# Patient Record
Sex: MALE | Race: WHITE | HISPANIC OR LATINO | Employment: FULL TIME | URBAN - METROPOLITAN AREA
[De-identification: names, ages, dates, MRNs, and addresses within clinical notes are randomized per-mention and may not be internally consistent; named-entity substitution may affect disease eponyms.]

---

## 2017-11-06 ENCOUNTER — HOSPITAL ENCOUNTER (EMERGENCY)
Facility: HOSPITAL | Age: 52
Discharge: HOME | End: 2017-11-06
Attending: EMERGENCY MEDICINE

## 2017-11-06 ENCOUNTER — APPOINTMENT (EMERGENCY)
Dept: RADIOLOGY | Facility: HOSPITAL | Age: 52
End: 2017-11-06

## 2017-11-06 VITALS
DIASTOLIC BLOOD PRESSURE: 82 MMHG | SYSTOLIC BLOOD PRESSURE: 141 MMHG | WEIGHT: 228 LBS | HEIGHT: 71 IN | RESPIRATION RATE: 19 BRPM | TEMPERATURE: 97.9 F | BODY MASS INDEX: 31.92 KG/M2 | OXYGEN SATURATION: 98 % | HEART RATE: 66 BPM
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
## 2017-11-06 DIAGNOSIS — S39.012A ACUTE MYOFASCIAL STRAIN OF LUMBAR REGION, INITIAL ENCOUNTER: ICD-10-CM

## 2017-11-06 PROCEDURE — 99284 EMERGENCY DEPT VISIT MOD MDM: CPT

## 2017-11-06 PROCEDURE — 72100 X-RAY EXAM L-S SPINE 2/3 VWS: CPT

## 2017-11-06 RX ORDER — METHOCARBAMOL 750 MG/1
750 TABLET, FILM COATED ORAL EVERY 6 HOURS PRN
Qty: 12 TABLET | Refills: 0 | Status: SHIPPED | OUTPATIENT
Start: 2017-11-06 | End: 2018-11-19 | Stop reason: HOSPADM

## 2017-11-06 RX ORDER — METHOCARBAMOL 500 MG/1
1000 TABLET, FILM COATED ORAL ONCE
Status: COMPLETED | OUTPATIENT
Start: 2017-11-06 | End: 2017-11-06

## 2017-11-06 RX ORDER — NAPROXEN 500 MG/1
500 TABLET ORAL 2 TIMES DAILY PRN
Qty: 14 TABLET | Refills: 0 | Status: SHIPPED | OUTPATIENT
Start: 2017-11-06 | End: 2018-11-19 | Stop reason: HOSPADM

## 2017-11-06 RX ORDER — IBUPROFEN 600 MG/1
600 TABLET ORAL ONCE
Status: COMPLETED | OUTPATIENT
Start: 2017-11-06 | End: 2017-11-06

## 2017-11-06 RX ADMIN — IBUPROFEN 600 MG: 600 TABLET, FILM COATED ORAL at 06:38

## 2017-11-06 RX ADMIN — METHOCARBAMOL 1000 MG: 500 TABLET ORAL at 06:38
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
## 2018-11-19 ENCOUNTER — APPOINTMENT (EMERGENCY)
Dept: RADIOLOGY | Facility: HOSPITAL | Age: 53
End: 2018-11-19

## 2018-11-19 ENCOUNTER — HOSPITAL ENCOUNTER (EMERGENCY)
Facility: HOSPITAL | Age: 53
Discharge: HOME | End: 2018-11-19
Attending: EMERGENCY MEDICINE | Admitting: EMERGENCY MEDICINE

## 2018-11-19 VITALS
SYSTOLIC BLOOD PRESSURE: 148 MMHG | DIASTOLIC BLOOD PRESSURE: 93 MMHG | WEIGHT: 225 LBS | TEMPERATURE: 98.8 F | OXYGEN SATURATION: 99 % | HEART RATE: 87 BPM | RESPIRATION RATE: 16 BRPM | BODY MASS INDEX: 31.38 KG/M2

## 2018-11-19 DIAGNOSIS — S83.90XA KNEE SPRAIN: Primary | ICD-10-CM

## 2018-11-19 PROCEDURE — 73562 X-RAY EXAM OF KNEE 3: CPT

## 2018-11-19 PROCEDURE — 99283 EMERGENCY DEPT VISIT LOW MDM: CPT

## 2018-11-19 RX ORDER — IBUPROFEN 600 MG/1
600 TABLET ORAL EVERY 6 HOURS PRN
Qty: 30 TABLET | Refills: 0 | Status: SHIPPED | OUTPATIENT
Start: 2018-11-19 | End: 2019-02-04

## 2018-11-19 NOTE — DISCHARGE INSTRUCTIONS
Knee Sprain   WHAT YOU NEED TO KNOW:   A knee sprain occurs when one or more ligaments in your knee are suddenly stretched or torn  Ligaments are tissues that hold bones together  Ligaments support the knee and keep the joint and bones in the correct position  DISCHARGE INSTRUCTIONS:   Seek care immediately if:   · Any part of your leg feels cold, numb, or looks pale     Contact your healthcare provider if:   · You have new or increased swelling, bruising, or pain in your knee  · Your symptoms do not improve within 6 weeks, even with treatment  · You have questions or concerns about your condition or care  Medicines:   · NSAIDs , such as ibuprofen, help decrease swelling, pain, and fever  This medicine is available with or without a doctor's order  NSAIDs can cause stomach bleeding or kidney problems in certain people  If you take blood thinner medicine, always ask your healthcare provider if NSAIDs are safe for you  Always read the medicine label and follow directions  · Acetaminophen  decreases pain and fever  It is available without a doctor's order  Ask how much to take and how often to take it  Follow directions  Read the labels of all other medicines you are using to see if they also contain acetaminophen, or ask your doctor or pharmacist  Acetaminophen can cause liver damage if not taken correctly  Do not use more than 4 grams (4,000 milligrams) total of acetaminophen in one day  · Prescription pain medicine  may be given  Ask how to take this medicine safely  · Take your medicine as directed  Contact your healthcare provider if you think your medicine is not helping or if you have side effects  Tell him or her if you are allergic to any medicine  Keep a list of the medicines, vitamins, and herbs you take  Include the amounts, and when and why you take them  Bring the list or the pill bottles to follow-up visits  Carry your medicine list with you in case of an emergency    Self-care: · Rest  your knee and do not exercise  You may be told to keep weight off your knee  This means that you should not walk on your injured leg  Rest helps decrease swelling and allows the injury to heal  You can do gentle range of motion (ROM) exercises as directed  This will prevent stiffness  · Apply ice  on your knee for 15 to 20 minutes every hour or as directed  Use an ice pack, or put crushed ice in a plastic bag  Cover it with a towel  Ice helps prevent tissue damage and decreases swelling and pain  · Apply compression to your knee as directed  You may need to wear an elastic bandage  This helps keep your injured knee from moving too much while it heals  You can loosen or tighten the elastic bandage to make it comfortable  It should be tight enough for you to feel support  It should not be so tight that it causes your toes to feel numb or tingly  If you are wearing an elastic bandage, take it off and rewrap it once a day  · Elevate your knee  above the level of your heart as often as you can  This will help decrease swelling and pain  Prop your leg on pillows or blankets to keep it elevated comfortably  Do not put pillows directly behind your knee  · Use support devices as directed:  Support devices such as a splint or brace may be needed  These devices limit movement and protect your joint while it heals  You may be given crutches to use until you can stand on your injured leg without pain  Use devices as directed  Physical therapy:  A physical therapist teaches you exercises to help improve movement and strength, and to decrease pain  Prevent another knee sprain:  Exercise your legs to keep your muscles strong  Strong leg muscles help protect your knee and prevent strain  The following may also prevent a knee sprain:  · Slowly start your exercise or training program   Slowly increase the time, distance, and intensity of your exercise   Sudden increases in training may cause you to injure your knee again  · Wear protective braces and equipment as directed  Braces may prevent your knee from moving the wrong way and causing another sprain  Protective equipment may support your bones and ligaments to prevent injury  · Warm up and stretch before exercise  Warm up by walking or using an exercise bike before starting your regular exercise  Do gentle stretches after warming up  This helps to loosen your muscles and decrease stress on your knee  Cool down and stretch after you exercise  · Wear shoes that fit correctly and support your feet  Replace your running or exercise shoes before the padding or shock absorption is worn out  Ask your healthcare provider which exercise shoes are best for you  Ask if you should wear special shoe inserts  Shoe inserts can help support your heels and arches or keep your foot lined up correctly in your shoes  Exercise on flat surfaces  Follow up with your healthcare provider as directed:  Write down your questions so you remember to ask them during your visits  © 2017 [address] [employer] Information is for End User's use only and may not be sold, redistributed or otherwise used for commercial purposes  All illustrations and images included in CareNotes® are the copyrighted property of [employer]  or Khalif Alvarado  The above information is an  only  It is not intended as medical advice for individual conditions or treatments  Talk to your doctor, nurse or pharmacist before following any medical regimen to see if it is safe and effective for you

## 2018-11-19 NOTE — ED PROVIDER NOTES
History  Chief Complaint   Patient presents with    Knee Pain     left knee pain for about a week  no specific injury       History provided by:  Patient   used: No    Medical Problem   Location:  Left knee pain for 2 weeks  Severity:  Mild  Onset quality:  Gradual  Duration:  2 weeks  Progression:  Unchanged  Chronicity:  New  Associated symptoms: no abdominal pain, no chest pain, no congestion, no cough, no diarrhea, no ear pain, no fatigue, no fever, no headaches, no loss of consciousness, no myalgias, no nausea, no rash, no rhinorrhea, no shortness of breath, no sore throat, no vomiting and no wheezing        Prior to Admission Medications   Prescriptions Last Dose Informant Patient Reported? Taking?   insulin aspart (NovoLOG) 100 units/mL injection Not Taking at Unknown time  Yes No   Sig: Inject under the skin 3 (three) times a day before meals      Facility-Administered Medications: None       Past Medical History:   Diagnosis Date    Carpal tunnel syndrome     Diabetes mellitus ([facility] [address] )     Umbilical hernia        Past Surgical History:   Procedure Laterality Date    HAND SURGERY      UMBILICAL HERNIA REPAIR         History reviewed  No pertinent family history  I have reviewed and agree with the history as documented  Social History   Substance Use Topics    Smoking status: Current Every Day Smoker     Packs/day: 0 25    Smokeless tobacco: Never Used    Alcohol use Yes      Comment: rarely        Review of Systems   Constitutional: Negative  Negative for fatigue and fever  HENT: Negative  Negative for congestion, ear pain, rhinorrhea and sore throat  Eyes: Negative  Respiratory: Negative  Negative for cough, shortness of breath and wheezing  Cardiovascular: Negative for chest pain  Gastrointestinal: Negative  Negative for abdominal pain, diarrhea, nausea and vomiting  Endocrine: Negative  Genitourinary: Negative  Musculoskeletal: Negative    Negative for myalgias  Skin: Negative  Negative for rash  Allergic/Immunologic: Negative  Neurological: Negative  Negative for loss of consciousness and headaches  Hematological: Negative  Psychiatric/Behavioral: Negative  All other systems reviewed and are negative  Physical Exam  Physical Exam   Constitutional: He appears well-developed and well-nourished  HENT:   Head: Normocephalic  Right Ear: External ear normal    Left Ear: External ear normal    Nose: Nose normal    Mouth/Throat: Oropharynx is clear and moist    Eyes: Pupils are equal, round, and reactive to light  Conjunctivae and EOM are normal    Neck: Normal range of motion  Neck supple  Cardiovascular: Normal rate, regular rhythm and normal heart sounds  Pulmonary/Chest: Effort normal and breath sounds normal    Abdominal: Soft  Bowel sounds are normal    Musculoskeletal: Normal range of motion  Left knee  From +crepitace no ballotment  Minor anterior meniscal tenderness   No ant/post drawer no valgus no varus    Neurological: He is alert  Skin: Skin is warm  Psychiatric: He has a normal mood and affect  His behavior is normal    Nursing note and vitals reviewed  Vital Signs  ED Triage Vitals [11/19/18 1313]   Temperature Pulse Respirations Blood Pressure SpO2   98 8 °F (37 1 °C) 87 16 148/93 99 %      Temp Source Heart Rate Source Patient Position - Orthostatic VS BP Location FiO2 (%)   Tympanic -- Sitting Left arm --      Pain Score       --           Vitals:    11/19/18 1313   BP: 148/93   Pulse: 87   Patient Position - Orthostatic VS: Sitting       Visual Acuity      ED Medications  Medications - No data to display    Diagnostic Studies  Results Reviewed     None                 XR knee 3 vw left non injury   Final Result by Yasmany Cullen MD (11/19 9002)         1  No acute osseous abnormality  2   Findings suggestive of old medial collateral ligament injury (Akin-Stieda)  3   Small joint effusion  Workstation performed: [id number]                    Procedures  Procedures       Phone Contacts  ED Phone Contact    ED Course                               MDM  CritCare Time    Disposition  Final diagnoses:   Knee sprain     Time reflects when diagnosis was documented in both MDM as applicable and the Disposition within this note     Time User Action Codes Description Comment    11/19/2018  2:27 PM Hanane Enriquez, [address] Knee sprain       ED Disposition     ED Disposition Condition Comment    Discharge  Marlon Crimes discharge to home/self care  Condition at discharge: Good        Follow-up Information     Follow up With Specialties Details Why Contact Info Additional [phone] [facility] Schedule an appointment as soon as possible for a visit  [address] [facility] [place], [place], [address]          Discharge Medication List as of 11/19/2018  2:29 PM      START taking these medications    Details   ibuprofen (MOTRIN) 600 mg tablet Take 1 tablet (600 mg total) by mouth every 6 (six) hours as needed (pain), Starting Mon 11/19/2018, Print         CONTINUE these medications which have NOT CHANGED    Details   insulin aspart (NovoLOG) 100 units/mL injection Inject under the skin 3 (three) times a day before meals, Historical Med      methocarbamol (ROBAXIN) 750 mg tablet Take 1 tablet by mouth every 6 (six) hours as needed for muscle spasms, Starting Mon 11/6/2017, Print      naproxen (NAPROSYN) 500 mg tablet Take 1 tablet by mouth 2 (two) times a day as needed for moderate pain, Starting Mon 11/6/2017, Print           No discharge procedures on file      ED Provider  Electronically Signed by           Imani Gregg PA-C  11/19/18 3731

## 2018-12-03 ENCOUNTER — APPOINTMENT (EMERGENCY)
Dept: RADIOLOGY | Facility: HOSPITAL | Age: 53
End: 2018-12-03

## 2018-12-03 ENCOUNTER — HOSPITAL ENCOUNTER (EMERGENCY)
Facility: HOSPITAL | Age: 53
Discharge: HOME | End: 2018-12-03
Attending: EMERGENCY MEDICINE

## 2018-12-03 ENCOUNTER — APPOINTMENT (EMERGENCY)
Dept: CT IMAGING | Facility: HOSPITAL | Age: 53
End: 2018-12-03

## 2018-12-03 VITALS
HEART RATE: 77 BPM | TEMPERATURE: 97 F | SYSTOLIC BLOOD PRESSURE: 158 MMHG | OXYGEN SATURATION: 98 % | RESPIRATION RATE: 18 BRPM | DIASTOLIC BLOOD PRESSURE: 97 MMHG | WEIGHT: 227.29 LBS | BODY MASS INDEX: 31.7 KG/M2

## 2018-12-03 DIAGNOSIS — S32.2XXA CLOSED FRACTURE OF COCCYX, INITIAL ENCOUNTER (HCC): Primary | ICD-10-CM
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
## 2018-12-03 DIAGNOSIS — S09.90XA INJURY OF HEAD, INITIAL ENCOUNTER: ICD-10-CM

## 2018-12-03 LAB — GLUCOSE SERPL-MCNC: 95 MG/DL (ref 70–99)

## 2018-12-03 PROCEDURE — 72220 X-RAY EXAM SACRUM TAILBONE: CPT

## 2018-12-03 PROCEDURE — 70450 CT HEAD/BRAIN W/O DYE: CPT

## 2018-12-03 PROCEDURE — 99284 EMERGENCY DEPT VISIT MOD MDM: CPT

## 2018-12-03 PROCEDURE — 96372 THER/PROPH/DIAG INJ SC/IM: CPT

## 2018-12-03 PROCEDURE — 82948 REAGENT STRIP/BLOOD GLUCOSE: CPT

## 2018-12-03 RX ORDER — DOCUSATE SODIUM 100 MG/1
100 CAPSULE, LIQUID FILLED ORAL EVERY 12 HOURS
Qty: 60 CAPSULE | Refills: 0 | Status: SHIPPED | OUTPATIENT
Start: 2018-12-03 | End: 2019-02-04

## 2018-12-03 RX ORDER — ACETAMINOPHEN AND CODEINE PHOSPHATE 300; 30 MG/1; MG/1
1-2 TABLET ORAL EVERY 6 HOURS PRN
Qty: 15 TABLET | Refills: 0 | Status: SHIPPED | OUTPATIENT
Start: 2018-12-03 | End: 2018-12-13

## 2018-12-03 RX ORDER — KETOROLAC TROMETHAMINE 30 MG/ML
15 INJECTION, SOLUTION INTRAMUSCULAR; INTRAVENOUS ONCE
Status: COMPLETED | OUTPATIENT
Start: 2018-12-03 | End: 2018-12-03

## 2018-12-03 RX ORDER — NAPROXEN 500 MG/1
500 TABLET ORAL 2 TIMES DAILY WITH MEALS
Qty: 30 TABLET | Refills: 0 | Status: SHIPPED | OUTPATIENT
Start: 2018-12-03 | End: 2019-02-04

## 2018-12-03 RX ADMIN — KETOROLAC TROMETHAMINE 15 MG: 30 INJECTION, SOLUTION INTRAMUSCULAR; INTRAVENOUS at 13:00

## 2018-12-03 NOTE — DISCHARGE INSTRUCTIONS
Coccyx Injury   WHAT YOU NEED TO KNOW:   A coccyx (tailbone) injury is when your coccyx breaks, dislocates, or is not stable  The coccyx is a small bone shaped like a triangle that forms the bottom of your spine  DISCHARGE INSTRUCTIONS:   Medicines: You may need any of the following:  · NSAIDs  decrease swelling and pain or fever  This medicine can be bought with or without a doctor's order  This medicine can cause stomach bleeding or kidney problems in certain people  If you take blood thinner medicine, always ask your healthcare provider if NSAIDs are safe for you  Always read the medicine label and follow the directions on it before using this medicine  · Prescription pain medicine  may be given to decrease pain  Do not wait until the pain is severe before you take this medicine  · A bowel movement softener  makes it easier and less painful for you to have a bowel movement  · Take your medicine as directed  Contact your healthcare provider if you think your medicine is not helping or if you have side effects  Tell him if you are allergic to any medicine  Keep a list of the medicines, vitamins, and herbs you take  Include the amounts, and when and why you take them  Bring the list or the pill bottles to follow-up visits  Carry your medicine list with you in case of an emergency  Self-care:   · Use a donut-shaped cushion  to decrease pain and support your coccyx when you sit  · Ice  helps decrease swelling and pain  Ice may also help prevent tissue damage  Use an ice pack, or put crushed ice in a plastic bag  Cover it with a towel and place it on your coccyx for 15 to 20 minutes every hour or as directed  · Sleep  on a firm mattress  Place a pillow under your knees if you sleep on your back  Or, sleep on your side with a pillow between your knees  This will decrease pain and tension in your coccyx and back    Follow up with your healthcare provider as directed:  Write down your questions so you remember to ask them during your visits  Contact your healthcare provider if:   · You have trouble urinating or having a bowel movement  · Your pain or swelling get worse or do not go away with treatment  · You have a fever  · You have questions or concerns about your condition or care  Return to the emergency department if:   · You have trouble breathing  · You cannot move your legs  · Your legs suddenly go numb  · You have severe pain  © 2017 [address] [facility] Information is for End User's use only and may not be sold, redistributed or otherwise used for commercial purposes  All illustrations and images included in CareNotes® are the copyrighted property of A D A M [employer] Inc  or [employer]var[employer]  The above information is an  only  It is not intended as medical advice for individual conditions or treatments  Talk to your doctor, nurse or pharmacist before following any medical regimen to see if it is safe and effective for you  Head Injury   WHAT YOU NEED TO KNOW:   A head injury is most often caused by a blow to the head  This may occur from a fall, bicycle injury, sports injury, being struck in the head, or a motor vehicle accident  DISCHARGE INSTRUCTIONS:   Call 911 or have someone else call for any of the following:   · You cannot be woken  · You have a seizure  · You stop responding to others or you faint  · You have blurry or double vision  · Your speech becomes slurred or confused  · You have arm or leg weakness, loss of feeling, or new problems with coordination  · Your pupils are larger than usual or one pupil is a different size than the other  · You have blood or clear fluid coming out of your ears or nose  Return to the emergency department if:   · You have repeated or forceful vomiting  · You feel confused  · Your headache gets worse or becomes severe      · You or someone caring for you notices that you are harder to wake than usual   Contact your healthcare provider if:   · Your symptoms last longer than 6 weeks after the injury  · You have questions or concerns about your condition or care  Medicines:   · Acetaminophen  decreases pain  Acetaminophen is available without a doctor's order  Ask how much to take and how often to take it  Follow directions  Acetaminophen can cause liver damage if not taken correctly  · Take your medicine as directed  Contact your healthcare provider if you think your medicine is not helping or if you have side effects  Tell him or her if you are allergic to any medicine  Keep a list of the medicines, vitamins, and herbs you take  Include the amounts, and when and why you take them  Bring the list or the pill bottles to follow-up visits  Carry your medicine list with you in case of an emergency  Self-care:   · Rest  or do quiet activities for 24 to 48 hours  Limit your time watching TV, using the computer, or doing tasks that require a lot of thinking  Slowly return to your normal activities as directed  Do not play sports or do activities that may cause you to get hit in the head  Ask your healthcare provider when you can return to sports  · Apply ice  on your head for 15 to 20 minutes every hour or as directed  Use an ice pack, or put crushed ice in a plastic bag  Cover it with a towel before you apply it to your skin  Ice helps prevent tissue damage and decreases swelling and pain  · Have someone stay with you for 24 hours  or as directed  This person can monitor you for complications and call [phone]  When you are awake the person should ask you a few questions to see if you are thinking clearly  An example would be to ask your name or your address  Prevent another head injury:   · Wear a helmet that fits properly  Do this when you play sports, or ride a bike, scooter, or skateboard  Helmets help decrease your risk of a serious head injury   Talk to your healthcare provider about other ways you can protect yourself if you play sports  · Wear your seat belt every time you are in a car  This helps to decrease your risk for a head injury if you are in a car accident  Follow up with your healthcare provider as directed:  Write down your questions so you remember to ask them during your visits  © 2017 [address] [employer] Information is for End User's use only and may not be sold, redistributed or otherwise used for commercial purposes  All illustrations and images included in CareNotes® are the copyrighted property of [employer]  or Khalif Alvarado  The above information is an  only  It is not intended as medical advice for individual conditions or treatments  Talk to your doctor, nurse or pharmacist before following any medical regimen to see if it is safe and effective for you

## 2018-12-03 NOTE — ED PROVIDER NOTES
History  Chief Complaint   Patient presents with    Fall     Patient was walking into house and slipped falling onto his tailbone  Fall happened on Friday night  No LOC  Having pain more on left side of tailbone and pain going down right leg with movement  41-year-old male presenting after fall 3 days ago  Patient reports he was drinking alcohol 3 days ago when he started feeling dizzy and he when outside  All he was coming back into the house patient states he tripped and fell backward  He does admit to hitting his head but no LOC  He reports taking tylenol at home with only minimal relief  He denies any blurry vision, dizziness, loss of vision, diplopia, numnbess, tingling or weakness currently  He denies any neck or back pain  He denies any dysuria, hematuria, bowel or bladder dysfunction, saddle anesthesia after the fall  Prior to Admission Medications   Prescriptions Last Dose Informant Patient Reported? Taking?   ibuprofen (MOTRIN) 600 mg tablet   No No   Sig: Take 1 tablet (600 mg total) by mouth every 6 (six) hours as needed (pain)   insulin aspart (NovoLOG) 100 units/mL injection   Yes No   Sig: Inject under the skin 3 (three) times a day before meals      Facility-Administered Medications: None       Past Medical History:   Diagnosis Date    Carpal tunnel syndrome     Chronic pain     left arm    Diabetes mellitus ([facility] [address] )     Hypertension     Umbilical hernia        Past Surgical History:   Procedure Laterality Date    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    HAND SURGERY      UMBILICAL HERNIA REPAIR         History reviewed  No pertinent family history  I have reviewed and agree with the history as documented  Social History   Substance Use Topics    Smoking status: Current Every Day Smoker     Packs/day: 0 25    Smokeless tobacco: Never Used    Alcohol use Yes      Comment: rarely        Review of Systems   All other systems reviewed and are negative        Physical Exam  Physical Exam   Constitutional: He is oriented to person, place, and time  He appears well-developed and well-nourished  No distress  HENT:   Head: Normocephalic and atraumatic  Eyes: Conjunctivae are normal    EOM grossly intact   Neck: Normal range of motion  Neck supple  No JVD present  Cardiovascular: Normal rate  Pulmonary/Chest: Effort normal    Abdominal: Soft  Musculoskeletal:        Arms:       Legs:  FROM, steady gait, cap refill brisk, strength and sensation grossly intact throughout   Neurological: He is alert and oriented to person, place, and time  Skin: Skin is warm and dry  Capillary refill takes less than 2 seconds  Psychiatric: He has a normal mood and affect  His behavior is normal    Nursing note and vitals reviewed  Vital Signs  ED Triage Vitals [12/03/18 1112]   Temperature Pulse Respirations Blood Pressure SpO2   (!) 97 °F (36 1 °C) 77 18 158/97 98 %      Temp Source Heart Rate Source Patient Position - Orthostatic VS BP Location FiO2 (%)   Tympanic Monitor Sitting Left arm --      Pain Score       6           Vitals:    12/03/18 1112   BP: 158/97   Pulse: 77   Patient Position - Orthostatic VS: Sitting       Visual Acuity      ED Medications  Medications   ketorolac (TORADOL) injection 15 mg (not administered)       Diagnostic Studies  Results Reviewed     Procedure Component Value Units Date/Time    Fingerstick Glucose (POCT) [[id number]]  (Normal) Collected:  12/03/18 1109    Lab Status:  Final result Updated:  12/03/18 1120     POC Glucose 95 mg/dl                  CT head without contrast   Final Result by Dominic Bermudez MD (12/03 1249)      No acute intracranial abnormality  Workstation performed: [id number]         XR sacrum and coccyx   Final Result by Dominic Bermudez MD (12/03 1224)      Mid coccygeal fracture           Workstation performed: [id number]                    Procedures  Procedures       Phone Contacts  ED Phone Contact    ED Course  ED Course as of Dec 03 1255   Mon Dec 03, 2018   1229 Spoke with pt about results of xray, will await CT head                                MDM  Number of Diagnoses or Management Options  Diagnosis management comments: 71-year-old male presenting 3 days status post fall, patient did have a coccygeal fracture on x-ray, CT head was negative, will send home with rx for analgesia, advised to buy a donut to sit on at home for comfort, will provide information to f/u with pcp and ortho outpatient    All imaging discussed with patient, strict return to ED precautions discussed  Pt verbalizes understanding and agrees with plan  Pt is stable for discharge    Portions of the record may have been created with voice recognition software  Occasional wrong word or "sound a like" substitutions may have occurred due to the inherent limitations of voice recognition software  Read the chart carefully and recognize, using context, where substitutions have occurred  CritCare Time    Disposition  Final diagnoses:   Closed fracture of coccyx, initial encounter [facility])   Fall, initial encounter   Injury of head, initial encounter     Time reflects when diagnosis was documented in both MDM as applicable and the Disposition within this note     Time User Action Codes Description Comment    12/3/2018 12:53 PM Ivonne Meckel Add [S32  2XXA] Closed fracture of coccyx, initial encounter ([facility] [address] [facility]     12/3/2018 12:54 PM Ivonne Meckel Add [A69  PJPI] Fall, initial encounter     12/3/2018 12:54 PM Ebbie Common C Add [S09 90XA] Injury of head, initial encounter       ED Disposition     ED Disposition Condition Comment    Discharge  Lina Fought discharge to home/self care      Condition at discharge: Good        Follow-up Information     Follow up With Specialties Details Why Contact Info Additional Information    Ti Morrissey MD Family Medicine Schedule an appointment as soon as possible for a visit As needed 21 [phone] [address] [facility] [id number] [address] [facility] Orthopedic Surgery Schedule an appointment as soon as possible for a visit As needed [facility] [phone] [address], [address]          Patient's Medications   Discharge Prescriptions    ACETAMINOPHEN-CODEINE (TYLENOL #3) 300-30 MG PER TABLET    Take 1-2 tablets by mouth every 6 (six) hours as needed for moderate pain for up to 10 days       Start Date: 12/3/2018 End Date: 12/13/2018       Order Dose: 1-2 tablets       Quantity: 15 tablet    Refills: 0    DOCUSATE SODIUM (COLACE) 100 MG CAPSULE    Take 1 capsule (100 mg total) by mouth every 12 (twelve) hours       Start Date: 12/3/2018 End Date: --       Order Dose: 100 mg       Quantity: 60 capsule    Refills: 0    NAPROXEN (NAPROSYN) 500 MG TABLET    Take 1 tablet (500 mg total) by mouth 2 (two) times a day with meals       Start Date: 12/3/2018 End Date: --       Order Dose: 500 mg       Quantity: 30 tablet    Refills: 0     No discharge procedures on file      ED Provider  Electronically Signed by           Danny Sadler PA-C  12/03/18 7044

## 2018-12-06 ENCOUNTER — APPOINTMENT (EMERGENCY)
Dept: RADIOLOGY | Facility: HOSPITAL | Age: 53
End: 2018-12-06

## 2018-12-06 ENCOUNTER — HOSPITAL ENCOUNTER (EMERGENCY)
Facility: HOSPITAL | Age: 53
Discharge: HOME | End: 2018-12-06
Attending: EMERGENCY MEDICINE | Admitting: EMERGENCY MEDICINE

## 2018-12-06 VITALS
BODY MASS INDEX: 31.85 KG/M2 | DIASTOLIC BLOOD PRESSURE: 99 MMHG | OXYGEN SATURATION: 99 % | RESPIRATION RATE: 18 BRPM | SYSTOLIC BLOOD PRESSURE: 147 MMHG | HEART RATE: 74 BPM | TEMPERATURE: 97.8 F | WEIGHT: 228.4 LBS

## 2018-12-06 DIAGNOSIS — J40 BRONCHITIS: Primary | ICD-10-CM

## 2018-12-06 DIAGNOSIS — J45.909 REACTIVE AIRWAY DISEASE: ICD-10-CM

## 2018-12-06 LAB
ANION GAP SERPL CALCULATED.3IONS-SCNC: 7 MMOL/L (ref 5–14)
APTT PPP: 28 SECONDS (ref 23–34)
BASOPHILS # BLD AUTO: 0 THOUSANDS/ΜL (ref 0–0.1)
BASOPHILS NFR BLD AUTO: 0 % (ref 0–1)
BUN SERPL-MCNC: 16 MG/DL (ref 5–25)
CALCIUM SERPL-MCNC: 9.4 MG/DL (ref 8.4–10.2)
CHLORIDE SERPL-SCNC: 102 MMOL/L (ref 97–108)
CO2 SERPL-SCNC: 28 MMOL/L (ref 22–30)
CREAT SERPL-MCNC: 0.81 MG/DL (ref 0.7–1.5)
EOSINOPHIL # BLD AUTO: 0.1 THOUSAND/ΜL (ref 0–0.4)
EOSINOPHIL NFR BLD AUTO: 2 % (ref 0–6)
ERYTHROCYTE [DISTWIDTH] IN BLOOD BY AUTOMATED COUNT: 13.9 %
GFR SERPL CREATININE-BSD FRML MDRD: 103 ML/MIN/1.73SQ M
GLUCOSE SERPL-MCNC: 129 MG/DL (ref 70–99)
HCT VFR BLD AUTO: 47.5 % (ref 41–53)
HGB BLD-MCNC: 15.2 G/DL (ref 13.5–17.5)
INR PPP: 0.99 (ref 0.89–1.1)
LIPASE SERPL-CCNC: 95 U/L (ref 23–300)
LYMPHOCYTES # BLD AUTO: 1.3 THOUSANDS/ΜL (ref 0.5–4)
LYMPHOCYTES NFR BLD AUTO: 24 % (ref 25–45)
MCH RBC QN AUTO: 28 PG (ref 26–34)
MCHC RBC AUTO-ENTMCNC: 32.1 G/DL (ref 31–36)
MCV RBC AUTO: 87 FL (ref 80–100)
MONOCYTES # BLD AUTO: 0.5 THOUSAND/ΜL (ref 0.2–0.9)
MONOCYTES NFR BLD AUTO: 9 % (ref 1–10)
NEUTROPHILS # BLD AUTO: 3.6 THOUSANDS/ΜL (ref 1.8–7.8)
NEUTS SEG NFR BLD AUTO: 65 % (ref 45–65)
NT-PROBNP SERPL-MCNC: 109 PG/ML (ref 0–299)
PLATELET # BLD AUTO: 218 THOUSANDS/UL (ref 150–450)
PMV BLD AUTO: 8.2 FL (ref 8.9–12.7)
POTASSIUM SERPL-SCNC: 4.3 MMOL/L (ref 3.6–5)
PROTHROMBIN TIME: 10.5 SECONDS (ref 9.5–11.6)
RBC # BLD AUTO: 5.44 MILLION/UL (ref 4.5–5.9)
SODIUM SERPL-SCNC: 137 MMOL/L (ref 137–147)
TROPONIN I SERPL-MCNC: <0.01 NG/ML (ref 0–0.03)
WBC # BLD AUTO: 5.6 THOUSAND/UL (ref 4.5–11)

## 2018-12-06 PROCEDURE — 83690 ASSAY OF LIPASE: CPT | Performed by: EMERGENCY MEDICINE

## 2018-12-06 PROCEDURE — 83880 ASSAY OF NATRIURETIC PEPTIDE: CPT | Performed by: EMERGENCY MEDICINE

## 2018-12-06 PROCEDURE — 93005 ELECTROCARDIOGRAM TRACING: CPT

## 2018-12-06 PROCEDURE — 85610 PROTHROMBIN TIME: CPT | Performed by: EMERGENCY MEDICINE

## 2018-12-06 PROCEDURE — 84484 ASSAY OF TROPONIN QUANT: CPT | Performed by: EMERGENCY MEDICINE

## 2018-12-06 PROCEDURE — 85025 COMPLETE CBC W/AUTO DIFF WBC: CPT | Performed by: EMERGENCY MEDICINE

## 2018-12-06 PROCEDURE — 85730 THROMBOPLASTIN TIME PARTIAL: CPT | Performed by: EMERGENCY MEDICINE

## 2018-12-06 PROCEDURE — 80048 BASIC METABOLIC PNL TOTAL CA: CPT | Performed by: EMERGENCY MEDICINE

## 2018-12-06 PROCEDURE — 99285 EMERGENCY DEPT VISIT HI MDM: CPT

## 2018-12-06 PROCEDURE — 71045 X-RAY EXAM CHEST 1 VIEW: CPT

## 2018-12-06 PROCEDURE — 36415 COLL VENOUS BLD VENIPUNCTURE: CPT | Performed by: EMERGENCY MEDICINE

## 2018-12-06 RX ORDER — DEXTROMETHORPHAN HYDROBROMIDE AND PROMETHAZINE HYDROCHLORIDE 15; 6.25 MG/5ML; MG/5ML
5 SYRUP ORAL 4 TIMES DAILY PRN
Qty: 118 ML | Refills: 0 | Status: SHIPPED | OUTPATIENT
Start: 2018-12-06 | End: 2019-02-04

## 2018-12-06 RX ORDER — ALBUTEROL SULFATE 90 UG/1
1-2 AEROSOL, METERED RESPIRATORY (INHALATION) EVERY 6 HOURS PRN
Qty: 1 INHALER | Refills: 0 | Status: SHIPPED | OUTPATIENT
Start: 2018-12-06 | End: 2019-02-04

## 2018-12-06 NOTE — DISCHARGE INSTRUCTIONS
Asthma, Ambulatory Care   GENERAL INFORMATION:   Asthma  is a lung disease that makes breathing difficult  Chronic inflammation and reactions to triggers narrow the airways in your lungs  Asthma can become life-threatening if it is not managed  Common symptoms include the following:   · Coughing     · Wheezing     · Shortness of breath     · Chest tightness  Seek immediate care for the following symptoms:   · Severe shortness of breath    · Blue or gray lips or nails    · Skin around your neck and ribs pulls in with each breath    · Shortness of breath, even after you take your short-term medicine as directed     · Peak flow numbers in the red zone of your asthma action plan  Treatment for asthma  will depend on how severe it is  Medicine may decrease inflammation, open airways, and make it easier to breathe  Medicines may be inhaled, taken as a pill, or injected  Short-term medicines relieve your symptoms quickly  Long-term medicines are used to prevent future attacks  You may also need medicine to help control your allergies  Manage and prevent future asthma attacks:   · Follow your asthma action pan  This is a written plan that you and your healthcare provider create  It explains which medicine you need and when to change doses if necessary  It also explains how you can monitor symptoms and use a peak flow meter  The meter measures how well your lungs are working  · Manage other health conditions , such as allergies, acid reflux, and sleep apnea  · Identify and avoid triggers  These may include pets, dust mites, mold, and cockroaches  · Do not smoke and avoid others who smoke  If you smoke, it is never too late to quit  Ask your healthcare provider if you need help quitting  · Ask about a flu vaccine  The flu can make your asthma worse  You may need a yearly flu shot  Follow up with your healthcare provider as directed:   You will need to return to make sure your medicine is working and your symptoms are controlled  You may be referred to an asthma or allergy specialist  Mehnaz Brown may be asked to keep a record of your peak flow values and bring it with you to your appointments  Write down your questions so you remember to ask them during your visits  CARE AGREEMENT:   You have the right to help plan your care  Learn about your health condition and how it may be treated  Discuss treatment options with your caregivers to decide what care you want to receive  You always have the right to refuse treatment  The above information is an  only  It is not intended as medical advice for individual conditions or treatments  Talk to your doctor, nurse or pharmacist before following any medical regimen to see if it is safe and effective for you  © 2014 [address] is for End User's use only and may not be sold, redistributed or otherwise used for commercial purposes  All illustrations and images included in CareNotes® are the copyrighted property of A D A M , Inc  or [employer]  Acute Bronchitis   WHAT YOU NEED TO KNOW:   Acute bronchitis is swelling and irritation in the air passages of your lungs  This irritation may cause you to cough or have other breathing problems  Acute bronchitis often starts because of another illness, such as a cold or the flu  The illness spreads from your nose and throat to your windpipe and airways  Bronchitis is often called a chest cold  Acute bronchitis lasts about 3 to 6 weeks and is usually not a serious illness  Your cough can last for several weeks  DISCHARGE INSTRUCTIONS:   Return to the emergency department if:   · You cough up blood  · Your lips or fingernails turn blue  · You feel like you are not getting enough air when you breathe  Contact your healthcare provider if:   · You have a fever  · Your breathing problems do not go away or get worse  · Your cough does not get better within 4 weeks      · You have questions or concerns about your condition or care  Self-care:   · Get more rest   Rest helps your body to heal  Slowly start to do more each day  Rest when you feel it is needed  · Avoid irritants in the air  Avoid chemicals, fumes, and dust  Wear a face mask if you must work around dust or fumes  Stay inside on days when air pollution levels are high  If you have allergies, stay inside when pollen counts are high  Do not use aerosol products, such as spray-on deodorant, bug spray, and hair spray  · Do not smoke or be around others who smoke  Nicotine and other chemicals in cigarettes and cigars damages the cilia that move mucus out of your lungs  Ask your healthcare provider for information if you currently smoke and need help to quit  E-cigarettes or smokeless tobacco still contain nicotine  Talk to your healthcare provider before you use these products  · Drink liquids as directed  Liquids help keep your air passages moist and help you cough up mucus  You may need to drink more liquids when you have acute bronchitis  Ask how much liquid to drink each day and which liquids are best for you  · Use a humidifier or vaporizer  Use a cool mist humidifier or a vaporizer to increase air moisture in your home  This may make it easier for you to breathe and help decrease your cough  Decrease risk for acute bronchitis:   · Get the vaccinations you need  Ask your healthcare provider if you should get vaccinated against the flu or pneumonia  · Prevent the spread of germs  You can decrease your risk of acute bronchitis and other illnesses by doing the following:     [facility] your hands often with soap and water  Carry germ-killing hand lotion or gel with you  You can use the lotion or gel to clean your hands when soap and water are not available      ¨ Do not touch your eyes, nose, or mouth unless you have washed your hands first     ¨ Always cover your mouth when you cough to prevent the spread of germs  It is best to cough into a tissue or your shirt sleeve instead of into your hand  Ask those around you cover their mouths when they cough  ¨ Try to avoid people who have a cold or the flu  If you are sick, stay away from others as much as possible  Medicines: Your healthcare provider may  give you any of the following:  · Ibuprofen or acetaminophen  are medicines that help lower your fever  They are available without a doctor's order  Ask your healthcare provider which medicine is right for you  Ask how much to take and how often to take it  Follow directions  These medicines can cause stomach bleeding if not taken correctly  Ibuprofen can cause kidney damage  Do not take ibuprofen if you have kidney disease, an ulcer, or allergies to aspirin  Acetaminophen can cause liver damage  Do not take more than 4,000 milligrams in 24 hours  · Decongestants  help loosen mucus in your lungs and make it easier to cough up  This can help you breathe easier  · Cough suppressants  decrease your urge to cough  If your cough produces mucus, do not take a cough suppressant unless your healthcare provider tells you to  Your healthcare provider may suggest that you take a cough suppressant at night so you can rest     · Inhalers  may be given  Your healthcare provider may give you one or more inhalers to help you breathe easier and cough less  An inhaler gives your medicine to open your airways  Ask your healthcare provider to show you how to use your inhaler correctly  · Take your medicine as directed  Contact your healthcare provider if you think your medicine is not helping or if you have side effects  Tell him of her if you are allergic to any medicine  Keep a list of the medicines, vitamins, and herbs you take  Include the amounts, and when and why you take them  Bring the list or the pill bottles to follow-up visits  Carry your medicine list with you in case of an emergency    Follow up with your healthcare provider as directed:  Write down questions you have so you will remember to ask them during your follow-up visits  © 2017 [address] [employer] Information is for End User's use only and may not be sold, redistributed or otherwise used for commercial purposes  All illustrations and images included in CareNotes® are the copyrighted property of A D A M [employer]  or [employer]if [employer]varado  The above information is an  only  It is not intended as medical advice for individual conditions or treatments  Talk to your doctor, nurse or pharmacist before following any medical regimen to see if it is safe and effective for you

## 2018-12-06 NOTE — ED PROVIDER NOTES
History  Chief Complaint   Patient presents with    Shortness of Breath     SOB, cough for 2-3 weeks  denies any pain to chest or upper back       History provided by:  Patient  Cough   Cough characteristics:  Non-productive  Sputum characteristics:  Nondescript  Severity:  Moderate  Onset quality:  Gradual  Timing:  Constant  Progression:  Worsening  Chronicity:  New  Relieved by:  Nothing  Worsened by: Activity, deep breathing, environmental changes and exposure to cold air  Ineffective treatments:  None tried  Associated symptoms: wheezing    Associated symptoms: no chest pain, no chills, no fever, no headaches, no myalgias, no rash, no rhinorrhea, no shortness of breath and no sore throat        Prior to Admission Medications   Prescriptions Last Dose Informant Patient Reported? Taking? METFORMIN HCL PO   Yes No   Sig: Take by mouth   acetaminophen-codeine (TYLENOL #3) 300-30 mg per tablet   No No   Sig: Take 1-2 tablets by mouth every 6 (six) hours as needed for moderate pain for up to 10 days   docusate sodium (COLACE) 100 mg capsule   No No   Sig: Take 1 capsule (100 mg total) by mouth every 12 (twelve) hours   ibuprofen (MOTRIN) 600 mg tablet   No No   Sig: Take 1 tablet (600 mg total) by mouth every 6 (six) hours as needed (pain)   insulin aspart (NovoLOG) 100 units/mL injection   Yes No   Sig: Inject under the skin 3 (three) times a day before meals   naproxen (NAPROSYN) 500 mg tablet   No No   Sig: Take 1 tablet (500 mg total) by mouth 2 (two) times a day with meals      Facility-Administered Medications: None       Past Medical History:   Diagnosis Date    Carpal tunnel syndrome     Chronic pain     left arm    Diabetes mellitus ([address] )     Hypertension     Umbilical hernia        Past Surgical History:   Procedure Laterality Date    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    HAND SURGERY      UMBILICAL HERNIA REPAIR         History reviewed  No pertinent family history    I have reviewed and agree with the history as documented  Social History   Substance Use Topics    Smoking status: Current Every Day Smoker     Packs/day: 0 25    Smokeless tobacco: Never Used    Alcohol use Yes      Comment: rarely        Review of Systems   Constitutional: Negative for chills and fever  HENT: Negative for rhinorrhea, sore throat and trouble swallowing  Eyes: Negative for pain  Respiratory: Positive for wheezing  Negative for cough, shortness of breath and stridor  Cardiovascular: Negative for chest pain and leg swelling  Gastrointestinal: Negative for abdominal pain, diarrhea and nausea  Endocrine: Negative for polyuria  Genitourinary: Negative for dysuria, flank pain and urgency  Musculoskeletal: Negative for joint swelling, myalgias and neck stiffness  Skin: Negative for rash  Allergic/Immunologic: Negative for immunocompromised state  Neurological: Negative for dizziness, syncope, weakness, numbness and headaches  Psychiatric/Behavioral: Negative for confusion and suicidal ideas  All other systems reviewed and are negative  Physical Exam  Physical Exam   Constitutional: He is oriented to person, place, and time  He appears well-developed and well-nourished  HENT:   Head: Normocephalic and atraumatic  Eyes: Pupils are equal, round, and reactive to light  EOM are normal    Neck: Normal range of motion  Neck supple  Cardiovascular: Normal rate and regular rhythm  Exam reveals no friction rub  No murmur heard  Pulmonary/Chest: Breath sounds normal  No respiratory distress  He has no wheezes  He has no rales  Abdominal: Soft  Bowel sounds are normal  He exhibits no distension  There is no tenderness  Musculoskeletal: Normal range of motion  He exhibits no edema or tenderness  Neurological: He is alert and oriented to person, place, and time  Skin: Skin is warm  No rash noted  Psychiatric: He has a normal mood and affect     Nursing note and vitals reviewed        Vital Signs  ED Triage Vitals   Temperature Pulse Respirations Blood Pressure SpO2   12/06/18 1143 12/06/18 1143 12/06/18 1143 12/06/18 1143 12/06/18 1143   97 8 °F (36 6 °C) 79 18 131/99 98 %      Temp Source Heart Rate Source Patient Position - Orthostatic VS BP Location FiO2 (%)   12/06/18 1143 12/06/18 1143 12/06/18 1143 12/06/18 1143 --   Tympanic Monitor Sitting Left arm       Pain Score       12/06/18 1250       4           Vitals:    12/06/18 1143 12/06/18 1250   BP: 131/99 147/99   Pulse: 79 74   Patient Position - Orthostatic VS: Sitting Sitting       Visual Acuity      ED Medications  Medications - No data to display    Diagnostic Studies  Results Reviewed     Procedure Component Value Units Date/Time    Troponin I [[id number]]  (Normal) Collected:  12/06/18 1158    Lab Status:  Final result Specimen:  Blood from Arm, Right Updated:  12/06/18 1233     Troponin I <0 01 ng/mL     Protime-INR [[id number]]  (Normal) Collected:  12/06/18 1158    Lab Status:  Final result Specimen:  Blood from Arm, Right Updated:  12/06/18 1231     Protime 10 5 seconds      INR 0 99    Narrative:       INR:  ,PROTIME:      APTT [[id number]]  (Normal) Collected:  12/06/18 1158    Lab Status:  Final result Specimen:  Blood from Arm, Right Updated:  12/06/18 1231     PTT 28 seconds     Narrative:       PTT:      NT-BNP PRO [[id number]]  (Normal) Collected:  12/06/18 1158    Lab Status:  Final result Specimen:  Blood from Arm, Right Updated:  12/06/18 1231     NT-proBNP 109 pg/mL     Lipase [[id number]]  (Normal) Collected:  12/06/18 1158    Lab Status:  Final result Specimen:  Blood from Arm, Right Updated:  12/06/18 1223     Lipase 95 u/L     Basic metabolic panel [[id number]]  (Abnormal) Collected:  12/06/18 1158    Lab Status:  Final result Specimen:  Blood from Arm, Right Updated:  12/06/18 1223     Sodium 137 mmol/L      Potassium 4 3 mmol/L      Chloride 102 mmol/L      CO2 28 mmol/L      ANION GAP 7 mmol/L      BUN 16 mg/dL      Creatinine 0 81 mg/dL      Glucose 129 (H) mg/dL      Calcium 9 4 mg/dL      eGFR 103 ml/min/1 73sq m     Narrative:         National Kidney Disease Education Program recommendations are as follows:  GFR calculation is accurate only with a steady state creatinine  Chronic Kidney disease less than 60 ml/min/1 73 sq  meters  Kidney failure less than 15 ml/min/1 73 sq  meters  CBC and differential [[id number]]  (Abnormal) Collected:  12/06/18 1158    Lab Status:  Final result Specimen:  Blood from Arm, Right Updated:  12/06/18 1215     WBC 5 60 Thousand/uL      RBC 5 44 Million/uL      Hemoglobin 15 2 g/dL      Hematocrit 47 5 %      MCV 87 fL      MCH 28 0 pg      MCHC 32 1 g/dL      RDW 13 9 %      MPV 8 2 (L) fL      Platelets 485 Thousands/uL      Neutrophils Relative 65 %      Lymphocytes Relative 24 (L) %      Monocytes Relative 9 %      Eosinophils Relative 2 %      Basophils Relative 0 %      Neutrophils Absolute 3 60 Thousands/µL      Lymphocytes Absolute 1 30 Thousands/µL      Monocytes Absolute 0 50 Thousand/µL      Eosinophils Absolute 0 10 Thousand/µL      Basophils Absolute 0 00 Thousands/µL                  XR chest portable   Final Result by Shavon Carvajal DO (12/06 1240)   No acute cardiopulmonary disease              Workstation performed: [id number]                    Procedures  ECG 12 Lead Documentation  Date/Time: 12/6/2018 12:44 PM  Performed by: Addison Murry by: Aaron Tom     ECG reviewed by me, the ED Provider: yes    Patient location:  ED  Previous ECG:     Previous ECG:  Compared to current    Similarity:  No change  Interpretation:     Interpretation: normal    Rate:     ECG rate assessment: normal    Rhythm:     Rhythm: sinus rhythm    Ectopy:     Ectopy: none    QRS:     QRS axis:  Normal    QRS intervals:  Normal  Conduction:     Conduction: normal    ST segments:     ST segments:  Normal  T waves:     T waves: normal             Phone Contacts  ED Phone Contact    ED Course                               MDM  Number of Diagnoses or Management Options  Bronchitis: new and requires workup  Reactive airway disease: new and requires workup     Amount and/or Complexity of Data Reviewed  Clinical lab tests: reviewed and ordered  Tests in the radiology section of CPT®: ordered and reviewed  Review and summarize past medical records: yes  Independent visualization of images, tracings, or specimens: yes (All labs reviewed and utilized in the medical decision making process    All radiology studies independently viewed by me and interpreted by the radiologist   )      CritCare Time    Disposition  Final diagnoses:   Bronchitis   Reactive airway disease     Time reflects when diagnosis was documented in both MDM as applicable and the Disposition within this note     Time User Action Codes Description Comment    12/6/2018 12:42 PM Aysha Numbers Add [J40] Bronchitis     12/6/2018 12:44 PM Aysha Numbers Add [J45 909] Reactive airway disease       ED Disposition     ED Disposition Condition Comment    Discharge  Cydney Naif discharge to home/self care      Condition at discharge: Stable        Follow-up Information     Follow up With Specialties Details Why Contact Info    Gene Kitchen MD Family Medicine Call in 2 days If symptoms worsen [id number] [address] [phone]            Discharge Medication List as of 12/6/2018 12:46 PM      START taking these medications    Details   albuterol (PROVENTIL HFA,VENTOLIN HFA) 90 mcg/act inhaler Inhale 1-2 puffs every 6 (six) hours as needed for wheezing, Starting Thu 12/6/2018, Print      promethazine-dextromethorphan (PHENERGAN-DM) 6 25-15 mg/5 mL oral syrup Take 5 mL by mouth 4 (four) times a day as needed for cough, Starting u 12/6/2018, Print         CONTINUE these medications which have NOT CHANGED    Details   METFORMIN HCL PO Take by mouth, Historical Med      acetaminophen-codeine (TYLENOL #3) 300-30 mg per tablet Take 1-2 tablets by mouth every 6 (six) hours as needed for moderate pain for up to 10 days, Starting Mon 12/3/2018, Until u 12/13/2018, Print      docusate sodium (COLACE) 100 mg capsule Take 1 capsule (100 mg total) by mouth every 12 (twelve) hours, Starting Mon 12/3/2018, Print      ibuprofen (MOTRIN) 600 mg tablet Take 1 tablet (600 mg total) by mouth every 6 (six) hours as needed (pain), Starting Mon 11/19/2018, Print      insulin aspart (NovoLOG) 100 units/mL injection Inject under the skin 3 (three) times a day before meals, Historical Med      naproxen (NAPROSYN) 500 mg tablet Take 1 tablet (500 mg total) by mouth 2 (two) times a day with meals, Starting Mon 12/3/2018, Print           No discharge procedures on file      ED Provider  Electronically Signed by           Jose Jacobo DO  12/06/18 1547

## 2018-12-09 LAB
ATRIAL RATE: 63 BPM
P AXIS: 69 DEGREES
PR INTERVAL: 148 MS
QRS AXIS: 27 DEGREES
QRSD INTERVAL: 84 MS
QT INTERVAL: 422 MS
QTC INTERVAL: 431 MS
T WAVE AXIS: 31 DEGREES
VENTRICULAR RATE: 63 BPM

## 2018-12-09 PROCEDURE — 93010 ELECTROCARDIOGRAM REPORT: CPT | Performed by: INTERNAL MEDICINE

## 2018-12-17 ENCOUNTER — APPOINTMENT (EMERGENCY)
Dept: CT IMAGING | Facility: HOSPITAL | Age: 53
End: 2018-12-17

## 2018-12-17 ENCOUNTER — HOSPITAL ENCOUNTER (EMERGENCY)
Facility: HOSPITAL | Age: 53
Discharge: HOME | End: 2018-12-17
Attending: EMERGENCY MEDICINE

## 2018-12-17 VITALS
SYSTOLIC BLOOD PRESSURE: 148 MMHG | HEIGHT: 72 IN | RESPIRATION RATE: 22 BRPM | DIASTOLIC BLOOD PRESSURE: 95 MMHG | BODY MASS INDEX: 31.46 KG/M2 | HEART RATE: 92 BPM | TEMPERATURE: 98.8 F | WEIGHT: 232.3 LBS | OXYGEN SATURATION: 98 %

## 2018-12-17 DIAGNOSIS — H53.8 BLURRY VISION, RIGHT EYE: ICD-10-CM

## 2018-12-17 DIAGNOSIS — E11.319 DIABETIC RETINOPATHY (HCC): Primary | ICD-10-CM

## 2018-12-17 DIAGNOSIS — H43.391 FLOATERS IN VISUAL FIELD, RIGHT: ICD-10-CM

## 2018-12-17 LAB
ANION GAP SERPL CALCULATED.3IONS-SCNC: 4 MMOL/L (ref 5–14)
APTT PPP: 27 SECONDS (ref 23–34)
BASOPHILS # BLD AUTO: 0 THOUSANDS/ΜL (ref 0–0.1)
BASOPHILS NFR BLD AUTO: 1 % (ref 0–1)
BUN SERPL-MCNC: 17 MG/DL (ref 5–25)
CALCIUM SERPL-MCNC: 9.4 MG/DL (ref 8.4–10.2)
CHLORIDE SERPL-SCNC: 104 MMOL/L (ref 97–108)
CO2 SERPL-SCNC: 29 MMOL/L (ref 22–30)
CREAT SERPL-MCNC: 0.88 MG/DL (ref 0.7–1.5)
EOSINOPHIL # BLD AUTO: 0.2 THOUSAND/ΜL (ref 0–0.4)
EOSINOPHIL NFR BLD AUTO: 3 % (ref 0–6)
ERYTHROCYTE [DISTWIDTH] IN BLOOD BY AUTOMATED COUNT: 14.4 %
ERYTHROCYTE [SEDIMENTATION RATE] IN BLOOD: 18 MM/HOUR (ref 1–20)
GFR SERPL CREATININE-BSD FRML MDRD: 99 ML/MIN/1.73SQ M
GLUCOSE SERPL-MCNC: 110 MG/DL (ref 70–99)
GLUCOSE SERPL-MCNC: 125 MG/DL (ref 70–99)
HCT VFR BLD AUTO: 46.1 % (ref 41–53)
HGB BLD-MCNC: 14.7 G/DL (ref 13.5–17.5)
INR PPP: 0.97 (ref 0.89–1.1)
LYMPHOCYTES # BLD AUTO: 1.5 THOUSANDS/ΜL (ref 0.5–4)
LYMPHOCYTES NFR BLD AUTO: 24 % (ref 25–45)
MCH RBC QN AUTO: 27.7 PG (ref 26–34)
MCHC RBC AUTO-ENTMCNC: 31.9 G/DL (ref 31–36)
MCV RBC AUTO: 87 FL (ref 80–100)
MONOCYTES # BLD AUTO: 0.7 THOUSAND/ΜL (ref 0.2–0.9)
MONOCYTES NFR BLD AUTO: 11 % (ref 1–10)
NEUTROPHILS # BLD AUTO: 3.9 THOUSANDS/ΜL (ref 1.8–7.8)
NEUTS SEG NFR BLD AUTO: 62 % (ref 45–65)
PLATELET # BLD AUTO: 219 THOUSANDS/UL (ref 150–450)
PMV BLD AUTO: 7.8 FL (ref 8.9–12.7)
POTASSIUM SERPL-SCNC: 4.2 MMOL/L (ref 3.6–5)
PROTHROMBIN TIME: 10.3 SECONDS (ref 9.5–11.6)
RBC # BLD AUTO: 5.32 MILLION/UL (ref 4.5–5.9)
SODIUM SERPL-SCNC: 137 MMOL/L (ref 137–147)
WBC # BLD AUTO: 6.2 THOUSAND/UL (ref 4.5–11)

## 2018-12-17 PROCEDURE — 70450 CT HEAD/BRAIN W/O DYE: CPT

## 2018-12-17 PROCEDURE — 85610 PROTHROMBIN TIME: CPT | Performed by: EMERGENCY MEDICINE

## 2018-12-17 PROCEDURE — 85652 RBC SED RATE AUTOMATED: CPT | Performed by: EMERGENCY MEDICINE

## 2018-12-17 PROCEDURE — 80048 BASIC METABOLIC PNL TOTAL CA: CPT | Performed by: EMERGENCY MEDICINE

## 2018-12-17 PROCEDURE — 36415 COLL VENOUS BLD VENIPUNCTURE: CPT | Performed by: EMERGENCY MEDICINE

## 2018-12-17 PROCEDURE — 86140 C-REACTIVE PROTEIN: CPT | Performed by: EMERGENCY MEDICINE

## 2018-12-17 PROCEDURE — 85025 COMPLETE CBC W/AUTO DIFF WBC: CPT | Performed by: EMERGENCY MEDICINE

## 2018-12-17 PROCEDURE — 99284 EMERGENCY DEPT VISIT MOD MDM: CPT

## 2018-12-17 PROCEDURE — 82948 REAGENT STRIP/BLOOD GLUCOSE: CPT

## 2018-12-17 PROCEDURE — 85730 THROMBOPLASTIN TIME PARTIAL: CPT | Performed by: EMERGENCY MEDICINE

## 2018-12-18 LAB — CRP SERPL QL: <3 MG/L

## 2018-12-18 NOTE — ED PROVIDER NOTES
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  48 yo male with poorly controlled diabetes (checks it every few days and typically in 200's - takes metformin 500mg once daily instead of prescribed BID) who presents with 4 days of blurry vision with floaters in R eye  Pt denies trauma, no discharge  Visual acuity performed and 20/200 in R eye  Most of history taking pt keeps R eye closed to focus on me  Has had intermittent mild global headache  History provided by:  Patient   used: No    Eye Problem   Location:  Right eye  Quality:  Dull  Severity:  Mild  Onset quality:  Gradual  Duration:  4 days  Timing:  Constant  Progression:  Unchanged  Chronicity:  New  Relieved by:  Nothing  Worsened by:  Nothing  Ineffective treatments:  None tried  Associated symptoms: blurred vision, decreased vision (floaters) and headaches (intermittent generalized)    Associated symptoms: no nausea, no photophobia, no redness and no vomiting        Prior to Admission Medications   Prescriptions Last Dose Informant Patient Reported? Taking?    METFORMIN HCL PO   Yes No   Sig: Take by mouth   albuterol (PROVENTIL HFA,VENTOLIN HFA) 90 mcg/act inhaler   No No   Sig: Inhale 1-2 puffs every 6 (six) hours as needed for wheezing   docusate sodium (COLACE) 100 mg capsule   No No   Sig: Take 1 capsule (100 mg total) by mouth every 12 (twelve) hours   ibuprofen (MOTRIN) 600 mg tablet   No No   Sig: Take 1 tablet (600 mg total) by mouth every 6 (six) hours as needed (pain)   insulin aspart (NovoLOG) 100 units/mL injection   Yes No   Sig: Inject under the skin 3 (three) times a day before meals   naproxen (NAPROSYN) 500 mg tablet   No No   Sig: Take 1 tablet (500 mg total) by mouth 2 (two) times a day with meals   promethazine-dextromethorphan (PHENERGAN-DM) 6 25-15 mg/5 mL oral syrup   No No   Sig: Take 5 mL by mouth 4 (four) times a day as needed for cough      Facility-Administered Medications: None       Past Medical History:   Diagnosis Date    Carpal tunnel syndrome     Chronic pain     left arm    Diabetes mellitus ([address] )     Hypertension     Umbilical hernia        Past Surgical History:   Procedure Laterality Date    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    HAND SURGERY      UMBILICAL HERNIA REPAIR         History reviewed  No pertinent family history  I have reviewed and agree with the history as documented  Social History   Substance Use Topics    Smoking status: Current Every Day Smoker     Packs/day: 0 25    Smokeless tobacco: Never Used    Alcohol use Yes      Comment: rarely        Review of Systems   Constitutional: Negative for chills and fever  HENT: Negative for congestion and sore throat  Eyes: Positive for blurred vision and visual disturbance (R eye blurry vision with floaters)  Negative for photophobia, pain and redness  Respiratory: Negative for shortness of breath and wheezing  Cardiovascular: Negative for chest pain and palpitations  Gastrointestinal: Negative for abdominal pain, diarrhea, nausea and vomiting  Genitourinary: Negative for dysuria  Musculoskeletal: Negative for neck pain and neck stiffness  Skin: Negative for pallor and rash  Neurological: Positive for headaches (intermittent generalized)  Negative for dizziness, seizures and speech difficulty  Psychiatric/Behavioral: Negative for confusion  All other systems reviewed and are negative  Physical Exam  Physical Exam   Constitutional: He is oriented to person, place, and time  He appears well-developed and well-nourished  No distress  HENT:   Head: Normocephalic and atraumatic  Right Ear: External ear normal    Left Ear: External ear normal    Mouth/Throat: Oropharynx is clear and moist    Eyes: Pupils are equal, round, and reactive to light  Conjunctivae and EOM are normal  Right eye exhibits no discharge   Left eye exhibits no discharge  R fundoscopic normal, no deny red spot, no abnormalities noted with optic disc   Neck: Normal range of motion  Neck supple  Cardiovascular: Normal rate and regular rhythm  No murmur heard  Pulmonary/Chest: Effort normal and breath sounds normal    Abdominal: Soft  Bowel sounds are normal  He exhibits no distension  There is no tenderness  Musculoskeletal: Normal range of motion  He exhibits no edema  Neurological: He is alert and oriented to person, place, and time  He displays normal reflexes  No cranial nerve deficit or sensory deficit  He exhibits normal muscle tone  Coordination normal    Skin: Skin is warm  No rash noted  No pallor  Psychiatric: He has a normal mood and affect  His behavior is normal    Nursing note and vitals reviewed  Vital Signs  ED Triage Vitals [12/17/18 2002]   Temperature Pulse Respirations Blood Pressure SpO2   98 8 °F (37 1 °C) 92 22 148/95 98 %      Temp Source Heart Rate Source Patient Position - Orthostatic VS BP Location FiO2 (%)   Tympanic Monitor Sitting Left arm --      Pain Score       --           Vitals:    12/17/18 2002   BP: 148/95   Pulse: 92   Patient Position - Orthostatic VS: Sitting       Visual Acuity  Visual Acuity      Most Recent Value   Visual acuity R eye is  20/200   Visual acuity Left eye is  Other [20/13]   L Pupil Size (mm)  3   R Pupil Size (mm)  3          ED Medications  Medications - No data to display    Diagnostic Studies  Results Reviewed     Procedure Component Value Units Date/Time    C-reactive protein [[id number]] Collected:  12/17/18 2243    Lab Status:   In process Specimen:  Blood Updated:  12/17/18 2337    Protime-INR [[id number]]  (Normal) Collected:  12/17/18 2243    Lab Status:  Final result Specimen:  Blood from Arm, Right Updated:  12/17/18 2305     Protime 10 3 seconds      INR 0 97    Narrative:       INR:  ,PROTIME:      APTT [[id number]]  (Normal) Collected:  12/17/18 2243    Lab Status:  Final result Specimen:  Blood from Arm, Right Updated:  12/17/18 2305     PTT 27 seconds     Narrative:       PTT:      Sedimentation rate, automated [[id number]]  (Normal) Collected:  12/17/18 2243    Lab Status:  Final result Specimen:  Blood from Arm, Right Updated:  12/17/18 2304     Sed Rate 18 mm/hour     Basic metabolic panel [[id number]]  (Abnormal) Collected:  12/17/18 2243    Lab Status:  Final result Specimen:  Blood from Arm, Right Updated:  12/17/18 2303     Sodium 137 mmol/L      Potassium 4 2 mmol/L      Chloride 104 mmol/L      CO2 29 mmol/L      ANION GAP 4 (L) mmol/L      BUN 17 mg/dL      Creatinine 0 88 mg/dL      Glucose 125 (H) mg/dL      Calcium 9 4 mg/dL      eGFR 99 ml/min/1 73sq m     Narrative:         National Kidney Disease Education Program recommendations are as follows:  GFR calculation is accurate only with a steady state creatinine  Chronic Kidney disease less than 60 ml/min/1 73 sq  meters  Kidney failure less than 15 ml/min/1 73 sq  meters      CBC and differential [[id number]]  (Abnormal) Collected:  12/17/18 2243    Lab Status:  Final result Specimen:  Blood from Arm, Right Updated:  12/17/18 2252     WBC 6 20 Thousand/uL      RBC 5 32 Million/uL      Hemoglobin 14 7 g/dL      Hematocrit 46 1 %      MCV 87 fL      MCH 27 7 pg      MCHC 31 9 g/dL      RDW 14 4 %      MPV 7 8 (L) fL      Platelets 779 Thousands/uL      Neutrophils Relative 62 %      Lymphocytes Relative 24 (L) %      Monocytes Relative 11 (H) %      Eosinophils Relative 3 %      Basophils Relative 1 %      Neutrophils Absolute 3 90 Thousands/µL      Lymphocytes Absolute 1 50 Thousands/µL      Monocytes Absolute 0 70 Thousand/µL      Eosinophils Absolute 0 20 Thousand/µL      Basophils Absolute 0 00 Thousands/µL     Fingerstick Glucose (POCT) [[id number]]  (Abnormal) Collected:  12/17/18 2220    Lab Status:  Final result Updated:  12/17/18 2231     POC Glucose 110 (H) mg/dl                  CT head without contrast   Final Result by Zeeshan Mitchell MD (12/17 2239)      1  No acute intracranial hemorrhage, midline shift, or mass effect  2   Small hypodensity in the left parietal cortex stable since 2012 likely representing a small chronic infarct  Workstation performed: [id number]                    Procedures  Procedures       Phone Contacts  ED Phone Contact    ED Course  ED Course as of Dec 18 0254   Mon Dec 17, 2018   2204 Pt seen and examined  48 yo male with poorly controlled diabetes (checks it every few days and typically in 200's - takes metformin 500mg once daily instead of prescribed BID) who presents with 4 days of blurry vision with floaters in R eye  Pt denies trauma, no discharge  Visual acuity performed and 20/200 in R eye  Most of history taking pt keeps R eye closed to focus on me  Has had intermittent mild global headache  Neuro intact  Will check BS, labs including sed rate, CRP and CT head, fundoscopic, visual acuity and pressure  2220  - pt to get labs, CT head and will check pressure with sita pen  2232 NO sita pen available at this time (our institutions is currently sent away for repair - unable to perform at this time)  2248 CT head - 1   No acute intracranial hemorrhage, midline shift, or mass effect  2   Small hypodensity in the left parietal cortex stable since 2012 likely representing a small chronic infarct  2304 CBC/CMP reviewed and ok  2320 Sed rate 18  Talked with pt about need to f/u with optho tomorrow  Requests note off work today nad tomorrow so he can call and get appointment                                   MDM  CritCare Time    Disposition  Final diagnoses:   Diabetic retinopathy ([address] )   Blurry vision, right eye   Floaters in visual field, right     Time reflects when diagnosis was documented in both MDM as applicable and the Disposition within this note     Time User Action Codes Description Comment    12/17/2018 11:26 PM Dannie ALONSO Add [E11 319] Diabetic retinopathy ([facility] [address] )     12/17/2018 11:26 PM Ernesto ALONSO Add [H53 8] Blurry vision, right eye     12/17/2018 11:26 PM Ernesto ALONSO Add [G77 225] Floaters in visual field, right     12/17/2018 11:26 PM Ernesto ALONSO Add [H53 8] Blurred vision, right eye     12/17/2018 11:26 PM Ernesto ALONSO Remove [H53 8] Blurred vision, right eye       ED Disposition     ED Disposition Condition Comment    Discharge  Hillary Chand  discharge to home/self care  Condition at discharge: Good        Follow-up Information     Follow up With Specialties Details Why Contact Info    Joleen France MD Ophthalmology Schedule an appointment as soon as possible for a visit  [address] [place] [address], MD Family Medicine Call  [facility] [phone]            Discharge Medication List as of 12/17/2018 11:27 PM      CONTINUE these medications which have NOT CHANGED    Details   albuterol (PROVENTIL HFA,VENTOLIN HFA) 90 mcg/act inhaler Inhale 1-2 puffs every 6 (six) hours as needed for wheezing, Starting Thu 12/6/2018, Print      docusate sodium (COLACE) 100 mg capsule Take 1 capsule (100 mg total) by mouth every 12 (twelve) hours, Starting Mon 12/3/2018, Print      ibuprofen (MOTRIN) 600 mg tablet Take 1 tablet (600 mg total) by mouth every 6 (six) hours as needed (pain), Starting Mon 11/19/2018, Print      insulin aspart (NovoLOG) 100 units/mL injection Inject under the skin 3 (three) times a day before meals, Historical Med      METFORMIN HCL PO Take by mouth, Historical Med      naproxen (NAPROSYN) 500 mg tablet Take 1 tablet (500 mg total) by mouth 2 (two) times a day with meals, Starting Mon 12/3/2018, Print      promethazine-dextromethorphan (PHENERGAN-DM) 6 25-15 mg/5 mL oral syrup Take 5 mL by mouth 4 (four) times a day as needed for cough, Starting Thu 12/6/2018, Print           No discharge procedures on file      ED Provider  Electronically Signed by           Lavern Silverio, DO  12/18/18 1716

## 2018-12-18 NOTE — ED NOTES
Pt states since Friday he hasnt been able to see right out of his Rt eye  Denies any injuries or trauma  Reports it came on all of a sudden  Reports he went to work in the morning and then went to cash a check and when he got home from work is when it started  Denies getting anything in his eye  Reports he works for a [employer]  Reports its blurry and then he sees a reddish/black thing floating in his eye  Denies any discharge  Reports he had a headache yesterday and the day before   Reports a few years ago he had problems from his L eye from trauma     Teena Nesbitt RN  12/17/18 2052

## 2018-12-18 NOTE — ED NOTES
Pt now stating the symptoms started on Thursday     [facility] [address] [place] [address]  12/17/18 6040

## 2018-12-18 NOTE — DISCHARGE INSTRUCTIONS
Blurred Vision   WHAT YOU NEED TO KNOW:   Blurred vision is when you cannot see fine details  You may have blurred vision if you are nearsighted or farsighted and you need glasses  Blurred vision may be caused by a corneal abrasion (scratch on the cornea) or a corneal ulcer (open sore)  You may have blurred vision if your eye came into contact with a chemical  A foreign body or infection may also cause blurred vision  Medical conditions, such as cataracts, glaucoma, detached retina, and nerve disorders can also cause blurred vision  Blurred vision may also be caused by a concussion or a tumor  If you have diabetes, you may develop diabetic retinopathy  Diabetic retinopathy damages the blood vessels of your retina  DISCHARGE INSTRUCTIONS:   Return to the emergency department if:   · You have weakness in an arm or leg, difficulty speaking or seeing, and a severe headache  · You have a fever, eye pain, or discharge  · You have a sudden loss of vision  Contact your healthcare provider if:   · Your blurred vision gets worse  · Your blurred vision is worse in the morning  · You have a sudden headache or eye pain  · Your eye has swelling, redness, or discharge  · You see floaters, flashes of light, fine dots, or cobweb shapes  · You have questions or concerns about your condition or care  Medicines: You may  need any of the following:  · Prescription pain medicine  may be given  Ask how to take this medicine safely  · Antibiotics  help prevent or treat an eye infection caused by bacteria  It may be given as eyedrops or an ointment  · Take your medicine as directed  Contact your healthcare provider if you think your medicine is not helping or if you have side effects  Tell him of her if you are allergic to any medicine  Keep a list of the medicines, vitamins, and herbs you take  Include the amounts, and when and why you take them  Bring the list or the pill bottles to follow-up visits  Carry your medicine list with you in case of an emergency  Manage your blurred vision:  Your healthcare provider may ask you to do any of the following:  · Use artificial tears  to keep your eye moist or to soothe your irritated eye  · Apply a cool compress  to decrease any swelling or pain  Wet a clean washcloth with cool water and place it on your eye  Use the cool compress as often as directed  · Wear an eye patch as directed  to protect your eye  Follow up with your healthcare provider as directed: You may need other eye exams and medicines  Write down your questions so you remember to ask them during your visits  © 2017 [address] [employer]  Information is for End User's use only and may not be sold, redistributed or otherwise used for commercial purposes  All illustrations and images included in CareNotes® are the copyrighted property of [employer]  or Khalif Alvarado  The above information is an  only  It is not intended as medical advice for individual conditions or treatments  Talk to your doctor, nurse or pharmacist before following any medical regimen to see if it is safe and effective for you  Diabetic Retinopathy   WHAT YOU NEED TO KNOW:   Diabetic retinopathy (DR) is eye damage caused by long-term high blood sugar levels  The walls of the blood vessels in the retina weaken and leak blood  This causes swelling and vision problems  Over time, new, weak blood vessels grow, leak blood, and cover the center of the retina  DR can lead to blindness  DISCHARGE INSTRUCTIONS:   Call 911 for any of the following:   · You suddenly cannot see  Contact your healthcare provider if:   · Your blurred vision gets worse, or you start to see double  · You see more floating spots  · You see dark spots  · You have questions or concerns about your condition or care  Prevent DR:   · Control your blood sugar    Keep your blood sugar levels as close to normal as possible  You may need to check your blood sugar levels 3 times each day  · Get your eyes checked at least once each year  Your eye doctor may want to see you every 6 months or more often  If you are pregnant, get your eyes checked during the first 13 weeks of your pregnancy  You will need frequent eye exams during pregnancy and for 1 year after you give birth  · Manage your blood pressure and cholesterol  Your blood pressure should be 140/90 mmHg or lower  You may need lab tests that measure the amount of cholesterol in your blood  You may need to make lifestyle changes and take medicines to control your blood pressure and cholesterol  · Exercise regularly  Ask your healthcare provider about the best exercise plan for you  He will tell you how to control your blood sugar when you exercise  You may need to check your blood sugar more often during exercise  Bring a snack with you when you exercise in case your blood sugar gets too low  · Do not smoke  Nicotine can damage blood vessels in your eyes and make it more difficult to manage your diabetes  Do not use e-cigarettes or smokeless tobacco in place of cigarettes or to help you quit  They still contain nicotine  Ask your healthcare provider for information if you currently smoke and need help quitting  Follow up with your healthcare provider or eye specialist as directed:  Write down your questions so you remember to ask them during your visits  © 2017 [facility] Information is for End User's use only and may not be sold, redistributed or otherwise used for commercial purposes  All illustrations and images included in CareNotes® are the copyrighted property of [employer]  or Khalif Alvarado  The above information is an  only  It is not intended as medical advice for individual conditions or treatments   Talk to your doctor, nurse or pharmacist before following any medical regimen to see if it is safe and effective for you  Type 2 Diabetes in Adults   WHAT YOU NEED TO KNOW:   What is type 2 diabetes? Type 2 diabetes is a disease that affects how your body uses glucose (sugar)  Normally, when the blood sugar level increases, the pancreas makes more insulin  Insulin helps move sugar out of the blood so it can be used for energy  Type 2 diabetes develops because either the body cannot make enough insulin, or it cannot use the insulin correctly  After many years, your pancreas may stop making insulin  What increases my risk for type 2 diabetes? · Obesity    · Physical inactivity    · Older age    · High blood pressure or high cholesterol    · A history of heart disease, gestational diabetes, or polycystic ovary syndrome     · A family member with diabetes    · Being [place], , , [place], or [facility]  What are the signs and symptoms of type 2 diabetes? You may have high blood sugar levels for a long time before symptoms appear  You may have any of the following:  · More hunger or thirst than usual     · Frequent urination     · Weight loss without trying     · Blurred vision  How is type 2 diabetes diagnosed? You may need tests to check for type 2 diabetes starting at age 39  You may need any of the following:  · An A1c test  shows the average amount of sugar in your blood over the past 2 to 3 months  Your healthcare provider will tell you the A1c level that is right for you  The goal for your A1c is usually below 7%  Your provider can help you make changes if a check shows the A1c is too high  · A fasting plasma glucose test  is when your blood sugar level is tested after you have not eaten for 8 hours  · A 2-hour plasma glucose test  starts with a blood sugar level check after you have not eaten for 8 hours  You are then given a glucose drink  Your blood sugar level is checked after 2 hours       · A random glucose test  may be done any time of day, no matter how long ago you ate  How is type 2 diabetes treated? Type 2 diabetes can be controlled to prevent damage to your heart, blood vessels, and other organs  The goal is to keep your blood sugar at a normal level  You must eat the right foods, and exercise regularly  You may need 1 or more hypoglycemic medicines or insulin if you cannot control your blood sugar level with nutrition and exercise  You may also need medicine to lower your risk for heart disease  An example includes medicine to lower or control your cholesterol  How do I check my blood sugar level? You will be taught how to check a small drop of blood in a glucose monitor  You will need to check your blood sugar level at least 3 times each day if you are on insulin  Ask your healthcare provider when and how often to check during the day  If you check your blood sugar level before a meal , it should be between 80 and 130 mg/dL  If you check your blood sugar level 1 to 2 hours after a meal , it should be less than 180 mg/dL  Ask your healthcare provider if these are good goals for you  Write down your results, and show them to your healthcare provider  Your provider may use the results to make changes to your medicine, food, and exercise schedules  What should I do if my blood sugar level is too low? Your blood sugar level is too low if it goes below 70 mg/dL  If the level is too low, eat or drink 15 grams of fast-acting carbohydrate  These are found naturally in fruits  Fast-acting carbohydrates will raise your blood sugar level quickly  Examples of 15 grams of fast-acting carbohydrate are 4 ounces (½ cup) of fruit juice or 4 ounces of regular soda  Other examples are 2 tablespoons of raisins or 3 to 4 glucose tablets  Check your blood sugar level 15 minutes later  If the level is still low (less than 100 mg/dL), eat another 15 grams of carbohydrate  When the level returns to 100 mg/dL, eat a snack or meal that contains carbohydrates   This will help prevent another drop in blood sugar  Always carefully follow your healthcare provider's instructions on how to treat low blood sugar levels  What do I need to know about nutrition? A dietitian will help you make a meal plan to keep your blood sugar level steady  Do not skip meals  Your blood sugar level may drop too low if you have taken diabetes medicine and do not eat  · Keep track of carbohydrates (sugar and starchy foods)  Your blood sugar level can get too high if you eat too many carbohydrates  Eat fruits, legumes, vegetables, and whole grains  Your dietitian will help you plan meals and snacks that have the right amount of carbohydrates  · Eat low-fat foods , such as skinless chicken and low-fat milk  · Eat less sodium (salt)  Limit high-sodium foods, such as soy sauce, potato chips, and soup  Do not add salt to food you cook  Limit your use of table salt  You should have less than 2,300 mg of sodium per day  · Eat high-fiber foods , such as vegetables, whole-grain breads, and beans  · Limit alcohol  Alcohol affects your blood sugar level and can make it harder to manage your diabetes  Limit alcohol to 1 drink a day if you are a woman  Limit alcohol to 2 drinks a day if you are a man  A drink of alcohol is 12 ounces of beer, 5 ounces of wine, or 1½ ounces of liquor  How much exercise do I need? Exercise can help keep your blood sugar level steady, decrease your risk of heart disease, and help you lose weight  Stretch before and after you exercise  Exercise for at least 150 minutes every week  Spread this amount of exercise over at least 3 days a week  Do not skip exercise more than 2 days in a row  Include muscle strengthening activities 2 to 3 days each week  Older adults should include balance training 2 to 3 times each week  Activities that help increase balance include yoga and kateryna chi  Work with your healthcare provider to create an exercise plan    · Check your blood sugar level before and after exercise  Healthcare providers may tell you to change the amount of insulin you take or food you eat  If your blood sugar level is high, check your blood or urine for ketones before you exercise  Do not exercise if your blood sugar level is high and you have ketones  · If your blood sugar level is less than 100 mg/dL, have a carbohydrate snack before you exercise  Examples are 4 to 6 crackers, ½ banana, 8 ounces (1 cup) of milk, or 4 ounces (½ cup) of juice  Drink water or liquids that do not contain sugar before, during, and after exercise  Ask your dietitian or healthcare provider which liquids you should drink when you exercise  · Do not sit for longer than 30 minutes  If you cannot walk around, at least stand up  This will help you stay active and keep your blood circulating  What else can I do to manage type 2 diabetes? · Check your feet each day for sores  Wear shoes and socks that fit correctly  Do not trim your toenails  Ask your healthcare provider for more information about foot care  · Maintain a healthy weight  Ask your healthcare provider how much you should weigh  A healthy weight can help you control your diabetes and prevent heart disease  Ask your provider to help you create a weight loss plan if you are overweight  Together you can set manageable weight loss goals  · Do not smoke  Nicotine and other chemicals in cigarettes and cigars can cause lung damage and make it more difficult to manage your diabetes  Ask your healthcare provider for information if you currently smoke and need help to quit  Do not use e-cigarettes or smokeless tobacco in place of cigarettes or to help you quit  They still contain nicotine  · Check your blood pressure as directed  Ask your healthcare provider what your blood pressure should be  Most adults with diabetes and high blood pressure should have a systolic blood pressure (first number) less than 140  Your diastolic blood pressure (second number) should be less than 90  · Wear medical alert identification  Wear medical alert jewelry or carry a card that says you have diabetes  Ask your healthcare provider where to get these items  · Ask about vaccines  You have a higher risk for serious illness if you get the flu, pneumonia, or hepatitis  Ask your healthcare provider if you should get a flu, pneumonia, or hepatitis B vaccine, and when to get the vaccine  What are the risks of type 2 diabetes? Uncontrolled diabetes can damage your nerves, veins, and arteries  High blood sugar levels may damage other body tissue and organs over time  Damage to arteries may increase your risk for heart attack and stroke  Nerve damage may also lead to other heart, stomach, and nerve problems  Diabetes is life-threatening if it is not controlled  Control your blood glucose levels to prevent health problems  Call 911 for any of the following:   · You have any of the following signs of a stroke:      ¨ Numbness or drooping on one side of your face     ¨ Weakness in an arm or leg    ¨ Confusion or difficulty speaking    ¨ Dizziness, a severe headache, or vision loss    · You have any of the following signs of a heart attack:      ¨ Squeezing, pressure, or pain in your chest that lasts longer than 5 minutes or returns    ¨ Discomfort or pain in your back, neck, jaw, stomach, or arm     ¨ Trouble breathing    ¨ Nausea or vomiting    ¨ Lightheadedness or a sudden cold sweat, especially with chest pain or trouble breathing  When should I seek immediate care? · You have severe abdominal pain, or the pain spreads to your back  You may also be vomiting  · You have trouble staying awake or focusing  · You are shaking or sweating  · You have blurred or double vision  · Your breath has a fruity, sweet smell  · Your breathing is deep and labored, or rapid and shallow       · Your heartbeat is fast and weak   When should I contact my healthcare provider? · You are vomiting or have diarrhea  · You have an upset stomach and cannot eat the foods on your meal plan  · You feel weak or more tired than usual      · You feel dizzy, have headaches, or are easily irritated  · Your skin is red, warm, dry, or swollen  · You have a wound that does not heal      · You have numbness in your arms or legs  · You have trouble coping with your illness, or you feel anxious or depressed  · You have questions or concerns about your condition or care  CARE AGREEMENT:   You have the right to help plan your care  Learn about your health condition and how it may be treated  Discuss treatment options with your caregivers to decide what care you want to receive  You always have the right to refuse treatment  The above information is an  only  It is not intended as medical advice for individual conditions or treatments  Talk to your doctor, nurse or pharmacist before following any medical regimen to see if it is safe and effective for you  © 2017 [address] [facility] Information is for End User's use only and may not be sold, redistributed or otherwise used for commercial purposes  All illustrations and images included in CareNotes® are the copyrighted property of [employer]  or Khaliferic DelatorreChristiano  Visual Floaters   WHAT YOU NEED TO KNOW:   Floaters are specks that appear to move around in your field of vision  They are dark and shaped like spots or cobwebs  If you try to look straight at them, they seem to dart away  DISCHARGE INSTRUCTIONS:   Follow up with your healthcare provider as directed: You may be referred to an ophthalmologist  Write down your questions so you remember to ask them during your visits  Protect your eyes:   · Get annual eye exams  Your ophthalmologist or optometrist will examine your eyes and test your vision      · Wear a hat and sunglasses  with ultraviolet (UV) protection lenses to protect your eyes when you are outdoors  · Manage your health conditions  See your healthcare provider regularly if you have a health condition that may affect your vision, such as diabetes or high blood pressure  Contact your healthcare provider or ophthalmologist if:   · You start to see more floaters over time  · You have eye pain or blurred vision, or light hurts your eyes  · You have questions or concerns about your care  Return to the emergency department if:   · You suddenly see more floaters, or flashing lights  · You have a gradual or sudden loss of vision, or a change in your vision  © 2017 [address] [facility] Information is for End User's use only and may not be sold, redistributed or otherwise used for commercial purposes  All illustrations and images included in CareNotes® are the copyrighted property of [employer] M [employer] Inc  or Khalif Alvarado  The above information is an  only  It is not intended as medical advice for individual conditions or treatments  Talk to your doctor, nurse or pharmacist before following any medical regimen to see if it is safe and effective for you

## 2019-01-02 ENCOUNTER — HOSPITAL ENCOUNTER (EMERGENCY)
Facility: HOSPITAL | Age: 54
Discharge: HOME | End: 2019-01-02
Attending: EMERGENCY MEDICINE | Admitting: EMERGENCY MEDICINE
Payer: COMMERCIAL

## 2019-01-02 ENCOUNTER — APPOINTMENT (EMERGENCY)
Dept: RADIOLOGY | Facility: HOSPITAL | Age: 54
End: 2019-01-02
Payer: COMMERCIAL

## 2019-01-02 VITALS
TEMPERATURE: 97.5 F | HEART RATE: 86 BPM | DIASTOLIC BLOOD PRESSURE: 79 MMHG | BODY MASS INDEX: 31.6 KG/M2 | SYSTOLIC BLOOD PRESSURE: 141 MMHG | OXYGEN SATURATION: 97 % | RESPIRATION RATE: 20 BRPM | WEIGHT: 233 LBS

## 2019-01-02 DIAGNOSIS — M77.12 EPICONDYLITIS, LATERAL, LEFT: ICD-10-CM

## 2019-01-02 DIAGNOSIS — S46.919A SHOULDER STRAIN: Primary | ICD-10-CM

## 2019-01-02 PROCEDURE — 73030 X-RAY EXAM OF SHOULDER: CPT

## 2019-01-02 PROCEDURE — 73080 X-RAY EXAM OF ELBOW: CPT

## 2019-01-02 PROCEDURE — 99283 EMERGENCY DEPT VISIT LOW MDM: CPT

## 2019-01-02 RX ORDER — IBUPROFEN 600 MG/1
600 TABLET ORAL EVERY 6 HOURS PRN
Qty: 30 TABLET | Refills: 0 | Status: SHIPPED | OUTPATIENT
Start: 2019-01-02 | End: 2019-03-26

## 2019-01-02 RX ORDER — METHOCARBAMOL 500 MG/1
500 TABLET, FILM COATED ORAL 2 TIMES DAILY
Qty: 20 TABLET | Refills: 0 | Status: SHIPPED | OUTPATIENT
Start: 2019-01-02 | End: 2019-03-26

## 2019-01-02 RX ORDER — IBUPROFEN 600 MG/1
600 TABLET ORAL ONCE
Status: COMPLETED | OUTPATIENT
Start: 2019-01-02 | End: 2019-01-02

## 2019-01-02 RX ADMIN — IBUPROFEN 600 MG: 600 TABLET ORAL at 13:12

## 2019-01-02 NOTE — ED TRIAGE NOTES
Reports L arm pain since Friday  Reports the pain is in his shoulder and elbow  Constant  Describes as sharp   Denies any chest pain

## 2019-01-02 NOTE — ED PROVIDER NOTES
History  Chief Complaint   Patient presents with    Arm Pain       History provided by:  Patient   used: No    Medical Problem   Location:  Pt with left shoulder and elbow pain since last week  pain worse with movement no known trauma   Severity:  Mild  Onset quality:  Gradual  Duration:  1 week  Timing:  Constant  Chronicity:  New  Associated symptoms: no abdominal pain, no chest pain, no congestion, no cough, no diarrhea, no ear pain, no fatigue, no fever, no headaches, no loss of consciousness, no myalgias, no nausea, no rash, no rhinorrhea, no shortness of breath, no sore throat, no vomiting and no wheezing        Prior to Admission Medications   Prescriptions Last Dose Informant Patient Reported? Taking?    METFORMIN HCL PO Not Taking at Unknown time  Yes No   Sig: Take by mouth   albuterol (PROVENTIL HFA,VENTOLIN HFA) 90 mcg/act inhaler Not Taking at Unknown time  No No   Sig: Inhale 1-2 puffs every 6 (six) hours as needed for wheezing   Patient not taking: Reported on 1/2/2019    docusate sodium (COLACE) 100 mg capsule Not Taking at Unknown time  No No   Sig: Take 1 capsule (100 mg total) by mouth every 12 (twelve) hours   Patient not taking: Reported on 1/2/2019    ibuprofen (MOTRIN) 600 mg tablet Not Taking at Unknown time  No No   Sig: Take 1 tablet (600 mg total) by mouth every 6 (six) hours as needed (pain)   Patient not taking: Reported on 1/2/2019    insulin aspart (NovoLOG) 100 units/mL injection Not Taking at Unknown time  Yes No   Sig: Inject under the skin 3 (three) times a day before meals   naproxen (NAPROSYN) 500 mg tablet Not Taking at Unknown time  No No   Sig: Take 1 tablet (500 mg total) by mouth 2 (two) times a day with meals   Patient not taking: Reported on 1/2/2019    promethazine-dextromethorphan (PHENERGAN-DM) 6 25-15 mg/5 mL oral syrup Not Taking at Unknown time  No No   Sig: Take 5 mL by mouth 4 (four) times a day as needed for cough   Patient not taking: Reported on 1/2/2019       Facility-Administered Medications: None       Past Medical History:   Diagnosis Date    Carpal tunnel syndrome     Chronic pain     left arm    Diabetes mellitus ([address] )     Hypertension     Umbilical hernia        Past Surgical History:   Procedure Laterality Date    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    HAND SURGERY      UMBILICAL HERNIA REPAIR         History reviewed  No pertinent family history  I have reviewed and agree with the history as documented  Social History   Substance Use Topics    Smoking status: Current Every Day Smoker     Packs/day: 0 25    Smokeless tobacco: Never Used    Alcohol use Yes      Comment: rarely        Review of Systems   Constitutional: Negative  Negative for fatigue and fever  HENT: Negative  Negative for congestion, ear pain, rhinorrhea and sore throat  Eyes: Negative  Respiratory: Negative  Negative for cough, shortness of breath and wheezing  Cardiovascular: Negative  Negative for chest pain  Gastrointestinal: Negative  Negative for abdominal pain, diarrhea, nausea and vomiting  Endocrine: Negative  Genitourinary: Negative  Musculoskeletal: Negative  Negative for myalgias  Skin: Negative  Negative for rash  Allergic/Immunologic: Negative  Neurological: Negative  Negative for loss of consciousness and headaches  Hematological: Negative  Psychiatric/Behavioral: Negative  All other systems reviewed and are negative  Physical Exam  Physical Exam   Constitutional: He is oriented to person, place, and time  He appears well-developed and well-nourished  HENT:   Head: Normocephalic and atraumatic  Right Ear: External ear normal    Left Ear: External ear normal    Nose: Nose normal    Mouth/Throat: Oropharynx is clear and moist    Eyes: Pupils are equal, round, and reactive to light  Conjunctivae and EOM are normal    Neck: Normal range of motion  Neck supple     Cardiovascular: Normal rate, regular rhythm and normal heart sounds  Pulmonary/Chest: Effort normal and breath sounds normal    Abdominal: Soft  Bowel sounds are normal    Musculoskeletal:   Left shoulder from with pain  Ac joint tender and humeral head tenderness    Left elbow from  Lateral epicondyle tenderness  No swelling  Distal neuro and vascular wnl    Neurological: He is alert and oriented to person, place, and time  Skin: Skin is warm  Psychiatric: He has a normal mood and affect  His behavior is normal    Nursing note and vitals reviewed  Vital Signs  ED Triage Vitals [01/02/19 1230]   Temperature Pulse Respirations Blood Pressure SpO2   97 5 °F (36 4 °C) 86 20 141/79 97 %      Temp Source Heart Rate Source Patient Position - Orthostatic VS BP Location FiO2 (%)   Tympanic Monitor Sitting Left arm --      Pain Score       5           Vitals:    01/02/19 1230   BP: 141/79   Pulse: 86   Patient Position - Orthostatic VS: Sitting       Visual Acuity      ED Medications  Medications   ibuprofen (MOTRIN) tablet 600 mg (600 mg Oral Given 1/2/19 1312)       Diagnostic Studies  Results Reviewed     None                 XR elbow 3+ vw LEFT   Final Result by Scott Henry MD (01/02 1547)      No acute osseous abnormality  Workstation performed: [id number]         XR shoulder 2+ views LEFT   Final Result by Scott Henry MD (01/02 1547)      No acute osseous abnormality  Degenerative changes as described  Workstation performed: [id number]                    Procedures  Procedures       Phone Contacts  ED Phone Contact    ED Course                               MDM  CritCare Time    Disposition  Final diagnoses:   Shoulder strain   Epicondylitis, lateral, left     Time reflects when diagnosis was documented in both MDM as applicable and the Disposition within this note     Time User Action Codes Description Comment    1/2/2019 12:59 PM Driss Side   Add [T34 536R] Shoulder strain     1/2/2019 12:59 PM Ariane Self Add [M77 12] Epicondylitis, lateral, left       ED Disposition     ED Disposition Condition Comment    Discharge  Ben Lopez  discharge to home/self care  Condition at discharge: Good        Follow-up Information     Follow up With Specialties Details Why Contact Info    Viry Daigle MD Orthopedic Surgery Schedule an appointment as soon as possible for a visit  [place] [address]  [phone]            Discharge Medication List as of 1/2/2019  1:04 PM      START taking these medications    Details   !! ibuprofen (MOTRIN) 600 mg tablet Take 1 tablet (600 mg total) by mouth every 6 (six) hours as needed (pain), Starting Wed 1/2/2019, Print      methocarbamol (ROBAXIN) 500 mg tablet Take 1 tablet (500 mg total) by mouth 2 (two) times a day, Starting Wed 1/2/2019, Print       !! - Potential duplicate medications found  Please discuss with provider  CONTINUE these medications which have NOT CHANGED    Details   albuterol (PROVENTIL HFA,VENTOLIN HFA) 90 mcg/act inhaler Inhale 1-2 puffs every 6 (six) hours as needed for wheezing, Starting Thu 12/6/2018, Print      docusate sodium (COLACE) 100 mg capsule Take 1 capsule (100 mg total) by mouth every 12 (twelve) hours, Starting Mon 12/3/2018, Print      !! ibuprofen (MOTRIN) 600 mg tablet Take 1 tablet (600 mg total) by mouth every 6 (six) hours as needed (pain), Starting Mon 11/19/2018, Print      insulin aspart (NovoLOG) 100 units/mL injection Inject under the skin 3 (three) times a day before meals, Historical Med      METFORMIN HCL PO Take by mouth, Historical Med      naproxen (NAPROSYN) 500 mg tablet Take 1 tablet (500 mg total) by mouth 2 (two) times a day with meals, Starting Mon 12/3/2018, Print      promethazine-dextromethorphan (PHENERGAN-DM) 6 25-15 mg/5 mL oral syrup Take 5 mL by mouth 4 (four) times a day as needed for cough, Starting Thu 12/6/2018, Print       !! - Potential duplicate medications found  Please discuss with provider  No discharge procedures on file      ED Provider  Electronically Signed by           Madison López PA-C  01/02/19 6837

## 2019-01-02 NOTE — DISCHARGE INSTRUCTIONS
Muscle Strain   WHAT YOU NEED TO KNOW:   A muscle strain is a twist, pull, or tear of a muscle or tendon  A tendon is a strong elastic tissue that connects a muscle to a bone  Signs of a strained muscle include bruising and swelling over the area, pain with movement, and loss of strength  DISCHARGE INSTRUCTIONS:   Seek care immediately or call 911 if:   · You suddenly cannot feel or move your injured muscle  Contact your healthcare provider if:   · Your pain and swelling worsen or do not go away  · You have questions or concerns about your condition or care  Medicines:   · NSAIDs , such as ibuprofen, help decrease swelling, pain, and fever  This medicine is available with or without a doctor's order  NSAIDs can cause stomach bleeding or kidney problems in certain people  If you take blood thinner medicine, always ask your healthcare provider if NSAIDs are safe for you  Always read the medicine label and follow directions  · Muscle relaxers  help decrease pain and muscle spasms  · Take your medicine as directed  Contact your healthcare provider if you think your medicine is not helping or if you have side effects  Tell him or her if you are allergic to any medicine  Keep a list of the medicines, vitamins, and herbs you take  Include the amounts, and when and why you take them  Bring the list or the pill bottles to follow-up visits  Carry your medicine list with you in case of an emergency  Follow up with your healthcare provider as directed: Your healthcare provider may suggest that you have a follow-up visit before you go back to your usual activity  Write down your questions so you remember to ask them during your visits  Self-care:   · 3 to 7 days after the injury:  Use Rest, Ice, Compression, and Elevation (RICE) to help stop bruising and decrease pain and swelling  ¨ Rest:  Rest your muscle to allow your injury to heal  When the pain decreases, begin normal, slow movements   For mild and moderate muscle strains, you should rest your muscles for about 2 days  However, if you have a severe muscle strain, you should rest for 10 to 14 days  You may need to use crutches to walk if your muscle strain is in your legs or lower body  ¨ Ice:  Put an ice pack on the injured area  Put a towel between the ice pack and your skin  Do not put the ice pack directly on your skin  You can use a package of frozen peas instead of an ice pack  ¨ Compression:  You may need to wrap an elastic bandage around the area to decrease swelling  It should be tight enough for you to feel support  Do not wrap it too tightly  ¨ Elevation:  Keep the injured muscle raised above your heart if possible  For example, if you have a strain of your lower leg muscle, lie down and prop your leg up on pillows  This helps decrease pain and swelling  · 3 to 21 days after the injury:  Start to slowly and regularly exercise your strained muscle  This will help it heal  If you feel pain, decrease how hard you are exercising  · 1 to 6 weeks after the injury:  Stretch the injured muscle  Hold the stretch for about 30 seconds  Do this 4 times a day  You may stretch the muscle until you feel a slight pull  Stop stretching if you feel pain  · 2 weeks to 6 months after the injury:  The goal of this phase is to return to the activity you were doing before the injury happened, without hurting the muscle again  · 3 weeks to 6 months after the injury:  Keep stretching and strengthening your muscles to avoid injury  Slowly increase the time and distance that you exercise  You may still have signs and symptoms of muscle strain 6 months after the injury, even if you do things to help it heal  In this case, you may need surgery on the muscle  Prevent muscle strains:   · Always wear proper shoes when you play sports:  Replace your old running shoes with new ones often if you are a runner   Use special shoe inserts or arch supports to correct leg or foot problems  Ask your healthcare provider for more information on shoe supports  · Do warm up and cool down exercises:  Do stretching exercises before you work out or do sports activities  These exercises will help loosen and decrease stress on your muscles  Cool down and stretch after your workout  Do not stop and rest after a workout without cooling down first            · Keep your muscles strong with strength training exercises:  Exercises such as weight lifting and stretching exercises help keep your muscles flexible and strong  A physical therapist or  may help you with these exercises  · Slowly start your exercise or sports training program:  Follow your healthcare provider's advice on when to start exercising  Slowly increase time, distance, and how often you train  Sudden increases in how often you train may cause you to injure your muscle again  © 2017 [employer] Information is for End User's use only and may not be sold, redistributed or otherwise used for commercial purposes  All illustrations and images included in CareNotes® are the copyrighted property of [employer]  or [employer]  The above information is an  only  It is not intended as medical advice for individual conditions or treatments  Talk to your doctor, nurse or pharmacist before following any medical regimen to see if it is safe and effective for you  Tennis Elbow   WHAT YOU NEED TO KNOW:   Tennis elbow is inflammation of the tendons in your elbow  Tendons are strong tissues that connect muscle to bone  DISCHARGE INSTRUCTIONS:   Return to the emergency department if:   · You suddenly have no feeling in your arm, hand, or fingers  · You suddenly cannot move your arm, wrist, hand, or fingers  Contact your healthcare provider if:   · You have a fever  · You have more pain or weakness in your arm, wrist, hand, or fingers      · You have new numbness or tingling in your arm, hand, or fingers  · You have questions or concerns about your condition or care  Medicines:   · Acetaminophen  decreases pain and fever  It is available without a doctor's order  Ask how much to take and how often to take it  Follow directions  Read the labels of all other medicines you are using to see if they also contain acetaminophen, or ask your doctor or pharmacist  Acetaminophen can cause liver damage if not taken correctly  Do not use more than 4 grams (4,000 milligrams) total of acetaminophen in one day  · NSAIDs , such as ibuprofen, help decrease swelling, pain, and fever  This medicine is available with or without a doctor's order  NSAIDs can cause stomach bleeding or kidney problems in certain people  If you take blood thinner medicine, always ask your healthcare provider if NSAIDs are safe for you  Always read the medicine label and follow directions  · Take your medicine as directed  Contact your healthcare provider if you think your medicine is not helping or if you have side effects  Tell him or her if you are allergic to any medicine  Keep a list of the medicines, vitamins, and herbs you take  Include the amounts, and when and why you take them  Bring the list or the pill bottles to follow-up visits  Carry your medicine list with you in case of an emergency  Physical therapy:  A physical therapist teaches you exercises to help improve movement and strength, and to decrease pain  Self-care:   · Wear your support device as directed  Devices, such as an arm strap, brace, or splint, help limit your arm movement  They also decrease pain and help prevent more damage to your tendon  Ask your healthcare provider how to care for your arm while you wear a brace or splint  · Rest your injured arm  and avoid activities that cause pain  This will help your tendons heal     · Apply ice on your elbow  for 15 to 20 minutes every hour or as directed   Use an ice pack, or put crushed ice in a plastic bag  Cover it with a towel before you apply it to your skin  Ice helps prevent tissue damage and decreases swelling and pain  · Elevate your elbow  above the level of your heart as often as you can  This will help decrease swelling and pain  Prop your elbow on pillows or blankets to keep it elevated comfortably  Follow up with your healthcare provider as directed:  Write down your questions so you remember to ask them during your visits  © 2017 [employer]  Information is for End User's use only and may not be sold, redistributed or otherwise used for commercial purposes  All illustrations and images included in CareNotes® are the copyrighted property of [employer]  or [employer]  The above information is an  only  It is not intended as medical advice for individual conditions or treatments  Talk to your doctor, nurse or pharmacist before following any medical regimen to see if it is safe and effective for you  Shoulder Pain   AMBULATORY CARE:   Shoulder pain  is a common problem and can affect your daily activities  Pain can be caused by a problem within your shoulder  Shoulder pain may also be caused by pain that spreads to your shoulder from another part of your body  Seek care immediately if:   · You have severe pain  · You cannot move your arm or shoulder  · You have numbness or tingling in your shoulder or arm  Contact your healthcare provider if:   · Your pain gets worse or does not go away with treatment  · You have trouble moving your arm or shoulder  · You have questions or concerns about your condition or care  Treatment for shoulder pain  may include any of the following:  · Acetaminophen  decreases pain and fever  It is available without a doctor's order  Ask how much to take and how often to take it  Follow directions  Acetaminophen can cause liver damage if not taken correctly      · NSAIDs , such as ibuprofen, help decrease swelling, pain, and fever  This medicine is available with or without a doctor's order  NSAIDs can cause stomach bleeding or kidney problems in certain people  If you take blood thinner medicine, always ask your healthcare provider if NSAIDs are safe for you  Always read the medicine label and follow directions  · A steroid injection  may help decrease pain and swelling  · Surgery  may be needed for long-term pain and loss of function  Manage your symptoms:   · Apply ice  on your shoulder for 20 to 30 minutes every 2 hours or as directed  Use an ice pack, or put crushed ice in a plastic bag  Cover it with a towel  Ice helps prevent tissue damage and decreases swelling and pain  · Apply heat if ice does not help your symptoms  Apply heat on your shoulder for 20 to 30 minutes every 2 hours for as many days as directed  Heat helps decrease pain and muscle spasms  · Go to physical or occupational therapy as directed  A physical therapist teaches you exercises to help improve movement and strength, and to decrease pain  An occupational therapist teaches you skills to help with your daily activities  Prevent shoulder pain:   · Stretch and strengthen your shoulder  Use proper technique during exercises and sports  · Limit activities as directed  Try to avoid repeated overhead movements  Follow up with your healthcare provider or orthopedist as directed:  Write down your questions so you remember to ask them during your visits  © 2017 [address] [facility] Information is for End User's use only and may not be sold, redistributed or otherwise used for commercial purposes  All illustrations and images included in CareNotes® are the copyrighted property of [employer]  or [employer]  The above information is an  only  It is not intended as medical advice for individual conditions or treatments   Talk to your doctor, nurse or pharmacist before following any medical regimen to see if it is safe and effective for you

## 2019-02-04 ENCOUNTER — HOSPITAL ENCOUNTER (EMERGENCY)
Facility: HOSPITAL | Age: 54
Discharge: HOME | End: 2019-02-04
Attending: EMERGENCY MEDICINE | Admitting: EMERGENCY MEDICINE
Payer: COMMERCIAL

## 2019-02-04 VITALS
BODY MASS INDEX: 32.47 KG/M2 | HEART RATE: 86 BPM | RESPIRATION RATE: 20 BRPM | TEMPERATURE: 98.9 F | WEIGHT: 239.38 LBS | DIASTOLIC BLOOD PRESSURE: 84 MMHG | OXYGEN SATURATION: 98 % | SYSTOLIC BLOOD PRESSURE: 160 MMHG

## 2019-02-04 DIAGNOSIS — J40 BRONCHITIS: Primary | ICD-10-CM

## 2019-02-04 DIAGNOSIS — R59.1 LYMPHADENOPATHY: ICD-10-CM

## 2019-02-04 PROCEDURE — 99282 EMERGENCY DEPT VISIT SF MDM: CPT

## 2019-02-04 RX ORDER — AZITHROMYCIN 250 MG/1
250 TABLET, FILM COATED ORAL DAILY
Qty: 6 TABLET | Refills: 0 | Status: SHIPPED | OUTPATIENT
Start: 2019-02-04 | End: 2019-02-09

## 2019-02-04 RX ORDER — IBUPROFEN 800 MG/1
800 TABLET ORAL 3 TIMES DAILY
Qty: 21 TABLET | Refills: 0 | Status: SHIPPED | OUTPATIENT
Start: 2019-02-04 | End: 2019-03-26

## 2019-02-04 NOTE — DISCHARGE INSTRUCTIONS
Acute Bronchitis   WHAT YOU NEED TO KNOW:   Acute bronchitis is swelling and irritation in the air passages of your lungs  This irritation may cause you to cough or have other breathing problems  Acute bronchitis often starts because of another illness, such as a cold or the flu  The illness spreads from your nose and throat to your windpipe and airways  Bronchitis is often called a chest cold  Acute bronchitis lasts about 3 to 6 weeks and is usually not a serious illness  Your cough can last for several weeks  DISCHARGE INSTRUCTIONS:   Return to the emergency department if:   · You cough up blood  · Your lips or fingernails turn blue  · You feel like you are not getting enough air when you breathe  Contact your healthcare provider if:   · You have a fever  · Your breathing problems do not go away or get worse  · Your cough does not get better within 4 weeks  · You have questions or concerns about your condition or care  Self-care:   · Get more rest   Rest helps your body to heal  Slowly start to do more each day  Rest when you feel it is needed  · Avoid irritants in the air  Avoid chemicals, fumes, and dust  Wear a face mask if you must work around dust or fumes  Stay inside on days when air pollution levels are high  If you have allergies, stay inside when pollen counts are high  Do not use aerosol products, such as spray-on deodorant, bug spray, and hair spray  · Do not smoke or be around others who smoke  Nicotine and other chemicals in cigarettes and cigars damages the cilia that move mucus out of your lungs  Ask your healthcare provider for information if you currently smoke and need help to quit  E-cigarettes or smokeless tobacco still contain nicotine  Talk to your healthcare provider before you use these products  · Drink liquids as directed  Liquids help keep your air passages moist and help you cough up mucus   You may need to drink more liquids when you have acute bronchitis  Ask how much liquid to drink each day and which liquids are best for you  · Use a humidifier or vaporizer  Use a cool mist humidifier or a vaporizer to increase air moisture in your home  This may make it easier for you to breathe and help decrease your cough  Decrease risk for acute bronchitis:   · Get the vaccinations you need  Ask your healthcare provider if you should get vaccinated against the flu or pneumonia  · Prevent the spread of germs  You can decrease your risk of acute bronchitis and other illnesses by doing the following:     [facility] your hands often with soap and water  Carry germ-killing hand lotion or gel with you  You can use the lotion or gel to clean your hands when soap and water are not available  ¨ Do not touch your eyes, nose, or mouth unless you have washed your hands first     ¨ Always cover your mouth when you cough to prevent the spread of germs  It is best to cough into a tissue or your shirt sleeve instead of into your hand  Ask those around you cover their mouths when they cough  ¨ Try to avoid people who have a cold or the flu  If you are sick, stay away from others as much as possible  Medicines: Your healthcare provider may  give you any of the following:  · Ibuprofen or acetaminophen  are medicines that help lower your fever  They are available without a doctor's order  Ask your healthcare provider which medicine is right for you  Ask how much to take and how often to take it  Follow directions  These medicines can cause stomach bleeding if not taken correctly  Ibuprofen can cause kidney damage  Do not take ibuprofen if you have kidney disease, an ulcer, or allergies to aspirin  Acetaminophen can cause liver damage  Do not take more than 4,000 milligrams in 24 hours  · Decongestants  help loosen mucus in your lungs and make it easier to cough up  This can help you breathe easier  · Cough suppressants  decrease your urge to cough   If your cough produces mucus, do not take a cough suppressant unless your healthcare provider tells you to  Your healthcare provider may suggest that you take a cough suppressant at night so you can rest     · Inhalers  may be given  Your healthcare provider may give you one or more inhalers to help you breathe easier and cough less  An inhaler gives your medicine to open your airways  Ask your healthcare provider to show you how to use your inhaler correctly  · Take your medicine as directed  Contact your healthcare provider if you think your medicine is not helping or if you have side effects  Tell him of her if you are allergic to any medicine  Keep a list of the medicines, vitamins, and herbs you take  Include the amounts, and when and why you take them  Bring the list or the pill bottles to follow-up visits  Carry your medicine list with you in case of an emergency  Follow up with your healthcare provider as directed:  Write down questions you have so you will remember to ask them during your follow-up visits  © 2017 [employer] Information is for End User's use only and may not be sold, redistributed or otherwise used for commercial purposes  All illustrations and images included in CareNotes® are the copyrighted property of [employer]  or Khalif Alvarado  The above information is an  only  It is not intended as medical advice for individual conditions or treatments  Talk to your doctor, nurse or pharmacist before following any medical regimen to see if it is safe and effective for you

## 2019-02-13 NOTE — ED PROVIDER NOTES
History  Chief Complaint   Patient presents with    Sore Throat     x few days  states he also noticed a l;ump under his throat  also c/o cough with dark thick phlegm  states he coughed hard today and had small tinge of blood in phlegm       History provided by:  Parent  Sore Throat   Location:  Generalized  Quality:  Aching  Severity:  Mild  Onset quality:  Gradual  Timing:  Constant  Progression:  Worsening  Chronicity:  New  Relieved by:  Nothing  Worsened by:  Nothing  Ineffective treatments:  None tried  Associated symptoms: adenopathy and cough    Associated symptoms: no abdominal pain, no chest pain, no chills, no fever, no headaches, no neck stiffness, no rash, no rhinorrhea, no shortness of breath, no stridor and no trouble swallowing        Prior to Admission Medications   Prescriptions Last Dose Informant Patient Reported? Taking? METFORMIN HCL PO   Yes No   Sig: Take by mouth   ibuprofen (MOTRIN) 600 mg tablet   No No   Sig: Take 1 tablet (600 mg total) by mouth every 6 (six) hours as needed (pain)   insulin aspart (NovoLOG) 100 units/mL injection   Yes No   Sig: Inject under the skin 3 (three) times a day before meals   methocarbamol (ROBAXIN) 500 mg tablet   No No   Sig: Take 1 tablet (500 mg total) by mouth 2 (two) times a day      Facility-Administered Medications: None       Past Medical History:   Diagnosis Date    Carpal tunnel syndrome     Chronic pain     left arm    [facility] mellitus [facility] [address]     Hypertension     Umbilical hernia        Past Surgical History:   Procedure Laterality Date    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    HAND SURGERY      UMBILICAL HERNIA REPAIR         History reviewed  No pertinent family history  I have reviewed and agree with the history as documented      Social History     Tobacco Use    Smoking status: Current Every Day Smoker     Packs/day: 0 25    Smokeless tobacco: Never Used   Substance Use Topics    Alcohol use: Yes     Comment: rarely    Drug use: No        Review of Systems   Constitutional: Negative for chills and fever  HENT: Positive for sore throat  Negative for rhinorrhea and trouble swallowing  Eyes: Negative for pain  Respiratory: Positive for cough  Negative for shortness of breath, wheezing and stridor  Cardiovascular: Negative for chest pain and leg swelling  Gastrointestinal: Negative for abdominal pain, diarrhea and nausea  Endocrine: Negative for polyuria  Genitourinary: Negative for dysuria, flank pain and urgency  Musculoskeletal: Negative for joint swelling, myalgias and neck stiffness  Skin: Negative for rash  Allergic/Immunologic: Negative for immunocompromised state  Neurological: Negative for dizziness, syncope, weakness, numbness and headaches  Hematological: Positive for adenopathy  Psychiatric/Behavioral: Negative for confusion and suicidal ideas  All other systems reviewed and are negative  Physical Exam  Physical Exam   Constitutional: He is oriented to person, place, and time  He appears well-developed and well-nourished  HENT:   Head: Normocephalic and atraumatic  Lymphadenopathy b/l      Eyes: Pupils are equal, round, and reactive to light  EOM are normal    Neck: Normal range of motion  Neck supple  Cardiovascular: Normal rate and regular rhythm  Exam reveals no friction rub  No murmur heard  Pulmonary/Chest: Breath sounds normal  No respiratory distress  He has no wheezes  He has no rales  Abdominal: Soft  Bowel sounds are normal  He exhibits no distension  There is no tenderness  Musculoskeletal: Normal range of motion  He exhibits no edema or tenderness  Neurological: He is alert and oriented to person, place, and time  Skin: Skin is warm  No rash noted  Psychiatric: He has a normal mood and affect  Nursing note and vitals reviewed        Vital Signs  ED Triage Vitals [02/04/19 1719]   Temperature Pulse Respirations Blood Pressure SpO2   98 9 °F (37 2 °C) 86 20 160/84 98 %      Temp Source Heart Rate Source Patient Position - Orthostatic VS BP Location FiO2 (%)   Tympanic Monitor Sitting Left arm --      Pain Score       5           Vitals:    02/04/19 1719   BP: 160/84   Pulse: 86   Patient Position - Orthostatic VS: Sitting       Visual Acuity      ED Medications  Medications - No data to display    Diagnostic Studies  Results Reviewed     None                 No orders to display              Procedures  Procedures       Phone Contacts  ED Phone Contact    ED Course                               MDM  Number of Diagnoses or Management Options  Bronchitis: new and requires workup  Lymphadenopathy: new and requires workup  Diagnosis management comments: Pt re-examined and evaluated after testing and treatment  Spoke with the patient and feeling improved and sxs have resolved  Will discharge home with close f/u with pcp and instructed to return to the ED if sxs worsen or continue  Pt agrees with the plan for discharge and feels comfortable to go home with proper f/u  Advised to return for worsening or additional problems  Diagnostic tests were reviewed and questions answered  Diagnosis, care plan and treatment options were discussed  The patient understand instructions and will follow up as directed  Disposition  Final diagnoses:   Bronchitis   Lymphadenopathy     Time reflects when diagnosis was documented in both MDM as applicable and the Disposition within this note     Time User Action Codes Description Comment    2/4/2019  5:35 PM Umberto Sarmiento Add [J40] Bronchitis     2/4/2019  5:36 PM Umberto Sarmiento Add [R59 1] Lymphadenopathy       ED Disposition     ED Disposition Condition Date/Time Comment    Discharge  Mon Feb 4, 2019  5:36 PM Monse Gomez  discharge to home/self care       Condition at discharge: Stable        Follow-up Information     Follow up With Specialties Details Why Ana Driscoll MD Family Medicine Call in 2 days If symptoms charity [facility] [address] [id number]  [phone]            Discharge Medication List as of 2/4/2019  5:37 PM      START taking these medications    Details   azithromycin (ZITHROMAX Z-TANISHA) 250 mg tablet Take 1 tablet (250 mg total) by mouth daily for 5 days Take two tabs on day one and then one tab each day for 4 days, Starting Mon 2/4/2019, Until Sat 2/9/2019, Print      !! ibuprofen (MOTRIN) 800 mg tablet Take 1 tablet (800 mg total) by mouth 3 (three) times a day, Starting Mon 2/4/2019, Print       !! - Potential duplicate medications found  Please discuss with provider  CONTINUE these medications which have NOT CHANGED    Details   !! ibuprofen (MOTRIN) 600 mg tablet Take 1 tablet (600 mg total) by mouth every 6 (six) hours as needed (pain), Starting Wed 1/2/2019, Print      insulin aspart (NovoLOG) 100 units/mL injection Inject under the skin 3 (three) times a day before meals, Historical Med      METFORMIN HCL PO Take by mouth, Historical Med      methocarbamol (ROBAXIN) 500 mg tablet Take 1 tablet (500 mg total) by mouth 2 (two) times a day, Starting Wed 1/2/2019, Print       !! - Potential duplicate medications found  Please discuss with provider  No discharge procedures on file      ED Provider  Electronically Signed by           Sharyle Fare, DO  02/13/19 2658

## 2019-03-05 ENCOUNTER — HOSPITAL ENCOUNTER (EMERGENCY)
Facility: HOSPITAL | Age: 54
Discharge: HOME | End: 2019-03-05
Attending: EMERGENCY MEDICINE | Admitting: EMERGENCY MEDICINE
Payer: COMMERCIAL

## 2019-03-05 VITALS
DIASTOLIC BLOOD PRESSURE: 89 MMHG | TEMPERATURE: 98.5 F | RESPIRATION RATE: 16 BRPM | OXYGEN SATURATION: 99 % | BODY MASS INDEX: 32.02 KG/M2 | SYSTOLIC BLOOD PRESSURE: 158 MMHG | HEART RATE: 91 BPM | WEIGHT: 236.11 LBS

## 2019-03-05 DIAGNOSIS — G56.22 CUBITAL TUNNEL SYNDROME ON LEFT: ICD-10-CM

## 2019-03-05 DIAGNOSIS — J06.9 UPPER RESPIRATORY INFECTION: ICD-10-CM

## 2019-03-05 DIAGNOSIS — M77.8 LEFT ELBOW TENDINITIS: Primary | ICD-10-CM

## 2019-03-05 PROCEDURE — 99283 EMERGENCY DEPT VISIT LOW MDM: CPT

## 2019-03-05 RX ORDER — ALBUTEROL SULFATE 90 UG/1
2 AEROSOL, METERED RESPIRATORY (INHALATION) ONCE
Status: COMPLETED | OUTPATIENT
Start: 2019-03-05 | End: 2019-03-05

## 2019-03-05 RX ORDER — IBUPROFEN 600 MG/1
600 TABLET ORAL EVERY 6 HOURS PRN
Qty: 30 TABLET | Refills: 0 | Status: SHIPPED | OUTPATIENT
Start: 2019-03-05 | End: 2019-03-26

## 2019-03-05 RX ORDER — TRAMADOL HYDROCHLORIDE 50 MG/1
50 TABLET ORAL EVERY 6 HOURS PRN
Qty: 12 TABLET | Refills: 0 | Status: SHIPPED | OUTPATIENT
Start: 2019-03-05 | End: 2019-03-26

## 2019-03-05 RX ORDER — IBUPROFEN 600 MG/1
600 TABLET ORAL ONCE
Status: COMPLETED | OUTPATIENT
Start: 2019-03-05 | End: 2019-03-05

## 2019-03-05 RX ADMIN — IBUPROFEN 600 MG: 600 TABLET ORAL at 16:58

## 2019-03-05 RX ADMIN — ALBUTEROL SULFATE 2 PUFF: 90 AEROSOL, METERED RESPIRATORY (INHALATION) at 16:58

## 2019-03-05 NOTE — ED PROVIDER NOTES
History  Chief Complaint   Patient presents with    Cough     Pt c/o cough, sore throat, and nasal congestion beginning yesterday  Pt c/o intermittent left arm pain and numbness x two years (since partial finger amputation)  Here with multiple complaints  47 RHD M with left shoulder / left elbow pain  Off/on for a couple of years - current flare up a few months old  Pain primarily in the left elbow w/ radiation down the forearm  If arm in a bent position for too long, will note paresthesias and numbness  Does a lot of heavy lifting at work and feel that makes it worse  Denies specific injury or inciting event  Denies redness or swelling to the joint  Denies polyarthralgias, fatigue  No known tick exposures, no rashes  Also c/o cough/congestion for a day  +smoker, hx of asthma when younger  Denies f/c/s, some nasal congestion, no sig sore throat, cough w/ clear sputum, no cp/pressure, no sob at rest, +mild agustin, normal appetite, no n/v/d  Hasn't used inhalers for years  History provided by:  Parent   used: No    Elbow Pain   Location:  Elbow  Elbow location:  L elbow  Injury: no    Pain details:     Quality:  Aching    Radiates to:  L forearm, L fingers and L wrist    Severity:  Moderate    Onset quality:  Gradual    Timing:  Intermittent    Progression:  Waxing and waning  Handedness:  Right-handed  Dislocation: no    Prior injury to area:  No  Relieved by:  None tried  Worsened by: Movement  Ineffective treatments:  None tried  Associated symptoms: numbness and tingling    Associated symptoms: no back pain, no decreased range of motion, no fatigue, no fever, no muscle weakness, no neck pain, no stiffness and no swelling    Risk factors: no concern for non-accidental trauma        Prior to Admission Medications   Prescriptions Last Dose Informant Patient Reported? Taking?    METFORMIN HCL PO   Yes No   Sig: Take by mouth   ibuprofen (MOTRIN) 600 mg tablet   No No   Sig: Take 1 tablet (600 mg total) by mouth every 6 (six) hours as needed (pain)   ibuprofen (MOTRIN) 800 mg tablet   No No   Sig: Take 1 tablet (800 mg total) by mouth 3 (three) times a day   insulin aspart (NovoLOG) 100 units/mL injection   Yes No   Sig: Inject under the skin 3 (three) times a day before meals   methocarbamol (ROBAXIN) 500 mg tablet   No No   Sig: Take 1 tablet (500 mg total) by mouth 2 (two) times a day      Facility-Administered Medications: None       Past Medical History:   Diagnosis Date    Carpal tunnel syndrome     Chronic pain     left arm    [facility] [address]     Hypertension     Umbilical hernia        Past Surgical History:   Procedure Laterality Date    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    HAND SURGERY      UMBILICAL HERNIA REPAIR         History reviewed  No pertinent family history  I have reviewed and agree with the history as documented  Social History     Tobacco Use    Smoking status: Current Every Day Smoker     Packs/day: 0 25    Smokeless tobacco: Never Used   Substance Use Topics    Alcohol use: Yes     Comment: rarely    Drug use: No        Review of Systems   Constitutional: Negative for fatigue and fever  Musculoskeletal: Negative for back pain, neck pain and stiffness  All other systems reviewed and are negative  Physical Exam  Physical Exam   Constitutional: He appears well-developed and well-nourished  HENT:   Head: Normocephalic  Right Ear: External ear normal    Left Ear: External ear normal    Nose: Mucosal edema present  Mouth/Throat: Oropharynx is clear and moist    Eyes: Conjunctivae are normal    Neck: Neck supple  Cardiovascular: Normal rate and regular rhythm  Pulmonary/Chest: No accessory muscle usage  No tachypnea  No respiratory distress  He has no decreased breath sounds  He has wheezes (occasional scattered)  He has no rhonchi  He has no rales  Abdominal: He exhibits no distension     Musculoskeletal: He exhibits no edema  Left shoulder: He exhibits pain  He exhibits normal range of motion, no tenderness, no bony tenderness, no swelling, no effusion, no deformity and no spasm  Left elbow: He exhibits normal range of motion, no swelling and no effusion  Tenderness found  Medial epicondyle and lateral epicondyle tenderness noted  No radial head and no olecranon process tenderness noted  Arms:  Neurological: He is alert  Skin: Skin is warm  Psychiatric: He has a normal mood and affect  Nursing note and vitals reviewed        Vital Signs  ED Triage Vitals [03/05/19 1618]   Temperature Pulse Respirations Blood Pressure SpO2   98 5 °F (36 9 °C) 91 16 158/89 99 %      Temp Source Heart Rate Source Patient Position - Orthostatic VS BP Location FiO2 (%)   Tympanic Monitor Sitting Left arm --      Pain Score       Worst Possible Pain           Vitals:    03/05/19 1618   BP: 158/89   Pulse: 91   Patient Position - Orthostatic VS: Sitting       Visual Acuity      ED Medications  Medications   albuterol (PROVENTIL HFA,VENTOLIN HFA) inhaler 2 puff (2 puffs Inhalation Given 3/5/19 1658)   ibuprofen (MOTRIN) tablet 600 mg (600 mg Oral Given 3/5/19 1658)       Diagnostic Studies  Results Reviewed     None                 No orders to display              Procedures  Procedures       Phone Contacts  ED Phone Contact    ED Course                               MDM  Number of Diagnoses or Management Options  Cubital tunnel syndrome on left: new and does not require workup  Left elbow tendinitis: new and does not require workup  Upper respiratory infection: new and does not require workup      Disposition  Final diagnoses:   Left elbow tendinitis   Cubital tunnel syndrome on left   Upper respiratory infection     Time reflects when diagnosis was documented in both MDM as applicable and the Disposition within this note     Time User Action Codes Description Comment    3/5/2019  4:46 PM Ginger Orta Add [M77 8] Left elbow tendinitis     3/5/2019  4:46 PM Ginger Orta [G56 22] Cubital tunnel syndrome on left     3/5/2019  4:46 PM Ginger Orta Add [J06 9] Upper respiratory infection       ED Disposition     ED Disposition Condition Date/Time Comment    Discharge Good Tue Mar 5, 2019  4:46 PM Gregoria Mendoza  discharge to home/self care  Follow-up Information    None         Discharge Medication List as of 3/5/2019  4:52 PM      START taking these medications    Details   !! ibuprofen (MOTRIN) 600 mg tablet Take 1 tablet (600 mg total) by mouth every 6 (six) hours as needed for mild pain or moderate pain, Starting Tue 3/5/2019, Print      traMADol (ULTRAM) 50 mg tablet Take 1 tablet (50 mg total) by mouth every 6 (six) hours as needed for moderate pain, Starting Tue 3/5/2019, Print       !! - Potential duplicate medications found  Please discuss with provider  CONTINUE these medications which have NOT CHANGED    Details   !! ibuprofen (MOTRIN) 600 mg tablet Take 1 tablet (600 mg total) by mouth every 6 (six) hours as needed (pain), Starting Wed 1/2/2019, Print      !! ibuprofen (MOTRIN) 800 mg tablet Take 1 tablet (800 mg total) by mouth 3 (three) times a day, Starting Mon 2/4/2019, Print      insulin aspart (NovoLOG) 100 units/mL injection Inject under the skin 3 (three) times a day before meals, Historical Med      METFORMIN HCL PO Take by mouth, Historical Med      methocarbamol (ROBAXIN) 500 mg tablet Take 1 tablet (500 mg total) by mouth 2 (two) times a day, Starting Wed 1/2/2019, Print       !! - Potential duplicate medications found  Please discuss with provider  No discharge procedures on file      ED Provider  Electronically Signed by           Jayashree Silverio DO  03/05/19 2024

## 2019-03-26 ENCOUNTER — APPOINTMENT (EMERGENCY)
Dept: CT IMAGING | Facility: HOSPITAL | Age: 54
End: 2019-03-26
Payer: COMMERCIAL

## 2019-03-26 ENCOUNTER — HOSPITAL ENCOUNTER (EMERGENCY)
Facility: HOSPITAL | Age: 54
Discharge: HOME | End: 2019-03-26
Admitting: EMERGENCY MEDICINE
Payer: COMMERCIAL

## 2019-03-26 VITALS
HEART RATE: 75 BPM | TEMPERATURE: 99.4 F | DIASTOLIC BLOOD PRESSURE: 84 MMHG | BODY MASS INDEX: 31.84 KG/M2 | WEIGHT: 234.79 LBS | OXYGEN SATURATION: 98 % | RESPIRATION RATE: 16 BRPM | SYSTOLIC BLOOD PRESSURE: 146 MMHG

## 2019-03-26 DIAGNOSIS — S09.90XA INJURY OF HEAD, INITIAL ENCOUNTER: Primary | ICD-10-CM

## 2019-03-26 PROCEDURE — 70450 CT HEAD/BRAIN W/O DYE: CPT

## 2019-03-26 PROCEDURE — 99283 EMERGENCY DEPT VISIT LOW MDM: CPT

## 2019-03-26 RX ORDER — ACETAMINOPHEN 500 MG
500 TABLET ORAL EVERY 6 HOURS PRN
Qty: 30 TABLET | Refills: 0 | Status: SHIPPED | OUTPATIENT
Start: 2019-03-26 | End: 2019-07-09

## 2019-03-28 NOTE — ED PROVIDER NOTES
History  Chief Complaint   Patient presents with    Head Injury     Pt c/o right sided headache after falling and hitting head on table on Friday  Pt reports right side of head is tender to palpation  Pt denies dizzines, n/v       78-year-old male with past medical history of diabetes, hypertension, who presents to the emergency department for right-sided head pain and headache status post head strike four days ago  Patient reports that he was ambulating, when he became lightheaded, and fell striking the right side of his head against table  Currently complains of mild to moderate pain to palpation to the right side of his head  Patient is unsure if he lost consciousness  He currently denies visual changes, hearing changes, slurred speech, confusion, nausea, vomiting, numbness, tingling, weakness  He denies neck pain or stiffness  Denies use of anticoagulation medications  Has not taken anything for pain prior to arrival in the emergency department today  Patient states that since the injury, he has not felt lightheaded or dizzy  History provided by:  Patient   used: No        Prior to Admission Medications   Prescriptions Last Dose Informant Patient Reported? Taking? METFORMIN HCL PO   Yes No   Sig: Take by mouth      Facility-Administered Medications: None       Past Medical History:   Diagnosis Date    Carpal tunnel syndrome     Chronic pain     left arm    Diabetes mellitus ([address] )     Hypertension     Umbilical hernia        Past Surgical History:   Procedure Laterality Date    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    HAND SURGERY      UMBILICAL HERNIA REPAIR         History reviewed  No pertinent family history  I have reviewed and agree with the history as documented      Social History     Tobacco Use    Smoking status: Current Every Day Smoker     Packs/day: 0 50    Smokeless tobacco: Never Used   Substance Use Topics    Alcohol use: Yes     Comment: rarely    Drug use: No        Review of Systems   Constitutional: Negative for chills and fever  HENT: Negative for congestion, ear pain, postnasal drip, rhinorrhea, sinus pressure, sinus pain, sore throat and trouble swallowing  Eyes: Negative  Respiratory: Negative for cough, chest tightness, shortness of breath and wheezing  Cardiovascular: Negative for chest pain, palpitations and leg swelling  Gastrointestinal: Negative for abdominal pain, anal bleeding, constipation, diarrhea, nausea and vomiting  Genitourinary: Negative for dysuria, flank pain, frequency, hematuria and urgency  Musculoskeletal: Negative for arthralgias, back pain, gait problem, joint swelling, myalgias, neck pain and neck stiffness  Skin: Negative for color change, pallor, rash and wound  Neurological: Positive for light-headedness and headaches  Negative for dizziness, tremors, seizures, syncope, facial asymmetry, speech difficulty, weakness and numbness  Psychiatric/Behavioral: Negative  All other systems reviewed and are negative  Physical Exam  Physical Exam   Constitutional: He is oriented to person, place, and time  He appears well-developed and well-nourished  No distress  HENT:   Right Ear: External ear normal    Left Ear: External ear normal    No hemotympanum bilaterally  Negative [place] signs or raccoon eyes  There is pain with palpation in hematoma to the temporal aspect of the right side of the head  Eyes: Pupils are equal, round, and reactive to light  Conjunctivae and EOM are normal    Neck: Normal range of motion  Neck supple  No pain with palpation to cervical spine  Cardiovascular: Normal rate, regular rhythm and intact distal pulses  Pulmonary/Chest: Effort normal and breath sounds normal  He has no wheezes  He has no rales  Abdominal: Soft  Bowel sounds are normal  He exhibits no distension  There is no tenderness  There is no rebound and no guarding     Musculoskeletal: Normal range of motion  He exhibits no edema or tenderness  Neurological: He is alert and oriented to person, place, and time  No cranial nerve deficit or sensory deficit  He exhibits normal muscle tone  Coordination normal    Negative pronator drift  Normal finger to nose  No gait ataxia  Skin: Skin is warm and dry  Capillary refill takes less than 2 seconds  He is not diaphoretic  Psychiatric: He has a normal mood and affect  His behavior is normal    Nursing note and vitals reviewed  Vital Signs  ED Triage Vitals [03/26/19 1712]   Temperature Pulse Respirations Blood Pressure SpO2   99 4 °F (37 4 °C) 88 16 162/98 99 %      Temp Source Heart Rate Source Patient Position - Orthostatic VS BP Location FiO2 (%)   Tympanic Monitor Sitting Left arm --      Pain Score       6           Vitals:    03/26/19 1712 03/26/19 1801   BP: 162/98 146/84   Pulse: 88 75   Patient Position - Orthostatic VS: Sitting Sitting         Visual Acuity      ED Medications  Medications - No data to display    Diagnostic Studies  Results Reviewed     None                 CT head without contrast   Final Result by Jerry Broussard DO (03/26 8025)   No acute intracranial abnormality  No intracranial hemorrhage  Workstation performed: [id number]                    Procedures  Procedures       Phone Contacts  ED Phone Contact    ED Course                               MDM  Number of Diagnoses or Management Options  Injury of head, initial encounter:   Diagnosis management comments: Differential Diagnosis includes but is not limited to: Intracranial hemorrhage, subdural hemorrhage, fracture, dislocation of the skull  CT of the head is negative for acute intracranial findings  Patient likely has a contusion with underlying hematoma  Discharge home with primary care follow-up as needed         Amount and/or Complexity of Data Reviewed  Tests in the radiology section of CPT®: ordered and reviewed        Disposition  Final diagnoses: Injury of head, initial encounter     Time reflects when diagnosis was documented in both MDM as applicable and the Disposition within this note     Time User Action Codes Description Comment    3/26/2019  5:58 PM Favian Del Angel [S09 [id number]] Injury of head, initial encounter       ED Disposition     ED Disposition Condition Date/Time Comment    Discharge Stable Tue Mar 26, 2019  5:58 PM Trever Belcher  discharge to home/self care              Follow-up Information     Follow up With Specialties Details Why Gustavo Lagos MD Family Medicine Schedule an appointment as soon as possible for a visit in 1 week As needed, If symptoms worsen [facility] [address] [id number]  [phone]            Discharge Medication List as of 3/26/2019  5:59 PM      START taking these medications    Details   acetaminophen (TYLENOL) 500 mg tablet Take 1 tablet (500 mg total) by mouth every 6 (six) hours as needed for mild pain or moderate pain, Starting Tue 3/26/2019, Print         CONTINUE these medications which have NOT CHANGED    Details   METFORMIN HCL PO Take by mouth, Historical Med      !! ibuprofen (MOTRIN) 600 mg tablet Take 1 tablet (600 mg total) by mouth every 6 (six) hours as needed (pain), Starting Wed 1/2/2019, Print      !! ibuprofen (MOTRIN) 600 mg tablet Take 1 tablet (600 mg total) by mouth every 6 (six) hours as needed for mild pain or moderate pain, Starting Tue 3/5/2019, Print      !! ibuprofen (MOTRIN) 800 mg tablet Take 1 tablet (800 mg total) by mouth 3 (three) times a day, Starting Mon 2/4/2019, Print      insulin aspart (NovoLOG) 100 units/mL injection Inject under the skin 3 (three) times a day before meals, Historical Med      methocarbamol (ROBAXIN) 500 mg tablet Take 1 tablet (500 mg total) by mouth 2 (two) times a day, Starting Wed 1/2/2019, Print      traMADol (ULTRAM) 50 mg tablet Take 1 tablet (50 mg total) by mouth every 6 (six) hours as needed for moderate pain, Starting Tue 3/5/2019, Print       !! - Potential duplicate medications found  Please discuss with provider  No discharge procedures on file      ED Provider  Electronically Signed by           Antoinette Martin PA-C  03/28/19 2862

## 2019-04-24 ENCOUNTER — APPOINTMENT (EMERGENCY)
Dept: RADIOLOGY | Facility: HOSPITAL | Age: 54
End: 2019-04-24
Payer: COMMERCIAL

## 2019-04-24 ENCOUNTER — HOSPITAL ENCOUNTER (EMERGENCY)
Facility: HOSPITAL | Age: 54
Discharge: HOME | End: 2019-04-24
Attending: EMERGENCY MEDICINE | Admitting: EMERGENCY MEDICINE
Payer: COMMERCIAL

## 2019-04-24 VITALS
SYSTOLIC BLOOD PRESSURE: 125 MMHG | WEIGHT: 240 LBS | HEIGHT: 72 IN | DIASTOLIC BLOOD PRESSURE: 78 MMHG | BODY MASS INDEX: 32.51 KG/M2 | HEART RATE: 78 BPM | RESPIRATION RATE: 16 BRPM | OXYGEN SATURATION: 98 % | TEMPERATURE: 98.5 F

## 2019-04-24 DIAGNOSIS — R19.7 DIARRHEA: Primary | ICD-10-CM

## 2019-04-24 DIAGNOSIS — R05.9 COUGH: ICD-10-CM

## 2019-04-24 PROCEDURE — 71046 X-RAY EXAM CHEST 2 VIEWS: CPT

## 2019-04-24 PROCEDURE — 99283 EMERGENCY DEPT VISIT LOW MDM: CPT

## 2019-04-24 PROCEDURE — 99283 EMERGENCY DEPT VISIT LOW MDM: CPT | Performed by: PHYSICIAN ASSISTANT

## 2019-04-24 RX ORDER — GUAIFENESIN 100 MG/5ML
200 SYRUP ORAL 3 TIMES DAILY PRN
Qty: 120 ML | Refills: 0 | Status: SHIPPED | OUTPATIENT
Start: 2019-04-24 | End: 2019-05-04

## 2019-04-24 RX ORDER — DICYCLOMINE HCL 20 MG
20 TABLET ORAL 2 TIMES DAILY
Qty: 20 TABLET | Refills: 0 | Status: SHIPPED | OUTPATIENT
Start: 2019-04-24 | End: 2019-05-14

## 2019-04-24 RX ORDER — LOPERAMIDE HYDROCHLORIDE 2 MG/1
2 CAPSULE ORAL ONCE
Status: COMPLETED | OUTPATIENT
Start: 2019-04-24 | End: 2019-04-24

## 2019-04-24 RX ORDER — BENZONATATE 100 MG/1
100 CAPSULE ORAL EVERY 8 HOURS
Qty: 21 CAPSULE | Refills: 0 | Status: SHIPPED | OUTPATIENT
Start: 2019-04-24 | End: 2019-05-14

## 2019-04-24 RX ADMIN — LOPERAMIDE HYDROCHLORIDE 2 MG: 2 CAPSULE ORAL at 10:52

## 2019-05-14 ENCOUNTER — APPOINTMENT (EMERGENCY)
Dept: CT IMAGING | Facility: HOSPITAL | Age: 54
End: 2019-05-14
Payer: COMMERCIAL

## 2019-05-14 ENCOUNTER — HOSPITAL ENCOUNTER (EMERGENCY)
Facility: HOSPITAL | Age: 54
Discharge: HOME | End: 2019-05-14
Attending: EMERGENCY MEDICINE
Payer: COMMERCIAL

## 2019-05-14 VITALS
WEIGHT: 241.4 LBS | OXYGEN SATURATION: 98 % | TEMPERATURE: 98.5 F | HEIGHT: 72 IN | SYSTOLIC BLOOD PRESSURE: 152 MMHG | DIASTOLIC BLOOD PRESSURE: 92 MMHG | BODY MASS INDEX: 32.7 KG/M2 | RESPIRATION RATE: 18 BRPM | HEART RATE: 78 BPM

## 2019-05-14 DIAGNOSIS — R10.9 INTERMITTENT ABDOMINAL PAIN: Primary | ICD-10-CM

## 2019-05-14 LAB
ALBUMIN SERPL BCP-MCNC: 4.6 G/DL (ref 3–5.2)
ALP SERPL-CCNC: 53 U/L (ref 43–122)
ALT SERPL W P-5'-P-CCNC: 14 U/L (ref 9–52)
ANION GAP SERPL CALCULATED.3IONS-SCNC: 7 MMOL/L (ref 5–14)
AST SERPL W P-5'-P-CCNC: 21 U/L (ref 17–59)
BASOPHILS # BLD AUTO: 0 THOUSANDS/ΜL (ref 0–0.1)
BASOPHILS NFR BLD AUTO: 0 % (ref 0–1)
BILIRUB SERPL-MCNC: 0.6 MG/DL
BUN SERPL-MCNC: 12 MG/DL (ref 5–25)
CALCIUM SERPL-MCNC: 9.9 MG/DL (ref 8.4–10.2)
CHLORIDE SERPL-SCNC: 103 MMOL/L (ref 97–108)
CO2 SERPL-SCNC: 29 MMOL/L (ref 22–30)
CREAT SERPL-MCNC: 0.78 MG/DL (ref 0.7–1.5)
EOSINOPHIL # BLD AUTO: 0.1 THOUSAND/ΜL (ref 0–0.4)
EOSINOPHIL NFR BLD AUTO: 1 % (ref 0–6)
ERYTHROCYTE [DISTWIDTH] IN BLOOD BY AUTOMATED COUNT: 14 %
GFR SERPL CREATININE-BSD FRML MDRD: 104 ML/MIN/1.73SQ M
GLUCOSE SERPL-MCNC: 114 MG/DL (ref 70–99)
HCT VFR BLD AUTO: 47.5 % (ref 41–53)
HGB BLD-MCNC: 15.6 G/DL (ref 13.5–17.5)
LIPASE SERPL-CCNC: 83 U/L (ref 23–300)
LYMPHOCYTES # BLD AUTO: 1.6 THOUSANDS/ΜL (ref 0.5–4)
LYMPHOCYTES NFR BLD AUTO: 25 % (ref 25–45)
MCH RBC QN AUTO: 27.7 PG (ref 26–34)
MCHC RBC AUTO-ENTMCNC: 32.7 G/DL (ref 31–36)
MCV RBC AUTO: 85 FL (ref 80–100)
MONOCYTES # BLD AUTO: 0.6 THOUSAND/ΜL (ref 0.2–0.9)
MONOCYTES NFR BLD AUTO: 10 % (ref 1–10)
NEUTROPHILS # BLD AUTO: 4 THOUSANDS/ΜL (ref 1.8–7.8)
NEUTS SEG NFR BLD AUTO: 64 % (ref 45–65)
PLATELET # BLD AUTO: 229 THOUSANDS/UL (ref 150–450)
PMV BLD AUTO: 8 FL (ref 8.9–12.7)
POTASSIUM SERPL-SCNC: 4.1 MMOL/L (ref 3.6–5)
PROT SERPL-MCNC: 7.9 G/DL (ref 5.9–8.4)
RBC # BLD AUTO: 5.62 MILLION/UL (ref 4.5–5.9)
SODIUM SERPL-SCNC: 139 MMOL/L (ref 137–147)
WBC # BLD AUTO: 6.3 THOUSAND/UL (ref 4.5–11)

## 2019-05-14 PROCEDURE — 99285 EMERGENCY DEPT VISIT HI MDM: CPT

## 2019-05-14 PROCEDURE — 93005 ELECTROCARDIOGRAM TRACING: CPT

## 2019-05-14 PROCEDURE — 96375 TX/PRO/DX INJ NEW DRUG ADDON: CPT

## 2019-05-14 PROCEDURE — 85025 COMPLETE CBC W/AUTO DIFF WBC: CPT | Performed by: PHYSICIAN ASSISTANT

## 2019-05-14 PROCEDURE — 36415 COLL VENOUS BLD VENIPUNCTURE: CPT | Performed by: PHYSICIAN ASSISTANT

## 2019-05-14 PROCEDURE — 74177 CT ABD & PELVIS W/CONTRAST: CPT

## 2019-05-14 PROCEDURE — 96374 THER/PROPH/DIAG INJ IV PUSH: CPT

## 2019-05-14 PROCEDURE — 99284 EMERGENCY DEPT VISIT MOD MDM: CPT | Performed by: PHYSICIAN ASSISTANT

## 2019-05-14 PROCEDURE — 80053 COMPREHEN METABOLIC PANEL: CPT | Performed by: PHYSICIAN ASSISTANT

## 2019-05-14 PROCEDURE — 96361 HYDRATE IV INFUSION ADD-ON: CPT

## 2019-05-14 PROCEDURE — 83690 ASSAY OF LIPASE: CPT | Performed by: PHYSICIAN ASSISTANT

## 2019-05-14 RX ORDER — IBUPROFEN 400 MG/1
400 TABLET ORAL EVERY 6 HOURS PRN
Qty: 12 TABLET | Refills: 0 | Status: SHIPPED | OUTPATIENT
Start: 2019-05-14 | End: 2019-07-09

## 2019-05-14 RX ORDER — DICYCLOMINE HCL 20 MG
20 TABLET ORAL 2 TIMES DAILY
Qty: 20 TABLET | Refills: 0 | Status: SHIPPED | OUTPATIENT
Start: 2019-05-14 | End: 2019-07-09

## 2019-05-14 RX ORDER — ACETAMINOPHEN 500 MG
500 TABLET ORAL EVERY 6 HOURS PRN
Qty: 30 TABLET | Refills: 0 | Status: SHIPPED | OUTPATIENT
Start: 2019-05-14 | End: 2019-07-09

## 2019-05-14 RX ORDER — KETOROLAC TROMETHAMINE 30 MG/ML
15 INJECTION, SOLUTION INTRAMUSCULAR; INTRAVENOUS ONCE
Status: COMPLETED | OUTPATIENT
Start: 2019-05-14 | End: 2019-05-14

## 2019-05-14 RX ORDER — ONDANSETRON 2 MG/ML
4 INJECTION INTRAMUSCULAR; INTRAVENOUS ONCE
Status: COMPLETED | OUTPATIENT
Start: 2019-05-14 | End: 2019-05-14

## 2019-05-14 RX ORDER — ONDANSETRON 4 MG/1
4 TABLET, ORALLY DISINTEGRATING ORAL EVERY 8 HOURS PRN
Qty: 20 TABLET | Refills: 0 | Status: SHIPPED | OUTPATIENT
Start: 2019-05-14 | End: 2019-07-10

## 2019-05-14 RX ADMIN — SODIUM CHLORIDE 1000 ML: 9 INJECTION, SOLUTION INTRAVENOUS at 20:57

## 2019-05-14 RX ADMIN — IOHEXOL 100 ML: 350 INJECTION, SOLUTION INTRAVENOUS at 22:13

## 2019-05-14 RX ADMIN — ONDANSETRON HYDROCHLORIDE 4 MG: 2 INJECTION, SOLUTION INTRAMUSCULAR; INTRAVENOUS at 21:06

## 2019-05-14 RX ADMIN — KETOROLAC TROMETHAMINE 15 MG: 30 INJECTION, SOLUTION INTRAMUSCULAR; INTRAVENOUS at 21:06

## 2019-05-15 LAB
ATRIAL RATE: 67 BPM
P AXIS: 76 DEGREES
PR INTERVAL: 142 MS
QRS AXIS: 13 DEGREES
QRSD INTERVAL: 90 MS
QT INTERVAL: 436 MS
QTC INTERVAL: 460 MS
T WAVE AXIS: 14 DEGREES
VENTRICULAR RATE: 67 BPM

## 2019-05-15 PROCEDURE — 93010 ELECTROCARDIOGRAM REPORT: CPT | Performed by: INTERNAL MEDICINE

## 2019-06-20 ENCOUNTER — ANESTHESIA (EMERGENCY)
Dept: PERIOP | Facility: HOSPITAL | Age: 54
End: 2019-06-20
Payer: COMMERCIAL

## 2019-06-20 ENCOUNTER — ANESTHESIA EVENT (OUTPATIENT)
Dept: ANESTHESIOLOGY | Facility: HOSPITAL | Age: 54
End: 2019-06-20

## 2019-06-20 ENCOUNTER — APPOINTMENT (EMERGENCY)
Dept: ULTRASOUND IMAGING | Facility: HOSPITAL | Age: 54
End: 2019-06-20
Payer: COMMERCIAL

## 2019-06-20 ENCOUNTER — HOSPITAL ENCOUNTER (OUTPATIENT)
Facility: HOSPITAL | Age: 54
Discharge: HOME | End: 2019-06-21
Attending: EMERGENCY MEDICINE | Admitting: SPECIALIST
Payer: COMMERCIAL

## 2019-06-20 ENCOUNTER — ANESTHESIA (OUTPATIENT)
Dept: ANESTHESIOLOGY | Facility: HOSPITAL | Age: 54
End: 2019-06-20

## 2019-06-20 ENCOUNTER — ANESTHESIA EVENT (EMERGENCY)
Dept: PERIOP | Facility: HOSPITAL | Age: 54
End: 2019-06-20
Payer: COMMERCIAL

## 2019-06-20 DIAGNOSIS — K80.00 ACUTE CALCULOUS CHOLECYSTITIS: Primary | ICD-10-CM

## 2019-06-20 DIAGNOSIS — R10.9 ABDOMINAL PAIN: ICD-10-CM

## 2019-06-20 LAB
ALBUMIN SERPL BCP-MCNC: 4.5 G/DL (ref 3–5.2)
ALP SERPL-CCNC: 53 U/L (ref 43–122)
ALT SERPL W P-5'-P-CCNC: 11 U/L (ref 9–52)
ANION GAP SERPL CALCULATED.3IONS-SCNC: 11 MMOL/L (ref 5–14)
AST SERPL W P-5'-P-CCNC: 22 U/L (ref 17–59)
BASOPHILS # BLD AUTO: 0 THOUSANDS/ΜL (ref 0–0.1)
BASOPHILS NFR BLD AUTO: 0 % (ref 0–1)
BILIRUB SERPL-MCNC: 0.6 MG/DL
BUN SERPL-MCNC: 15 MG/DL (ref 5–25)
CALCIUM SERPL-MCNC: 9.2 MG/DL (ref 8.4–10.2)
CHLORIDE SERPL-SCNC: 102 MMOL/L (ref 97–108)
CO2 SERPL-SCNC: 26 MMOL/L (ref 22–30)
CREAT SERPL-MCNC: 0.93 MG/DL (ref 0.7–1.5)
EOSINOPHIL # BLD AUTO: 0.1 THOUSAND/ΜL (ref 0–0.4)
EOSINOPHIL NFR BLD AUTO: 1 % (ref 0–6)
ERYTHROCYTE [DISTWIDTH] IN BLOOD BY AUTOMATED COUNT: 14.1 %
GFR SERPL CREATININE-BSD FRML MDRD: 94 ML/MIN/1.73SQ M
GLUCOSE SERPL-MCNC: 124 MG/DL (ref 70–99)
GLUCOSE SERPL-MCNC: 129 MG/DL (ref 65–140)
HCT VFR BLD AUTO: 47.3 % (ref 41–53)
HGB BLD-MCNC: 15.9 G/DL (ref 13.5–17.5)
LIPASE SERPL-CCNC: 70 U/L (ref 23–300)
LYMPHOCYTES # BLD AUTO: 1 THOUSANDS/ΜL (ref 0.5–4)
LYMPHOCYTES NFR BLD AUTO: 18 % (ref 25–45)
MCH RBC QN AUTO: 28.1 PG (ref 26–34)
MCHC RBC AUTO-ENTMCNC: 33.5 G/DL (ref 31–36)
MCV RBC AUTO: 84 FL (ref 80–100)
MONOCYTES # BLD AUTO: 0.6 THOUSAND/ΜL (ref 0.2–0.9)
MONOCYTES NFR BLD AUTO: 11 % (ref 1–10)
NEUTROPHILS # BLD AUTO: 3.7 THOUSANDS/ΜL (ref 1.8–7.8)
NEUTS SEG NFR BLD AUTO: 69 % (ref 45–65)
PLATELET # BLD AUTO: 214 THOUSANDS/UL (ref 150–450)
PMV BLD AUTO: 8.3 FL (ref 8.9–12.7)
POTASSIUM SERPL-SCNC: 4.4 MMOL/L (ref 3.6–5)
PROT SERPL-MCNC: 8 G/DL (ref 5.9–8.4)
RBC # BLD AUTO: 5.64 MILLION/UL (ref 4.5–5.9)
SODIUM SERPL-SCNC: 139 MMOL/L (ref 137–147)
WBC # BLD AUTO: 5.3 THOUSAND/UL (ref 4.5–11)

## 2019-06-20 PROCEDURE — 96374 THER/PROPH/DIAG INJ IV PUSH: CPT

## 2019-06-20 PROCEDURE — 99219 PR INITIAL OBSERVATION CARE/DAY 50 MINUTES: CPT | Performed by: SPECIALIST

## 2019-06-20 PROCEDURE — 76705 ECHO EXAM OF ABDOMEN: CPT

## 2019-06-20 PROCEDURE — 99284 EMERGENCY DEPT VISIT MOD MDM: CPT | Performed by: EMERGENCY MEDICINE

## 2019-06-20 PROCEDURE — 88304 TISSUE EXAM BY PATHOLOGIST: CPT | Performed by: PATHOLOGY

## 2019-06-20 PROCEDURE — 47562 LAPAROSCOPIC CHOLECYSTECTOMY: CPT | Performed by: SPECIALIST

## 2019-06-20 PROCEDURE — 99285 EMERGENCY DEPT VISIT HI MDM: CPT

## 2019-06-20 PROCEDURE — 85025 COMPLETE CBC W/AUTO DIFF WBC: CPT | Performed by: EMERGENCY MEDICINE

## 2019-06-20 PROCEDURE — 83690 ASSAY OF LIPASE: CPT | Performed by: EMERGENCY MEDICINE

## 2019-06-20 PROCEDURE — 80053 COMPREHEN METABOLIC PANEL: CPT | Performed by: EMERGENCY MEDICINE

## 2019-06-20 PROCEDURE — 93005 ELECTROCARDIOGRAM TRACING: CPT

## 2019-06-20 PROCEDURE — 96361 HYDRATE IV INFUSION ADD-ON: CPT

## 2019-06-20 PROCEDURE — 36415 COLL VENOUS BLD VENIPUNCTURE: CPT | Performed by: EMERGENCY MEDICINE

## 2019-06-20 PROCEDURE — 82948 REAGENT STRIP/BLOOD GLUCOSE: CPT

## 2019-06-20 RX ORDER — MEPERIDINE HYDROCHLORIDE 25 MG/ML
12.5 INJECTION INTRAMUSCULAR; INTRAVENOUS; SUBCUTANEOUS
Status: DISCONTINUED | OUTPATIENT
Start: 2019-06-20 | End: 2019-06-20 | Stop reason: HOSPADM

## 2019-06-20 RX ORDER — HYDROMORPHONE HCL/PF 1 MG/ML
0.5 SYRINGE (ML) INJECTION
Status: DISCONTINUED | OUTPATIENT
Start: 2019-06-20 | End: 2019-06-20 | Stop reason: HOSPADM

## 2019-06-20 RX ORDER — OXYCODONE HYDROCHLORIDE AND ACETAMINOPHEN 5; 325 MG/1; MG/1
2 TABLET ORAL EVERY 4 HOURS PRN
Status: DISCONTINUED | OUTPATIENT
Start: 2019-06-20 | End: 2019-06-21 | Stop reason: HOSPADM

## 2019-06-20 RX ORDER — SODIUM CHLORIDE, SODIUM LACTATE, POTASSIUM CHLORIDE, CALCIUM CHLORIDE 600; 310; 30; 20 MG/100ML; MG/100ML; MG/100ML; MG/100ML
INJECTION, SOLUTION INTRAVENOUS CONTINUOUS PRN
Status: DISCONTINUED | OUTPATIENT
Start: 2019-06-20 | End: 2019-06-20 | Stop reason: SURG

## 2019-06-20 RX ORDER — PROPOFOL 10 MG/ML
INJECTION, EMULSION INTRAVENOUS AS NEEDED
Status: DISCONTINUED | OUTPATIENT
Start: 2019-06-20 | End: 2019-06-20 | Stop reason: SURG

## 2019-06-20 RX ORDER — KETOROLAC TROMETHAMINE 30 MG/ML
15 INJECTION, SOLUTION INTRAMUSCULAR; INTRAVENOUS EVERY 6 HOURS PRN
Status: DISCONTINUED | OUTPATIENT
Start: 2019-06-20 | End: 2019-06-21 | Stop reason: HOSPADM

## 2019-06-20 RX ORDER — ONDANSETRON 2 MG/ML
4 INJECTION INTRAMUSCULAR; INTRAVENOUS ONCE
Status: COMPLETED | OUTPATIENT
Start: 2019-06-20 | End: 2019-06-20

## 2019-06-20 RX ORDER — BUPIVACAINE HYDROCHLORIDE 5 MG/ML
INJECTION, SOLUTION PERINEURAL AS NEEDED
Status: DISCONTINUED | OUTPATIENT
Start: 2019-06-20 | End: 2019-06-20 | Stop reason: HOSPADM

## 2019-06-20 RX ORDER — SODIUM CHLORIDE 9 MG/ML
INJECTION, SOLUTION INTRAVENOUS AS NEEDED
Status: DISCONTINUED | OUTPATIENT
Start: 2019-06-20 | End: 2019-06-20 | Stop reason: HOSPADM

## 2019-06-20 RX ORDER — SODIUM CHLORIDE, SODIUM LACTATE, POTASSIUM CHLORIDE, CALCIUM CHLORIDE 600; 310; 30; 20 MG/100ML; MG/100ML; MG/100ML; MG/100ML
50 INJECTION, SOLUTION INTRAVENOUS CONTINUOUS
Status: CANCELLED | OUTPATIENT
Start: 2019-06-20

## 2019-06-20 RX ORDER — FENTANYL CITRATE 50 UG/ML
INJECTION, SOLUTION INTRAMUSCULAR; INTRAVENOUS AS NEEDED
Status: DISCONTINUED | OUTPATIENT
Start: 2019-06-20 | End: 2019-06-20 | Stop reason: SURG

## 2019-06-20 RX ORDER — HEPARIN SODIUM 5000 [USP'U]/ML
INJECTION, SOLUTION INTRAVENOUS; SUBCUTANEOUS AS NEEDED
Status: DISCONTINUED | OUTPATIENT
Start: 2019-06-20 | End: 2019-06-20 | Stop reason: SURG

## 2019-06-20 RX ORDER — DIPHENHYDRAMINE HYDROCHLORIDE 50 MG/ML
12.5 INJECTION INTRAMUSCULAR; INTRAVENOUS EVERY 6 HOURS PRN
Status: DISCONTINUED | OUTPATIENT
Start: 2019-06-20 | End: 2019-06-21 | Stop reason: HOSPADM

## 2019-06-20 RX ORDER — ONDANSETRON 2 MG/ML
4 INJECTION INTRAMUSCULAR; INTRAVENOUS ONCE AS NEEDED
Status: DISCONTINUED | OUTPATIENT
Start: 2019-06-20 | End: 2019-06-20 | Stop reason: HOSPADM

## 2019-06-20 RX ORDER — PROMETHAZINE HYDROCHLORIDE 25 MG/ML
12.5 INJECTION, SOLUTION INTRAMUSCULAR; INTRAVENOUS ONCE AS NEEDED
Status: DISCONTINUED | OUTPATIENT
Start: 2019-06-20 | End: 2019-06-20 | Stop reason: HOSPADM

## 2019-06-20 RX ORDER — OXYCODONE HYDROCHLORIDE AND ACETAMINOPHEN 5; 325 MG/1; MG/1
1 TABLET ORAL EVERY 4 HOURS PRN
Qty: 20 TABLET | Refills: 0 | Status: SHIPPED | OUTPATIENT
Start: 2019-06-20 | End: 2019-06-30

## 2019-06-20 RX ORDER — OXYCODONE HYDROCHLORIDE AND ACETAMINOPHEN 5; 325 MG/1; MG/1
1 TABLET ORAL EVERY 4 HOURS PRN
Status: DISCONTINUED | OUTPATIENT
Start: 2019-06-20 | End: 2019-06-21 | Stop reason: HOSPADM

## 2019-06-20 RX ORDER — DEXAMETHASONE SODIUM PHOSPHATE 4 MG/ML
INJECTION, SOLUTION INTRA-ARTICULAR; INTRALESIONAL; INTRAMUSCULAR; INTRAVENOUS; SOFT TISSUE AS NEEDED
Status: DISCONTINUED | OUTPATIENT
Start: 2019-06-20 | End: 2019-06-20 | Stop reason: SURG

## 2019-06-20 RX ORDER — CEFAZOLIN SODIUM 2 G/50ML
SOLUTION INTRAVENOUS AS NEEDED
Status: DISCONTINUED | OUTPATIENT
Start: 2019-06-20 | End: 2019-06-20 | Stop reason: SURG

## 2019-06-20 RX ORDER — ONDANSETRON 2 MG/ML
4 INJECTION INTRAMUSCULAR; INTRAVENOUS EVERY 8 HOURS PRN
Status: DISCONTINUED | OUTPATIENT
Start: 2019-06-20 | End: 2019-06-21 | Stop reason: HOSPADM

## 2019-06-20 RX ORDER — EPHEDRINE SULFATE 50 MG/ML
INJECTION INTRAVENOUS AS NEEDED
Status: DISCONTINUED | OUTPATIENT
Start: 2019-06-20 | End: 2019-06-20 | Stop reason: SURG

## 2019-06-20 RX ORDER — ROCURONIUM BROMIDE 10 MG/ML
INJECTION, SOLUTION INTRAVENOUS AS NEEDED
Status: DISCONTINUED | OUTPATIENT
Start: 2019-06-20 | End: 2019-06-20 | Stop reason: SURG

## 2019-06-20 RX ORDER — KETOROLAC TROMETHAMINE 30 MG/ML
INJECTION, SOLUTION INTRAMUSCULAR; INTRAVENOUS AS NEEDED
Status: DISCONTINUED | OUTPATIENT
Start: 2019-06-20 | End: 2019-06-20 | Stop reason: SURG

## 2019-06-20 RX ORDER — LIDOCAINE HYDROCHLORIDE 10 MG/ML
INJECTION, SOLUTION INFILTRATION; PERINEURAL AS NEEDED
Status: DISCONTINUED | OUTPATIENT
Start: 2019-06-20 | End: 2019-06-20 | Stop reason: SURG

## 2019-06-20 RX ORDER — SODIUM CHLORIDE, SODIUM LACTATE, POTASSIUM CHLORIDE, CALCIUM CHLORIDE 600; 310; 30; 20 MG/100ML; MG/100ML; MG/100ML; MG/100ML
75 INJECTION, SOLUTION INTRAVENOUS CONTINUOUS
Status: DISCONTINUED | OUTPATIENT
Start: 2019-06-20 | End: 2019-06-21 | Stop reason: HOSPADM

## 2019-06-20 RX ORDER — MIDAZOLAM HYDROCHLORIDE 1 MG/ML
INJECTION INTRAMUSCULAR; INTRAVENOUS AS NEEDED
Status: DISCONTINUED | OUTPATIENT
Start: 2019-06-20 | End: 2019-06-20 | Stop reason: SURG

## 2019-06-20 RX ADMIN — SODIUM CHLORIDE 1000 ML: 9 INJECTION, SOLUTION INTRAVENOUS at 11:35

## 2019-06-20 RX ADMIN — EPHEDRINE SULFATE 10 MG: 50 INJECTION, SOLUTION INTRAVENOUS at 16:15

## 2019-06-20 RX ADMIN — FENTANYL CITRATE 50 MCG: 50 INJECTION INTRAMUSCULAR; INTRAVENOUS at 16:33

## 2019-06-20 RX ADMIN — FENTANYL CITRATE 100 MCG: 50 INJECTION INTRAMUSCULAR; INTRAVENOUS at 15:46

## 2019-06-20 RX ADMIN — PROPOFOL 200 MG: 10 INJECTION, EMULSION INTRAVENOUS at 15:48

## 2019-06-20 RX ADMIN — DEXAMETHASONE SODIUM PHOSPHATE 4 MG: 4 INJECTION, SOLUTION INTRA-ARTICULAR; INTRALESIONAL; INTRAMUSCULAR; INTRAVENOUS; SOFT TISSUE at 15:55

## 2019-06-20 RX ADMIN — SODIUM CHLORIDE, SODIUM LACTATE, POTASSIUM CHLORIDE, AND CALCIUM CHLORIDE: .6; .31; .03; .02 INJECTION, SOLUTION INTRAVENOUS at 16:39

## 2019-06-20 RX ADMIN — ROCURONIUM BROMIDE 40 MG: 10 INJECTION, SOLUTION INTRAVENOUS at 15:48

## 2019-06-20 RX ADMIN — HYDROMORPHONE HYDROCHLORIDE 0.5 MG: 1 INJECTION, SOLUTION INTRAMUSCULAR; INTRAVENOUS; SUBCUTANEOUS at 17:51

## 2019-06-20 RX ADMIN — HEPARIN SODIUM 5000 UNITS: 5000 INJECTION, SOLUTION INTRAVENOUS; SUBCUTANEOUS at 16:02

## 2019-06-20 RX ADMIN — SODIUM CHLORIDE, POTASSIUM CHLORIDE, SODIUM LACTATE AND CALCIUM CHLORIDE 75 ML/HR: 600; 310; 30; 20 INJECTION, SOLUTION INTRAVENOUS at 19:36

## 2019-06-20 RX ADMIN — ONDANSETRON 4 MG: 2 INJECTION, SOLUTION INTRAMUSCULAR; INTRAVENOUS at 11:35

## 2019-06-20 RX ADMIN — FENTANYL CITRATE 50 MCG: 50 INJECTION INTRAMUSCULAR; INTRAVENOUS at 16:26

## 2019-06-20 RX ADMIN — ROCURONIUM BROMIDE 10 MG: 10 INJECTION, SOLUTION INTRAVENOUS at 16:38

## 2019-06-20 RX ADMIN — CEFAZOLIN SODIUM 2000 MG: 2 SOLUTION INTRAVENOUS at 15:45

## 2019-06-20 RX ADMIN — HYDROMORPHONE HYDROCHLORIDE 0.5 MG: 1 INJECTION, SOLUTION INTRAMUSCULAR; INTRAVENOUS; SUBCUTANEOUS at 18:08

## 2019-06-20 RX ADMIN — MIDAZOLAM HYDROCHLORIDE 2 MG: 1 INJECTION, SOLUTION INTRAMUSCULAR; INTRAVENOUS at 15:45

## 2019-06-20 RX ADMIN — KETOROLAC TROMETHAMINE 30 MG: 30 INJECTION, SOLUTION INTRAMUSCULAR; INTRAVENOUS at 17:18

## 2019-06-20 RX ADMIN — SODIUM CHLORIDE, SODIUM LACTATE, POTASSIUM CHLORIDE, AND CALCIUM CHLORIDE: .6; .31; .03; .02 INJECTION, SOLUTION INTRAVENOUS at 11:34

## 2019-06-20 RX ADMIN — HYDROMORPHONE HYDROCHLORIDE 0.5 MG: 1 INJECTION, SOLUTION INTRAMUSCULAR; INTRAVENOUS; SUBCUTANEOUS at 18:19

## 2019-06-20 RX ADMIN — LIDOCAINE HYDROCHLORIDE 50 MG: 10 INJECTION, SOLUTION INFILTRATION; PERINEURAL at 15:48

## 2019-06-21 VITALS
OXYGEN SATURATION: 96 % | HEART RATE: 84 BPM | TEMPERATURE: 98 F | SYSTOLIC BLOOD PRESSURE: 146 MMHG | BODY MASS INDEX: 32.65 KG/M2 | RESPIRATION RATE: 18 BRPM | WEIGHT: 240.74 LBS | DIASTOLIC BLOOD PRESSURE: 84 MMHG

## 2019-06-21 LAB
ATRIAL RATE: 64 BPM
P AXIS: 75 DEGREES
PR INTERVAL: 142 MS
QRS AXIS: 16 DEGREES
QRSD INTERVAL: 88 MS
QT INTERVAL: 432 MS
QTC INTERVAL: 445 MS
T WAVE AXIS: 16 DEGREES
VENTRICULAR RATE: 64 BPM

## 2019-06-21 PROCEDURE — 93010 ELECTROCARDIOGRAM REPORT: CPT | Performed by: INTERNAL MEDICINE

## 2019-06-21 RX ORDER — OXYCODONE HYDROCHLORIDE AND ACETAMINOPHEN 5; 325 MG/1; MG/1
1 TABLET ORAL EVERY 4 HOURS PRN
Qty: 20 TABLET | Refills: 0 | Status: SHIPPED | OUTPATIENT
Start: 2019-06-21 | End: 2019-07-01

## 2019-06-21 RX ADMIN — OXYCODONE HYDROCHLORIDE AND ACETAMINOPHEN 2 TABLET: 5; 325 TABLET ORAL at 08:13

## 2019-07-09 RX ORDER — ATORVASTATIN CALCIUM 20 MG/1
20 TABLET, FILM COATED ORAL DAILY
COMMUNITY
Start: 2019-05-24 | End: 2020-01-13 | Stop reason: SDUPTHER

## 2019-07-09 RX ORDER — LISINOPRIL 10 MG/1
10 TABLET ORAL DAILY
COMMUNITY
Start: 2018-06-12 | End: 2020-01-13 | Stop reason: SDUPTHER

## 2019-07-09 RX ORDER — LANCETS 30 GAUGE
EACH MISCELLANEOUS
COMMUNITY
Start: 2019-05-28

## 2019-07-09 RX ORDER — PRAVASTATIN SODIUM 80 MG/1
TABLET ORAL
COMMUNITY
End: 2020-01-13

## 2019-07-09 RX ORDER — CEPHALEXIN 500 MG/1
CAPSULE ORAL
COMMUNITY
End: 2019-12-09

## 2019-07-09 RX ORDER — ASPIRIN 81 MG/1
81 TABLET ORAL DAILY
COMMUNITY
Start: 2016-07-01 | End: 2020-01-13 | Stop reason: SDUPTHER

## 2019-07-10 ENCOUNTER — OFFICE VISIT (OUTPATIENT)
Dept: SURGERY | Facility: CLINIC | Age: 54
End: 2019-07-10

## 2019-07-10 VITALS
DIASTOLIC BLOOD PRESSURE: 78 MMHG | RESPIRATION RATE: 16 BRPM | HEIGHT: 72 IN | WEIGHT: 244 LBS | BODY MASS INDEX: 33.05 KG/M2 | SYSTOLIC BLOOD PRESSURE: 132 MMHG | HEART RATE: 88 BPM

## 2019-07-10 DIAGNOSIS — K80.00 ACUTE CALCULOUS CHOLECYSTITIS: Primary | ICD-10-CM

## 2019-07-10 PROCEDURE — 99024 POSTOP FOLLOW-UP VISIT: CPT | Performed by: SPECIALIST

## 2019-07-10 RX ORDER — UBIQUINOL 100 MG
CAPSULE ORAL DAILY
Refills: 5 | COMMUNITY
Start: 2019-05-28

## 2019-07-10 NOTE — PROGRESS NOTES
Nicole Kirbyn presents today for his postop visit status post laparoscopic cholecystectomy for severe acute and chronic calculous cholecystitis  His operation was difficult and took a few hours  He did well postoperative was discharged the hospital is here today follow-up visit  Today in the office says he is doing well  His bowels were somewhat loose [place] [facility] but neither firming up  He has 1 bowel movement of a day  His incisions are okay but the 1 at the umbilicus has a bulge above it  He says it is nontender to palpation  Physical exam middle-aged  male who is awake alert no distress    Abdomen:  Soft flat there is a visible bulge above the umbilicus  Palpating it is nontender does not reduce and appears more related to postoperative edema and then umbilical hernia  Other incisions somewhat edematous 2  No evidence of infection  Impression doing well status post laparoscopic cholecystectomy for acute and chronic calculous cholecystitis  Plan:  Discharge patient  He is told if the swelling at the umbilical area does not recede in about a month he should notify the office and he would be re-evaluated at that time  At this point he is discharged the office given a work release

## 2019-08-16 ENCOUNTER — APPOINTMENT (EMERGENCY)
Dept: CT IMAGING | Facility: HOSPITAL | Age: 54
End: 2019-08-16
Payer: COMMERCIAL

## 2019-08-16 ENCOUNTER — HOSPITAL ENCOUNTER (EMERGENCY)
Facility: HOSPITAL | Age: 54
Discharge: HOME | End: 2019-08-16
Attending: EMERGENCY MEDICINE | Admitting: EMERGENCY MEDICINE
Payer: COMMERCIAL

## 2019-08-16 ENCOUNTER — APPOINTMENT (EMERGENCY)
Dept: RADIOLOGY | Facility: HOSPITAL | Age: 54
End: 2019-08-16
Payer: COMMERCIAL

## 2019-08-16 VITALS
WEIGHT: 246.91 LBS | DIASTOLIC BLOOD PRESSURE: 91 MMHG | OXYGEN SATURATION: 97 % | RESPIRATION RATE: 18 BRPM | SYSTOLIC BLOOD PRESSURE: 154 MMHG | HEART RATE: 63 BPM | BODY MASS INDEX: 33.49 KG/M2 | TEMPERATURE: 97.2 F

## 2019-08-16 DIAGNOSIS — R55 SYNCOPE: Primary | ICD-10-CM

## 2019-08-16 LAB
ANION GAP SERPL CALCULATED.3IONS-SCNC: 8 MMOL/L (ref 5–14)
APTT PPP: 29 SECONDS (ref 23–34)
ATRIAL RATE: 72 BPM
BASOPHILS # BLD AUTO: 0 THOUSANDS/ΜL (ref 0–0.1)
BASOPHILS NFR BLD AUTO: 1 % (ref 0–1)
BUN SERPL-MCNC: 15 MG/DL (ref 5–25)
CALCIUM SERPL-MCNC: 9.7 MG/DL (ref 8.4–10.2)
CHLORIDE SERPL-SCNC: 102 MMOL/L (ref 97–108)
CO2 SERPL-SCNC: 26 MMOL/L (ref 22–30)
CREAT SERPL-MCNC: 0.83 MG/DL (ref 0.7–1.5)
EOSINOPHIL # BLD AUTO: 0.1 THOUSAND/ΜL (ref 0–0.4)
EOSINOPHIL NFR BLD AUTO: 2 % (ref 0–6)
ERYTHROCYTE [DISTWIDTH] IN BLOOD BY AUTOMATED COUNT: 14.7 %
GFR SERPL CREATININE-BSD FRML MDRD: 101 ML/MIN/1.73SQ M
GLUCOSE SERPL-MCNC: 148 MG/DL (ref 70–99)
HCT VFR BLD AUTO: 43.7 % (ref 41–53)
HGB BLD-MCNC: 14.4 G/DL (ref 13.5–17.5)
INR PPP: 0.95 (ref 0.89–1.1)
LYMPHOCYTES # BLD AUTO: 1.1 THOUSANDS/ΜL (ref 0.5–4)
LYMPHOCYTES NFR BLD AUTO: 22 % (ref 25–45)
MAGNESIUM SERPL-MCNC: 2 MG/DL (ref 1.6–2.3)
MCH RBC QN AUTO: 28.2 PG (ref 26–34)
MCHC RBC AUTO-ENTMCNC: 32.9 G/DL (ref 31–36)
MCV RBC AUTO: 86 FL (ref 80–100)
MONOCYTES # BLD AUTO: 0.3 THOUSAND/ΜL (ref 0.2–0.9)
MONOCYTES NFR BLD AUTO: 7 % (ref 1–10)
NEUTROPHILS # BLD AUTO: 3.4 THOUSANDS/ΜL (ref 1.8–7.8)
NEUTS SEG NFR BLD AUTO: 68 % (ref 45–65)
P AXIS: 71 DEGREES
PLATELET # BLD AUTO: 212 THOUSANDS/UL (ref 150–450)
PMV BLD AUTO: 8.5 FL (ref 8.9–12.7)
POTASSIUM SERPL-SCNC: 4.4 MMOL/L (ref 3.6–5)
PR INTERVAL: 138 MS
PROTHROMBIN TIME: 10.1 SECONDS (ref 9.5–11.6)
QRS AXIS: 26 DEGREES
QRSD INTERVAL: 86 MS
QT INTERVAL: 408 MS
QTC INTERVAL: 446 MS
RBC # BLD AUTO: 5.1 MILLION/UL (ref 4.5–5.9)
SODIUM SERPL-SCNC: 136 MMOL/L (ref 137–147)
T WAVE AXIS: 23 DEGREES
TROPONIN I SERPL-MCNC: <0.01 NG/ML (ref 0–0.03)
VENTRICULAR RATE: 72 BPM
WBC # BLD AUTO: 4.9 THOUSAND/UL (ref 4.5–11)

## 2019-08-16 PROCEDURE — 85025 COMPLETE CBC W/AUTO DIFF WBC: CPT | Performed by: EMERGENCY MEDICINE

## 2019-08-16 PROCEDURE — 99284 EMERGENCY DEPT VISIT MOD MDM: CPT

## 2019-08-16 PROCEDURE — 93010 ELECTROCARDIOGRAM REPORT: CPT | Performed by: INTERNAL MEDICINE

## 2019-08-16 PROCEDURE — 84484 ASSAY OF TROPONIN QUANT: CPT | Performed by: EMERGENCY MEDICINE

## 2019-08-16 PROCEDURE — 99285 EMERGENCY DEPT VISIT HI MDM: CPT | Performed by: EMERGENCY MEDICINE

## 2019-08-16 PROCEDURE — 70450 CT HEAD/BRAIN W/O DYE: CPT

## 2019-08-16 PROCEDURE — 93005 ELECTROCARDIOGRAM TRACING: CPT

## 2019-08-16 PROCEDURE — 85730 THROMBOPLASTIN TIME PARTIAL: CPT | Performed by: EMERGENCY MEDICINE

## 2019-08-16 PROCEDURE — 36415 COLL VENOUS BLD VENIPUNCTURE: CPT | Performed by: EMERGENCY MEDICINE

## 2019-08-16 PROCEDURE — 83735 ASSAY OF MAGNESIUM: CPT | Performed by: EMERGENCY MEDICINE

## 2019-08-16 PROCEDURE — 80048 BASIC METABOLIC PNL TOTAL CA: CPT | Performed by: EMERGENCY MEDICINE

## 2019-08-16 PROCEDURE — 85610 PROTHROMBIN TIME: CPT | Performed by: EMERGENCY MEDICINE

## 2019-08-16 PROCEDURE — 71046 X-RAY EXAM CHEST 2 VIEWS: CPT

## 2019-08-16 NOTE — ED PROVIDER NOTES
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  Patient started his shift at approximately 5:30 a m  In the morning  The night before he had not slept at all  Patient arrived home with his wife at midnight and cannot fall asleep  Patient also noted the he did not have anything the eat the day prior and did not have breakfast before going to work  At approximately 6:30 a m  While patient was working he noticed that he became dizzy the room was spinning he felt off balance and he then must have passed out  Patient woke up a few seconds later with his co-workers surrounding him  Patient did report that he hit his head  Patient is a known diabetic and has not taken his diabetic medications for approximately 1 month secondary to financial challenges  Patient recently last month had his gallbladder out at this campus  Patient denies any chest pain shortness of breath headache nausea vomiting diarrhea currently  Patient did vomit once after he had his syncopal episode period  Prior to Admission Medications   Prescriptions Last Dose Informant Patient Reported? Taking? Alcohol Swabs (ALCOHOL PREP) 70 % PADS   Yes No   Sig: daily Test   Lancets MISC   Yes No   Sig: Use to test blood glucose once a day  Dx: Type 2 DM  E11 9   METFORMIN HCL PO   Yes No   Sig: Take by mouth   Urine Diagnostic Calibration STRP   Yes No   Sig: Use to test blood glucose once a day  Dx: Type 2 DM   E11 9   aspirin (ASPIRIN EC ADULT LOW STRENGTH) 81 mg EC tablet   Yes No   Sig: Take 81 mg by mouth daily   atorvastatin (LIPITOR) 20 mg tablet Yes No   Sig: Take 20 mg by mouth daily   cephalexin (KEFLEX) 500 mg capsule   Yes No   Sig: cephalexin 500 mg capsule   TK 1 C PO TID FOR 7 DAYS   lisinopril (ZESTRIL) 10 mg tablet   Yes No   Sig: Take 10 mg by mouth daily   pravastatin (PRAVACHOL) 80 mg tablet   Yes No   Sig: pravastatin 80 mg tablet      Facility-Administered Medications: None       Past Medical History:   Diagnosis Date    Carpal tunnel syndrome     Chronic pain     left arm    Diabetes mellitus ([address] )     Hypertension     Umbilical hernia        Past Surgical History:   Procedure Laterality Date    CHOLECYSTECTOMY      CHOLECYSTECTOMY LAPAROSCOPIC N/A 6/20/2019    Procedure: CHOLECYSTECTOMY LAPAROSCOPIC;  Surgeon: Minnie Millard MD;  Location: [address] [place] [address];  Service: General    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    HAND SURGERY      UMBILICAL HERNIA REPAIR         History reviewed  No pertinent family history  I have reviewed and agree with the history as documented  Social History     Tobacco Use    Smoking status: Current Every Day Smoker     Packs/day: 0 50    Smokeless tobacco: Never Used   Substance Use Topics    Alcohol use: Yes     Frequency: 2-3 times a week     Drinks per session: 1 or 2     Comment: [place]    Drug use: No        Review of Systems   Constitutional: Negative  HENT: Negative  Respiratory: Negative for shortness of breath  Cardiovascular: Negative for chest pain and leg swelling  Gastrointestinal: Negative  Endocrine: Negative  Genitourinary: Negative  Musculoskeletal: Negative  Allergic/Immunologic: Negative  Neurological: Positive for dizziness and syncope  Hematological: Negative  Psychiatric/Behavioral: Negative  Physical Exam  Physical Exam   Constitutional: He is oriented to person, place, and time  He appears well-developed and well-nourished  HENT:   Head: Normocephalic and atraumatic     Right Ear: External ear normal    Left Ear: External ear normal  Nose: Nose normal    Mouth/Throat: Oropharynx is clear and moist    Eyes: Pupils are equal, round, and reactive to light  Conjunctivae and EOM are normal    Neck: Normal range of motion  Neck supple  Cardiovascular: Normal rate, regular rhythm, normal heart sounds and intact distal pulses  Pulmonary/Chest: Effort normal and breath sounds normal    Abdominal: Soft  Bowel sounds are normal    Musculoskeletal: Normal range of motion  Neurological: He is alert and oriented to person, place, and time  Skin: Skin is warm and dry  Capillary refill takes less than 2 seconds  Psychiatric: He has a normal mood and affect  His behavior is normal  Judgment and thought content normal    Nursing note and vitals reviewed        Vital Signs  ED Triage Vitals [08/16/19 0722]   Temperature Pulse Respirations Blood Pressure SpO2   (!) 97 2 °F (36 2 °C) 91 20 153/91 97 %      Temp Source Heart Rate Source Patient Position - Orthostatic VS BP Location FiO2 (%)   Tympanic Monitor Sitting Left arm --      Pain Score       --           Vitals:    08/16/19 0722 08/16/19 0933   BP: 153/91 154/91   Pulse: 91 63   Patient Position - Orthostatic VS: Sitting Lying         Visual Acuity  Visual Acuity      Most Recent Value   L Pupil Size (mm)  3   R Pupil Size (mm)  3          ED Medications  Medications - No data to display    Diagnostic Studies  Results Reviewed     Procedure Component Value Units Date/Time    Troponin I [[id number]]  (Normal) Collected:  08/16/19 0756    Lab Status:  Final result Specimen:  Blood from Arm, Right Updated:  08/16/19 0857     Troponin I <0 01 ng/mL     Protime-INR [[id number]]  (Normal) Collected:  08/16/19 0756    Lab Status:  Final result Specimen:  Blood from Arm, Right Updated:  08/16/19 0850     Protime 10 1 seconds      INR 0 95    APTT [[id number]]  (Normal) Collected:  08/16/19 0756    Lab Status:  Final result Specimen:  Blood from Arm, Right Updated:  08/16/19 0850     PTT 29 seconds Magnesium [[id number]]  (Normal) Collected:  08/16/19 0756    Lab Status:  Final result Specimen:  Blood from Arm, Right Updated:  08/16/19 0846     Magnesium 2 0 mg/dL     Basic metabolic panel [[id number]]  (Abnormal) Collected:  08/16/19 0756    Lab Status:  Final result Specimen:  Blood from Arm, Right Updated:  08/16/19 0846     Sodium 136 mmol/L      Potassium 4 4 mmol/L      Chloride 102 mmol/L      CO2 26 mmol/L      ANION GAP 8 mmol/L      BUN 15 mg/dL      Creatinine 0 83 mg/dL      Glucose 148 mg/dL      Calcium 9 7 mg/dL      eGFR 101 ml/min/1 73sq m     Narrative:       [place] guidelines for Chronic Kidney Disease (CKD):     Stage 1 with normal or high GFR (GFR > 90 mL/min/1 73 square meters)    Stage 2 Mild CKD (GFR = 60-89 mL/min/1 73 square meters)    Stage 3A Moderate CKD (GFR = 45-59 mL/min/1 73 square meters)    Stage 3B Moderate CKD (GFR = 30-44 mL/min/1 73 square meters)    Stage 4 Severe CKD (GFR = 15-29 mL/min/1 73 square meters)    Stage 5 End Stage CKD (GFR <15 mL/min/1 73 square meters)  Note: GFR calculation is accurate only with a steady state creatinine    CBC and differential [[id number]]  (Abnormal) Collected:  08/16/19 0756    Lab Status:  Final result Specimen:  Blood from Arm, Right Updated:  08/16/19 0840     WBC 4 90 Thousand/uL      RBC 5 10 Million/uL      Hemoglobin 14 4 g/dL      Hematocrit 43 7 %      MCV 86 fL      MCH 28 2 pg      MCHC 32 9 g/dL      RDW 14 7 %      MPV 8 5 fL      Platelets 245 Thousands/uL      Neutrophils Relative 68 %      Lymphocytes Relative 22 %      Monocytes Relative 7 %      Eosinophils Relative 2 %      Basophils Relative 1 %      Neutrophils Absolute 3 40 Thousands/µL      Lymphocytes Absolute 1 10 Thousands/µL      Monocytes Absolute 0 30 Thousand/µL      Eosinophils Absolute 0 10 Thousand/µL      Basophils Absolute 0 00 Thousands/µL                  XR chest 2 views   Final Result by Marilu Tejeda MD (08/16 0604) No acute cardiopulmonary disease  Workstation performed: [id number]         CT head without contrast   Final Result by Karla Morelos MD (49/33 3911)      No acute intracranial abnormality  Workstation performed: [id number]                    Procedures  ECG 12 Lead Documentation Only  Date/Time: 8/16/2019 7:48 AM  Performed by: Kira Juan III, DO  Authorized by: Kira Juan III, DO     Indications / Diagnosis:  Syncope  ECG reviewed by me, the ED Provider: yes    Patient location:  ED  Comments:      I personally reviewed this EKG was performed the patient on August 16, 2019 at 7:45 a m  EKG was interpreted by me at 7:48 a m  Normal sinus rhythm with a ventricular rate of 72 beats per minute  A p r n  Interval of 138 milliseconds  QRS duration 86 milliseconds  The QT interval 408 milliseconds  QTC of 446 milliseconds  No acute ST abnormalities  ED Course                               MDM  Number of Diagnoses or Management Options  Syncope:   Diagnosis management comments: This is a very pleasant 23-year-old gentleman presents to the emergency department with a syncopal episode that occurred yesterday while he was at work  Patient noted that the night before he went to work he did not sleep at all and had nothing to eat that particular day  When the patient woke and went to work yesterday did not have breakfast   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 and around 630 patient started become dizzy and had unsteady gait  Patient had a syncopal episode which lasted for approximately 5 seconds  Patient noted the a significant amount of prodrome specifically dizziness and not feeling well prior to passing out  Patient did not have any chest pain shortness of breath  Workup today is negative  Patient has CT of the head which was unremarkable    Was ordered because patient did fall and hit his head when he had the syncopal episode  Telemetry monitoring review was showed no ectopy or arrhythmias  EKG is within normal limits  The patient's QT interval was within normal limits  Patient's electrolytes are unremarkable  The underlying cause of this is unclear but the contributing factors could be dehydration, lack of food and sleep  Explained the patient need to follow up with the primary care doctor  Referral given for [facility] at the patient's request      Disposition  Final diagnoses:   Syncope     Time reflects when diagnosis was documented in both MDM as applicable and the Disposition within this note     Time User Action Codes Description Comment    8/16/2019  9:45 AM Tobias Stovall Add [R55] Syncope       ED Disposition     ED Disposition Condition Date/Time Comment    Discharge Stable Fri Aug 16, 2019  9:43 AM Dewayne Silverio  discharge to home/self care  Follow-up Information     Follow up With Specialties Details Why [place] [address] [facility]  [phone]            Discharge Medication List as of 8/16/2019  9:45 AM      CONTINUE these medications which have NOT CHANGED    Details   Alcohol Swabs (ALCOHOL PREP) 70 % PADS daily Test, Starting Tue 5/28/2019, Historical Med      aspirin (ASPIRIN EC ADULT LOW STRENGTH) 81 mg EC tablet Take 81 mg by mouth daily, Starting Fri 7/1/2016, Historical Med      atorvastatin (LIPITOR) 20 mg tablet Take 20 mg by mouth daily, Starting Fri 5/24/2019, Until Sat 5/23/2020, Historical Med      cephalexin (KEFLEX) 500 mg capsule cephalexin 500 mg capsule   TK 1 C PO TID FOR 7 DAYS, Historical Med      Lancets MISC Use to test blood glucose once a day  Dx: Type 2 DM   E11 9, Historical Med      lisinopril (ZESTRIL) 10 mg tablet Take 10 mg by mouth daily, Starting Tue 6/12/2018, Until Wed 11/20/2019, Historical Med      METFORMIN HCL PO Take by mouth, Historical Med pravastatin (PRAVACHOL) 80 mg tablet pravastatin 80 mg tablet, Historical Med      Urine Diagnostic Calibration STRP Use to test blood glucose once a day  Dx: Type 2 DM  E11 9, Historical Med           No discharge procedures on file      ED Provider  Electronically Signed by           Nader Davis III, DO  08/16/19 3928

## 2019-09-16 ENCOUNTER — APPOINTMENT (EMERGENCY)
Dept: RADIOLOGY | Facility: HOSPITAL | Age: 54
End: 2019-09-16
Payer: COMMERCIAL

## 2019-09-16 ENCOUNTER — APPOINTMENT (EMERGENCY)
Dept: CT IMAGING | Facility: HOSPITAL | Age: 54
End: 2019-09-16
Payer: COMMERCIAL

## 2019-09-16 ENCOUNTER — HOSPITAL ENCOUNTER (EMERGENCY)
Facility: HOSPITAL | Age: 54
Discharge: HOME | End: 2019-09-16
Attending: EMERGENCY MEDICINE | Admitting: EMERGENCY MEDICINE
Payer: COMMERCIAL

## 2019-09-16 VITALS
HEART RATE: 66 BPM | DIASTOLIC BLOOD PRESSURE: 106 MMHG | BODY MASS INDEX: 32.55 KG/M2 | RESPIRATION RATE: 18 BRPM | WEIGHT: 240 LBS | TEMPERATURE: 98.1 F | SYSTOLIC BLOOD PRESSURE: 178 MMHG | OXYGEN SATURATION: 98 %

## 2019-09-16 DIAGNOSIS — I10 HTN (HYPERTENSION): ICD-10-CM

## 2019-09-16 DIAGNOSIS — S89.91XA INJURY OF RIGHT KNEE, INITIAL ENCOUNTER: ICD-10-CM

## 2019-09-16 DIAGNOSIS — Z91.19 NON-COMPLIANCE: ICD-10-CM

## 2019-09-16 DIAGNOSIS — S02.2XXA NASAL BONE FRACTURE: ICD-10-CM

## 2019-09-16 DIAGNOSIS — R04.0 EPISTAXIS: ICD-10-CM

## 2019-09-16 DIAGNOSIS — S09.90XA INJURY OF HEAD, INITIAL ENCOUNTER: ICD-10-CM

## 2019-09-16 DIAGNOSIS — T07.XXXA ABRASIONS OF MULTIPLE SITES: ICD-10-CM
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
## 2019-09-16 DIAGNOSIS — E11.9 DIABETES (HCC): ICD-10-CM

## 2019-09-16 LAB — GLUCOSE SERPL-MCNC: 136 MG/DL (ref 65–140)

## 2019-09-16 PROCEDURE — 70486 CT MAXILLOFACIAL W/O DYE: CPT

## 2019-09-16 PROCEDURE — 72125 CT NECK SPINE W/O DYE: CPT

## 2019-09-16 PROCEDURE — 70450 CT HEAD/BRAIN W/O DYE: CPT

## 2019-09-16 PROCEDURE — 99284 EMERGENCY DEPT VISIT MOD MDM: CPT | Performed by: EMERGENCY MEDICINE

## 2019-09-16 PROCEDURE — 90471 IMMUNIZATION ADMIN: CPT

## 2019-09-16 PROCEDURE — 73630 X-RAY EXAM OF FOOT: CPT

## 2019-09-16 PROCEDURE — 73564 X-RAY EXAM KNEE 4 OR MORE: CPT

## 2019-09-16 PROCEDURE — 90715 TDAP VACCINE 7 YRS/> IM: CPT | Performed by: EMERGENCY MEDICINE

## 2019-09-16 PROCEDURE — 82948 REAGENT STRIP/BLOOD GLUCOSE: CPT

## 2019-09-16 PROCEDURE — 99285 EMERGENCY DEPT VISIT HI MDM: CPT

## 2019-09-16 RX ORDER — CYCLOBENZAPRINE HCL 10 MG
10 TABLET ORAL ONCE
Status: COMPLETED | OUTPATIENT
Start: 2019-09-16 | End: 2019-09-16

## 2019-09-16 RX ORDER — CYCLOBENZAPRINE HCL 10 MG
10 TABLET ORAL 3 TIMES DAILY PRN
Qty: 15 TABLET | Refills: 0 | Status: SHIPPED | OUTPATIENT
Start: 2019-09-16 | End: 2019-12-09

## 2019-09-16 RX ADMIN — TETANUS TOXOID, REDUCED DIPHTHERIA TOXOID AND ACELLULAR PERTUSSIS VACCINE, ADSORBED 0.5 ML: 5; 2.5; 8; 8; 2.5 SUSPENSION INTRAMUSCULAR at 12:25

## 2019-09-16 RX ADMIN — CYCLOBENZAPRINE HYDROCHLORIDE 10 MG: 10 TABLET, FILM COATED ORAL at 12:27

## 2019-09-16 NOTE — ED NOTES
Patient transported to CT via stretcher, by Brittany Mckeon transport Fernando Sorto , RN  09/16/19 3386
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
## 2019-09-16 NOTE — ED NOTES
Pt passed dysphagia screening at 1225, prior to PO intake     Kamille Moreno, CRISTOBAL  09/16/19 4574

## 2019-09-16 NOTE — ED NOTES
Pt returns to ER from radiology dept, pt reports feeling "much better", pt appears comfortable resting on litter and denies any complaints at this time     Altagracia Freemanr, CRISTOBAL  09/16/19 9063

## 2019-09-30 ENCOUNTER — OFFICE VISIT (OUTPATIENT)
Dept: FAMILY MEDICINE CLINIC | Facility: CLINIC | Age: 54
End: 2019-09-30

## 2019-09-30 VITALS
HEIGHT: 72 IN | SYSTOLIC BLOOD PRESSURE: 130 MMHG | TEMPERATURE: 98.5 F | BODY MASS INDEX: 33.72 KG/M2 | DIASTOLIC BLOOD PRESSURE: 70 MMHG | OXYGEN SATURATION: 97 % | RESPIRATION RATE: 18 BRPM | WEIGHT: 249 LBS | HEART RATE: 97 BPM

## 2019-09-30 DIAGNOSIS — N52.8 OTHER MALE ERECTILE DYSFUNCTION: ICD-10-CM

## 2019-09-30 DIAGNOSIS — R20.0 NUMBNESS OF LEFT HAND: ICD-10-CM

## 2019-09-30 DIAGNOSIS — I10 ESSENTIAL HYPERTENSION: ICD-10-CM

## 2019-09-30 DIAGNOSIS — E11.9 TYPE 2 DIABETES MELLITUS WITHOUT COMPLICATION, WITHOUT LONG-TERM CURRENT USE OF INSULIN (HCC): Primary | ICD-10-CM

## 2019-09-30 DIAGNOSIS — I83.813 VARICOSE VEINS OF BOTH LOWER EXTREMITIES WITH PAIN: ICD-10-CM

## 2019-09-30 DIAGNOSIS — F17.200 TOBACCO DEPENDENCE: ICD-10-CM

## 2019-09-30 PROBLEM — M25.512 ACUTE PAIN OF LEFT SHOULDER: Status: ACTIVE | Noted: 2018-03-14

## 2019-09-30 PROBLEM — S68.119A TRAUMATIC AMPUTATION OF FINGER: Status: ACTIVE | Noted: 2019-09-30

## 2019-09-30 PROBLEM — K80.20 GALLSTONES: Status: ACTIVE | Noted: 2019-05-24

## 2019-09-30 LAB — SL AMB POCT HEMOGLOBIN AIC: 6.6 (ref ?–6.5)

## 2019-09-30 PROCEDURE — 99406 BEHAV CHNG SMOKING 3-10 MIN: CPT | Performed by: FAMILY MEDICINE

## 2019-09-30 PROCEDURE — 99203 OFFICE O/P NEW LOW 30 MIN: CPT | Performed by: FAMILY MEDICINE

## 2019-09-30 PROCEDURE — 83036 HEMOGLOBIN GLYCOSYLATED A1C: CPT | Performed by: FAMILY MEDICINE

## 2019-09-30 RX ORDER — NICOTINE 21 MG/24HR
1 PATCH, TRANSDERMAL 24 HOURS TRANSDERMAL EVERY 24 HOURS
Qty: 28 PATCH | Refills: 0 | Status: SHIPPED | OUTPATIENT
Start: 2019-09-30 | End: 2019-12-09

## 2019-09-30 RX ORDER — SILDENAFIL 50 MG/1
50 TABLET, FILM COATED ORAL DAILY PRN
Qty: 5 TABLET | Refills: 0 | Status: SHIPPED | OUTPATIENT
Start: 2019-09-30 | End: 2020-01-13

## 2019-09-30 NOTE — ASSESSMENT & PLAN NOTE
BP Readings from Last 1 Encounters:   09/30/19 130/70   Currently controlled off medication  Continue current management  Reassess at next visit

## 2019-09-30 NOTE — ASSESSMENT & PLAN NOTE
Patient currently contemplative  Will try nicotine patches and gum  Counseled on cessation  Will reassess at next visit

## 2019-09-30 NOTE — ASSESSMENT & PLAN NOTE
Hx of priapism and DMII  Trouble getting and maintaining erection  Will send Viagra  Will send to Urology

## 2019-09-30 NOTE — PROGRESS NOTES
Assessment/Plan:    Essential hypertension  BP Readings from Last 1 Encounters:   09/30/19 130/70   Currently controlled off medication  Continue current management  Reassess at next visit  Tobacco dependence  Patient currently contemplative  Will try nicotine patches and gum  Counseled on cessation  Will reassess at next visit  Other male erectile dysfunction  Hx of priapism and DMII  Trouble getting and maintaining erection  Will send Viagra  Will send to Urology  Numbness of left hand  Will get EMG  RTC in 1 month for results  Varicose veins of both lower extremities with pain  Painful, R>L  Will send to vascular surgery for evaluation  Diagnoses and all orders for this visit:    Type 2 diabetes mellitus without complication, without long-term current use of insulin (HCC)  -     POCT hemoglobin A1c  -     metFORMIN (GLUCOPHAGE) 1000 MG tablet; Take 1 tablet (1,000 mg total) by mouth 2 (two) times a day with meals  -     CBC and differential; Future  -     Comprehensive metabolic panel; Future  -     Lipid Panel with Direct LDL reflex; Future    Other male erectile dysfunction  -     sildenafil (VIAGRA) 50 MG tablet; Take 1 tablet (50 mg total) by mouth daily as needed for erectile dysfunction  -     Ambulatory referral to Urology; Future    Numbness of left hand  -     EMG 1 Limb; Future  -     XR spine cervical complete 4 or 5 vw non injury; Future    Essential hypertension    Tobacco dependence  -     nicotine (NICODERM CQ) 14 mg/24hr TD 24 hr patch; Place 1 patch on the skin every 24 hours  -     nicotine polacrilex (NICORETTE) 4 mg gum; Chew 1 each (4 mg total) as needed for smoking cessation    Varicose veins of both lower extremities with pain  -     Ambulatory referral to Vascular Surgery; Future          Subjective:      Patient ID: Janki Singh  is a 47 y o  male  Who presents for follow up on their chronic conditions   Complains of pain, numbness and weakness in left hand   Patient Active Problem List:     Acute pain of left shoulder, for many years, now with weakness while lifting heavy objects at work  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  Type 2 diabetes mellitus without complication, without long-term current use of insulin ([address] ), well controlled, HbA1c 6 6 today  Patient previously on Metformin  Essential hypertension, well controlled off of medications  Numbness of left hand     Other male erectile dysfunction, trouble getting and maintaining erection, Hx of priapism, agreed to start Viagra, ER precautions given  Varicose veins of both lower extremities with pain, agreed to be seen by vascular surgery  Tobacco dependence, smokes 1/4 pack per day, would like to quit, has agreed to try nicotine patches and gum  The following portions of the patient's history were reviewed and updated as appropriate: allergies, current medications, past family history, past medical history, past social history, past surgical history and problem list     Review of Systems   Constitutional: Negative for chills and fever  HENT: Negative for ear pain and sore throat  Eyes: Negative for pain and redness  Respiratory: Negative for cough and shortness of breath  Cardiovascular: Negative for chest pain, palpitations and leg swelling  Gastrointestinal: Negative for abdominal pain, constipation, diarrhea, nausea and vomiting  Genitourinary: Negative for dysuria, frequency and hematuria  Musculoskeletal: Negative for arthralgias and myalgias  Neurological: Positive for weakness and numbness  Negative for dizziness and headaches  Psychiatric/Behavioral: Negative for dysphoric mood  The patient is not nervous/anxious            Objective:      /70 (BP Location: Left arm, Patient Position: Sitting, Cuff Size: Large)   Pulse 97   Temp 98 5 °F (36 9 °C) (Temporal)   Resp 18   Ht 6' (1 829 m)   Wt 113 kg (249 lb)   SpO2 97%   BMI 33 77 kg/m²          Physical Exam   Constitutional: He is oriented to person, place, and time  He appears well-developed and well-nourished  HENT:   Head: Normocephalic and atraumatic  Right Ear: External ear normal    Left Ear: External ear normal    Nose: Nose normal    Mouth/Throat: Oropharynx is clear and moist    Eyes: Pupils are equal, round, and reactive to light  Conjunctivae and EOM are normal    Neck: Normal range of motion  Neck supple  Cardiovascular: Normal rate, regular rhythm, normal heart sounds and intact distal pulses  Pulmonary/Chest: Effort normal and breath sounds normal    Abdominal: Soft  Bowel sounds are normal  There is no tenderness  Musculoskeletal: Normal range of motion  He exhibits no edema or tenderness  Lymphadenopathy:     He has no cervical adenopathy  Neurological: He is alert and oriented to person, place, and time  No sensory deficit  He exhibits normal muscle tone  Reflex Scores:       Tricep reflexes are 2+ on the right side and 2+ on the left side  Bicep reflexes are 2+ on the right side and 2+ on the left side  Brachioradialis reflexes are 1+ on the right side and 1+ on the left side  Skin: Skin is warm and dry  Psychiatric: He has a normal mood and affect   His behavior is normal

## 2019-09-30 NOTE — PATIENT INSTRUCTIONS
Basic diet recommendation     - Drinks  o Okay to drink [employer], flavored seltzer water, beverages like coffee, tea, green tea, herbal tea without any sugar  o Cut down on  Regular soda, diet soda, diet drinks and 100% fruit juice    - Sweetener  o Can use stevia, truvia, monk fruit  o Avoid sugar, other artificial sweeteners, honey or agave     - Fruits  o Apples, pears, berries  consume in moderation  Avoid juices, instead consume whole fruit    - Eat 3 balanced meals  Limit snacks to 1-2 times daily     - Foods to cut down on  o Bread, pasta, corn, cereal  o Starchy vegetables like potatoes, yams, taro  o Peas, lentils, beans ( except green beans and snow peas)    - Safe snacks  o 12 unsalted nuts and 6 rice crackers OR  o 1 apple with 1-2 spoons of peanut butter OR  o 100 calorie nuts and 1 small box of raisins ( kids size) OR  o 6oz plain or vanilla [place] yogurt and ½ cup berries OR  o ½ tuna sandwich OR  o 12 edamame pods OR  o 1 pear and 1-2 slices low fat cheese  o Raw vegetable sticks ( 2 carrot sticks, 2 celery sticks, raw broccoli, pepper, cucumber, or tomato)    - Exercise     CURRENT [employer] TIPS ON EXERCISE:  IF YOU HAVE CONDITIONS THAT PREVENT AEROBIC EXERCISE:  WALK 30 MINUTES AT LEAST 5 DAYS PER WEEK; MAY BREAK IT UP INTO 10-  15 MINUTE SESSIONS  GET SOME RESISTANCE EXERCISES USING THE MAJOR MUSCLE GROUPS 2-3 TIMES PER WEEK   IF YOU ARE PHYSICALLY ABLE:  TRY TO GET 10,000 STEPS 3 TIMES PER WEEK  PLUS  GO "ONLINE" TO CHECK [employer] HEART RATE FOR YOUR AGE AND DO AEROBIC EXERCISES (JOG/STATIONARY BIKE/ELLIPTICAL) FOR 20-30 MINUTES 2-3 TIMES PER WEEK

## 2019-10-01 ENCOUNTER — TELEPHONE (OUTPATIENT)
Dept: FAMILY MEDICINE CLINIC | Facility: CLINIC | Age: 54
End: 2019-10-01

## 2019-10-01 NOTE — TELEPHONE ENCOUNTER
Pt given all info  Urology CBQ([phone]) is on 10/17/2019 at 8:15 am at [address], [place]     PCB([phone]) is on 12/20/2019 at 2 pm at [address], [place]

## 2019-10-17 ENCOUNTER — OFFICE VISIT (OUTPATIENT)
Dept: UROLOGY | Facility: MEDICAL CENTER | Age: 54
End: 2019-10-17
Payer: COMMERCIAL

## 2019-10-17 VITALS
HEART RATE: 95 BPM | HEIGHT: 71 IN | SYSTOLIC BLOOD PRESSURE: 132 MMHG | DIASTOLIC BLOOD PRESSURE: 70 MMHG | WEIGHT: 250 LBS | BODY MASS INDEX: 35 KG/M2

## 2019-10-17 DIAGNOSIS — R35.1 NOCTURIA: Primary | ICD-10-CM

## 2019-10-17 DIAGNOSIS — N52.8 OTHER MALE ERECTILE DYSFUNCTION: ICD-10-CM

## 2019-10-17 PROCEDURE — 99204 OFFICE O/P NEW MOD 45 MIN: CPT | Performed by: UROLOGY

## 2019-10-17 RX ORDER — TADALAFIL 20 MG/1
20 TABLET ORAL DAILY PRN
Qty: 10 TABLET | Refills: 3 | Status: SHIPPED | OUTPATIENT
Start: 2019-10-17 | End: 2019-12-10 | Stop reason: SDUPTHER

## 2019-10-17 NOTE — PATIENT INSTRUCTIONS
If the pills do not work, you might want to consider penile injections  IF YOU WANT TO DO INJECTIONS, CALL HERE BEFORE THE APPOINTMENT  WE WILL SEND YOU THE PRESCRIPTION    YOU WILL HAVE TO  THE MEDICINE AND BRING IT TUR AT THE APPOINTMENT

## 2019-10-17 NOTE — PROGRESS NOTES
HISTORY:    2-3 years of ED, minimal firmness whatsoever, never usable  He tried Viagra twice, did not work at all, even at 100 mg  Long-term smoker, diabetic, hypertensive meds    Denies any voiding symptoms, good stream and control, rare nocturia  ASSESSMENT / PLAN:    Trial of Tadalafil 20 mg  We discussed the risk factors of smoking and diabetes    If Tadalafil does not work, the next step would be penile injections  I discussed that in some detail  If he wants to do that, he will call here and we will send the prescription to the pharmacy  He will then bring the script to the teaching session for penile injections if he wants to go that route  Check PSA and testosterone  Follow-up three months    The following portions of the patient's history were reviewed and updated as appropriate: allergies, current medications, past family history, past medical history, past social history, past surgical history and problem list     Review of Systems   All other systems reviewed and are negative  Objective:     Physical Exam   Constitutional: He appears well-developed and well-nourished     Genitourinary:   Genitourinary Comments: Penis testes normal    Prostate hard to feel, no obvious nodules         No results found for: PSA]  BUN   Date Value Ref Range Status   08/16/2019 15 5 - 25 mg/dL Final     Creatinine   Date Value Ref Range Status   08/16/2019 0 83 0 70 - 1 50 mg/dL Final     Comment:     Standardized to IDMS reference method     No components found for: CBC      Patient Active Problem List   Diagnosis    Acute calculous cholecystitis    Acute pain of left shoulder    Type 2 diabetes mellitus without complication, without long-term current use of insulin ([address] )    Essential hypertension    Numbness of left hand    Other male erectile dysfunction    Traumatic amputation of finger    Varicose veins of bilateral lower extremities with other complications    Gallstones    Tobacco dependence    Nocturia        Diagnoses and all orders for this visit:    Nocturia  -     PSA Total, Diagnostic; Future    Other male erectile dysfunction  -     Ambulatory referral to Urology  -     Testosterone; Future  -     tadalafil (CIALIS) 20 MG tablet; Take 1 tablet (20 mg total) by mouth daily as needed for erectile dysfunction           Patient ID: Scott Johnson  is a 47 y o  male  Current Outpatient Medications:     Alcohol Swabs (ALCOHOL PREP) 70 % PADS, daily Test, Disp: , Rfl: 5    Lancets MISC, Use to test blood glucose once a day  Dx: Type 2 DM   E11 9, Disp: , Rfl:     metFORMIN (GLUCOPHAGE) 1000 MG tablet, Take 1 tablet (1,000 mg total) by mouth 2 (two) times a day with meals, Disp: 60 tablet, Rfl: 0    aspirin (ASPIRIN EC ADULT LOW STRENGTH) 81 mg EC tablet, Take 81 mg by mouth daily, Disp: , Rfl:     atorvastatin (LIPITOR) 20 mg tablet, Take 20 mg by mouth daily, Disp: , Rfl:     cephalexin (KEFLEX) 500 mg capsule, cephalexin 500 mg capsule  TK 1 C PO TID FOR 7 DAYS, Disp: , Rfl:     cyclobenzaprine (FLEXERIL) 10 mg tablet, Take 1 tablet (10 mg total) by mouth 3 (three) times a day as needed for muscle spasms for up to 5 days, Disp: 15 tablet, Rfl: 0    lisinopril (ZESTRIL) 10 mg tablet, Take 10 mg by mouth daily, Disp: , Rfl:     nicotine (NICODERM CQ) 14 mg/24hr TD 24 hr patch, Place 1 patch on the skin every 24 hours (Patient not taking: Reported on 10/17/2019), Disp: 28 patch, Rfl: 0    nicotine polacrilex (NICORETTE) 4 mg gum, Chew 1 each (4 mg total) as needed for smoking cessation (Patient not taking: Reported on 10/17/2019), Disp: 100 each, Rfl: 0    pravastatin (PRAVACHOL) 80 mg tablet, pravastatin 80 mg tablet, Disp: , Rfl:     sildenafil (VIAGRA) 50 MG tablet, Take 1 tablet (50 mg total) by mouth daily as needed for erectile dysfunction (Patient not taking: Reported on 10/17/2019), Disp: 5 tablet, Rfl: 0    tadalafil (CIALIS) 20 MG tablet, Take 1 tablet (20 mg total) by mouth daily as needed for erectile dysfunction, Disp: 10 tablet, Rfl: 3    Urine Diagnostic Calibration STRP, Use to test blood glucose once a day  Dx: Type 2 DM   E11 9, Disp: , Rfl:     Past Medical History:   Diagnosis Date    Carpal tunnel syndrome     Chronic pain     left arm    Diabetes mellitus [address] [address] [facility]     Erectile dysfunction     Hypertension     Umbilical hernia        Past Surgical History:   Procedure Laterality Date    CHOLECYSTECTOMY      CHOLECYSTECTOMY LAPAROSCOPIC N/A 6/20/2019    Procedure: CHOLECYSTECTOMY LAPAROSCOPIC;  Surgeon: Ti Holt MD;  Location: [address] [place] [address];  Service: General    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    HAND SURGERY      UMBILICAL HERNIA REPAIR         Social History

## 2019-10-21 ENCOUNTER — TELEPHONE (OUTPATIENT)
Dept: UROLOGY | Facility: MEDICAL CENTER | Age: 54
End: 2019-10-21

## 2019-10-21 ENCOUNTER — APPOINTMENT (OUTPATIENT)
Dept: LAB | Facility: HOSPITAL | Age: 54
End: 2019-10-21
Payer: COMMERCIAL

## 2019-10-21 DIAGNOSIS — N52.8 OTHER MALE ERECTILE DYSFUNCTION: ICD-10-CM

## 2019-10-21 DIAGNOSIS — R35.1 NOCTURIA: ICD-10-CM

## 2019-10-21 DIAGNOSIS — E11.9 TYPE 2 DIABETES MELLITUS WITHOUT COMPLICATION, WITHOUT LONG-TERM CURRENT USE OF INSULIN (HCC): Primary | ICD-10-CM

## 2019-10-21 DIAGNOSIS — E11.9 TYPE 2 DIABETES MELLITUS WITHOUT COMPLICATION, WITHOUT LONG-TERM CURRENT USE OF INSULIN (HCC): ICD-10-CM

## 2019-10-21 LAB
ALBUMIN SERPL BCP-MCNC: 4 G/DL (ref 3–5.2)
ALP SERPL-CCNC: 43 U/L (ref 43–122)
ALT SERPL W P-5'-P-CCNC: 19 U/L (ref 9–52)
ANION GAP SERPL CALCULATED.3IONS-SCNC: 6 MMOL/L (ref 5–14)
AST SERPL W P-5'-P-CCNC: 18 U/L (ref 17–59)
BASOPHILS # BLD AUTO: 0.1 THOUSANDS/ΜL (ref 0–0.1)
BASOPHILS NFR BLD AUTO: 2 % (ref 0–1)
BILIRUB SERPL-MCNC: 0.4 MG/DL
BUN SERPL-MCNC: 12 MG/DL (ref 5–25)
CALCIUM SERPL-MCNC: 9.2 MG/DL (ref 8.4–10.2)
CHLORIDE SERPL-SCNC: 104 MMOL/L (ref 97–108)
CHOLEST SERPL-MCNC: 171 MG/DL
CO2 SERPL-SCNC: 27 MMOL/L (ref 22–30)
CREAT SERPL-MCNC: 0.87 MG/DL (ref 0.7–1.5)
EOSINOPHIL # BLD AUTO: 0.1 THOUSAND/ΜL (ref 0–0.4)
EOSINOPHIL NFR BLD AUTO: 1 % (ref 0–6)
ERYTHROCYTE [DISTWIDTH] IN BLOOD BY AUTOMATED COUNT: 14.4 %
EST. AVERAGE GLUCOSE BLD GHB EST-MCNC: 143 MG/DL
GFR SERPL CREATININE-BSD FRML MDRD: 99 ML/MIN/1.73SQ M
GLUCOSE P FAST SERPL-MCNC: 125 MG/DL (ref 70–99)
HBA1C MFR BLD: 6.6 % (ref 4.2–6.3)
HCT VFR BLD AUTO: 43.2 % (ref 41–53)
HDLC SERPL-MCNC: 56 MG/DL (ref 40–59)
HGB BLD-MCNC: 14 G/DL (ref 13.5–17.5)
LDLC SERPL CALC-MCNC: 102 MG/DL
LYMPHOCYTES # BLD AUTO: 1.4 THOUSANDS/ΜL (ref 0.5–4)
LYMPHOCYTES NFR BLD AUTO: 21 % (ref 25–45)
MCH RBC QN AUTO: 28.2 PG (ref 26–34)
MCHC RBC AUTO-ENTMCNC: 32.5 G/DL (ref 31–36)
MCV RBC AUTO: 87 FL (ref 80–100)
MONOCYTES # BLD AUTO: 0.5 THOUSAND/ΜL (ref 0.2–0.9)
MONOCYTES NFR BLD AUTO: 7 % (ref 1–10)
NEUTROPHILS # BLD AUTO: 4.8 THOUSANDS/ΜL (ref 1.8–7.8)
NEUTS SEG NFR BLD AUTO: 69 % (ref 45–65)
PLATELET # BLD AUTO: 198 THOUSANDS/UL (ref 150–450)
PMV BLD AUTO: 8.6 FL (ref 8.9–12.7)
POTASSIUM SERPL-SCNC: 4.4 MMOL/L (ref 3.6–5)
PROT SERPL-MCNC: 7.1 G/DL (ref 5.9–8.4)
PSA SERPL-MCNC: 1 NG/ML (ref 0–4)
RBC # BLD AUTO: 4.97 MILLION/UL (ref 4.5–5.9)
SODIUM SERPL-SCNC: 137 MMOL/L (ref 137–147)
TESTOST SERPL-MCNC: 416 NG/DL (ref 95–948)
TRIGL SERPL-MCNC: 65 MG/DL
WBC # BLD AUTO: 6.9 THOUSAND/UL (ref 4.5–11)

## 2019-10-21 PROCEDURE — 84153 ASSAY OF PSA TOTAL: CPT

## 2019-10-21 PROCEDURE — 83036 HEMOGLOBIN GLYCOSYLATED A1C: CPT

## 2019-10-21 PROCEDURE — 85025 COMPLETE CBC W/AUTO DIFF WBC: CPT

## 2019-10-21 PROCEDURE — 80053 COMPREHEN METABOLIC PANEL: CPT

## 2019-10-21 PROCEDURE — 80061 LIPID PANEL: CPT

## 2019-10-21 PROCEDURE — 84403 ASSAY OF TOTAL TESTOSTERONE: CPT

## 2019-10-21 PROCEDURE — 36415 COLL VENOUS BLD VENIPUNCTURE: CPT

## 2019-10-22 ENCOUNTER — TELEPHONE (OUTPATIENT)
Dept: UROLOGY | Facility: MEDICAL CENTER | Age: 54
End: 2019-10-22

## 2019-10-22 NOTE — TELEPHONE ENCOUNTER
Prior Authorization for Tadalafil (CIALIS) 20mg was requested and initiated via CoverMyMeds  com - Key: [id number] - [place] Case ID#: [id number] - Rx#: [id number]  Response questions answered and submitted, final determination is expected within 24-72 hours

## 2019-10-24 NOTE — TELEPHONE ENCOUNTER
Tadalafil (CIALIS) 20mg was DENIED by [address] [place] [address]  Patient notified of same  He has already obtained 10 tablets for $10 from another pharmacy here in the [facility]  No further action required

## 2019-10-28 NOTE — PROGRESS NOTES
Assessment/Plan:    Varicose veins of bilateral lower extremities with other complications  14-SDDW-YESENIA male with a history of HTN, HLD, type 2 DM, nicotine dependence and symptomatic BLE varicose veins, R>L, with prominent clusters of RLE truncal varicosities, significant R lower leg venous stasis changes and prior hx superficial phlebitis  Patient's venous incompetence symptoms prominent prior to recent 100 lb weight loss but now mild since weight loss  Patient does not wear compression    -recommend 3 month trial of conservative measures to include daily use of compression stockings, lower extremity elevation, low-sodium diet, aerobic activity, weight management and skin moisturization  -LEVDR in 3 months  -return to office with surgeon in 3 months with LEVDR for re-evaluation and discussion of surgical options  -instructed to contact the office in the interim with any questions, concerns or new symptoms    Type 2 diabetes mellitus without complication, without long-term current use of insulin ([facility] [address] )    Lab Results   Component Value Date    HGBA1C 6 6 (H) 10/21/2019   -stable  -continue to optimize BS control for prevention of vascular disease  -management per PCP    Tobacco dependence  -discussed the pathophysiology and relationship of smoking and peripheral vascular disease  -encourage smoking cessation    Other hyperlipidemia  -stable  -continue statin therapy  -management per PCP       Diagnoses and all orders for this visit:    Varicose veins of bilateral lower extremities with other complications  -     Compression Stocking  -     VAS reflux lower limb venous duplex study with reflux assessment, complete bilateral; Future    Type 2 diabetes mellitus without complication, without long-term current use of insulin ([facility])    Tobacco dependence    Varicose veins of both lower extremities with pain  -     Ambulatory referral to Vascular Surgery    Other hyperlipidemia          Subjective:      Patient ID: Bishnu Real Phu Butcher  is a 47 y o  male  Pt is new to our practice and was referred by Dr Raquel Weber MD for evaluation of varicose veins  Pt c/o bulging veins in his right leg for the past 15-20 years  Pt denies any pain in the right leg  Pt does not use compression stocking nor elevates his legs regularly  Pt has had no testing for this condition  Pt is currently taking ASA, Lipitor and Pravastatin  41-year-old male with a history of HTN, type 2 DM, nicotine dependence and symptomatic BLE varicose veins, R>L, with prominent clusters of RLE truncal varicosities, significant R lower leg venous stasis changes and prior hx superficial phlebitis who is referred by his PCP for evaluation his longstanding varicose veins  Patient reports a 20 year history lower extremity varicose veins which have been progressive and previously associated with lower extremity heaviness, aching, pain, recurrent phlebitis, chronic right lower extremity edema and burning  Patient's symptoms prominent prior to recent 100 lb weight loss but now mild since weight loss  Patient does not wear compression  He denies history of DVT, PE, hypercoagulable disorder, venous ulcerations, bleeding varicosities or recurrent lower extremity cellulitis  Patient reports significant family history of varicose veins in his mother but denies family history of VTE or hypercoagulable disorder  The patient denies previous venous intervention or evaluation by vascular surgery  The following portions of the patient's history were reviewed and updated as appropriate: allergies, current medications, past family history, past medical history, past social history, past surgical history and problem list     Review of Systems   Constitutional: Negative  HENT: Positive for tinnitus  Eyes: Negative  Respiratory: Negative  Cardiovascular: Negative  Gastrointestinal: Negative  Endocrine: Negative  Genitourinary: Positive for frequency  Musculoskeletal: Negative  Skin: Positive for color change (Right leg)  Allergic/Immunologic: Negative  Neurological: Negative  Hematological: Negative  Psychiatric/Behavioral: Negative  I have personally reviewed the ROS entered by MA and agree as documented  I have reviewed and made appropriate changes to the review of systems input by the medical assistant      Vitals:    10/29/19 1257   BP: 140/94   BP Location: Right arm   Patient Position: Sitting   Cuff Size: Adult   Pulse: 76   Resp: 18   Temp: 98 9 °F (37 2 °C)   TempSrc: Tympanic   Weight: 115 kg (253 lb)   Height: 5' 11" (1 803 m)       Patient Active Problem List   Diagnosis    Acute calculous cholecystitis    Acute pain of left shoulder    Type 2 diabetes mellitus without complication, without long-term current use of insulin ([facility]CC)    Essential hypertension    Numbness of left hand    Other male erectile dysfunction    Traumatic amputation of finger    Varicose veins of bilateral lower extremities with other complications    Gallstones    Tobacco dependence    Nocturia    Other hyperlipidemia       Past Surgical History:   Procedure Laterality Date    CHOLECYSTECTOMY      CHOLECYSTECTOMY LAPAROSCOPIC N/A 6/20/2019    Procedure: CHOLECYSTECTOMY LAPAROSCOPIC;  Surgeon: Mario Robert MD;  Location:  MAIN OR;  Service: General    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    HAND SURGERY      UMBILICAL HERNIA REPAIR         Family History   Problem Relation Age of Onset    Breast cancer additional onset Mother     Diabetes Father     Hyperlipidemia Father     Hypertension Father     Seizures Father     No Known Problems Son     No Known Problems Son     Vitiligo Son        Social History     Socioeconomic History    Marital status:      Spouse name: Not on file    Number of children: Not on file    Years of education: Not on file    Highest education level: Not on file   Occupational History    Not on file   Social Needs    Financial resource strain: Not on file    Food insecurity:     Worry: Not on file     Inability: Not on file    Transportation needs:     Medical: Not on file     Non-medical: Not on file   Tobacco Use    Smoking status: Current Every Day Smoker     Packs/day: 0 25     Types: Cigarettes    Smokeless tobacco: Never Used   Substance and Sexual Activity    Alcohol use: Yes     Frequency: 2-3 times a week     Drinks per session: 1 or 2    Drug use: No    Sexual activity: Not on file   Lifestyle    Physical activity:     Days per week: Not on file     Minutes per session: Not on file    Stress: Not on file   Relationships    Social connections:     Talks on phone: Not on file     Gets together: Not on file     Attends Alevism service: Not on file     Active member of club or organization: Not on file     Attends meetings of clubs or organizations: Not on file     Relationship status: Not on file    Intimate partner violence:     Fear of current or ex partner: Not on file     Emotionally abused: Not on file     Physically abused: Not on file     Forced sexual activity: Not on file   Other Topics Concern    Not on file   Social History Narrative    Not on file       No Known Allergies      Current Outpatient Medications:     Alcohol Swabs (ALCOHOL PREP) 70 % PADS, daily Test, Disp: , Rfl: 5    Lancets MISC, Use to test blood glucose once a day  Dx: Type 2 DM  E11 9, Disp: , Rfl:     metFORMIN (GLUCOPHAGE) 1000 MG tablet, Take 1 tablet (1,000 mg total) by mouth 2 (two) times a day with meals, Disp: 60 tablet, Rfl: 0    tadalafil (CIALIS) 20 MG tablet, Take 1 tablet (20 mg total) by mouth daily as needed for erectile dysfunction, Disp: 10 tablet, Rfl: 3    Urine Diagnostic Calibration STRP, Use to test blood glucose once a day  Dx: Type 2 DM   E11 9, Disp: , Rfl:     aspirin (ASPIRIN EC ADULT LOW STRENGTH) 81 mg EC tablet, Take 81 mg by mouth daily, Disp: , Rfl:    atorvastatin (LIPITOR) 20 mg tablet, Take 20 mg by mouth daily, Disp: , Rfl:     cephalexin (KEFLEX) 500 mg capsule, cephalexin 500 mg capsule  TK 1 C PO TID FOR 7 DAYS, Disp: , Rfl:     cyclobenzaprine (FLEXERIL) 10 mg tablet, Take 1 tablet (10 mg total) by mouth 3 (three) times a day as needed for muscle spasms for up to 5 days, Disp: 15 tablet, Rfl: 0    lisinopril (ZESTRIL) 10 mg tablet, Take 10 mg by mouth daily, Disp: , Rfl:     nicotine (NICODERM CQ) 14 mg/24hr TD 24 hr patch, Place 1 patch on the skin every 24 hours (Patient not taking: Reported on 10/17/2019), Disp: 28 patch, Rfl: 0    nicotine polacrilex (NICORETTE) 4 mg gum, Chew 1 each (4 mg total) as needed for smoking cessation (Patient not taking: Reported on 10/17/2019), Disp: 100 each, Rfl: 0    pravastatin (PRAVACHOL) 80 mg tablet, pravastatin 80 mg tablet, Disp: , Rfl:     sildenafil (VIAGRA) 50 MG tablet, Take 1 tablet (50 mg total) by mouth daily as needed for erectile dysfunction (Patient not taking: Reported on 10/17/2019), Disp: 5 tablet, Rfl: 0    Objective:  Imaging study:  L GIANLUCA 6/16/2016:  No evidence of acute or chronic left lower extremity DVT or superficial thrombophlebitis    /94 (BP Location: Right arm, Patient Position: Sitting, Cuff Size: Adult)   Pulse 76   Temp 98 9 °F (37 2 °C) (Tympanic)   Resp 18   Ht 5' 11" (1 803 m)   Wt 115 kg (253 lb)   BMI 35 29 kg/m²          Physical Exam   Constitutional: He is oriented to person, place, and time  He appears well-developed and well-nourished  No distress  HENT:   Head: Normocephalic and atraumatic  Eyes: Pupils are equal, round, and reactive to light  Conjunctivae and EOM are normal  No scleral icterus  Neck: Normal range of motion  Neck supple  No JVD present  Carotid bruit is not present  No tracheal deviation present  No thyromegaly present     Cardiovascular: Normal rate, regular rhythm, S1 normal and S2 normal  Exam reveals no gallop, no S3 and no friction rub    No murmur heard  Pulses:       Radial pulses are 2+ on the right side, and 2+ on the left side  Dorsalis pedis pulses are 2+ on the right side, and 2+ on the left side  Posterior tibial pulses are 2+ on the right side, and 2+ on the left side  Pulmonary/Chest: Breath sounds normal  No stridor  No respiratory distress  He has no wheezes  He has no rhonchi  He has no rales  Abdominal: Soft  Bowel sounds are normal  He exhibits no distension, no abdominal bruit, no pulsatile midline mass and no mass  There is no hepatosplenomegaly  There is no tenderness  There is no rebound  Musculoskeletal: Normal range of motion  He exhibits edema (1+ edema right lower leg with increased calf circumference  )  He exhibits no deformity  Superficial varicosities noted left medial distal thigh  Large cluster of truncal varicosities on right medial distal thigh and right mid medial calf  Significant hemosiderin staining noted of entire right lower leg  Neurological: He is alert and oriented to person, place, and time  He has normal strength  Skin: Skin is warm, dry and intact  No lesion noted  No cyanosis or erythema  No pallor  Psychiatric: He has a normal mood and affect         Right medial lower extremity    Right lower extremity

## 2019-10-29 ENCOUNTER — CONSULT (OUTPATIENT)
Dept: VASCULAR SURGERY | Facility: CLINIC | Age: 54
End: 2019-10-29
Payer: COMMERCIAL

## 2019-10-29 VITALS
DIASTOLIC BLOOD PRESSURE: 94 MMHG | BODY MASS INDEX: 35.42 KG/M2 | HEART RATE: 76 BPM | WEIGHT: 253 LBS | RESPIRATION RATE: 18 BRPM | HEIGHT: 71 IN | TEMPERATURE: 98.9 F | SYSTOLIC BLOOD PRESSURE: 140 MMHG

## 2019-10-29 DIAGNOSIS — E11.9 TYPE 2 DIABETES MELLITUS WITHOUT COMPLICATION, WITHOUT LONG-TERM CURRENT USE OF INSULIN (HCC): ICD-10-CM

## 2019-10-29 DIAGNOSIS — E78.49 OTHER HYPERLIPIDEMIA: ICD-10-CM

## 2019-10-29 DIAGNOSIS — F17.200 TOBACCO DEPENDENCE: ICD-10-CM

## 2019-10-29 DIAGNOSIS — I83.893 VARICOSE VEINS OF BILATERAL LOWER EXTREMITIES WITH OTHER COMPLICATIONS: Primary | ICD-10-CM

## 2019-10-29 DIAGNOSIS — I83.813 VARICOSE VEINS OF BOTH LOWER EXTREMITIES WITH PAIN: ICD-10-CM

## 2019-10-29 PROCEDURE — 99204 OFFICE O/P NEW MOD 45 MIN: CPT | Performed by: PHYSICIAN ASSISTANT

## 2019-10-29 NOTE — PATIENT INSTRUCTIONS
Varicose Veins   WHAT YOU NEED TO KNOW:   What are varicose veins? Varicose veins are veins that become large, twisted, and swollen  They are common on the back of the calves, knees, and thighs  Varicose veins are caused by valves in your veins that do not work properly  This causes blood to collect and increase pressure in the veins of your legs  The increased pressure causes your veins to stretch, get larger, swell, and twist        What increases my risk for varicose veins? · Pregnancy    · A family history of varicose veins    · Being overweight or obese    · Age 48 years or older    · Sitting or standing for long periods of time    · Wearing tight clothing  What are the signs and symptoms of varicose veins? Your symptoms may be worse after you stand or sit for long periods of time  You may have any of the following:  · Blue, purple, or bulging veins in your legs     · Pain, swelling, or muscle cramps in your legs    · Feeling of fatigue or heaviness in your legs  How are varicose veins diagnosed? Your healthcare provider will examine your legs and ask about your medical history  You may need tests, such as a Doppler ultrasound or duplex scan  These tests show your veins and valves, and how your blood is flowing through them  These tests may also show if there is a blockage or blood clot  How are varicose veins treated? The goal of treatment is to decrease symptoms, improve appearance, and prevent further problems  Treatment will depend on which veins are affected and how severe your condition is  You may need procedures to treat or remove your varicose veins  For example, your healthcare provider may inject a solution or use a laser to close the varicose veins  Surgery to remove long veins may also be done  Ask your healthcare provider for more information about procedures used to treat varicose veins  What can I do to manage my symptoms? · Do not sit or stand for long periods of time    This can cause the blood to collect in your legs and make your symptoms worse  Bend or rotate your ankles several times every hour  Walk around for a few minutes every hour to get blood moving in your legs  · Do not cross your legs when you sit  This decreases blood flow to your feet and can make your symptoms worse  · Do not wear tight clothing or shoes  Do not wear high-heeled shoes  Do not wear clothes that are tight around the waist or knees  · Maintain a healthy weight  Being overweight or obese can make your varicose veins worse  Ask your healthcare provider how much you should weigh  Ask him or her to help you create a weight loss plan if you are overweight  · Wear pressure stockings as directed  The stockings are tight and put pressure on your legs  They improve blood flow and help prevent clots  · Elevate your legs  Keep them above the level of your heart for 15 to 30 minutes several times a day  You can also prop the end of your bed up slightly to elevate your legs while you sleep  This will help blood to flow back to your heart  · Get regular exercise  Talk to your healthcare provider about the best exercise plan for you  Exercise can improve blood flow to your legs and feet  When should I seek immediate care? · You have a wound that does not heal or is infected  · You have an injury that has broken your skin and caused your varicose veins to bleed  · Your leg is swollen and hard  · You notice that your legs or feet are turning blue or black  · Your leg feels warm, tender, and painful  It may look swollen and red  When should I contact my healthcare provider? · You have pain in your leg that does not go away or gets worse  · You notice sudden large bruising on your legs  · You have a rash on your leg  · Your symptoms keep you from doing your daily activities  · You have questions or concerns about your condition or care    CARE AGREEMENT:   You have the right to help plan your care  Learn about your health condition and how it may be treated  Discuss treatment options with your caregivers to decide what care you want to receive  You always have the right to refuse treatment  The above information is an  only  It is not intended as medical advice for individual conditions or treatments  Talk to your doctor, nurse or pharmacist before following any medical regimen to see if it is safe and effective for you  © 2017 [employer]  Information is for End User's use only and may not be sold, redistributed or otherwise used for commercial purposes  All illustrations and images included in CareNotes® are the copyrighted property of [employer]  or [employer]      -recommend 3 month trial of conservative measures to include daily use of prescription compression stockings, leg elevation, low-salt diet, aerobic activity, weight management and lotion to leg to help promote good skin health  -place your compression stockings on in the morning and remove prior to bed  -we will schedule you for a lower extremity venous reflux study to assess the function of your valves in your veins to help guide treatment options  -return to office with surgeon in 3 months after reflux study for re-evaluation   -please contact the office in the interim with any questions, concerns or new symptoms

## 2019-10-29 NOTE — LETTER
October 29, 2019     Patient: Lorenza Castro  YOB: 1965   Date of Visit: 10/29/2019       To Whom it May Concern:    Remberto Cuello is under my professional care  He was seen in my office on 10/29/2019  He may return to work on 10/30/2019  If you have any questions or concerns, please don't hesitate to call           Sincerely,          Emre Duncan PA-C        CC: No Recipients

## 2019-10-29 NOTE — ASSESSMENT & PLAN NOTE
20-year-old male with a history of HTN, HLD, type 2 DM, nicotine dependence and symptomatic BLE varicose veins, R>L, with prominent clusters of RLE truncal varicosities, significant R lower leg venous stasis changes and prior hx superficial phlebitis  Patient's venous incompetence symptoms prominent prior to recent 100 lb weight loss but now mild since weight loss  Patient does not wear compression    -recommend 3 month trial of conservative measures to include daily use of compression stockings, lower extremity elevation, low-sodium diet, aerobic activity, weight management and skin moisturization  -LEVDR in 3 months  -return to office with surgeon in 3 months with LEVDR for re-evaluation and discussion of surgical options  -instructed to contact the office in the interim with any questions, concerns or new symptoms

## 2019-10-29 NOTE — ASSESSMENT & PLAN NOTE
-discussed the pathophysiology and relationship of smoking and peripheral vascular disease  -encourage smoking cessation

## 2019-10-29 NOTE — ASSESSMENT & PLAN NOTE
Lab Results   Component Value Date    HGBA1C 6 6 (H) 10/21/2019   -stable  -continue to optimize BS control for prevention of vascular disease  -management per PCP

## 2019-10-31 ENCOUNTER — OFFICE VISIT (OUTPATIENT)
Dept: FAMILY MEDICINE CLINIC | Facility: CLINIC | Age: 54
End: 2019-10-31

## 2019-10-31 VITALS
DIASTOLIC BLOOD PRESSURE: 70 MMHG | TEMPERATURE: 96.8 F | OXYGEN SATURATION: 97 % | BODY MASS INDEX: 34.72 KG/M2 | SYSTOLIC BLOOD PRESSURE: 120 MMHG | RESPIRATION RATE: 20 BRPM | WEIGHT: 248 LBS | HEIGHT: 71 IN | HEART RATE: 92 BPM

## 2019-10-31 DIAGNOSIS — M77.02 MEDIAL EPICONDYLITIS OF ELBOW, LEFT: ICD-10-CM

## 2019-10-31 DIAGNOSIS — F17.200 TOBACCO DEPENDENCE: ICD-10-CM

## 2019-10-31 DIAGNOSIS — Z12.11 SCREENING FOR COLON CANCER: ICD-10-CM

## 2019-10-31 DIAGNOSIS — E11.9 TYPE 2 DIABETES MELLITUS WITHOUT COMPLICATION, WITHOUT LONG-TERM CURRENT USE OF INSULIN (HCC): ICD-10-CM

## 2019-10-31 DIAGNOSIS — N52.8 OTHER MALE ERECTILE DYSFUNCTION: ICD-10-CM

## 2019-10-31 DIAGNOSIS — R06.83 SNORING: ICD-10-CM

## 2019-10-31 DIAGNOSIS — R06.81 WITNESSED APNEIC SPELLS: Primary | ICD-10-CM

## 2019-10-31 DIAGNOSIS — I10 ESSENTIAL HYPERTENSION: ICD-10-CM

## 2019-10-31 PROCEDURE — 99213 OFFICE O/P EST LOW 20 MIN: CPT | Performed by: FAMILY MEDICINE

## 2019-10-31 PROCEDURE — 4004F PT TOBACCO SCREEN RCVD TLK: CPT | Performed by: FAMILY MEDICINE

## 2019-10-31 PROCEDURE — 3078F DIAST BP <80 MM HG: CPT | Performed by: FAMILY MEDICINE

## 2019-10-31 PROCEDURE — 3074F SYST BP LT 130 MM HG: CPT | Performed by: FAMILY MEDICINE

## 2019-10-31 NOTE — ASSESSMENT & PLAN NOTE
4-8 cigarettes daily  Patient currently pre-contemplative  Not ready to quit  Counseled on cessation  Will reassess at next visit

## 2019-10-31 NOTE — PROGRESS NOTES
Assessment/Plan:    Tobacco dependence  4-8 cigarettes daily  Patient currently pre-contemplative  Not ready to quit  Counseled on cessation  Will reassess at next visit  Other male erectile dysfunction  Follows with Urology  Well controlled on Tadalafil  Essential hypertension  BP Readings from Last 1 Encounters:   10/31/19 120/70   Currently controlled  Not taking lisinopril, will not restart as BP in controlled and no evidence of proteinuria  Will get microalbumin creatinine ratio  Reassess at next visit  Medial epicondylitis of elbow, left  Recommend NSAIDs and icing at night  Recommended brace in while sleeping as well as work for the next 4-6 weeks  Will consider physical therapy if patient fails conservative management  Type 2 diabetes mellitus without complication, without long-term current use of insulin (HCC)    Lab Results   Component Value Date    HGBA1C 6 6 (H) 10/21/2019   Currently controlled  Continue current management  Reassess at next visit  Diagnoses and all orders for this visit:    Witnessed apneic spells  -     Ambulatory referral to Sleep Medicine; Future    Screening for colon cancer  -     Ambulatory referral to Gastroenterology; Future    Snoring  -     Ambulatory referral to Sleep Medicine; Future    Tobacco dependence    Other male erectile dysfunction    Essential hypertension    Type 2 diabetes mellitus without complication, without long-term current use of insulin (HCC)  -     Microalbumin / creatinine urine ratio    Medial epicondylitis of elbow, left  -     Misc  Devices MISC; by Does not apply route daily          Subjective:      Patient ID: Yudith Woodard  is a 47 y o  male  Who presents for follow up on their chronic conditions  Complains of none    Patient Active Problem List:     Type 2 diabetes mellitus without complication, without long-term current use of insulin ([facility] [address] ), well controlled, adherent to Metformin, denies hypoglycemia  Essential hypertension, well controlled, adherent to Lisinopril 10 mg  Numbness of left hand, with pain at medial epicondyl of elbow and equivocal tinels at cubital fossa  Tobacco dependence, improving down to 4-5 cigarettes daily  Sleep Apnea: Patient presents with possible obstructive sleep apnea  Patent has a several year history of symptoms of daytime fatigue and hypertension  Patient generally gets approximately 7 hours of sleep per night, and states they generally have nightime awakenings and difficulty falling back asleep if awakened  Snoring of moderate severity is present  Apneic episodes are present  Nasal obstruction is not present  The following portions of the patient's history were reviewed and updated as appropriate: allergies, current medications, past family history, past medical history, past social history, past surgical history and problem list     Review of Systems   Constitutional: Negative for chills and fever  HENT: Negative for ear pain and sore throat  Eyes: Negative for pain and redness  Respiratory: Negative for cough and shortness of breath  Cardiovascular: Negative for chest pain, palpitations and leg swelling  Gastrointestinal: Negative for abdominal pain, constipation, diarrhea, nausea and vomiting  Genitourinary: Negative for dysuria, frequency and hematuria  Musculoskeletal: Positive for arthralgias  Negative for myalgias  Neurological: Positive for weakness and numbness  Negative for dizziness and headaches  Psychiatric/Behavioral: Positive for sleep disturbance  Negative for dysphoric mood  The patient is not nervous/anxious  Objective:      /70   Pulse 92   Temp (!) 96 8 °F (36 °C) (Skin)   Resp 20   Ht 5' 11" (1 803 m)   Wt 112 kg (248 lb)   SpO2 97%   BMI 34 59 kg/m²          Physical Exam   Constitutional: He is oriented to person, place, and time  He appears well-developed and well-nourished  HENT:   Head: Normocephalic and atraumatic  Right Ear: External ear normal    Left Ear: External ear normal    Nose: Nose normal    Mouth/Throat: Oropharynx is clear and moist    Eyes: Pupils are equal, round, and reactive to light  Conjunctivae and EOM are normal    Neck: Normal range of motion  Neck supple  Cardiovascular: Normal rate, regular rhythm, normal heart sounds and intact distal pulses  Pulmonary/Chest: Effort normal and breath sounds normal    Abdominal: Soft  Bowel sounds are normal  There is no tenderness  Musculoskeletal: Normal range of motion  He exhibits no edema  Left forearm: He exhibits tenderness (meidal epicondyl, equivocal tinel's )  Lymphadenopathy:     He has no cervical adenopathy  Neurological: He is alert and oriented to person, place, and time  Skin: Skin is warm and dry  Psychiatric: He has a normal mood and affect   His behavior is normal

## 2019-10-31 NOTE — PATIENT INSTRUCTIONS
Cubital Tunnel Syndrome   WHAT YOU NEED TO KNOW:   Cubital tunnel syndrome is a condition where there is increased pressure on the ulnar nerve in your elbow  The ulnar nerve controls muscles and feeling in the hand  Cubital tunnel syndrome may be caused by direct pressure, stretching, or decreased blood flow to the ulnar nerve  DISCHARGE INSTRUCTIONS:   Medicines:   · NSAIDs:  These medicines decrease swelling and pain  NSAIDs are available without a doctor's order  Ask which medicine is right for you and how much to take  Take as directed  NSAIDs can cause stomach bleeding or kidney problems if not taken correctly  · Take your medicine as directed  Contact your healthcare provider if you think your medicine is not helping or if you have side effects  Tell him of her if you are allergic to any medicine  Keep a list of the medicines, vitamins, and herbs you take  Include the amounts, and when and why you take them  Bring the list or the pill bottles to follow-up visits  Carry your medicine list with you in case of an emergency  Follow up with your healthcare provider as directed:  Write down your questions so you remember to ask them during your visits  Manage your symptoms:   · Avoid putting pressure on your elbow:  Certain positions put pressure on the ulnar nerve in your elbow  Leaning or sleeping on your bent elbow can make your symptoms worse  · Apply ice:  Ice helps decrease swelling and pain  Ice may also help prevent tissue damage  Use an ice pack or put crushed ice in a plastic bag  Cover the ice pack with a towel and place it on the area for 15 to 20 minutes every hour  · Rest your arm:  You may need to rest your injured arm and avoid activities that cause your symptoms to allow your nerve to heal     · Get physical therapy:  A physical therapist can show you exercises to help improve movement and strength  Physical therapy can also help decrease pain and loss of function      · Use elbow splint or brace: You may need a brace or splint on your elbow to decrease your arm movement  This will help to keep pressure off your ulnar nerve  You may also need elbow pads to protect your elbow  Contact your healthcare provider if:   · Your symptoms get worse  · Your hand and fingers are so weak that you cannot grab, squeeze, or lift items  · You have questions or concerns about your condition or care  Return to the emergency department if:   · You suddenly lose feeling in your hand or fingers  · You cannot move your ring or little finger  © 2017 [facility] Information is for End User's use only and may not be sold, redistributed or otherwise used for commercial purposes  All illustrations and images included in CareNotes® are the copyrighted property of [employer]  or Khalif Alvarado  The above information is an  only  It is not intended as medical advice for individual conditions or treatments  Talk to your doctor, nurse or pharmacist before following any medical regimen to see if it is safe and effective for you

## 2019-10-31 NOTE — ASSESSMENT & PLAN NOTE
BP Readings from Last 1 Encounters:   10/31/19 120/70   Currently controlled  Not taking lisinopril, will not restart as BP in controlled and no evidence of proteinuria  Will get microalbumin creatinine ratio  Reassess at next visit

## 2019-10-31 NOTE — ASSESSMENT & PLAN NOTE
Recommend NSAIDs and icing at night  Recommended brace in while sleeping as well as work for the next 4-6 weeks  Will consider physical therapy if patient fails conservative management

## 2019-11-01 NOTE — ASSESSMENT & PLAN NOTE
Lab Results   Component Value Date    HGBA1C 6 6 (H) 10/21/2019   Currently controlled  Continue current management  Reassess at next visit

## 2019-12-09 ENCOUNTER — HOSPITAL ENCOUNTER (EMERGENCY)
Facility: HOSPITAL | Age: 54
Discharge: HOME | End: 2019-12-09
Attending: EMERGENCY MEDICINE
Payer: COMMERCIAL

## 2019-12-09 ENCOUNTER — APPOINTMENT (EMERGENCY)
Dept: RADIOLOGY | Facility: HOSPITAL | Age: 54
End: 2019-12-09
Payer: COMMERCIAL

## 2019-12-09 VITALS
TEMPERATURE: 96.9 F | HEART RATE: 83 BPM | DIASTOLIC BLOOD PRESSURE: 88 MMHG | OXYGEN SATURATION: 98 % | BODY MASS INDEX: 36.9 KG/M2 | SYSTOLIC BLOOD PRESSURE: 153 MMHG | WEIGHT: 264.55 LBS | RESPIRATION RATE: 16 BRPM

## 2019-12-09 DIAGNOSIS — J40 BRONCHITIS: Primary | ICD-10-CM

## 2019-12-09 DIAGNOSIS — M79.641 RIGHT HAND PAIN: ICD-10-CM

## 2019-12-09 LAB
ANION GAP SERPL CALCULATED.3IONS-SCNC: 8 MMOL/L (ref 5–14)
BASOPHILS # BLD AUTO: 0 THOUSANDS/ΜL (ref 0–0.1)
BASOPHILS NFR BLD AUTO: 1 % (ref 0–1)
BUN SERPL-MCNC: 15 MG/DL (ref 5–25)
CALCIUM SERPL-MCNC: 9.2 MG/DL (ref 8.4–10.2)
CHLORIDE SERPL-SCNC: 104 MMOL/L (ref 97–108)
CO2 SERPL-SCNC: 24 MMOL/L (ref 22–30)
CREAT SERPL-MCNC: 0.72 MG/DL (ref 0.7–1.5)
EOSINOPHIL # BLD AUTO: 0.1 THOUSAND/ΜL (ref 0–0.4)
EOSINOPHIL NFR BLD AUTO: 2 % (ref 0–6)
ERYTHROCYTE [DISTWIDTH] IN BLOOD BY AUTOMATED COUNT: 13.5 %
GFR SERPL CREATININE-BSD FRML MDRD: 107 ML/MIN/1.73SQ M
GLUCOSE SERPL-MCNC: 127 MG/DL (ref 70–99)
HCT VFR BLD AUTO: 41 % (ref 41–53)
HGB BLD-MCNC: 13.7 G/DL (ref 13.5–17.5)
LYMPHOCYTES # BLD AUTO: 1.4 THOUSANDS/ΜL (ref 0.5–4)
LYMPHOCYTES NFR BLD AUTO: 24 % (ref 25–45)
MCH RBC QN AUTO: 28.6 PG (ref 26–34)
MCHC RBC AUTO-ENTMCNC: 33.5 G/DL (ref 31–36)
MCV RBC AUTO: 86 FL (ref 80–100)
MONOCYTES # BLD AUTO: 0.5 THOUSAND/ΜL (ref 0.2–0.9)
MONOCYTES NFR BLD AUTO: 9 % (ref 1–10)
NEUTROPHILS # BLD AUTO: 3.7 THOUSANDS/ΜL (ref 1.8–7.8)
NEUTS SEG NFR BLD AUTO: 65 % (ref 45–65)
PLATELET # BLD AUTO: 204 THOUSANDS/UL (ref 150–450)
PMV BLD AUTO: 8 FL (ref 8.9–12.7)
POTASSIUM SERPL-SCNC: 4 MMOL/L (ref 3.6–5)
RBC # BLD AUTO: 4.79 MILLION/UL (ref 4.5–5.9)
SODIUM SERPL-SCNC: 136 MMOL/L (ref 137–147)
TROPONIN I SERPL-MCNC: <0.01 NG/ML (ref 0–0.03)
WBC # BLD AUTO: 5.6 THOUSAND/UL (ref 4.5–11)

## 2019-12-09 PROCEDURE — 71046 X-RAY EXAM CHEST 2 VIEWS: CPT

## 2019-12-09 PROCEDURE — 85025 COMPLETE CBC W/AUTO DIFF WBC: CPT | Performed by: EMERGENCY MEDICINE

## 2019-12-09 PROCEDURE — 80048 BASIC METABOLIC PNL TOTAL CA: CPT | Performed by: EMERGENCY MEDICINE

## 2019-12-09 PROCEDURE — 84484 ASSAY OF TROPONIN QUANT: CPT | Performed by: EMERGENCY MEDICINE

## 2019-12-09 PROCEDURE — 99284 EMERGENCY DEPT VISIT MOD MDM: CPT

## 2019-12-09 PROCEDURE — 99284 EMERGENCY DEPT VISIT MOD MDM: CPT | Performed by: EMERGENCY MEDICINE

## 2019-12-09 PROCEDURE — 36415 COLL VENOUS BLD VENIPUNCTURE: CPT | Performed by: EMERGENCY MEDICINE

## 2019-12-09 PROCEDURE — 93005 ELECTROCARDIOGRAM TRACING: CPT

## 2019-12-09 PROCEDURE — 73130 X-RAY EXAM OF HAND: CPT

## 2019-12-09 RX ORDER — AZITHROMYCIN 250 MG/1
TABLET, FILM COATED ORAL
Qty: 6 TABLET | Refills: 0 | Status: SHIPPED | OUTPATIENT
Start: 2019-12-09 | End: 2019-12-13
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
## 2019-12-09 NOTE — ED PROCEDURE NOTE
PROCEDURE  ECG 12 Lead Documentation Only  Date/Time: 12/9/2019 3:36 PM  Performed by: Ruby Meza MD  Authorized by: Ruby Meza MD     Indications / Diagnosis:  Cough  ECG reviewed by me, the ED Provider: yes    Patient location:  ED  Interpretation:     Interpretation: normal    Rate:     ECG rate:  74    ECG rate assessment: normal    Rhythm:     Rhythm: sinus rhythm    Ectopy:     Ectopy: none    QRS:     QRS axis:  Normal    QRS intervals:  Normal  Conduction:     Conduction: normal    ST segments:     ST segments:  Normal  T waves:     T waves: normal           Ruby Meza MD  12/09/19 1547

## 2019-12-10 DIAGNOSIS — N52.8 OTHER MALE ERECTILE DYSFUNCTION: ICD-10-CM

## 2019-12-10 LAB
ATRIAL RATE: 74 BPM
P AXIS: 68 DEGREES
PR INTERVAL: 140 MS
QRS AXIS: 16 DEGREES
QRSD INTERVAL: 94 MS
QT INTERVAL: 396 MS
QTC INTERVAL: 439 MS
T WAVE AXIS: 20 DEGREES
VENTRICULAR RATE: 74 BPM

## 2019-12-10 PROCEDURE — 93010 ELECTROCARDIOGRAM REPORT: CPT | Performed by: INTERNAL MEDICINE

## 2019-12-11 DIAGNOSIS — E11.9 TYPE 2 DIABETES MELLITUS WITHOUT COMPLICATION, WITHOUT LONG-TERM CURRENT USE OF INSULIN (HCC): ICD-10-CM

## 2019-12-11 RX ORDER — TADALAFIL 20 MG/1
20 TABLET ORAL DAILY PRN
Qty: 10 TABLET | Refills: 0 | Status: SHIPPED | OUTPATIENT
Start: 2019-12-11 | End: 2020-01-13 | Stop reason: SDUPTHER

## 2019-12-20 ENCOUNTER — HOSPITAL ENCOUNTER (OUTPATIENT)
Dept: NEUROLOGY | Facility: HOSPITAL | Age: 54
Discharge: HOME | End: 2019-12-20
Payer: COMMERCIAL

## 2019-12-20 ENCOUNTER — TELEPHONE (OUTPATIENT)
Dept: FAMILY MEDICINE CLINIC | Facility: CLINIC | Age: 54
End: 2019-12-20

## 2019-12-20 DIAGNOSIS — G56.02 CARPAL TUNNEL SYNDROME OF LEFT WRIST: Primary | ICD-10-CM

## 2019-12-20 DIAGNOSIS — E11.9 TYPE 2 DIABETES MELLITUS WITHOUT COMPLICATION, WITHOUT LONG-TERM CURRENT USE OF INSULIN (HCC): ICD-10-CM

## 2019-12-20 DIAGNOSIS — G56.22 CUBITAL TUNNEL SYNDROME ON LEFT: ICD-10-CM

## 2019-12-20 DIAGNOSIS — R20.0 NUMBNESS OF LEFT HAND: ICD-10-CM

## 2019-12-20 PROCEDURE — 95907 NVR CNDJ TST 1-2 STUDIES: CPT | Performed by: PHYSICAL MEDICINE & REHABILITATION

## 2019-12-20 PROCEDURE — 95886 MUSC TEST DONE W/N TEST COMP: CPT | Performed by: PHYSICAL MEDICINE & REHABILITATION

## 2019-12-20 NOTE — TELEPHONE ENCOUNTER
Patient in need of metFORMIN (GLUCOPHAGE) 1000 MG tablet  He missed his appointment this morning, but rescheduled for next month  Please advise

## 2019-12-20 NOTE — PROCEDURES
[facility]  Electromyography and Nerve Conduction Study      Patient Name:  Yudith Woodard  MRN: [id number]   :  1965 Date performed: 2019    Age: 47 y o  Consult request by: Deana Matt MD      HISTORY:  Yudith Woodard  is a 47 y o  male  right-hand dominant, symptoms present since 2016  Of note did have a left 5th digit traumatic amputation from DIP from a work related injury with his 5th digit was crushed by dumpster  He reports numbness located entire left hand, difficulty holding phone  Denies any cervical pain referring to the left upper limb but does have pain at the left anterior biceps tendon that refers down lower limb  No worsening alleviating factors noted  Past Medical History:   Diagnosis Date    Carpal tunnel syndrome     Chronic pain     left arm    Diabetes mellitus (HCC)     Erectile dysfunction     Hypertension     Umbilical hernia      Social history:  68 cigarettes per day times 14 years, 2-3 beers per week  Working in [employer]  Current Outpatient Medications:     Alcohol Swabs (ALCOHOL PREP) 70 % PADS, daily Test, Disp: , Rfl: 5    aspirin (ASPIRIN EC ADULT LOW STRENGTH) 81 mg EC tablet, Take 81 mg by mouth daily, Disp: , Rfl:     atorvastatin (LIPITOR) 20 mg tablet, Take 20 mg by mouth daily, Disp: , Rfl:     Lancets MISC, Use to test blood glucose once a day  Dx: Type 2 DM  E11 9, Disp: , Rfl:     lisinopril (ZESTRIL) 10 mg tablet, Take 10 mg by mouth daily, Disp: , Rfl:     metFORMIN (GLUCOPHAGE) 1000 MG tablet, Take 1 tablet (1,000 mg total) by mouth 2 (two) times a day with meals, Disp: 60 tablet, Rfl: 0    Misc   Devices MISC, by Does not apply route daily, Disp: 1 each, Rfl: 0    pravastatin (PRAVACHOL) 80 mg tablet, pravastatin 80 mg tablet, Disp: , Rfl:     sildenafil (VIAGRA) 50 MG tablet, Take 1 tablet (50 mg total) by mouth daily as needed for erectile dysfunction (Patient not taking: Reported on 10/17/2019), Disp: 5 tablet, Rfl: 0    tadalafil (CIALIS) 20 MG tablet, Take 1 tablet (20 mg total) by mouth daily as needed for erectile dysfunction, Disp: 10 tablet, Rfl: 0    Urine Diagnostic Calibration STRP, Use to test blood glucose once a day  Dx: Type 2 DM  E11 9, Disp: , Rfl:     PHYSICAL EXAMINATION:  Amputation at the 5th digit from DIP onward  Light touch pinprick intact bilateral upper limbs, motor exam 5/5 bilateral upper limbs  Negative median compression test bilaterally  EMG/NCS data and waveforms that were performed for this study have been scanned into Epic  Please refer to scanned document for waveforms  IMPRESSION:   Moderate to severe left median neuropathy (carpal tunnel syndrome)  Mild left ulnar neuropathy (cubital tunnel syndrome)  No left cervical radiculopathy or other pathology  Recommend referral to orthopedic surgery      Alona Cullen MD

## 2020-01-08 DIAGNOSIS — N52.8 OTHER MALE ERECTILE DYSFUNCTION: Primary | ICD-10-CM

## 2020-01-09 ENCOUNTER — TELEPHONE (OUTPATIENT)
Dept: VASCULAR SURGERY | Facility: CLINIC | Age: 55
End: 2020-01-09

## 2020-01-09 RX ORDER — TADALAFIL 20 MG/1
TABLET ORAL
Qty: 10 TABLET | Refills: 0 | Status: SHIPPED | OUTPATIENT
Start: 2020-01-09 | End: 2020-01-13

## 2020-01-09 NOTE — TELEPHONE ENCOUNTER
Scheduling Instructions   REV LARRY [email or web address]       Called pt to schedule follow up - left message

## 2020-01-13 ENCOUNTER — OFFICE VISIT (OUTPATIENT)
Dept: FAMILY MEDICINE CLINIC | Facility: CLINIC | Age: 55
End: 2020-01-13

## 2020-01-13 VITALS
RESPIRATION RATE: 18 BRPM | HEART RATE: 90 BPM | TEMPERATURE: 98 F | HEIGHT: 71 IN | WEIGHT: 274 LBS | SYSTOLIC BLOOD PRESSURE: 150 MMHG | DIASTOLIC BLOOD PRESSURE: 94 MMHG | BODY MASS INDEX: 38.36 KG/M2 | OXYGEN SATURATION: 98 %

## 2020-01-13 DIAGNOSIS — I10 ESSENTIAL HYPERTENSION: ICD-10-CM

## 2020-01-13 DIAGNOSIS — Z63.9 RELATIONSHIP DYSFUNCTION: ICD-10-CM

## 2020-01-13 DIAGNOSIS — E78.49 OTHER HYPERLIPIDEMIA: ICD-10-CM

## 2020-01-13 DIAGNOSIS — E11.9 TYPE 2 DIABETES MELLITUS WITHOUT COMPLICATION, WITHOUT LONG-TERM CURRENT USE OF INSULIN (HCC): Primary | ICD-10-CM

## 2020-01-13 DIAGNOSIS — N52.8 OTHER MALE ERECTILE DYSFUNCTION: ICD-10-CM

## 2020-01-13 DIAGNOSIS — R06.83 SNORING: ICD-10-CM

## 2020-01-13 DIAGNOSIS — H53.8 BLURRED VISION, RIGHT EYE: ICD-10-CM

## 2020-01-13 DIAGNOSIS — F17.200 TOBACCO DEPENDENCE: ICD-10-CM

## 2020-01-13 LAB — SL AMB POCT HEMOGLOBIN AIC: 6.4 (ref ?–6.5)

## 2020-01-13 PROCEDURE — 3008F BODY MASS INDEX DOCD: CPT | Performed by: FAMILY MEDICINE

## 2020-01-13 PROCEDURE — 99213 OFFICE O/P EST LOW 20 MIN: CPT | Performed by: FAMILY MEDICINE

## 2020-01-13 PROCEDURE — 3044F HG A1C LEVEL LT 7.0%: CPT | Performed by: ORTHOPAEDIC SURGERY

## 2020-01-13 PROCEDURE — 83036 HEMOGLOBIN GLYCOSYLATED A1C: CPT | Performed by: FAMILY MEDICINE

## 2020-01-13 PROCEDURE — 3044F HG A1C LEVEL LT 7.0%: CPT | Performed by: FAMILY MEDICINE

## 2020-01-13 RX ORDER — TADALAFIL 20 MG/1
20 TABLET ORAL DAILY PRN
Qty: 20 TABLET | Refills: 12 | Status: SHIPPED | OUTPATIENT
Start: 2020-01-13 | End: 2020-01-13 | Stop reason: SDUPTHER

## 2020-01-13 RX ORDER — ATORVASTATIN CALCIUM 20 MG/1
20 TABLET, FILM COATED ORAL DAILY
Qty: 90 TABLET | Refills: 3 | Status: SHIPPED | OUTPATIENT
Start: 2020-01-13 | End: 2020-11-06 | Stop reason: SDUPTHER

## 2020-01-13 RX ORDER — PEN NEEDLE, DIABETIC 31 GX5/16"
NEEDLE, DISPOSABLE MISCELLANEOUS
COMMUNITY
Start: 2020-01-10 | End: 2020-02-17 | Stop reason: SDUPTHER

## 2020-01-13 RX ORDER — LISINOPRIL 10 MG/1
10 TABLET ORAL DAILY
Qty: 90 TABLET | Refills: 3 | Status: SHIPPED | OUTPATIENT
Start: 2020-01-13 | End: 2020-01-13 | Stop reason: SDUPTHER

## 2020-01-13 RX ORDER — ASPIRIN 81 MG/1
81 TABLET ORAL DAILY
Qty: 90 TABLET | Refills: 3 | Status: SHIPPED | OUTPATIENT
Start: 2020-01-13 | End: 2020-02-06 | Stop reason: HOSPADM

## 2020-01-13 RX ORDER — LISINOPRIL 10 MG/1
10 TABLET ORAL DAILY
Qty: 360 TABLET | Refills: 0 | Status: SHIPPED | OUTPATIENT
Start: 2020-01-13 | End: 2020-02-06 | Stop reason: HOSPADM

## 2020-01-13 RX ORDER — TADALAFIL 20 MG/1
20 TABLET ORAL DAILY PRN
Qty: 20 TABLET | Refills: 12 | Status: SHIPPED | OUTPATIENT
Start: 2020-01-13 | End: 2020-02-06 | Stop reason: HOSPADM

## 2020-01-13 SDOH — SOCIAL STABILITY - SOCIAL INSECURITY: PROBLEM RELATED TO PRIMARY SUPPORT GROUP, UNSPECIFIED: Z63.9

## 2020-01-13 NOTE — PATIENT INSTRUCTIONS
Lifestyle Medicine Tip Sheet   1  Eat predominantly less processed foods such as fast food, T V  dinners, and huffman  2  Eat Close to [employer], [employer] or [employer]     3  Eat a predominantly plant based diet   a  Dark Leafy Greens   b  Fruits/Vegetables   c  Whole Grains: Whole wheat, barely, wheat berries, quinoa, steel cut oats, brown rice, whole wheat pasta  d  Legumes: kidney beans, rotiz beans, white beans, black beans, garbanzo beans (chickpeas), lima beans (mature, dried), split peas, lentils, and edamame (green soybeans)       4  At least half of the plate should contain fruits or vegetables     5  Liquid should be predominantly water (limit soda and juice)     6  Watch portion size  7  Foods you should avoid or limit?   - Fats - Specifically saturated and trans-fats  They are found in margarines, many fast foods, and some store-bought baked goods  Saturated and Trans-fats can raise your cholesterol level and your chance of getting heart disease        - When you cook, it's best to use no oils but if needed try to limit the amount of oil used as oil contains many calories per volume and is very unhealthy when heated during cooking    - Sugar -Limit or avoid sugar, sweets, and refined grains  Refined grains are found in white bread, white rice, most forms of pasta, and most packaged "snack" foods    - Try not to cook with salt and avoid adding extra salt to your meals   - Meat - Studies have shown that eating a lot of red meat and poultry can increase your risk of certain health problems, including heart disease, diabetes, obesity and cancer  So try to limit the intake of it  8  Practice good sleep hygiene by getting 7-9 hours of sleep a night     9  Daily exercise minimum of 30 minutes (walking around the block)     10  Socialization (friends and family)   - Explore your neighborhood   Go to the park, spend time at [employer]   - Consider taking a class or volunteering to connect with new people     If you are interested you can read more about healthy food choices at the following websites:   a  [employer]  org   b  Home cooking recipes: https://www [employer]/   c  http://jo info/   d  Familydoctor  org

## 2020-01-13 NOTE — ASSESSMENT & PLAN NOTE
Patient currently pre-contemplative  Not ready to quit  Counseled on cessation  Will reassess at next visit

## 2020-01-13 NOTE — ASSESSMENT & PLAN NOTE
Unable to have sleep study due to scheduling issues  Recommended reaching out to sleep medicine to schedule for a better date

## 2020-01-13 NOTE — PROGRESS NOTES
Assessment/Plan:    Type 2 diabetes mellitus without complication, without long-term current use of insulin ([facility])    Lab Results   Component Value Date    HGBA1C 6 6 (H) 10/21/2019   HbA1c today is 6 4  Currently controlled  Continue current management  Reassess at next visit  Snoring  Unable to have sleep study due to scheduling issues  Recommended reaching out to sleep medicine to schedule for a better date  Essential hypertension  BP Readings from Last 1 Encounters:   01/13/20 150/94     Currently elevated, not taking Lisiniopril  Recommended restarting Lisinopril 10 mg   RTC in 4 weeks for reevaluation  Tobacco dependence  Patient currently pre-contemplative  Not ready to quit  Counseled on cessation  Will reassess at next visit  Relationship dysfunction  Will send referral to [facility] for evaluation for couples counseling  Blurred vision, right eye  Will send to ophthalmology for evaluation  Diagnoses and all orders for this visit:    Type 2 diabetes mellitus without complication, without long-term current use of insulin ([facility])  -     metFORMIN (GLUCOPHAGE) 1000 MG tablet; Take 1 tablet (1,000 mg total) by mouth 2 (two) times a day with meals    Essential hypertension  -     POCT hemoglobin A1c  -     Discontinue: lisinopril (ZESTRIL) 10 mg tablet; Take 1 tablet (10 mg total) by mouth daily  -     lisinopril (ZESTRIL) 10 mg tablet; Take 1 tablet (10 mg total) by mouth daily    Other hyperlipidemia  -     aspirin (ASPIRIN EC ADULT LOW STRENGTH) 81 mg EC tablet; Take 1 tablet (81 mg total) by mouth daily  -     atorvastatin (LIPITOR) 20 mg tablet; Take 1 tablet (20 mg total) by mouth daily    Tobacco dependence    Snoring    Other male erectile dysfunction  -     Discontinue: tadalafil (CIALIS) 20 MG tablet; Take 1 tablet (20 mg total) by mouth daily as needed for erectile dysfunction  -     Discontinue: tadalafil (CIALIS) 20 MG tablet;  Take 1 tablet (20 mg total) by mouth daily as needed for erectile dysfunction  -     tadalafil (CIALIS) 20 MG tablet; Take 1 tablet (20 mg total) by mouth daily as needed for erectile dysfunction    Relationship dysfunction  -     Ambulatory referral to [facility]; Future    Blurred vision, right eye  -     Ambulatory referral to Ophthalmology; Future    Other orders  -     Alcohol Swabs (ALCOHOL PREP) PADS; TEST DAILY          Subjective:      Patient ID: Rk Gomez  is a 49 y o  male  Who presents for follow up on their chronic conditions  Complains of relationship stress  Patient Active Problem List:     Type 2 diabetes mellitus without complication, without long-term current use of insulin ([address] ), well controlled, adherent to medications  Essential hypertension, elevated in office, patient states he has not taken his lisinopril, agreed to restart  Other male erectile dysfunction, well controlled with Cialis  Snoring, with witnessed apnea, has not been able to get sleep study due to scheduling, he was given sleep medicine Dr phone number and agreed to call  Relationship dysfunction, increased stress and anxiety due to relationship discord, agrees to be seen by behavioral health for evaluation for couples counseling  Blurred vision, right eye, x 1 week, 3rd episode in a year, denies pain  The following portions of the patient's history were reviewed and updated as appropriate: allergies, current medications, past family history, past medical history, past social history, past surgical history and problem list     Review of Systems   Constitutional: Negative for chills and fever  HENT: Negative for ear pain  Eyes: Positive for visual disturbance  Negative for pain  Respiratory: Negative for cough, shortness of breath and wheezing  Cardiovascular: Negative for chest pain, palpitations and leg swelling  Gastrointestinal: Negative for abdominal pain, constipation, diarrhea, nausea and vomiting  Genitourinary: Negative for dysuria and hematuria  Musculoskeletal: Negative for arthralgias  Skin: Negative for rash  Neurological: Negative for dizziness and headaches  Psychiatric/Behavioral: Positive for sleep disturbance  Negative for dysphoric mood  Objective:      /94 (BP Location: Left arm, Patient Position: Sitting, Cuff Size: Large)   Pulse 90   Temp 98 °F (36 7 °C) (Temporal)   Resp 18   Ht 5' 11" (1 803 m)   Wt 124 kg (274 lb)   SpO2 98%   BMI 38 22 kg/m²          Physical Exam   Constitutional: He is oriented to person, place, and time  He appears well-developed and well-nourished  HENT:   Head: Normocephalic and atraumatic  Right Ear: External ear normal    Left Ear: External ear normal    Nose: Nose normal    Mouth/Throat: Oropharynx is clear and moist    Eyes: Pupils are equal, round, and reactive to light  Conjunctivae and EOM are normal    Neck: Normal range of motion  Neck supple  Cardiovascular: Normal rate, regular rhythm, normal heart sounds and intact distal pulses  Pulmonary/Chest: Effort normal and breath sounds normal    Abdominal: Soft  Bowel sounds are normal  There is no tenderness  Musculoskeletal: Normal range of motion  He exhibits no edema or tenderness  Lymphadenopathy:     He has no cervical adenopathy  Neurological: He is alert and oriented to person, place, and time  Skin: Skin is warm and dry  Psychiatric: He has a normal mood and affect   His behavior is normal

## 2020-01-13 NOTE — ASSESSMENT & PLAN NOTE
Lab Results   Component Value Date    HGBA1C 6 6 (H) 10/21/2019   HbA1c today is 6 4  Currently controlled  Continue current management  Reassess at next visit

## 2020-01-14 ENCOUNTER — APPOINTMENT (EMERGENCY)
Dept: RADIOLOGY | Facility: HOSPITAL | Age: 55
End: 2020-01-14
Payer: COMMERCIAL

## 2020-01-14 ENCOUNTER — HOSPITAL ENCOUNTER (OUTPATIENT)
Facility: HOSPITAL | Age: 55
Setting detail: OBSERVATION
Discharge: HOME | End: 2020-01-15
Attending: EMERGENCY MEDICINE | Admitting: FAMILY MEDICINE
Payer: COMMERCIAL

## 2020-01-14 DIAGNOSIS — F17.200 TOBACCO DEPENDENCE: ICD-10-CM

## 2020-01-14 DIAGNOSIS — R06.02 SHORTNESS OF BREATH: Primary | ICD-10-CM

## 2020-01-14 DIAGNOSIS — J44.9 COPD (CHRONIC OBSTRUCTIVE PULMONARY DISEASE) (HCC): ICD-10-CM

## 2020-01-14 DIAGNOSIS — J45.30 MILD PERSISTENT ASTHMA: ICD-10-CM

## 2020-01-14 DIAGNOSIS — I50.9 ACUTE CHF (CONGESTIVE HEART FAILURE) (HCC): ICD-10-CM

## 2020-01-14 DIAGNOSIS — B35.3 TINEA PEDIS OF BOTH FEET: ICD-10-CM

## 2020-01-14 LAB
ANION GAP SERPL CALCULATED.3IONS-SCNC: 10 MMOL/L (ref 5–14)
APTT PPP: 27 SECONDS (ref 25–32)
ATRIAL RATE: 77 BPM
BASOPHILS # BLD AUTO: 0 THOUSANDS/ΜL (ref 0–0.1)
BASOPHILS NFR BLD AUTO: 0 % (ref 0–1)
BUN SERPL-MCNC: 20 MG/DL (ref 5–25)
CALCIUM SERPL-MCNC: 9.4 MG/DL (ref 8.4–10.2)
CHLORIDE SERPL-SCNC: 105 MMOL/L (ref 97–108)
CO2 SERPL-SCNC: 24 MMOL/L (ref 22–30)
CREAT SERPL-MCNC: 0.89 MG/DL (ref 0.7–1.5)
EOSINOPHIL # BLD AUTO: 0.1 THOUSAND/ΜL (ref 0–0.4)
EOSINOPHIL NFR BLD AUTO: 1 % (ref 0–6)
ERYTHROCYTE [DISTWIDTH] IN BLOOD BY AUTOMATED COUNT: 14.1 %
GFR SERPL CREATININE-BSD FRML MDRD: 98 ML/MIN/1.73SQ M
GLUCOSE SERPL-MCNC: 106 MG/DL (ref 70–99)
GLUCOSE SERPL-MCNC: 139 MG/DL (ref 65–140)
HCT VFR BLD AUTO: 41.9 % (ref 41–53)
HGB BLD-MCNC: 14 G/DL (ref 13.5–17.5)
INR PPP: 0.92 (ref 0.91–1.09)
LYMPHOCYTES # BLD AUTO: 1.5 THOUSANDS/ΜL (ref 0.5–4)
LYMPHOCYTES NFR BLD AUTO: 21 % (ref 25–45)
MCH RBC QN AUTO: 28.4 PG (ref 26–34)
MCHC RBC AUTO-ENTMCNC: 33.4 G/DL (ref 31–36)
MCV RBC AUTO: 85 FL (ref 80–100)
MONOCYTES # BLD AUTO: 0.7 THOUSAND/ΜL (ref 0.2–0.9)
MONOCYTES NFR BLD AUTO: 9 % (ref 1–10)
NEUTROPHILS # BLD AUTO: 4.8 THOUSANDS/ΜL (ref 1.8–7.8)
NEUTS SEG NFR BLD AUTO: 68 % (ref 45–65)
NT-PROBNP SERPL-MCNC: 497 PG/ML (ref 0–299)
P AXIS: 70 DEGREES
PLATELET # BLD AUTO: 209 THOUSANDS/UL (ref 150–450)
PMV BLD AUTO: 8.6 FL (ref 8.9–12.7)
POTASSIUM SERPL-SCNC: 4.2 MMOL/L (ref 3.6–5)
PR INTERVAL: 130 MS
PROTHROMBIN TIME: 10.2 SECONDS (ref 9.8–12)
QRS AXIS: 23 DEGREES
QRSD INTERVAL: 88 MS
QT INTERVAL: 384 MS
QTC INTERVAL: 434 MS
RBC # BLD AUTO: 4.92 MILLION/UL (ref 4.5–5.9)
SODIUM SERPL-SCNC: 139 MMOL/L (ref 137–147)
T WAVE AXIS: 34 DEGREES
TROPONIN I SERPL-MCNC: <0.01 NG/ML (ref 0–0.03)
VENTRICULAR RATE: 77 BPM
WBC # BLD AUTO: 7.1 THOUSAND/UL (ref 4.5–11)

## 2020-01-14 PROCEDURE — 83880 ASSAY OF NATRIURETIC PEPTIDE: CPT | Performed by: PHYSICIAN ASSISTANT

## 2020-01-14 PROCEDURE — 99285 EMERGENCY DEPT VISIT HI MDM: CPT

## 2020-01-14 PROCEDURE — 93010 ELECTROCARDIOGRAM REPORT: CPT | Performed by: INTERNAL MEDICINE

## 2020-01-14 PROCEDURE — 94640 AIRWAY INHALATION TREATMENT: CPT

## 2020-01-14 PROCEDURE — 84484 ASSAY OF TROPONIN QUANT: CPT | Performed by: PHYSICIAN ASSISTANT

## 2020-01-14 PROCEDURE — 80048 BASIC METABOLIC PNL TOTAL CA: CPT | Performed by: PHYSICIAN ASSISTANT

## 2020-01-14 PROCEDURE — 85730 THROMBOPLASTIN TIME PARTIAL: CPT | Performed by: PHYSICIAN ASSISTANT

## 2020-01-14 PROCEDURE — 99285 EMERGENCY DEPT VISIT HI MDM: CPT | Performed by: PHYSICIAN ASSISTANT

## 2020-01-14 PROCEDURE — 71046 X-RAY EXAM CHEST 2 VIEWS: CPT

## 2020-01-14 PROCEDURE — 82948 REAGENT STRIP/BLOOD GLUCOSE: CPT

## 2020-01-14 PROCEDURE — 93005 ELECTROCARDIOGRAM TRACING: CPT

## 2020-01-14 PROCEDURE — 85025 COMPLETE CBC W/AUTO DIFF WBC: CPT | Performed by: PHYSICIAN ASSISTANT

## 2020-01-14 PROCEDURE — 85610 PROTHROMBIN TIME: CPT | Performed by: PHYSICIAN ASSISTANT

## 2020-01-14 PROCEDURE — 36415 COLL VENOUS BLD VENIPUNCTURE: CPT | Performed by: PHYSICIAN ASSISTANT

## 2020-01-14 PROCEDURE — ND001 PR NO DOCUMENTATION: Performed by: FAMILY MEDICINE

## 2020-01-14 RX ORDER — ALBUTEROL SULFATE 90 UG/1
2 AEROSOL, METERED RESPIRATORY (INHALATION) EVERY 4 HOURS PRN
Status: DISCONTINUED | OUTPATIENT
Start: 2020-01-14 | End: 2020-01-15 | Stop reason: HOSPADM

## 2020-01-14 RX ORDER — ACETAMINOPHEN 325 MG/1
650 TABLET ORAL EVERY 6 HOURS PRN
Status: DISCONTINUED | OUTPATIENT
Start: 2020-01-14 | End: 2020-01-15 | Stop reason: HOSPADM

## 2020-01-14 RX ORDER — MELOXICAM 15 MG/1
15 TABLET ORAL DAILY
Status: DISCONTINUED | OUTPATIENT
Start: 2020-01-14 | End: 2020-01-15 | Stop reason: HOSPADM

## 2020-01-14 RX ORDER — IPRATROPIUM BROMIDE AND ALBUTEROL SULFATE 2.5; .5 MG/3ML; MG/3ML
3 SOLUTION RESPIRATORY (INHALATION) EVERY 6 HOURS PRN
Status: DISCONTINUED | OUTPATIENT
Start: 2020-01-14 | End: 2020-01-14

## 2020-01-14 RX ORDER — IPRATROPIUM BROMIDE AND ALBUTEROL SULFATE 2.5; .5 MG/3ML; MG/3ML
3 SOLUTION RESPIRATORY (INHALATION) ONCE
Status: COMPLETED | OUTPATIENT
Start: 2020-01-14 | End: 2020-01-14

## 2020-01-14 RX ORDER — FUROSEMIDE 10 MG/ML
20 INJECTION INTRAMUSCULAR; INTRAVENOUS ONCE
Status: COMPLETED | OUTPATIENT
Start: 2020-01-14 | End: 2020-01-14

## 2020-01-14 RX ORDER — LEVALBUTEROL 1.25 MG/.5ML
1.25 SOLUTION, CONCENTRATE RESPIRATORY (INHALATION) EVERY 8 HOURS PRN
Status: DISCONTINUED | OUTPATIENT
Start: 2020-01-14 | End: 2020-01-15 | Stop reason: HOSPADM

## 2020-01-14 RX ORDER — ALBUTEROL SULFATE 90 UG/1
2 AEROSOL, METERED RESPIRATORY (INHALATION) EVERY 4 HOURS
Status: DISCONTINUED | OUTPATIENT
Start: 2020-01-14 | End: 2020-01-14

## 2020-01-14 RX ADMIN — MELOXICAM 15 MG: 15 TABLET ORAL at 23:34

## 2020-01-14 RX ADMIN — IPRATROPIUM BROMIDE AND ALBUTEROL SULFATE 3 ML: 2.5; .5 SOLUTION RESPIRATORY (INHALATION) at 15:33

## 2020-01-14 RX ADMIN — ALBUTEROL SULFATE 2 PUFF: 90 AEROSOL, METERED RESPIRATORY (INHALATION) at 20:18

## 2020-01-14 RX ADMIN — METFORMIN HYDROCHLORIDE 1000 MG: 500 TABLET ORAL at 20:18

## 2020-01-14 RX ADMIN — ACETAMINOPHEN 650 MG: 325 TABLET ORAL at 23:34

## 2020-01-14 RX ADMIN — NICOTINE 1 PATCH: 7 PATCH, EXTENDED RELEASE TRANSDERMAL at 20:18

## 2020-01-14 RX ADMIN — FUROSEMIDE 20 MG: 10 INJECTION, SOLUTION INTRAVENOUS at 17:25

## 2020-01-14 RX ADMIN — IPRATROPIUM BROMIDE AND ALBUTEROL SULFATE 3 ML: 2.5; .5 SOLUTION RESPIRATORY (INHALATION) at 17:23

## 2020-01-14 NOTE — LETTER
[facility]  [address] [place] [id number]  [phone]  Dept: [phone]    January 15, 2020     Patient: Crissy Farley  YOB: 1965   Date of Visit: 1/14/2020       To Whom it May Concern:    Wen Be is under my professional care  He was seen in the hospital from 1/14/2020   to 01/15/20  He may return to work on 1/16/19 without limitations  If you have any questions or concerns, please don't hesitate to call           Sincerely,          Marysol Weller MD

## 2020-01-14 NOTE — H&P
History and Physical - Jayson Mcmullen    Patient Information: Dilshad Mcclure  49 y o  male MRN: [id number]  Unit/Bed#: ED 21 Encounter: [id number]  PCP: Pearl Bee MD  Date of Admission:  01/14/20    Assessment and Plan  Mr John Luke, 50 yo gentleman, with controlled DM2, recently diagnosed HTN, and severe varicose veins, admitted for possible asthma/COPD vs CHF  Evidenced by acute on chronic respiratory symptoms, smoking history, comorbid conditions, nonspecific changes on ECG, and elevated BNP to 497  More likely chronic respiratory condition given improvement in symptoms with nebulizer treatment  However, considering BNP is elevated CHF cannot be ruled out  Less likely pneumonia given lack of fever with normal CXR  Less likely pulmonary embolism given aforementioned clinical picture and vital signs  Unlikely to be ACS due to lack of chest pain in the setting of well controlled diabetes, negative troponin in the setting of ongoing symptoms, and EKG  Plan  1  Asthma/COPD vs CHF  - ECHO in AM  - Albuterol q 4hr  - Xopenex q 8hr prn for wheezing, shortness of breath  - Daily weights  - Continue to monitor RR, SpO2  - Low sodium diet    2  DM2  - Controlled based on last A1c 6 2  - Metformin 1000mg BID  - Diabetic diet    3  HTN  - Recently diagnosed  - Goal BP < 140/90  - Consider Lisinopril 10mg if indicated    4  Tobacco dependence  - Start Nicotine 7mg/24hr patch, smokes 3-4 cigarettes/day  - Nursing education to be provided     5  Discharge plan  - ECHO in AM  - Improvement in respiratory symptoms   - Consider NRT rx        VTE Prophylaxis: Enoxaparin (Lovenox)  Code Status: No Order  Anticipated Length of Stay:  Patient will be admitted on an Observation basis with an anticipated length of stay of  less than 2 midnights       Justification for Hospital Stay: Possible CHF    Chief Complaint:     Chief Complaint   Patient presents with    Cough     cough for about 1 month     History of Present Illness:    Rakel Casey  is a 49 y o  male with recently diagnosed HTN, controlled DM2, and severe varicose veins, who presents with cough and worsening shortness of breath  Cough began at the end of November, and he was seen in the ED approximately 1 month ago for cough  At that time he was diagnosed with bronchitis and placed on a course of azithromycin, which he reports completing  The cough, however, did not improve and has been constant since that time with associated increased dark green/brown sputum  He denies blood in sputum  Mr Robyn Valera also reports increasing shortness of breath on exertion over the past 1-2 weeks  Associated symptoms include chest tightness while coughing as well as occasional posttussive emesis (without blood)  Endorses baseline snoring requiring head elevation while sleeping, but denies orthopnea and denies swelling of extremities  Denies chest pain, fever/chills, rhinorrhea, nausea, diarrhea/constipation, abdominal pain  While in ED, Mr Robyn Valera received Duo-neb and reported improvement in shortness of breath  NT-pro BNP noted to be elevated at 497  Additionally, received 20mg Lasix in ED  CXR without signs of cardiopulmonary disease  CBC, BMP, troponin and coags are unremarkable  Review of Systems:  Review of Systems   Constitutional: Negative for activity change, appetite change, chills, fatigue and fever  HENT: Negative for congestion, ear pain, rhinorrhea, sneezing and sore throat  Respiratory: Positive for cough and shortness of breath  Negative for chest tightness and wheezing  Cardiovascular: Negative for chest pain, palpitations and leg swelling  Gastrointestinal: Positive for vomiting (posttussive)  Negative for abdominal distention, abdominal pain, blood in stool, constipation, diarrhea and nausea  Genitourinary: Negative for difficulty urinating, discharge, dysuria and frequency  Musculoskeletal: Negative for arthralgias and myalgias     Skin: Negative for rash  Neurological: Positive for headaches (morning on awakening daily)  Negative for dizziness, seizures, syncope, weakness and light-headedness  Past Medical and Surgical History:   Past Medical History:   Diagnosis Date    Asthma     Carpal tunnel syndrome     Chronic pain     left arm    Diabetes mellitus ([address] )     Erectile dysfunction     Hypertension     Umbilical hernia      Past Surgical History:   Procedure Laterality Date    CHOLECYSTECTOMY      CHOLECYSTECTOMY LAPAROSCOPIC N/A 6/20/2019    Procedure: CHOLECYSTECTOMY LAPAROSCOPIC;  Surgeon: Flory Sruesh MD;  Location: [address] [place] [address] OR;  Service: General    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    HAND SURGERY      UMBILICAL HERNIA REPAIR       Meds/Allergies: Allergies: No Known Allergies  Prior to Admission Medications   Prescriptions Last Dose Informant Patient Reported? Taking? Alcohol Swabs (ALCOHOL PREP) 70 % PADS  Self Yes No   Sig: daily Test   Alcohol Swabs (ALCOHOL PREP) PADS   Yes No   Sig: TEST DAILY   Lancets MISC  Self Yes No   Sig: Use to test blood glucose once a day  Dx: Type 2 DM  E11 9   Misc  Devices MISC   No No   Sig: by Does not apply route daily   Urine Diagnostic Calibration STRP  Self Yes No   Sig: Use to test blood glucose once a day  Dx: Type 2 DM   E11 9   aspirin (ASPIRIN EC ADULT LOW STRENGTH) 81 mg EC tablet   No No   Sig: Take 1 tablet (81 mg total) by mouth daily   atorvastatin (LIPITOR) 20 mg tablet   No No   Sig: Take 1 tablet (20 mg total) by mouth daily   lisinopril (ZESTRIL) 10 mg tablet   No No   Sig: Take 1 tablet (10 mg total) by mouth daily   metFORMIN (GLUCOPHAGE) 1000 MG tablet   No No   Sig: Take 1 tablet (1,000 mg total) by mouth 2 (two) times a day with meals   tadalafil (CIALIS) 20 MG tablet   No No   Sig: Take 1 tablet (20 mg total) by mouth daily as needed for erectile dysfunction      Facility-Administered Medications: None     Social History:     Social History Socioeconomic History    Marital status:      Spouse name: Not on file    Number of children: Not on file    Years of education: Not on file    Highest education level: Not on file   Occupational History    Not on file   Social Needs    Financial resource strain: Not on file    Food insecurity:     Worry: Not on file     Inability: Not on file    Transportation needs:     Medical: Not on file     Non-medical: Not on file   Tobacco Use    Smoking status: Current Every Day Smoker     Packs/day: 0 25     Types: Cigarettes    Smokeless tobacco: Never Used   Substance and Sexual Activity    Alcohol use: Yes     Frequency: 2-3 times a week     Drinks per session: 1 or 2     Comment: occ    Drug use: No    Sexual activity: Not on file   Lifestyle    Physical activity:     Days per week: Not on file     Minutes per session: Not on file    Stress: Not on file   Relationships    Social connections:     Talks on phone: Not on file     Gets together: Not on file     Attends [employer] service: Not on file     Active member of club or organization: Not on file     Attends meetings of clubs or organizations: Not on file     Relationship status: Not on file    Intimate partner violence:     Fear of current or ex partner: Not on file     Emotionally abused: Not on file     Physically abused: Not on file     Forced sexual activity: Not on file   Other Topics Concern    Not on file   Social History Narrative    Not on file     Patient Pre-hospital Living Situation: Lives at home with fiancee and stepson  Patient Pre-hospital Level of Mobility: No restrictions  Patient Pre-hospital Diet Restrictions: Diabetic diet    Family History:  Family History   Problem Relation Age of Onset    Breast cancer additional onset Mother     Diabetes Father     Hyperlipidemia Father     Hypertension Father     Seizures Father     No Known Problems Son     No Known Problems Son     Vitiligo Son      Physical Exam: Vitals:   Blood Pressure: 146/78 (01/14/20 1446)  Pulse: 79 (01/14/20 1446)  Temperature: 99 °F (37 2 °C) (01/14/20 1446)  Temp Source: Tympanic (01/14/20 1446)  Respirations: 20 (01/14/20 1446)  Weight - Scale: 127 kg (279 lb 12 2 oz) (01/14/20 1446)  SpO2: 98 % (01/14/20 1446)    Physical Exam   Constitutional: He is oriented to person, place, and time  He appears well-developed and well-nourished  No distress  HENT:   Head: Normocephalic and atraumatic  Nose: Nose normal    Mouth/Throat: Oropharynx is clear and moist  No oropharyngeal exudate  Eyes: Pupils are equal, round, and reactive to light  Conjunctivae and EOM are normal    Neck: Normal range of motion  Neck supple  No JVD present  Cardiovascular: Normal rate, regular rhythm, normal heart sounds and intact distal pulses  Exam reveals no gallop and no friction rub  No murmur heard  Pulmonary/Chest: Effort normal and breath sounds normal  No respiratory distress  He has no wheezes  He has no rales  He exhibits no tenderness  Abdominal: Soft  Bowel sounds are normal  He exhibits no distension and no mass  There is no tenderness  There is no rebound and no guarding  Genitourinary: Testes normal and penis normal  Uncircumcised  No penile tenderness  No discharge found  Genitourinary Comments: GAVIN Batista  Musculoskeletal: Normal range of motion  He exhibits no edema or tenderness  Severe varicose veins bilaterally extending above the knees  R>L    Neurological: He is alert and oriented to person, place, and time  No sensory deficit  He exhibits normal muscle tone  Coordination normal    Skin: Skin is warm and dry  No rash noted  Psychiatric: He has a normal mood and affect  His behavior is normal    Nursing note and vitals reviewed  Lab Results: I have personally reviewed pertinent reports      Results from last 7 days   Lab Units 01/14/20  1518   WBC Thousand/uL 7 10   HEMOGLOBIN g/dL 14 0   HEMATOCRIT % 41 9 PLATELETS Thousands/uL 209   NEUTROS PCT % 68*   LYMPHS PCT % 21*   MONOS PCT % 9   EOS PCT % 1     Results from last 7 days   Lab Units 01/14/20  1518   POTASSIUM mmol/L 4 2   CHLORIDE mmol/L 105   CO2 mmol/L 24   BUN mg/dL 20   CREATININE mg/dL 0 89   CALCIUM mg/dL 9 4   EGFR ml/min/1 73sq m 98     Results from last 7 days   Lab Units 01/14/20  1518   INR  0 92     Results from last 7 days   Lab Units 01/14/20  1518   TROPONIN I ng/mL <0 01             Results from last 7 days   Lab Units 01/14/20  1518   NT-PRO BNP pg/mL 497*            Invalid input(s): URIBILINOGEN          Imaging: I have personally reviewed pertinent reports  Xr Chest 2 Views    Result Date: 1/14/2020  Narrative: CHEST INDICATION:  Cough  COMPARISON:  12/9/2019 EXAM PERFORMED/VIEWS:  XR CHEST PA & LATERAL Images: 3 FINDINGS: Cardiomediastinal silhouette appears unremarkable  The lungs are clear  No pneumothorax or pleural effusion  Paravertebral ossifications thoracic spine  Impression: No acute cardiopulmonary disease  Workstation performed: [id number]       EKG, Pathology, and Other Studies Reviewed on Admission:   EKG  Result Date: 01/14/20  Impression: NSR, 77, normal axis, normal intervals, non specific interventricular conduction delay V1, V2, III  No significant changes when compared to previous ECG           [facility] H&P was discussed with Dr Trey Medeiros who agreed to what is noted above    Leilani Palomo  01/14/20  5:20 PM

## 2020-01-14 NOTE — ED PROVIDER NOTES
History  Chief Complaint   Patient presents with    Cough     cough for about 1 month     50-year-old male with past medical history of asthma, dm, hypertension presenting for evaluation of cough  Patient was seen in this ED 1 month ago where he was diagnosed with bronchitis  At that time patient was placed on a Z-Christofer which he reports he completed without any relief of his cough  Patient is a smoker but states that he has been smoking less than last that is irritating his cough  He denies any fevers at home  States that he has wheezing at night when lying down  Denies any chest pain or shortness of breath  Denies any abdominal pain nausea vomiting or diarrhea  Has not taken any over-the-counter cough and cold medications to help with symptoms after completing the zpack  Prior to Admission Medications   Prescriptions Last Dose Informant Patient Reported? Taking? Alcohol Swabs (ALCOHOL PREP) 70 % PADS Unknown at Unknown time Self Yes No   Sig: daily Test   Alcohol Swabs (ALCOHOL PREP) PADS Unknown at Unknown time  Yes No   Sig: TEST DAILY   Lancets MISC Unknown at Unknown time Self Yes No   Sig: Use to test blood glucose once a day  Dx: Type 2 DM  E11 9   Misc  Devices MISC Unknown at Unknown time  No No   Sig: by Does not apply route daily   Urine Diagnostic Calibration STRP Unknown at Unknown time Self Yes No   Sig: Use to test blood glucose once a day  Dx: Type 2 DM   E11 9   aspirin (ASPIRIN EC ADULT LOW STRENGTH) 81 mg EC tablet Unknown at Unknown time  No No   Sig: Take 1 tablet (81 mg total) by mouth daily   atorvastatin (LIPITOR) 20 mg tablet Unknown at Unknown time  No No   Sig: Take 1 tablet (20 mg total) by mouth daily   lisinopril (ZESTRIL) 10 mg tablet   No No   Sig: Take 1 tablet (10 mg total) by mouth daily   metFORMIN (GLUCOPHAGE) 1000 MG tablet 1/14/2020 at Unknown time  No Yes   Sig: Take 1 tablet (1,000 mg total) by mouth 2 (two) times a day with meals   tadalafil (CIALIS) 20 MG tablet Unknown at Unknown time  No No   Sig: Take 1 tablet (20 mg total) by mouth daily as needed for erectile dysfunction      Facility-Administered Medications: None       Past Medical History:   Diagnosis Date    Asthma     Carpal tunnel syndrome     Chronic pain     left arm    Diabetes mellitus ([address] )     Erectile dysfunction     Hypertension     Umbilical hernia        Past Surgical History:   Procedure Laterality Date    CHOLECYSTECTOMY      CHOLECYSTECTOMY LAPAROSCOPIC N/A 6/20/2019    Procedure: CHOLECYSTECTOMY LAPAROSCOPIC;  Surgeon: Gianfranco James MD;  Location: [address] [place] [address] OR;  Service: General    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    HAND SURGERY      UMBILICAL HERNIA REPAIR         Family History   Problem Relation Age of Onset    Breast cancer additional onset Mother     Diabetes Father     Hyperlipidemia Father     Hypertension Father     Seizures Father     No Known Problems Son     No Known Problems Son     Vitiligo Son      I have reviewed and agree with the history as documented  Social History     Tobacco Use    Smoking status: Current Every Day Smoker     Packs/day: 0 25     Types: Cigarettes    Smokeless tobacco: Never Used   Substance Use Topics    Alcohol use: Yes     Frequency: 2-3 times a week     Drinks per session: 1 or 2     Binge frequency: Never     Comment: occ    Drug use: No        Review of Systems   All other systems reviewed and are negative  Physical Exam  Physical Exam   Constitutional: He is oriented to person, place, and time  He appears well-developed and well-nourished  No distress  HENT:   Head: Normocephalic and atraumatic  Right Ear: External ear normal    Left Ear: External ear normal    Mouth/Throat: Oropharynx is clear and moist    Eyes: Conjunctivae are normal    EOM grossly intact   Neck: Normal range of motion  Neck supple  No JVD present  Cardiovascular: Normal rate     Pulmonary/Chest: Effort normal and breath sounds normal  No stridor  No respiratory distress  He has no wheezes  Abdominal: Soft  He exhibits no distension  There is no tenderness  Musculoskeletal:   FROM, steady gait, cap refill brisk, strength and sensation grossly intact throughout   Lymphadenopathy:     He has no cervical adenopathy  Neurological: He is alert and oriented to person, place, and time  Skin: Skin is warm and dry  Capillary refill takes less than 2 seconds  Psychiatric: He has a normal mood and affect  His behavior is normal    Nursing note and vitals reviewed        Vital Signs  ED Triage Vitals [01/14/20 1446]   Temperature Pulse Respirations Blood Pressure SpO2   99 °F (37 2 °C) 79 20 146/78 98 %      Temp Source Heart Rate Source Patient Position - Orthostatic VS BP Location FiO2 (%)   Tympanic Monitor Sitting Left arm --      Pain Score       7           Vitals:    01/14/20 1830 01/14/20 2034 01/14/20 2319 01/15/20 0822   BP: (!) 146/108 131/94 139/89 (!) 154/104   Pulse: 76  70 69   Patient Position - Orthostatic VS: Standing Sitting Lying Sitting         Visual Acuity      ED Medications  Medications   ipratropium-albuterol (DUO-NEB) 0 5-2 5 mg/3 mL inhalation solution 3 mL (3 mL Nebulization Given 1/14/20 1533)   ipratropium-albuterol (DUO-NEB) 0 5-2 5 mg/3 mL inhalation solution 3 mL (3 mL Nebulization Given 1/14/20 1723)   furosemide (LASIX) injection 20 mg (20 mg Intravenous Given 1/14/20 1725)       Diagnostic Studies  Results Reviewed     Procedure Component Value Units Date/Time    Basic metabolic panel [[id number]]  (Abnormal) Collected:  01/15/20 0433    Lab Status:  Final result Specimen:  Blood from Arm, Left Updated:  01/15/20 3066     Sodium 137 mmol/L      Potassium 3 8 mmol/L      Chloride 102 mmol/L      CO2 28 mmol/L      ANION GAP 7 mmol/L      BUN 16 mg/dL      Creatinine 0 85 mg/dL      Glucose 112 mg/dL      Calcium 9 2 mg/dL      eGFR 99 ml/min/1 73sq m     Narrative:       [place] guidelines for Chronic Kidney Disease (CKD):     Stage 1 with normal or high GFR (GFR > 90 mL/min/1 73 square meters)    Stage 2 Mild CKD (GFR = 60-89 mL/min/1 73 square meters)    Stage 3A Moderate CKD (GFR = 45-59 mL/min/1 73 square meters)    Stage 3B Moderate CKD (GFR = 30-44 mL/min/1 73 square meters)    Stage 4 Severe CKD (GFR = 15-29 mL/min/1 73 square meters)    Stage 5 End Stage CKD (GFR <15 mL/min/1 73 square meters)  Note: GFR calculation is accurate only with a steady state creatinine    Troponin I [[id number]]  (Normal) Collected:  01/14/20 1518    Lab Status:  Final result Specimen:  Blood from Arm, Right Updated:  01/14/20 1555     Troponin I <0 01 ng/mL     Protime-INR [[id number]]  (Normal) Collected:  01/14/20 1518    Lab Status:  Final result Specimen:  Blood from Arm, Right Updated:  01/14/20 1553     Protime 10 2 seconds      INR 0 92    APTT [[id number]]  (Normal) Collected:  01/14/20 1518    Lab Status:  Final result Specimen:  Blood from Arm, Right Updated:  01/14/20 1553     PTT 27 seconds     NT-BNP PRO [[id number]]  (Abnormal) Collected:  01/14/20 1518    Lab Status:  Final result Specimen:  Blood from Arm, Right Updated:  01/14/20 1552     NT-proBNP 497 pg/mL     Basic metabolic panel [[id number]]  (Abnormal) Collected:  01/14/20 1518    Lab Status:  Final result Specimen:  Blood from Arm, Right Updated:  01/14/20 1544     Sodium 139 mmol/L      Potassium 4 2 mmol/L      Chloride 105 mmol/L      CO2 24 mmol/L      ANION GAP 10 mmol/L      BUN 20 mg/dL      Creatinine 0 89 mg/dL      Glucose 106 mg/dL      Calcium 9 4 mg/dL      eGFR 98 ml/min/1 73sq m     Narrative:       [place] guidelines for Chronic Kidney Disease (CKD):     Stage 1 with normal or high GFR (GFR > 90 mL/min/1 73 square meters)    Stage 2 Mild CKD (GFR = 60-89 mL/min/1 73 square meters)    Stage 3A Moderate CKD (GFR = 45-59 mL/min/1 73 square meters)    Stage 3B Moderate CKD (GFR = 30-44 mL/min/1 73 square meters)    Stage 4 Severe CKD (GFR = 15-29 mL/min/1 73 square meters)    Stage 5 End Stage CKD (GFR <15 mL/min/1 73 square meters)  Note: GFR calculation is accurate only with a steady state creatinine    CBC and differential [[id number]]  (Abnormal) Collected:  01/14/20 1518    Lab Status:  Final result Specimen:  Blood from Arm, Right Updated:  01/14/20 1538     WBC 7 10 Thousand/uL      RBC 4 92 Million/uL      Hemoglobin 14 0 g/dL      Hematocrit 41 9 %      MCV 85 fL      MCH 28 4 pg      MCHC 33 4 g/dL      RDW 14 1 %      MPV 8 6 fL      Platelets 853 Thousands/uL      Neutrophils Relative 68 %      Lymphocytes Relative 21 %      Monocytes Relative 9 %      Eosinophils Relative 1 %      Basophils Relative 0 %      Neutrophils Absolute 4 80 Thousands/µL      Lymphocytes Absolute 1 50 Thousands/µL      Monocytes Absolute 0 70 Thousand/µL      Eosinophils Absolute 0 10 Thousand/µL      Basophils Absolute 0 00 Thousands/µL                  XR chest 2 views   Final Result by Gary Rouse DO (01/14 1610)      No acute cardiopulmonary disease              Workstation performed: [id number]                    Procedures  ECG 12 Lead Documentation Only  Date/Time: 1/14/2020 3:43 PM  Performed by: Lorelei Crowley PA-C  Authorized by: Lorelei Crowley PA-C     Indications / Diagnosis:  Cough  ECG reviewed by me, the ED Provider: yes    Patient location:  ED  Interpretation:     Interpretation: normal    Rate:     ECG rate:  77    ECG rate assessment: normal    Rhythm:     Rhythm: sinus rhythm    Ectopy:     Ectopy: none    QRS:     QRS axis:  Normal  Conduction:     Conduction: normal    ST segments:     ST segments:  Normal  T waves:     T waves: normal    Comments:      EDIE [id number]             ED Course  ED Course as of Grzegorz 15 2251   Tue Jan 14, 2020   1459 Attempted to go into the room and speak with pt but he was on the phone and would not hang up                                  MDM  Number of Diagnoses or Management Options  Acute CHF (congestive heart failure) [facility]):   Diagnosis management comments: 16-year-old male presenting for evaluation of cough ongoing for the past 2 months, he is found to be in acute CHF, congestion visualized on chest x-ray as well, will admit for IV Lasix to [facility]    Portions of the record may have been created with voice recognition software  Occasional wrong word or "sound a like" substitutions may have occurred due to the inherent limitations of voice recognition software  Read the chart carefully and recognize, using context, where substitutions have occurred  Disposition  Final diagnoses:   Acute CHF (congestive heart failure) ([place] [address] )     Time reflects when diagnosis was documented in both MDM as applicable and the Disposition within this note     Time User Action Codes Description Comment    1/14/2020  4:12 PM Kinjal Dwons Add [I50 9] Acute CHF (congestive heart failure) ([place] [address] )     1/15/2020 12:53 PM Dayoub, Majed Modify [I50 9] Acute CHF (congestive heart failure) ([place] [address] )     1/15/2020 12:53 PM Dayoub, Majed Add [F17 200] Tobacco dependence     1/15/2020 12:53 PM Dayoub, Majed Add [R06 02] Shortness of breath     1/15/2020 12:53 PM Dayoub, Majed Add [J44 9] COPD (chronic obstructive pulmonary disease) ([place] [address] )     1/15/2020 12:53 PM Dayoub, Majed Add [J45 30] Mild persistent asthma     1/15/2020 12:53 PM Dayoub, Majed Add [B35 3] Tinea pedis of both feet       ED Disposition     ED Disposition Condition Date/Time Comment    Admit Stable Tue Jan 14, 2020  4:12 PM Case was discussed with FM resident and the patient's admission status was agreed to be Admission Status: observation status to the service of Dr Binu Banerjee           Follow-up Information     Follow up With Specialties Details Why Contact Info Additional [address] Schedule an appointment as soon as possible for a visit in 1 week(s)  [address], [address] [facility] Vicki Bauman, [address], [address] [place], [place], [address]          Discharge Medication List as of 1/15/2020  2:02 PM      START taking these medications    Details   albuterol (PROVENTIL HFA,VENTOLIN HFA) 90 mcg/act inhaler Inhale 2 puffs every 4 (four) hours as needed for wheezing, Starting Wed 1/15/2020, Normal      fluticasone-vilanterol (BREO ELLIPTA) 100-25 mcg/inh inhaler Inhale 1 puff daily Rinse mouth after use , Starting Wed 1/15/2020, Normal      miconazole (MICOTIN) 2 % powder Apply topically as needed for itching Apply to affected area twice daily for 28 days  , Starting Wed 1/15/2020, Normal      nicotine (NICODERM CQ) 7 mg/24hr TD 24 hr patch Place 1 patch on the skin daily, Starting Thu 1/16/2020, Normal         CONTINUE these medications which have NOT CHANGED    Details   !! Alcohol Swabs (ALCOHOL PREP) 70 % PADS daily Test, Starting Tue 5/28/2019, Historical Med      !! Alcohol Swabs (ALCOHOL PREP) PADS TEST DAILY, Historical Med      aspirin (ASPIRIN EC ADULT LOW STRENGTH) 81 mg EC tablet Take 1 tablet (81 mg total) by mouth daily, Starting Mon 1/13/2020, Normal      atorvastatin (LIPITOR) 20 mg tablet Take 1 tablet (20 mg total) by mouth daily, Starting Mon 1/13/2020, Until Tue 1/12/2021, Normal      Lancets MISC Use to test blood glucose once a day  Dx: Type 2 DM  E11 9, Historical Med      lisinopril (ZESTRIL) 10 mg tablet Take 1 tablet (10 mg total) by mouth daily, Starting Mon 1/13/2020, Until Tue 1/12/2021, Normal      metFORMIN (GLUCOPHAGE) 1000 MG tablet Take 1 tablet (1,000 mg total) by mouth 2 (two) times a day with meals, Starting Mon 1/13/2020, Normal      Misc   Devices MISC by Does not apply route daily, Starting Thu 10/31/2019, Normal      tadalafil (CIALIS) 20 MG tablet Take 1 tablet (20 mg total) by mouth daily as needed for erectile dysfunction, Starting Mon 1/13/2020, Normal      Urine Diagnostic Calibration STRP Use to test blood glucose once a day  Dx: Type 2 DM  E11 9, Historical Med       !! - Potential duplicate medications found  Please discuss with provider  Outpatient Discharge Orders   Spacer Device for Inhaler     Discharge Diet     Activity as tolerated     Call provider for:  persistent nausea or vomiting     Call provider for:  hives     Call provider for:  difficulty breathing, headache or visual disturbances     Call provider for:  persistent dizziness or light-headedness     Call provider for:  extreme fatigue     Complete PFT with Post Bronchodilator and ABG   Standing Status: Future Standing Exp   Date: 01/15/21       ED Provider  Electronically Signed by           Ethelene Boast, PA-C  01/15/20 3588

## 2020-01-15 ENCOUNTER — APPOINTMENT (OUTPATIENT)
Dept: NON INVASIVE DIAGNOSTICS | Facility: HOSPITAL | Age: 55
End: 2020-01-15
Payer: COMMERCIAL

## 2020-01-15 VITALS
WEIGHT: 266.1 LBS | TEMPERATURE: 97.4 F | HEIGHT: 71 IN | SYSTOLIC BLOOD PRESSURE: 154 MMHG | DIASTOLIC BLOOD PRESSURE: 104 MMHG | OXYGEN SATURATION: 95 % | HEART RATE: 69 BPM | RESPIRATION RATE: 20 BRPM | BODY MASS INDEX: 37.25 KG/M2

## 2020-01-15 PROBLEM — R06.02 SHORTNESS OF BREATH: Status: RESOLVED | Noted: 2020-01-14 | Resolved: 2020-01-15

## 2020-01-15 PROBLEM — J44.9 COPD (CHRONIC OBSTRUCTIVE PULMONARY DISEASE) (HCC): Status: ACTIVE | Noted: 2020-01-15

## 2020-01-15 PROBLEM — B35.3 ATHLETE'S FOOT: Status: ACTIVE | Noted: 2020-01-15

## 2020-01-15 PROBLEM — J45.30 MILD PERSISTENT ASTHMA: Status: ACTIVE | Noted: 2020-01-15

## 2020-01-15 LAB
ANION GAP SERPL CALCULATED.3IONS-SCNC: 7 MMOL/L (ref 5–14)
ATRIAL RATE: 67 BPM
BUN SERPL-MCNC: 16 MG/DL (ref 5–25)
CALCIUM SERPL-MCNC: 9.2 MG/DL (ref 8.4–10.2)
CHLORIDE SERPL-SCNC: 102 MMOL/L (ref 97–108)
CO2 SERPL-SCNC: 28 MMOL/L (ref 22–30)
CREAT SERPL-MCNC: 0.85 MG/DL (ref 0.7–1.5)
GFR SERPL CREATININE-BSD FRML MDRD: 99 ML/MIN/1.73SQ M
GLUCOSE SERPL-MCNC: 112 MG/DL (ref 70–99)
P AXIS: 77 DEGREES
POTASSIUM SERPL-SCNC: 3.8 MMOL/L (ref 3.6–5)
PR INTERVAL: 144 MS
QRS AXIS: 21 DEGREES
QRSD INTERVAL: 90 MS
QT INTERVAL: 452 MS
QTC INTERVAL: 477 MS
SODIUM SERPL-SCNC: 137 MMOL/L (ref 137–147)
T WAVE AXIS: 24 DEGREES
VENTRICULAR RATE: 67 BPM

## 2020-01-15 PROCEDURE — 99217 PR OBSERVATION CARE DISCHARGE MANAGEMENT: CPT | Performed by: FAMILY MEDICINE

## 2020-01-15 PROCEDURE — 80048 BASIC METABOLIC PNL TOTAL CA: CPT | Performed by: FAMILY MEDICINE

## 2020-01-15 PROCEDURE — 93005 ELECTROCARDIOGRAM TRACING: CPT

## 2020-01-15 PROCEDURE — 93306 TTE W/DOPPLER COMPLETE: CPT | Performed by: INTERNAL MEDICINE

## 2020-01-15 PROCEDURE — 93306 TTE W/DOPPLER COMPLETE: CPT

## 2020-01-15 PROCEDURE — 93010 ELECTROCARDIOGRAM REPORT: CPT | Performed by: INTERNAL MEDICINE

## 2020-01-15 RX ORDER — FLUTICASONE FUROATE AND VILANTEROL 100; 25 UG/1; UG/1
1 POWDER RESPIRATORY (INHALATION) DAILY
Qty: 1 INHALER | Refills: 1 | Status: SHIPPED | OUTPATIENT
Start: 2020-01-15 | End: 2020-02-10

## 2020-01-15 RX ORDER — ALBUTEROL SULFATE 90 UG/1
2 AEROSOL, METERED RESPIRATORY (INHALATION) EVERY 4 HOURS PRN
Qty: 1 INHALER | Refills: 0 | Status: SHIPPED | OUTPATIENT
Start: 2020-01-15 | End: 2020-03-23 | Stop reason: SDUPTHER

## 2020-01-15 RX ADMIN — NICOTINE 1 PATCH: 7 PATCH, EXTENDED RELEASE TRANSDERMAL at 08:49

## 2020-01-15 RX ADMIN — METFORMIN HYDROCHLORIDE 1000 MG: 500 TABLET ORAL at 08:47

## 2020-01-15 RX ADMIN — MELOXICAM 15 MG: 15 TABLET ORAL at 08:48

## 2020-01-15 RX ADMIN — ENOXAPARIN SODIUM 40 MG: 40 INJECTION SUBCUTANEOUS at 08:47

## 2020-01-15 NOTE — DISCHARGE SUMMARY
Discharge Summary - [facility]    Patient Information: Shani Caputo  49 y o  male MRN: [id number]  Unit/Bed#: 7T [facility] 718-02 Encounter: [id number]    Discharging Physician / Practitioner: Dr Umesh Peralta MD  PCP: Evan Dean MD  Admission Date:   Admission Orders (From admission, onward)     Ordered        01/14/20 1612  Place in Observation  Once                   Discharge Date: 01/15/20    Reason for Admission: Shortness of breath    Discharge Diagnoses:     Principal Problem (Resolved): Shortness of breath  Active Problems:    Type 2 diabetes mellitus without complication, without long-term current use of insulin ([facility])    Essential hypertension    Tobacco dependence    COPD (chronic obstructive pulmonary disease) ([facility] )    Mild persistent asthma    Athlete's foot      Consultations During Hospital Stay:  · None    Procedures Performed:   · Echocardiogram    Significant Findings / Test Results:   · Improvement with Duo-neb inhalers  Incidental Findings:   · None     Test Results Pending at Discharge (will require follow up): · None     Outpatient Tests Requested:  · Pulmonary Function Tests    Outpatient follow-up Requested:   PCP    Complications:  None    Hospital Course:     Shani Caputo  is a pleasant 49 y o  male patient with HTN, DM-2, severe varicose veins of right lower extremity, and Tobacco dependence who was originally admitted to the hospital on 1/14/2020 due to shortness of breath  There was concern whether patient was having CHF exacerbation or Asthma/COPD exacerbation  BNP was elevated to 497, although clinically, he lacked evidence of CHF exacerbation such as pedal edema and crackles on lung auscultation  He admitted to having a history of asthma in the past with improvement with inhalers  During hospitalization, he had improvement in symptoms with nebulizer treatment    CHF was ruled out with echocardiogram, which showed normal LV systolic and diastolic function with EF around 61%  Also, patient had non-specific changes on ECG  Due to history of smoking and history of asthma in the past, the patient will be discharged home on Albuterol rescue inhaler with space and Breo-Ellipta inhaler for daily use  He will also be referred for PFTs  Smoking cessation counseling was provided and patient agreeable to begin nicotine replacement therapy with patches  He was also found to have tinea pedis of bilateral feet  He will be sent home on antifungal powder to use twice daily for 28 days  On day of discharge, he was feeling back to baseline with improvement of symptoms  He is agreeable to follow up with PCP  Condition at Discharge: good     Discharge Day Visit / Exam:     Vitals: Blood Pressure: (!) 154/104 (01/15/20 0822)  Pulse: 69 (01/15/20 0822)  Temperature: (!) 97 4 °F (36 3 °C) (01/15/20 0822)  Temp Source: Temporal (01/15/20 3551)  Respirations: 20 (01/15/20 1056)  Height: 5' 10 5" (179 1 cm) (01/14/20 1830)  Weight - Scale: 121 kg (266 lb 1 5 oz) (01/15/20 0600)  SpO2: 95 % (01/15/20 6133)  Exam:   Physical Exam   Constitutional: He is oriented to person, place, and time  He appears well-developed and well-nourished  No distress  HENT:   Head: Normocephalic and atraumatic  Eyes: Conjunctivae and EOM are normal  Right eye exhibits no discharge  Left eye exhibits no discharge  No scleral icterus  Neck: Normal range of motion  Cardiovascular: Normal rate, regular rhythm, normal heart sounds and intact distal pulses  No murmur heard  Pulmonary/Chest: Effort normal and breath sounds normal  He has no wheezes  Abdominal: Soft  Bowel sounds are normal  He exhibits no distension  There is no tenderness  Musculoskeletal: Normal range of motion  He exhibits no tenderness  Multiple varicose veins noted on the right lower extremity on the medical aspect of the thigh and on anterior portion of shin     Neurological: He is alert and oriented to person, place, and time  Skin: Skin is warm  Rash (scaly rash in bilateral feet  ) noted  He is not diaphoretic  Right lower extremity venous stasis dermatitis  Psychiatric: He has a normal mood and affect  Nursing note and vitals reviewed  Discussion with Family: none    Discharge instructions/Information to patient and family:   See after visit summary for information provided to patient and family  Discharge Medications:    Albuterol Sulfate 90 mcg/act 2 puffs Inhalation Every 4 hours PRN      Alcohol Swabs          70 % Pads, Daily, Test       TEST DAILY      Aspirin 81 mg Oral Daily      Atorvastatin Calcium 20 mg Oral Daily      Fluticasone Furoate-Vilanterol 100-25 mcg/inh 1 puff Inhalation Daily, Rinse mouth after use  Lancets Use to test blood glucose once a day  Dx: Type 2 DM  E11 9      Lisinopril 10 mg Oral Daily      metFORMIN HCl 1,000 mg Oral 2 times daily with meals      Miconazole Nitrate 2 % Topical As needed, Apply to affected area twice daily for 28 days  Misc  Devices Does not apply Daily      Nicotine 7 mg/24hr 1 patch Transdermal Daily      Tadalafil 20 mg Oral Daily PRN       Urine Diagnostic Calibration Use to test blood glucose once a day  Dx: Type 2 DM  E11 9       Provisions for Follow-Up Care:  See after visit summary for information related to follow-up care and any pertinent home health orders  Disposition:     Home    For Discharges to [facility] SNF:   · Not Applicable to this Patient - Not Applicable to this Patient    Planned Readmission: none     Discharge Statement:  I spent 45 minutes discharging the patient  This time was spent on the day of discharge  I had direct contact with the patient on the day of discharge  Greater than 50% of the total time was spent examining patient, answering all patient questions, arranging and discussing plan of care with patient as well as directly providing post-discharge instructions    Additional time then spent on discharge activities      Ofelia Dudley MD  01/15/20  1:19 PM

## 2020-01-15 NOTE — UTILIZATION REVIEW
Initial Clinical Review    Admission: Date/Time/Statement:  1/14 @ 1612 to OBSERVATION    Orders Placed This Encounter   Procedures    Place in Observation     Standing Status:   Standing     Number of Occurrences:   1     Order Specific Question:   Admitting Physician     Answer:   Karly Fajardo [[id number]]     Order Specific Question:   Level of Care     Answer:   Med Surg [16]     Order Specific Question:   Bed request comments     Answer:   tele     ED Arrival Information     Expected Arrival Acuity Means of Arrival Escorted By Service Admission Type    - 1/14/2020 14:33 Less Urgent Walk-In Self General Medicine Urgent    Arrival Complaint    cold/flu like symptoms          Chief Complaint   Patient presents with    Cough     cough for about 1 month     Assessment/Plan:  50 yo male presents to ED from home with cough & worsening SOB  He was seen in the ED a month ago, diagnosed with bronchitis & placed on a course of azithromycin, which he reports he completed  Cough did not improve  Reports increasing SOB on exertion over the past 1-2 weeks with  chest tightness while coughing as well as occasional posttussive emesis  Rec'd duonebs and Lasix in ED  CXR without signs of cardiopulmonary disease  Admitted to OBS with Asthma/COPD  Vs CHF    Plan: Nebs q4h & prn, ECHO, monitor RR and sats      ED Triage Vitals [01/14/20 1446]   Temperature Pulse Respirations Blood Pressure SpO2   99 °F (37 2 °C) 79 20 146/78 98 %      Temp Source Heart Rate Source Patient Position - Orthostatic VS BP Location FiO2 (%)   Tympanic Monitor Sitting Left arm --      Pain Score       7        Wt Readings from Last 1 Encounters:   01/15/20 121 kg (266 lb 1 5 oz)     Additional Vital Signs:   01/15/20 0822  97 4 °F (36 3 °C)  69  20  154/104Abnormal  95 % None (Room air) Sitting   01/14/20 2319  97 8 °F (36 6 °C)  70  20  139/89 98 % None (Room air) Lying   01/14/20 2034        131/94   Sitting   01/14/20 1830    76  20 146/108Abnormal    Standing   01/14/20 1800  99 2 °F (37 3 °C)  81  20  156/96 98 % None (Room air) Sitting       Pertinent Labs/Diagnostic Test Results:   Results from last 7 days   Lab Units 01/14/20  1518   WBC Thousand/uL 7 10   HEMOGLOBIN g/dL 14 0   HEMATOCRIT % 41 9   PLATELETS Thousands/uL 209   NEUTROS ABS Thousands/µL 4 80     Results from last 7 days   Lab Units 01/15/20  0433 01/14/20  1518   SODIUM mmol/L 137 139   POTASSIUM mmol/L 3 8 4 2   CHLORIDE mmol/L 102 105   CO2 mmol/L 28 24   ANION GAP mmol/L 7 10   BUN mg/dL 16 20   CREATININE mg/dL 0 85 0 89   EGFR ml/min/1 73sq m 99 98   CALCIUM mg/dL 9 2 9 4     Results from last 7 days   Lab Units 01/14/20  2034   POC GLUCOSE mg/dl 139     Results from last 7 days   Lab Units 01/15/20  0433 01/14/20  1518   GLUCOSE RANDOM mg/dL 112* 106*     Results from last 7 days   Lab Units 01/13/20  1550   HEMOGLOBIN A1C  6 4     Results from last 7 days   Lab Units 01/14/20  1518   TROPONIN I ng/mL <0 01     Results from last 7 days   Lab Units 01/14/20  1518   PROTIME seconds 10 2   INR  0 92   PTT seconds 27     Results from last 7 days   Lab Units 01/14/20  1518   NT-PRO BNP pg/mL 497*     1/14 CXR: No acute cardiopulmonary disease    1/14 EKG: NSR    ED Treatment:   Medication Administration from 01/14/2020 1432 to 01/14/2020 1854       Date/Time Order Dose Route Action     01/14/2020 1533 ipratropium-albuterol (DUO-NEB) 0 5-2 5 mg/3 mL inhalation solution 3 mL 3 mL Nebulization Given     01/14/2020 1723 ipratropium-albuterol (DUO-NEB) 0 5-2 5 mg/3 mL inhalation solution 3 mL 3 mL Nebulization Given     01/14/2020 1725 furosemide (LASIX) injection 20 mg 20 mg Intravenous Given        Past Medical History:   Diagnosis Date    Asthma     Carpal tunnel syndrome     Chronic pain     left arm    Diabetes mellitus ([address] )     Erectile dysfunction     Hypertension     Umbilical hernia      Present on Admission:   Shortness of breath   Type 2 diabetes mellitus without complication, without long-term current use of insulin (HCC)   Essential hypertension   Tobacco dependence      Admitting Diagnosis: Cough [R05]  Acute CHF (congestive heart failure) (HCC) [I50 9]  Age/Sex: 49 y o  male     Admission Orders:  Scheduled Medications:  Medications:  enoxaparin 40 mg Subcutaneous Daily   meloxicam 15 mg Oral Daily   metFORMIN 1,000 mg Oral BID With Meals   nicotine 1 patch Transdermal Daily     Continuous IV Infusions:     PRN Meds:  acetaminophen 650 mg Oral Q6H PRN x 1 1/14   albuterol 2 puff Inhalation Q4H PRN   levalbuterol 1 25 mg Nebulization Q8H PRN     TELE  ECHO  [facility]    Network Utilization Review Department  [email or web address]  ATTENTION: Please call with any questions or concerns to [phone] and carefully listen to the prompts so that you are directed to the right person  All voicemails are confidential   Deanne Rhodes all requests for admission clinical reviews, approved or denied determinations and any other requests to dedicated fax number below belonging to the campus where the patient is receiving treatment   List of dedicated fax numbers for the Facilities:  [address] [place] [address] DENIALS (Administrative/Medical Necessity) [phone]   [address] [place] [address] (Maternity/NICU/Pediatrics) [phone]   [facility] [phone]   Cheikh Max [phone]   Jamil Clamp [phone]   Yo Booth [phone]   [address] [place] [address] [phone]   [facility]  [phone]   [address] [facility]  [address] [facility] [address] [facility] [phone]

## 2020-01-15 NOTE — DISCHARGE INSTRUCTIONS
COPD (Chronic Obstructive Pulmonary Disease)   WHAT YOU NEED TO KNOW:   Chronic obstructive pulmonary disease (COPD) is a lung disease that makes it hard for you to breathe  It is usually a result of lung damage caused by years of irritation and inflammation in your lungs  DISCHARGE INSTRUCTIONS:   Call 911 if:   · You feel lightheaded, short of breath, and have chest pain  Return to the emergency department if:   · You are confused, dizzy, or feel faint  · Your arm or leg feels warm, tender, and painful  It may look swollen and red  · You cough up blood  Contact your healthcare provider if:   · You have more shortness of breath than usual      · You need more medicine than usual to control your symptoms  · You are coughing or wheezing more than usual      · You are coughing up more mucus, or it is a different color or has a different odor  · You gain more than 3 pounds in a week  · You have a fever, a runny or stuffy nose, and a sore throat, or other cold or flu symptoms  · Your skin, lips, or nails start to turn blue  · You have swelling in your legs or ankles  · You are very tired or weak for more than a day  · You notice changes in your mood, or changes in your ability to think or concentrate  · You have questions or concerns about your condition or care  Medicines:   · Medicines  may be used to open your airways, decrease swelling and inflammation in your lungs, or treat an infection  You may need 2 or more medicines  A short-acting medicine relieves symptoms quickly  Long-acting medicines will control or prevent symptoms  Ask for more information about the medicines you are given and how to use them safely  · Take your medicine as directed  Contact your healthcare provider if you think your medicine is not helping or if you have side effects  Tell him or her if you are allergic to any medicine   Keep a list of the medicines, vitamins, and herbs you take  Include the amounts, and when and why you take them  Bring the list or the pill bottles to follow-up visits  Carry your medicine list with you in case of an emergency  Help make breathing easier:   · Use pursed-lip breathing any time you feel short of breath  Take a deep breath in through your nose  Slowly breathe out through your mouth with your lips pursed for twice as long as you inhaled  You can also practice this breathing pattern while you bend, lift, climb stairs, or exercise  It slows down your breathing and helps move more air in and out of your lungs  · Do not smoke, and avoid others who smoke  Nicotine and other substances can cause lung irritation or damage and make it harder for you to breathe  Do not use e-cigarettes or smokeless tobacco  They still contain nicotine  Ask your healthcare provider for information if you currently smoke and need help to quit  For support and more information:  ¨ [employer]  Phone: 1- 721 - 084-7519  Web Address: [employer]      · Be aware of and avoid anything that makes your symptoms worse  Stay out of high altitudes and places with high humidity  Stay inside, or cover your mouth and nose with a scarf when you are outside during cold weather  Stay inside on days when air pollution or pollen counts are high  Do not use aerosol sprays such as deodorant, bug spray, and hair spray  Manage COPD and help prevent exacerbations:  COPD is a serious condition that gets worse over time  A COPD exacerbation means your symptoms suddenly get worse  It is important to prevent exacerbations  An exacerbation can cause more lung damage  COPD cannot be cured, but you can take action to feel better and prevent COPD exacerbations:  · Protect yourself from germs  Germs can get into your lungs and cause an infection  An infection in your lungs can create more mucus and make it harder to breathe   An infection can also create swelling in your airways and prevent air from getting in  You can decrease your risk for infection by doing the following:     [facility] [place] [facility] your hands often with soap and water  Carry germ-killing gel with you  You can use the gel to clean your hands when soap and water are not available  ¨ Do not touch your eyes, nose, or mouth unless you have washed your hands first      ¨ Always cover your mouth when you cough  Cough into a tissue or your shirtsleeve so you do not spread germs from your hands  ¨ Try to avoid people who have a cold or the flu  If you are sick, stay away from others as much as possible  · Drink more liquids  This will help to keep your air passages moist and help you cough up mucus  Ask how much liquid to drink each day and which liquids are best for you  · Exercise daily  Exercise for at least 20 minutes each day to help increase your energy and decrease shortness of breath  Walking or riding a bike are good ways to exercise  Talk to your healthcare provider about the best exercise plan for you  · Ask about vaccines  Your healthcare provider may recommend that you get regular flu and pneumonia vaccines  Pneumonia can become life-threatening for a person who has COPD  Ask about other vaccines you may need  Ask your healthcare provider about the flu and pneumonia vaccines  All adults should get the flu (influenza) vaccine every year as soon as it becomes available  The pneumonia vaccine is given to adults aged 72 or older to prevent pneumococcal disease, such as pneumonia  Adults aged 23 to 59 years who are at high risk for pneumococcal disease also should get the pneumococcal vaccine  It may need to be repeated 1 or 5 years later  Pulmonary rehabilitation:  Your healthcare provider may recommend a program to help you manage your symptoms and improve your quality of life  It may include nutritional counseling and exercise to strengthen your lungs     Make decisions about your choices for future treatment:  Ask for information about advanced medical directives and living parsons  These documents help you decide and write down your choices for treatment and end-of-life care  It is best to complete them when you feel well and can think clearly about your wishes  The information can then be kept for future use if you are in the hospital or become very ill  Follow up with your healthcare provider as directed: You may need more tests  Your healthcare provider may refer you to a pulmonary (lung) specialist  Write down your questions so you remember to ask them during your visits  © 2017 [facility] Information is for End User's use only and may not be sold, redistributed or otherwise used for commercial purposes  All illustrations and images included in CareNotes® are the copyrighted property of [employer]  or Khalif Alvarado  The above information is an  only  It is not intended as medical advice for individual conditions or treatments  Talk to your doctor, nurse or pharmacist before following any medical regimen to see if it is safe and effective for you  Asthma   WHAT YOU NEED TO KNOW:   Asthma is a lung disease that makes breathing difficult  Chronic inflammation and reactions to triggers narrow the airways in the lungs  Asthma can become life-threatening if it is not managed  DISCHARGE INSTRUCTIONS:   Seek care immediately if:   · You have severe shortness of breath  · Your lips or nails turn blue or gray  · The skin around your neck and ribs pulls in with each breath  · You have shortness of breath, even after you take your short-term medicine as directed  · Your peak flow numbers are in the red zone of your AAP  Contact your healthcare provider if:   · You run out of medicine before your next refill is due  · Your symptoms get worse  · You need to take more medicine than usual to control your symptoms      · You have questions or concerns about your condition or care  Medicines:   · Medicines  decrease inflammation, open airways, and make it easier to breathe  Medicines may be inhaled, taken as a pill, or injected  Short-term medicines relieve your symptoms quickly  Long-term medicines are used to prevent future attacks  You may also need medicine to help control your allergies  Ask your healthcare provider for more information about the medicine you are given and how to take it safely  · Take your medicine as directed  Contact your healthcare provider if you think your medicine is not helping or if you have side effects  Tell him or her if you are allergic to any medicine  Keep a list of the medicines, vitamins, and herbs you take  Include the amounts, and when and why you take them  Bring the list or the pill bottles to follow-up visits  Carry your medicine list with you in case of an emergency  Follow up with your healthcare provider as directed: You will need to return to make sure your medicine is working and your symptoms are controlled  You may be referred to an asthma specialist  Ehsan Milner may be asked to keep a record of your peak flow values and bring it with you to your appointments  Write down your questions so you remember to ask them  Manage your symptoms and prevent future attacks:   · Follow your Asthma Action Plan (AAP)  This is a written plan that you and your healthcare provider create  It explains which medicine you need and when to change doses if necessary  It also explains how you can monitor symptoms and use a peak flow meter  The meter measures how well your lungs are working  · Manage other health conditions , such as allergies, acid reflux, and sleep apnea  · Identify and avoid triggers  These may include pets, dust mites, mold, and cockroaches  · Do not smoke or be around others who smoke  Nicotine and other chemicals in cigarettes and cigars can cause lung damage   Ask your healthcare provider for information if you currently smoke and need help to quit  E-cigarettes or smokeless tobacco still contain nicotine  Talk to your healthcare provider before you use these products  · Ask about the flu vaccine  The flu can make your asthma worse  You may need a yearly flu shot  © 2017 [address] [employer]  Information is for End User's use only and may not be sold, redistributed or otherwise used for commercial purposes  All illustrations and images included in CareNotes® are the copyrighted property of [employer] M [employer]  or Khalif Alvarado  The above information is an  only  It is not intended as medical advice for individual conditions or treatments  Talk to your doctor, nurse or pharmacist before following any medical regimen to see if it is safe and effective for you  COPD (Chronic Obstructive Pulmonary Disease)   WHAT YOU NEED TO KNOW:   Chronic obstructive pulmonary disease (COPD) is a lung disease that makes it hard for you to breathe  It is usually a result of lung damage caused by years of irritation and inflammation in your lungs  DISCHARGE INSTRUCTIONS:   Call 911 if:   · You feel lightheaded, short of breath, and have chest pain  Seek care immediately if:   · You are confused, dizzy, or feel faint  · Your arm or leg feels warm, tender, and painful  It may look swollen and red  · You cough up blood  Contact your healthcare provider if:   · You have more shortness of breath than usual      · You need more medicine than usual to control your symptoms  · You are coughing or wheezing more than usual      · You are coughing up more mucus, or it is a different color or has a different odor  · You gain more than 3 pounds in a week  · You have a fever, a runny or stuffy nose, and a sore throat, or other cold or flu symptoms  · Your skin, lips, or nails start to turn blue  · You have swelling in your legs or ankles       · You are very tired or weak for more than a day  · You notice changes in your mood, or changes in your ability to think or concentrate  · You have questions or concerns about your condition or care  Medicines:   · Medicines  may be used to open your airways, decrease swelling and inflammation in your lungs, or treat an infection  You may need 2 or more medicines  A short-acting medicine relieves symptoms quickly  Long-acting medicines will control or prevent symptoms  Ask for more information about the medicines you are given and how to use them safely  · Take your medicine as directed  Contact your healthcare provider if you think your medicine is not helping or if you have side effects  Tell him or her if you are allergic to any medicine  Keep a list of the medicines, vitamins, and herbs you take  Include the amounts, and when and why you take them  Bring the list or the pill bottles to follow-up visits  Carry your medicine list with you in case of an emergency  Help make breathing easier:   · Use pursed-lip breathing any time you feel short of breath  Take a deep breath in through your nose  Slowly breathe out through your mouth with your lips pursed for twice as long as you inhaled  You can also practice this breathing pattern while you bend, lift, climb stairs, or exercise  It slows down your breathing and helps move more air in and out of your lungs  · Do not smoke, and avoid others who smoke  Nicotine and other substances can cause lung irritation or damage and make it harder for you to breathe  Do not use e-cigarettes or smokeless tobacco  They still contain nicotine  Ask your healthcare provider for information if you currently smoke and need help to quit  For support and more information:  ¨ [employer]  Phone: 1- 019 - 135-1499  Web Address: [employer]      · Be aware of and avoid anything that makes your symptoms worse  Stay out of high altitudes and places with high humidity   Stay inside, or cover your mouth and nose with a scarf when you are outside during cold weather  Stay inside on days when air pollution or pollen counts are high  Do not use aerosol sprays such as deodorant, bug spray, and hair spray  Manage COPD and help prevent exacerbations:  COPD is a serious condition that gets worse over time  A COPD exacerbation means your symptoms suddenly get worse  It is important to prevent exacerbations  An exacerbation can cause more lung damage  COPD cannot be cured, but you can take action to feel better and prevent COPD exacerbations:  · Protect yourself from germs  Germs can get into your lungs and cause an infection  An infection in your lungs can create more mucus and make it harder to breathe  An infection can also create swelling in your airways and prevent air from getting in  You can decrease your risk for infection by doing the following:     [facility] your hands often with soap and water  Carry germ-killing gel with you  You can use the gel to clean your hands when soap and water are not available  ¨ Do not touch your eyes, nose, or mouth unless you have washed your hands first      ¨ Always cover your mouth when you cough  Cough into a tissue or your shirtsleeve so you do not spread germs from your hands  ¨ Try to avoid people who have a cold or the flu  If you are sick, stay away from others as much as possible  · Drink more liquids  This will help to keep your air passages moist and help you cough up mucus  Ask how much liquid to drink each day and which liquids are best for you  · Exercise daily  Exercise for at least 20 minutes each day to help increase your energy and decrease shortness of breath  Walking or riding a bike are good ways to exercise  Talk to your healthcare provider about the best exercise plan for you  · Ask about vaccines  Your healthcare provider may recommend that you get regular flu and pneumonia vaccines   Pneumonia can become life-threatening for a person who has COPD  Ask about other vaccines you may need  Ask your healthcare provider about the flu and pneumonia vaccines  All adults should get the flu (influenza) vaccine every year as soon as it becomes available  The pneumonia vaccine is given to adults aged 72 or older to prevent pneumococcal disease, such as pneumonia  Adults aged 23 to 59 years who are at high risk for pneumococcal disease also should get the pneumococcal vaccine  It may need to be repeated 1 or 5 years later  Pulmonary rehabilitation:  Your healthcare provider may recommend a program to help you manage your symptoms and improve your quality of life  It may include nutritional counseling and exercise to strengthen your lungs  Make decisions about your choices for future treatment:  Ask for information about advanced medical directives and living parsons  These documents help you decide and write down your choices for treatment and end-of-life care  It is best to complete them when you feel well and can think clearly about your wishes  The information can then be kept for future use if you are in the hospital or become very ill  Follow up with your healthcare provider as directed: You may need more tests  Your healthcare provider may refer you to a pulmonary (lung) specialist  Write down your questions so you remember to ask them during your visits  © 2017 [address] [employer]  Information is for End User's use only and may not be sold, redistributed or otherwise used for commercial purposes  All illustrations and images included in CareNotes® are the copyrighted property of [employer]  or Khalif Alvarado  The above information is an  only  It is not intended as medical advice for individual conditions or treatments  Talk to your doctor, nurse or pharmacist before following any medical regimen to see if it is safe and effective for you                Albuterol (By breathing) Albuterol (al-BUE-ter-ol)  Treats or prevents bronchospasm  Brand Name(s): AccuNeb, ProAir HFA, Proventil HFA, ReliOn Ventolin HFA, Ventolin HFA   There may be other brand names for this medicine  When This Medicine Should Not Be Used: This medicine is not right for everyone  Do not use it if you had an allergic reaction to albuterol or milk proteins  How to Use This Medicine:   Aerosol, Powder Under Pressure, Suspension, Solution  · Your doctor will tell you how much medicine to use  Do not use more than directed  Ask your doctor or pharmacist if you have questions about how to use your inhaler, nebulizer, or medicine  · Solution:   ¨ You will use this medicine with an inhaler device called a nebulizer  The nebulizer turns the medicine into a fine mist that you breathe in through your mouth and to your lungs  Your caregiver will show you how to use your nebulizer  ¨ Store unopened vials of this medicine at room temperature, away from heat and direct light  Do not freeze  An open vial of medicine must be used right away  · Aerosol inhaler:   ¨ Shake the inhaler well just before each use  Avoid spraying this medicine into your eyes  ¨ Test spray in the air before using for the first time or if the inhaler has not been used for a while  ¨ If you are supposed to use more than one puff, wait 1 to 2 minutes before inhaling the second puff  Repeat these steps for the next puff, starting with shaking the inhaler  ¨ Clean the inhaler mouthpiece at least once a week with warm running water for 30 seconds  Let it air dry completely  ¨ Store the canister at room temperature, away from heat and direct light  Do not freeze  Do not keep this medicine inside a car where it could be exposed to extreme heat or cold  Do not poke holes in the canister or throw it into a fire, even if the canister is empty  · ProAir® Respiclick® inhaler:   ¨ Make sure the cap is closed   Do not open the cap unless you are going to use the medicine  ¨ Hold the inhaler upright  Open the cap fully until you hear a click  Your inhaler is now ready to use  ¨ Close the cap firmly over the mouthpiece after each use  ¨ Keep the inhaler clean and dry at all times  Do not wash or put any part of the inhaler in water  ¨ To clean the mouthpiece, wipe it gently with a dry cloth or tissue  ¨ Keep the medicine in the foil pouch until you are ready to use it  Store at room temperature, away from heat and direct light  Do not freeze  · Read and follow the patient instructions that come with this medicine  Talk to your doctor or pharmacist if you have any questions  · Missed dose: Take a dose as soon as you remember  If it is almost time for your next dose, wait until then and take a regular dose  Do not take extra medicine to make up for a missed dose  Drugs and Foods to Avoid:   Ask your doctor or pharmacist before using any other medicine, including over-the-counter medicines, vitamins, and herbal products  · Some foods and medicines can affect how albuterol works  Tell your doctor if you are using any of the following:  ¨ Digoxin  ¨ Beta-blocker medicine  ¨ Diuretic (water pill)  ¨ Medicine for depression or an MAO inhibitor within the past 2 weeks  Warnings While Using This Medicine:   · Tell your doctor if you are pregnant or breastfeeding, or if you have kidney disease, diabetes, heart disease, heart rhythm problems, high blood pressure, overactive thyroid, or a history of seizures  · This medicine may cause the following problems:   ¨ Paradoxical bronchospasm (increased trouble breathing right after use)  ¨ Low potassium levels in the blood  · If you use a corticosteroid medicine to control your asthma, keep using it as instructed by your doctor  · Call your doctor if your symptoms do not improve or if they get worse  · If any of your asthma medicines do not seem to be working as well as usual, call your doctor right away   Do not change your doses or stop using your medicines without asking your doctor  · Your doctor will check your progress and the effects of this medicine at regular visits  Keep all appointments  · Keep all medicine out of the reach of children  Never share your medicine with anyone  Possible Side Effects While Using This Medicine:   Call your doctor right away if you notice any of these side effects:  · Allergic reaction: Itching or hives, swelling in your face or hands, swelling or tingling in your mouth or throat, chest tightness, trouble breathing  · Dry mouth, increased thirst, muscle cramps, nausea, vomiting  · Fast, pounding, or uneven heartbeat, chest pain  · Lightheadedness, dizziness, or fainting  · Trouble breathing, increased wheezing, cough, chest tightness  If you notice these less serious side effects, talk with your doctor:   · Cough, sore throat, runny or stuffy nose  · Headache  · Tremors, nervousness  If you notice other side effects that you think are caused by this medicine, tell your doctor  Call your doctor for medical advice about side effects  You may report side effects to FDA at 5-117-FDA-0953  © 2017 [address] [employer] Information is for End User's use only and may not be sold, redistributed or otherwise used for commercial purposes  The above information is an  only  It is not intended as medical advice for individual conditions or treatments  Talk to your doctor, nurse or pharmacist before following any medical regimen to see if it is safe and effective for you  Fluticasone (By breathing)   Fluticasone ([id number])  Prevents asthma attacks  This medicine is a corticosteroid  Brand Name(s): Flovent HFA   There may be other brand names for this medicine  When This Medicine Should Not Be Used: This medicine is not right for everyone  Do not use it if you had an allergic reaction to fluticasone or while you are having an asthma attack   Do not use ArmonAir RespiClick®, Arnuity Ellipta®, or Flovent® Diskus® if you are allergic to milk proteins  How to Use This Medicine:   Liquid Under Pressure, Powder Under Pressure, Disk  · Take your medicine as directed  Your dose may need to be changed several times to find what works best for you  Never use more medicine than your doctor prescribed  · Read and follow the patient instructions that come with this medicine  Talk to your doctor or pharmacist if you have any questions  Your doctor or pharmacist should also show you how to use the inhaler  · Do not breathe into the inhaler  To inhale this medicine, breathe out fully, trying to get as much air out of the lungs as possible  Put the mouthpiece just in front of your mouth with the canister upright  · Open your mouth and breathe in slowly and deeply (like yawning), and at the same time firmly press down on the top of the canister once  · Hold your breath for about 5 to 10 seconds, and then breathe out slowly  · When you have finished all your inhalations, rinse your mouth out with water  Do not swallow the water  · Arnuity Ellipta®:   ¨ Do not shake the inhaler  When you are ready to use the medicine, open the cover of the inhaler  You should hear a click, and the dose counter number will change  This means the medicine is ready  ¨ The dose counter will turn red when the inhaler has fewer than 10 doses  Stop using the inhaler when the counter reaches 0, or 6 weeks after you open the package, whichever comes first   · Flovent® HFA:   ¨ Before you use the inhaler for the first time, point it away from your face and test spray into the air 4 times  Shake the inhaler before each spray  Test spray 1 time if the inhaler has not been used for 7 days or if it has been dropped  ¨ When the dose counter reaches 020, you will need a refill soon   Stop using the inhaler when it reaches 0   · Flovent® Diskus®:   ¨ To use the inhaler, hold it level and push the lever away from you  You should hear a click and see the mouthpiece  The dose counter number should change  ¨ When you are finished, slide the lever back into place until it clicks  The dose counter will turn red when you have 5 or fewer doses left  ¨ Do not use a spacer device with the inhaler  Do not wash the inhaler  · ArmonAir RespiClick®:   ¨ This medicine does not require priming  Do not use it with a spacer or volume holding chamber  ¨ When you are ready to use the medicine, open the cover of the inhaler  You should hear a click, and the dose counter number will change  This means the medicine is ready  ¨ The dose counter will turn red when the inhaler has fewer than 20 doses  Stop using the inhaler when the counter reaches 0, or 30 days after you open the package, whichever comes first   · Missed dose: Take a dose as soon as you remember  If it is almost time for your next dose, wait until then and take a regular dose  Do not take extra medicine to make up for a missed dose  Do not use more than 1 dose of Arnuity Ellipta® in 1 day  · If you miss a dose of ArmonAir RespiClick®, skip the missed dose and go back to your regular dosing schedule  Do not double doses  · Store the canister at room temperature, away from heat and direct light  Do not freeze  Do not keep this medicine inside a car where it could be exposed to extreme heat or cold  Do not poke holes in the canister or throw it into a fire, even if the canister is empty  Store the medicine at room temperature in a dry place, away from heat, moisture, or light  Store the [employer] inhaler with the mouthpiece down  Drugs and Foods to Avoid:   Ask your doctor or pharmacist before using any other medicine, including over-the-counter medicines, vitamins, and herbal products  · Some medicines can affect how fluticasone works   Tell your doctor if you are using any of the following:  ¨ Atazanavir, clarithromycin, conivaptan, indinavir, itraconazole, ketoconazole, lopinavir, nefazodone, nelfinavir, ritonavir, saquinavir, telithromycin, troleandomycin, or voriconazole  ¨ Medicine to treat seizures  Warnings While Using This Medicine:   · Tell your doctor if you are pregnant or breastfeeding, or if you have liver disease, osteoporosis, cataracts, or glaucoma  Tell your doctor if you have any immune system problems or infections, including herpes simplex in your eye or tuberculosis  Tell your doctor right away if you are exposed to measles or chickenpox  · This medicine may cause the following problems:  ¨ Increased trouble breathing right after use (paradoxical bronchospasm)  ¨ Low bone mineral density, which may lead to osteoporosis  ¨ Cataracts, glaucoma, or other vision problems  ¨ Slow growth in children  ¨ Problems with the adrenal glands  ¨ Higher risk of infection, including fungus infection in the mouth (thrush)  · This medicine will not stop an asthma attack that has already started  You should have another medicine to use in case of an acute asthma attack  · If any of your asthma medicines do not seem to be working as well as usual, call your doctor right away  Do not change your doses or stop using your medicines without asking your doctor  · Tell any doctor or dentist who treats you that you are using this medicine  · Your doctor will check your progress and the effects of this medicine at regular visits  Keep all appointments  · You may need to use this medicine for 1 to 2 weeks before your asthma starts to get better  Call your doctor if your symptoms do not improve or if they get worse  · Keep all medicine out of the reach of children  Never share your medicine with anyone    Possible Side Effects While Using This Medicine:   Call your doctor right away if you notice any of these side effects:  · Allergic reaction: Itching or hives, swelling in your face or hands, swelling or tingling in your mouth or throat, chest tightness, trouble breathing  · Color changes on the skin, dark freckles, easy bruising, muscle weakness, round or puffy face  · Eye pain or vision changes  · Fever, chills, cough, runny or stuffy nose, sore throat, body aches  · Tiredness, weakness, nausea and vomiting, dizziness  · Worsening of breathing problems  If you notice these less serious side effects, talk with your doctor:   · Headache  · Sores or white patches in your mouth or throat, pain when eating or swallowing  · Weight changes (in children)  If you notice other side effects that you think are caused by this medicine, tell your doctor  Call your doctor for medical advice about side effects  You may report side effects to FDA at 2-454-FDA-1585  © 2017 [address] [employer]  Information is for End User's use only and may not be sold, redistributed or otherwise used for commercial purposes  The above information is an  only  It is not intended as medical advice for individual conditions or treatments  Talk to your doctor, nurse or pharmacist before following any medical regimen to see if it is safe and effective for you  Nicotine (Absorbed through the skin)   Nicotine (JOSE-oh-teen)  Helps you quit smoking  Brand Name(s): [facility] Nicotine Transdermal System, [employer] Nicotine Transdermal System, Leader Nicotine Transdermal System, Nicoderm CQ, Nicoderm CQ Clear, Nicotrol, [facility] Nicotine, [employer] Nicotine Transdermal System   There may be other brand names for this medicine  When This Medicine Should Not Be Used: This medicine is not right for everyone  Do not use it if you had an allergic reaction to nicotine  How to Use This Medicine:   Patch  · Follow the instructions on the medicine label if you are using this medicine without a prescription  · Read and follow the patient instructions that come with this medicine  Talk to your doctor or pharmacist if you have any questions    · Wash your hands with soap and water before and after applying a patch  · Leave the patch in its sealed wrapper until you are ready to put it on  Tear the wrapper open carefully  NEVER CUT the wrapper or the patch with scissors  Do not use any patch that has been cut by accident  · NicoDerm® CQ:   ¨ This is a 3-step program  If you smoke more than 10 cigarettes per day, start with step 1 followed by step 2 and step 3  If you smoke 10 or fewer cigarettes per day, start with step 2 followed by step 3  ¨ Begin using the patch on the morning of your quit day, even if you are not able to stop smoking immediately  ¨ Apply the patch at about the same time every day to skin that is clean, dry, and free of hair  ¨ The patient instructions will show the body areas where you can wear the patch  When putting on each new patch, choose a different place within these areas  Do not put the new patch on the same place you wore the last one  Be sure to remove the old patch before applying a new one  ¨ Do not put the patch over irritated skin  Do not use creams or lotions, including sunscreen, on the skin where you apply the patch, because it may not stick well  ¨ Apply a new patch if one falls off  ¨ If you have vivid dreams or sleep problems, remove the patch at bedtime and apply a new one in the morning  · Keep using this medicine for the full treatment time  If you feel you need to use this medicine for a longer period of time, talk to your doctor  · Store the patches at room temperature in a closed container, away from heat, moisture, and direct light  · Fold the used patch in half with the sticky sides together  Throw any used patch away so that children or pets cannot get to it  You will also need to throw away old patches after the expiration date has passed  Drugs and Foods to Avoid:      Ask your doctor or pharmacist before using any other medicine, including over-the-counter medicines, vitamins, and herbal products        Warnings While Using This Medicine: · Pregnant or breastfeeding women should only use this medicine as directed by a doctor  Smoking can seriously harm your unborn child  Try to stop smoking without using medicine  Although this medicine is believed to be safer than smoking, the risks of its use during pregnancy are not fully known  · Tell your doctor if you have heart disease, heart rhythm problems, high blood pressure, or you had a recent heart attack  Tell your doctor if you have an allergy to adhesive tape  · This medicine may cause the following problems:  ¨ High blood pressure  ¨ Increase in heart rate  · The opaque NicoDerm® CQ patch may cause skin burns if you have a procedure called a magnetic resonance imaging (MRI) scan  You must remove the patch before an MRI  · Keep all medicine out of the reach of children  Never share your medicine with anyone  Possible Side Effects While Using This Medicine:   Call your doctor right away if you notice any of these side effects:  · Allergic reaction: Itching or hives, swelling in your face or hands, swelling or tingling in your mouth or throat, chest tightness, trouble breathing  · Dizziness, headache, upset stomach, drooling, vomiting, diarrhea, cold sweats, blurred vision, trouble hearing, confusion, fainting, or weakness  · Fast, slow, pounding, or uneven heartbeat  If you notice these less serious side effects, talk with your doctor:   · Mild skin redness, itching, burning, or tingling where you wear the patch  · Vivid dreams or trouble sleeping  If you notice other side effects that you think are caused by this medicine, tell your doctor  Call your doctor for medical advice about side effects  You may report side effects to FDA at 1-931-VWU-6880  © 2017 [address] Information is for End User's use only and may not be sold, redistributed or otherwise used for commercial purposes  The above information is an  only   It is not intended as medical advice for individual conditions or treatments  Talk to your doctor, nurse or pharmacist before following any medical regimen to see if it is safe and effective for you  Miconazole (On the skin)   Miconazole Nitrate ([id number]-IJM-x-zoljewel JESSIKA-trate)  Treats fungal infections  Brand Name(s): Aloe Vesta Antifungal, Antifungal, Ethan Antifungal, Edgardo Antifungal, Critic-Aid Clear AF, Cruex Prescription Strength, Desenex, Equate Athlete's Foot, Equate Miconazole 1, [facility] Miconazole 1, [facility] Miconazole 3, [facility] Miconazorb AF, Good Sense Anti-Fungal, Good Sense Miconazole 3, Leader Miconazole 3 Combination Pack   There may be other brand names for this medicine  When This Medicine Should Not Be Used: You should not use this medicine if you have had an allergic reaction to miconazole  How to Use This Medicine:   Cream, Spray, Ointment, Powder, Lotion  · Your doctor will tell you how much medicine to use  Do not use more than directed  · Use this medicine only on your skin  Rinse it off right away if it gets on a cut or scrape  Do not get the medicine in your eyes, nose, or mouth  · Follow the instructions on the medicine label if you are using this medicine without a prescription  · Clean and thoroughly dry the skin area before applying the medicine  · To use the powder, cream, lotion, or ointment, apply a thin layer of medicine over the affected area  Use this medicine each morning and each evening, unless your doctor tells you otherwise  · To use the spray, shake the can well just before each use  Hold the can about 4 to 6 inches away from your skin and spray on a thin layer of medicine over the affected area  Do not inhale the fumes from the spray  Use this medicine each morning and each evening, unless your doctor tells you otherwise  If the spray can clogs, remove the spray button and clean the nozzle with a pin    · Before using the effervescent tablet, clean your feet and dry them thoroughly  Fill a pan or foot bath tub with 1 gallon of warm water and drop in 1 effervescent tablet  Soak your feet for 15 to 30 minutes, and then pat them dry with a towel  · When treating athlete's foot, be sure to apply this medicine to the spaces in between your toes  Between treatments, keep your feet as dry as possible  Change your socks and shoes at least once each day  Wear shoes that fit you well and do not make your feet sweat heavily  If a dose is missed:   · Take a dose as soon as you remember  If it is almost time for your next dose, wait until then and take a regular dose  Do not take extra medicine to make up for a missed dose  How to Store and Dispose of This Medicine:   · Store the medicine in a closed container at room temperature, away from heat, moisture, and direct light  · Do not inhale the spray or use it near heat, open flame, or while smoking  Do not puncture, break, or burn the aerosol can  · Ask your pharmacist or doctor how to dispose of the medicine container and any leftover or  medicine  · Keep all medicine out of the reach of children  Never share your medicine with anyone  Drugs and Foods to Avoid:      Ask your doctor or pharmacist before using any other medicine, including over-the-counter medicines, vitamins, and herbal products  Warnings While Using This Medicine:   · Do not use this medicine on a child under 3years of age unless your doctor tells you to  Supervise any child who does use this medicine to make sure he or she uses it properly  · Call your doctor if your symptoms do not improve or if they get worse  · This medicine is not for treating infections of the scalp, fingernails, or toenails    Possible Side Effects While Using This Medicine:   Call your doctor right away if you notice any of these side effects:  · Allergic reaction: Itching or hives, swelling in your face or hands, swelling or tingling in your mouth or throat, chest tightness, trouble breathing  · No improvement in athlete's foot after 4 weeks of using this medicine  · No improvement in jock itch after 2 weeks of using this medicine  · No improvement in ringworm after 4 weeks of using this medicine  · Worsening of your condition  If you notice these less serious side effects, talk with your doctor:   · Minor irritation of treated skin areas  If you notice other side effects that you think are caused by this medicine, tell your doctor  Call your doctor for medical advice about side effects  You may report side effects to FDA at 9-535-FDA-2707  © 2017 [address] [employer] Information is for End User's use only and may not be sold, redistributed or otherwise used for commercial purposes  The above information is an  only  It is not intended as medical advice for individual conditions or treatments  Talk to your doctor, nurse or pharmacist before following any medical regimen to see if it is safe and effective for you

## 2020-01-15 NOTE — PLAN OF CARE
Problem: Potential for Falls  Goal: Patient will remain free of falls  Description  INTERVENTIONS:  - Assess patient frequently for physical needs  -  Identify cognitive and physical deficits and behaviors that affect risk of falls    -  [place] fall precautions as indicated by assessment   - Educate patient/family on patient safety including physical limitations  - Instruct patient to call for assistance with activity based on assessment  - Modify environment to reduce risk of injury  - Consider OT/PT consult to assist with strengthening/mobility  Outcome: Progressing     Problem: PAIN - ADULT  Goal: Verbalizes/displays adequate comfort level or baseline comfort level  Description  Interventions:  - Encourage patient to monitor pain and request assistance  - Assess pain using appropriate pain scale  - Administer analgesics based on type and severity of pain and evaluate response  - Implement non-pharmacological measures as appropriate and evaluate response  - Consider cultural and social influences on pain and pain management  - Notify physician/advanced practitioner if interventions unsuccessful or patient reports new pain  Outcome: Progressing     Problem: INFECTION - ADULT  Goal: Absence or prevention of progression during hospitalization  Description  INTERVENTIONS:  - Assess and monitor for signs and symptoms of infection  - Monitor lab/diagnostic results  - Monitor all insertion sites, i e  indwelling lines, tubes, and drains  - Monitor endotracheal if appropriate and nasal secretions for changes in amount and color  - [place] appropriate cooling/warming therapies per order  - Administer medications as ordered  - Instruct and encourage patient and family to use good hand hygiene technique  - Identify and instruct in appropriate isolation precautions for identified infection/condition  Outcome: Progressing     Problem: SAFETY ADULT  Goal: Patient will remain free of falls  Description  INTERVENTIONS:  - Assess patient frequently for physical needs  -  Identify cognitive and physical deficits and behaviors that affect risk of falls  -  [place] fall precautions as indicated by assessment   - Educate patient/family on patient safety including physical limitations  - Instruct patient to call for assistance with activity based on assessment  - Modify environment to reduce risk of injury  - Consider OT/PT consult to assist with strengthening/mobility  Outcome: Progressing     Problem: DISCHARGE PLANNING  Goal: Discharge to home or other facility with appropriate resources  Description  INTERVENTIONS:  - Identify barriers to discharge w/patient and caregiver  - Arrange for needed discharge resources and transportation as appropriate  - Identify discharge learning needs (meds, wound care, etc )  - Arrange for interpretive services to assist at discharge as needed  - Refer to Case Management Department for coordinating discharge planning if the patient needs post-hospital services based on physician/advanced practitioner order or complex needs related to functional status, cognitive ability, or social support system  Outcome: Progressing     Problem: Knowledge Deficit  Goal: Patient/family/caregiver demonstrates understanding of disease process, treatment plan, medications, and discharge instructions  Description  Complete learning assessment and assess knowledge base    Interventions:  - Provide teaching at level of understanding  - Provide teaching via preferred learning methods  Outcome: Progressing

## 2020-01-15 NOTE — PLAN OF CARE
Problem: Potential for Falls  Goal: Patient will remain free of falls  Description  INTERVENTIONS:  - Assess patient frequently for physical needs  -  Identify cognitive and physical deficits and behaviors that affect risk of falls    -  [place] fall precautions as indicated by assessment   - Educate patient/family on patient safety including physical limitations  - Instruct patient to call for assistance with activity based on assessment  - Modify environment to reduce risk of injury  - Consider OT/PT consult to assist with strengthening/mobility  Outcome: Adequate for Discharge

## 2020-01-15 NOTE — NURSING NOTE
Discharge Note  Patient leaves for home by self walking as requested after taking all personal belonging and verbalizing understanding of discharge instructions  Left befoe being accompanied off unit

## 2020-01-15 NOTE — NURSING NOTE
On initial rounds patient in no apparent distress  Skin warm and dry to touch  Pleasant and cooperative  Medicated for chronic back pain  No s/s of hypo/hyperglycemic reaction noted none voiced  All safety maintained  Will continue to monitor

## 2020-01-16 ENCOUNTER — OFFICE VISIT (OUTPATIENT)
Dept: OBGYN CLINIC | Facility: CLINIC | Age: 55
End: 2020-01-16
Payer: COMMERCIAL

## 2020-01-16 ENCOUNTER — TRANSITIONAL CARE MANAGEMENT (OUTPATIENT)
Dept: FAMILY MEDICINE CLINIC | Facility: CLINIC | Age: 55
End: 2020-01-16

## 2020-01-16 VITALS
HEIGHT: 71 IN | SYSTOLIC BLOOD PRESSURE: 153 MMHG | BODY MASS INDEX: 37.24 KG/M2 | DIASTOLIC BLOOD PRESSURE: 88 MMHG | HEART RATE: 88 BPM | WEIGHT: 266 LBS

## 2020-01-16 DIAGNOSIS — R20.0 NUMBNESS AND TINGLING IN LEFT HAND: Primary | ICD-10-CM

## 2020-01-16 DIAGNOSIS — R20.2 NUMBNESS AND TINGLING IN LEFT HAND: Primary | ICD-10-CM

## 2020-01-16 PROCEDURE — 99203 OFFICE O/P NEW LOW 30 MIN: CPT | Performed by: ORTHOPAEDIC SURGERY

## 2020-01-16 PROCEDURE — 1111F DSCHRG MED/CURRENT MED MERGE: CPT | Performed by: ORTHOPAEDIC SURGERY

## 2020-01-16 PROCEDURE — 20526 THER INJECTION CARP TUNNEL: CPT | Performed by: ORTHOPAEDIC SURGERY

## 2020-01-16 RX ORDER — BETAMETHASONE SODIUM PHOSPHATE AND BETAMETHASONE ACETATE 3; 3 MG/ML; MG/ML
6 INJECTION, SUSPENSION INTRA-ARTICULAR; INTRALESIONAL; INTRAMUSCULAR; SOFT TISSUE
Status: COMPLETED | OUTPATIENT
Start: 2020-01-16 | End: 2020-01-16

## 2020-01-16 RX ORDER — LIDOCAINE HYDROCHLORIDE 10 MG/ML
2 INJECTION, SOLUTION INFILTRATION; PERINEURAL
Status: COMPLETED | OUTPATIENT
Start: 2020-01-16 | End: 2020-01-16

## 2020-01-16 RX ADMIN — LIDOCAINE HYDROCHLORIDE 2 ML: 10 INJECTION, SOLUTION INFILTRATION; PERINEURAL at 16:10

## 2020-01-16 RX ADMIN — BETAMETHASONE SODIUM PHOSPHATE AND BETAMETHASONE ACETATE 6 MG: 3; 3 INJECTION, SUSPENSION INTRA-ARTICULAR; INTRALESIONAL; INTRAMUSCULAR; SOFT TISSUE at 16:10

## 2020-01-16 NOTE — PROGRESS NOTES
Chief Complaint   Patient presents with    Left Wrist - Pain           Assessment:  Left hand numbness    Plan :  Although the EMG test that he had is suggestive of carpal tunnel syndrome, his physical exam findings are not classic  For that reason we need to make sure that this is in fact localized pressure on the nerve at the wrist, not some other problem going on related to the crush injury from 2016  Carpal tunnel syndrome in general does not cause shoulder pain  For that reason I injected the left carpal tunnel today with lidocaine and Celestone without incident and I will use this injection both diagnostically and therapeutically to see if we can quiet some of his symptoms  He may use his hand for his work as well as his normal activities of daily living  I will see him in 2 months for follow-up  Depending on the results of this injection then we will either consider further diagnostic testing or even consider surgical intervention  He may put ice on the area for 10 minutes 4 times a day if painful or swollen and take Advil, Aleve, or Tylenol if needed for pain    HPI:   This 52-year-old Azeri male was sent by his family physician for consultation for left hand numbness  This has bothered for over 4 years dating back to a crush injury to his left hand  He eventually underwent a partial amputation of the left little finger at [facility] at the DIP joint and this healed  Because of insurance issues in job changes he never has some have significant follow-up care for this left hand until recently  His family doctor sent him for an EMG only of the left arm  He does have non insulin-dependent diabetes and also some heart disease for which she was just discharged from [facility] this week  He is right-hand dominant and this is his nondominant hand  He works for [employer] does heavy work with his hands    He states that the pain started his hand goes up to his elbow and up to his shoulder  The entire hand is numb    He feels occasional weakness in this hand    PMHx:         Past Medical History:   Diagnosis Date    Asthma     Carpal tunnel syndrome     Chronic pain     left arm    Diabetes mellitus ([address] )     Erectile dysfunction     Hypertension     Umbilical hernia        Past Surgical History:   Procedure Laterality Date    CHOLECYSTECTOMY      CHOLECYSTECTOMY LAPAROSCOPIC N/A 6/20/2019    Procedure: CHOLECYSTECTOMY LAPAROSCOPIC;  Surgeon: Martinez Church MD;  Location: [address] [place] [address];  Service: General    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    HAND SURGERY      UMBILICAL HERNIA REPAIR         Family History   Problem Relation Age of Onset    Breast cancer additional onset Mother     Diabetes Father     Hyperlipidemia Father     Hypertension Father     Seizures Father     No Known Problems Son     No Known Problems Son     Vitiligo Son        Social History     Socioeconomic History    Marital status:      Spouse name: Not on file    Number of children: Not on file    Years of education: Not on file    Highest education level: Not on file   Occupational History    Not on file   Social Needs    Financial resource strain: Not on file    Food insecurity:     Worry: Not on file     Inability: Not on file    Transportation needs:     Medical: Not on file     Non-medical: Not on file   Tobacco Use    Smoking status: Current Every Day Smoker     Packs/day: 0 25     Types: Cigarettes    Smokeless tobacco: Never Used   Substance and Sexual Activity    Alcohol use: Yes     Frequency: 2-3 times a week     Drinks per session: 1 or 2     Binge frequency: Never     Comment: occ    Drug use: No    Sexual activity: Yes     Partners: Female   Lifestyle    Physical activity:     Days per week: Not on file     Minutes per session: Not on file    Stress: Not on file   Relationships    Social connections:     Talks on phone: Not on file Gets together: Not on file     Attends Jewish service: Not on file     Active member of club or organization: Not on file     Attends meetings of clubs or organizations: Not on file     Relationship status: Not on file    Intimate partner violence:     Fear of current or ex partner: Not on file     Emotionally abused: Not on file     Physically abused: Not on file     Forced sexual activity: Not on file   Other Topics Concern    Not on file   Social History Narrative    Not on file       Current Outpatient Medications   Medication Sig Dispense Refill    albuterol (PROVENTIL HFA,VENTOLIN HFA) 90 mcg/act inhaler Inhale 2 puffs every 4 (four) hours as needed for wheezing 1 Inhaler 0    Alcohol Swabs (ALCOHOL PREP) 70 % PADS daily Test  5    Alcohol Swabs (ALCOHOL PREP) PADS TEST DAILY      aspirin (ASPIRIN EC ADULT LOW STRENGTH) 81 mg EC tablet Take 1 tablet (81 mg total) by mouth daily 90 tablet 3    atorvastatin (LIPITOR) 20 mg tablet Take 1 tablet (20 mg total) by mouth daily 90 tablet 3    fluticasone-vilanterol (BREO ELLIPTA) 100-25 mcg/inh inhaler Inhale 1 puff daily Rinse mouth after use  1 Inhaler 1    Lancets MISC Use to test blood glucose once a day  Dx: Type 2 DM  E11 9      lisinopril (ZESTRIL) 10 mg tablet Take 1 tablet (10 mg total) by mouth daily 360 tablet 0    metFORMIN (GLUCOPHAGE) 1000 MG tablet Take 1 tablet (1,000 mg total) by mouth 2 (two) times a day with meals 180 tablet 3    miconazole (MICOTIN) 2 % powder Apply topically as needed for itching Apply to affected area twice daily for 28 days  70 g 0    Misc  Devices MISC by Does not apply route daily 1 each 0    nicotine (NICODERM CQ) 7 mg/24hr TD 24 hr patch Place 1 patch on the skin daily 28 patch 0    tadalafil (CIALIS) 20 MG tablet Take 1 tablet (20 mg total) by mouth daily as needed for erectile dysfunction 20 tablet 12    Urine Diagnostic Calibration STRP Use to test blood glucose once a day  Dx: Type 2 DM   E11 9 No current facility-administered medications for this visit  Allergies: Patient has no known allergies  ROS:  Positive for stomach problems/gallstones, blood sugar issues, occasional breathing problems and wheezing, varicose veins, ED, nocturia, and orthopedic complaints as above  The remaining 5/12 systems on the intake sheet that I reviewed were negative  PE:  Ht 5' 10 5" (1 791 m)   Wt 121 kg (266 lb)   BMI 37 63 kg/m²   Constitutional: The patient was  oriented to person, place, and time  Well-developed and well-nourished  In no acute distress  HEENT: Vision intact  Hearing normal  Swallowing normal   Head: Normocephalic  Cardiovascular: Intact distal pulses  Pulse regular  Pulmonary/Chest: Effort normal  No respiratory distress  Neurological: Alert and oriented to person, place, and time  Skin: Skin is warm  Psychiatric: Normal mood and affect  Ortho Exam:  He had no swelling, redness, or ecchymosis over his left hand  He had partial amputation left little finger at the DIP joint the stump is nontender and nonswollen  He had normal sensation to pinprick in all 5 fingers of the left hand as well as the forearm and axillary nerve innervated area of his upper arm  He showed normal elbow flexion extension with no antecubital adenopathy  Radial pulse was normal   He had no atrophy of his thenar muscles  Tinel's sign was negative and Phalen's test was mildly positive on this right wrist     Studies reviewed:  I reviewed the report of an EMG done at [facility] which reported moderate to severe left carpal tunnel and mild left cubital tunnel  There is no evidence of cervical radiculopathy  Interestingly this was done only 1 arm and we do not have comparison testing of the other normal arm       Hand/upper extremity injection: L carpal tunnel  Date/Time: 1/16/2020 4:10 PM  Consent given by: patient  Site marked: site marked  Supporting Documentation  Indications: pain and diagnostic   Procedure Details  Condition:carpal tunnel syndrome Site: L carpal tunnel   Preparation: Patient was prepped and draped in the usual sterile fashion  Needle size: 25 G  Ultrasound guidance: no  Approach: volar  Medications administered: 2 mL lidocaine 1 %; 6 mg betamethasone acetate-betamethasone sodium phosphate 6 (3-3) mg/mL    Patient tolerance: patient tolerated the procedure well with no immediate complications  Dressing:  Sterile dressing applied

## 2020-01-16 NOTE — PATIENT INSTRUCTIONS
Although the EMG test that he had is suggestive of carpal tunnel syndrome, his physical exam findings are not classic  For that reason we need to make sure that this is in fact localized pressure on the nerve at the wrist, not some other problem going on related to the crush injury from 2016  Carpal tunnel syndrome in general does not cause shoulder pain  For that reason I injected the left carpal tunnel today with lidocaine and Celestone without incident and I will use this injection both diagnostically and therapeutically to see if we can quiet some of his symptoms  He may use his hand for his work as well as his normal activities of daily living  I will see him in 2 months for follow-up  Depending on the results of this injection then we will either consider further diagnostic testing or even consider surgical intervention    He may put ice on the area for 10 minutes 4 times a day if painful or swollen and take Advil, Aleve, or Tylenol if needed for pain

## 2020-02-04 ENCOUNTER — HOSPITAL ENCOUNTER (INPATIENT)
Facility: HOSPITAL | Age: 55
LOS: 2 days | Discharge: HOME | DRG: 305 | End: 2020-02-06
Attending: EMERGENCY MEDICINE | Admitting: FAMILY MEDICINE
Payer: COMMERCIAL

## 2020-02-04 DIAGNOSIS — I48.91 ATRIAL FIBRILLATION WITH RVR (HCC): Primary | ICD-10-CM

## 2020-02-04 DIAGNOSIS — B35.3 TINEA PEDIS OF BOTH FEET: ICD-10-CM

## 2020-02-04 DIAGNOSIS — I48.91 NEW ONSET A-FIB (HCC): ICD-10-CM

## 2020-02-04 LAB
ALBUMIN SERPL BCP-MCNC: 4.2 G/DL (ref 3–5.2)
ALP SERPL-CCNC: 44 U/L (ref 43–122)
ALT SERPL W P-5'-P-CCNC: 19 U/L (ref 9–52)
ANION GAP SERPL CALCULATED.3IONS-SCNC: 11 MMOL/L (ref 5–14)
AST SERPL W P-5'-P-CCNC: 18 U/L (ref 17–59)
BASOPHILS # BLD AUTO: 0 THOUSANDS/ΜL (ref 0–0.1)
BASOPHILS NFR BLD AUTO: 0 % (ref 0–1)
BILIRUB SERPL-MCNC: 0.3 MG/DL
BUN SERPL-MCNC: 17 MG/DL (ref 5–25)
CALCIUM SERPL-MCNC: 9.4 MG/DL (ref 8.4–10.2)
CHLORIDE SERPL-SCNC: 103 MMOL/L (ref 97–108)
CO2 SERPL-SCNC: 27 MMOL/L (ref 22–30)
CREAT SERPL-MCNC: 0.9 MG/DL (ref 0.7–1.5)
EOSINOPHIL # BLD AUTO: 0.1 THOUSAND/ΜL (ref 0–0.4)
EOSINOPHIL NFR BLD AUTO: 1 % (ref 0–6)
ERYTHROCYTE [DISTWIDTH] IN BLOOD BY AUTOMATED COUNT: 13.7 %
GFR SERPL CREATININE-BSD FRML MDRD: 97 ML/MIN/1.73SQ M
GLUCOSE SERPL-MCNC: 119 MG/DL (ref 70–99)
HCT VFR BLD AUTO: 42.2 % (ref 41–53)
HGB BLD-MCNC: 14.1 G/DL (ref 13.5–17.5)
LYMPHOCYTES # BLD AUTO: 1.8 THOUSANDS/ΜL (ref 0.5–4)
LYMPHOCYTES NFR BLD AUTO: 28 % (ref 25–45)
MAGNESIUM SERPL-MCNC: 1.9 MG/DL (ref 1.6–2.3)
MCH RBC QN AUTO: 28 PG (ref 26–34)
MCHC RBC AUTO-ENTMCNC: 33.3 G/DL (ref 31–36)
MCV RBC AUTO: 84 FL (ref 80–100)
MONOCYTES # BLD AUTO: 0.5 THOUSAND/ΜL (ref 0.2–0.9)
MONOCYTES NFR BLD AUTO: 8 % (ref 1–10)
NEUTROPHILS # BLD AUTO: 4.1 THOUSANDS/ΜL (ref 1.8–7.8)
NEUTS SEG NFR BLD AUTO: 63 % (ref 45–65)
NT-PROBNP SERPL-MCNC: 94 PG/ML (ref 0–299)
PLATELET # BLD AUTO: 237 THOUSANDS/UL (ref 150–450)
PMV BLD AUTO: 8.2 FL (ref 8.9–12.7)
POTASSIUM SERPL-SCNC: 4.2 MMOL/L (ref 3.6–5)
PROT SERPL-MCNC: 7.4 G/DL (ref 5.9–8.4)
RBC # BLD AUTO: 5.02 MILLION/UL (ref 4.5–5.9)
SODIUM SERPL-SCNC: 141 MMOL/L (ref 137–147)
TROPONIN I SERPL-MCNC: <0.01 NG/ML (ref 0–0.03)
TSH SERPL DL<=0.05 MIU/L-ACNC: 1.67 UIU/ML (ref 0.47–4.68)
WBC # BLD AUTO: 6.5 THOUSAND/UL (ref 4.5–11)

## 2020-02-04 PROCEDURE — 96376 TX/PRO/DX INJ SAME DRUG ADON: CPT

## 2020-02-04 PROCEDURE — 99284 EMERGENCY DEPT VISIT MOD MDM: CPT | Performed by: PHYSICIAN ASSISTANT

## 2020-02-04 PROCEDURE — 85025 COMPLETE CBC W/AUTO DIFF WBC: CPT | Performed by: PHYSICIAN ASSISTANT

## 2020-02-04 PROCEDURE — 84484 ASSAY OF TROPONIN QUANT: CPT | Performed by: PHYSICIAN ASSISTANT

## 2020-02-04 PROCEDURE — 36415 COLL VENOUS BLD VENIPUNCTURE: CPT | Performed by: PHYSICIAN ASSISTANT

## 2020-02-04 PROCEDURE — 99285 EMERGENCY DEPT VISIT HI MDM: CPT

## 2020-02-04 PROCEDURE — 84443 ASSAY THYROID STIM HORMONE: CPT | Performed by: PHYSICIAN ASSISTANT

## 2020-02-04 PROCEDURE — 80053 COMPREHEN METABOLIC PANEL: CPT | Performed by: PHYSICIAN ASSISTANT

## 2020-02-04 PROCEDURE — 96375 TX/PRO/DX INJ NEW DRUG ADDON: CPT

## 2020-02-04 PROCEDURE — 93005 ELECTROCARDIOGRAM TRACING: CPT

## 2020-02-04 PROCEDURE — 96365 THER/PROPH/DIAG IV INF INIT: CPT

## 2020-02-04 PROCEDURE — 83880 ASSAY OF NATRIURETIC PEPTIDE: CPT | Performed by: FAMILY MEDICINE

## 2020-02-04 PROCEDURE — 83735 ASSAY OF MAGNESIUM: CPT | Performed by: PHYSICIAN ASSISTANT

## 2020-02-04 RX ORDER — FLUTICASONE FUROATE AND VILANTEROL 100; 25 UG/1; UG/1
1 POWDER RESPIRATORY (INHALATION) DAILY
Status: DISCONTINUED | OUTPATIENT
Start: 2020-02-05 | End: 2020-02-06 | Stop reason: HOSPADM

## 2020-02-04 RX ORDER — DIGOXIN 0.25 MG/ML
250 INJECTION INTRAMUSCULAR; INTRAVENOUS ONCE
Status: COMPLETED | OUTPATIENT
Start: 2020-02-04 | End: 2020-02-04

## 2020-02-04 RX ORDER — ATORVASTATIN CALCIUM 20 MG/1
20 TABLET, FILM COATED ORAL DAILY
Status: DISCONTINUED | OUTPATIENT
Start: 2020-02-05 | End: 2020-02-06 | Stop reason: HOSPADM

## 2020-02-04 RX ORDER — ASPIRIN 81 MG/1
81 TABLET ORAL DAILY
Status: DISCONTINUED | OUTPATIENT
Start: 2020-02-05 | End: 2020-02-04

## 2020-02-04 RX ORDER — LISINOPRIL 10 MG/1
10 TABLET ORAL DAILY
Status: DISCONTINUED | OUTPATIENT
Start: 2020-02-05 | End: 2020-02-04

## 2020-02-04 RX ORDER — DILTIAZEM HYDROCHLORIDE 5 MG/ML
15 INJECTION INTRAVENOUS ONCE
Status: COMPLETED | OUTPATIENT
Start: 2020-02-04 | End: 2020-02-04

## 2020-02-04 RX ORDER — ACETAMINOPHEN 325 MG/1
975 TABLET ORAL EVERY 8 HOURS PRN
Status: DISCONTINUED | OUTPATIENT
Start: 2020-02-04 | End: 2020-02-06 | Stop reason: HOSPADM

## 2020-02-04 RX ORDER — ALBUTEROL SULFATE 90 UG/1
2 AEROSOL, METERED RESPIRATORY (INHALATION) EVERY 4 HOURS PRN
Status: DISCONTINUED | OUTPATIENT
Start: 2020-02-04 | End: 2020-02-06 | Stop reason: HOSPADM

## 2020-02-04 RX ORDER — KETOROLAC TROMETHAMINE 30 MG/ML
30 INJECTION, SOLUTION INTRAMUSCULAR; INTRAVENOUS ONCE
Status: COMPLETED | OUTPATIENT
Start: 2020-02-04 | End: 2020-02-04

## 2020-02-04 RX ORDER — KETOROLAC TROMETHAMINE 30 MG/ML
30 INJECTION, SOLUTION INTRAMUSCULAR; INTRAVENOUS ONCE
Status: DISCONTINUED | OUTPATIENT
Start: 2020-02-04 | End: 2020-02-04

## 2020-02-04 RX ADMIN — DILTIAZEM HYDROCHLORIDE 5 MG/HR: 5 INJECTION INTRAVENOUS at 19:23

## 2020-02-04 RX ADMIN — APIXABAN 5 MG: 5 TABLET, FILM COATED ORAL at 22:43

## 2020-02-04 RX ADMIN — KETOROLAC TROMETHAMINE 30 MG: 30 INJECTION, SOLUTION INTRAMUSCULAR at 19:19

## 2020-02-04 RX ADMIN — DILTIAZEM HYDROCHLORIDE 15 MG: 5 INJECTION INTRAVENOUS at 18:44

## 2020-02-04 RX ADMIN — DIGOXIN 250 MCG: 0.25 INJECTION INTRAMUSCULAR; INTRAVENOUS at 22:42

## 2020-02-04 RX ADMIN — DILTIAZEM HYDROCHLORIDE 15 MG/HR: 5 INJECTION INTRAVENOUS at 22:48

## 2020-02-04 NOTE — LETTER
[address] [facility]  [address] [place] [id number]  [phone]  Dept: [phone]    February 6, 2020     Patient: Simi Villasenor  YOB: 1965   Date of Visit: 2/4/2020       To Whom it May Concern:    Jason Gonzalez is under my professional care  He was seen in the hospital from 2/4/2020   to 02/06/20  He may return to work on 2/10/2020 without limitations  If you have any questions or concerns, please don't hesitate to call           Sincerely,          Ozzie Peabody, MD

## 2020-02-05 PROBLEM — M25.519 SHOULDER PAIN: Status: ACTIVE | Noted: 2018-03-14

## 2020-02-05 PROBLEM — I48.91 ATRIAL FIBRILLATION WITH RVR (HCC): Status: ACTIVE | Noted: 2020-02-05

## 2020-02-05 LAB
AMPHETAMINES SERPL QL SCN: NEGATIVE
ANION GAP SERPL CALCULATED.3IONS-SCNC: 6 MMOL/L (ref 5–14)
ANION GAP SERPL CALCULATED.3IONS-SCNC: 6 MMOL/L (ref 5–14)
APTT PPP: 29 SECONDS (ref 25–32)
ATRIAL RATE: 234 BPM
ATRIAL RATE: 66 BPM
BARBITURATES UR QL: NEGATIVE
BASOPHILS # BLD AUTO: 0 THOUSANDS/ΜL (ref 0–0.1)
BASOPHILS NFR BLD AUTO: 1 % (ref 0–1)
BENZODIAZ UR QL: NEGATIVE
BUN SERPL-MCNC: 16 MG/DL (ref 5–25)
BUN SERPL-MCNC: 16 MG/DL (ref 5–25)
CALCIUM SERPL-MCNC: 8.8 MG/DL (ref 8.4–10.2)
CALCIUM SERPL-MCNC: 8.9 MG/DL (ref 8.4–10.2)
CHLORIDE SERPL-SCNC: 102 MMOL/L (ref 97–108)
CHLORIDE SERPL-SCNC: 106 MMOL/L (ref 97–108)
CO2 SERPL-SCNC: 25 MMOL/L (ref 22–30)
CO2 SERPL-SCNC: 26 MMOL/L (ref 22–30)
COCAINE UR QL: NEGATIVE
CREAT SERPL-MCNC: 0.85 MG/DL (ref 0.7–1.5)
CREAT SERPL-MCNC: 0.87 MG/DL (ref 0.7–1.5)
EOSINOPHIL # BLD AUTO: 0.1 THOUSAND/ΜL (ref 0–0.4)
EOSINOPHIL NFR BLD AUTO: 2 % (ref 0–6)
ERYTHROCYTE [DISTWIDTH] IN BLOOD BY AUTOMATED COUNT: 13.8 %
GFR SERPL CREATININE-BSD FRML MDRD: 99 ML/MIN/1.73SQ M
GFR SERPL CREATININE-BSD FRML MDRD: 99 ML/MIN/1.73SQ M
GLUCOSE SERPL-MCNC: 142 MG/DL (ref 70–99)
GLUCOSE SERPL-MCNC: 154 MG/DL (ref 70–99)
HCT VFR BLD AUTO: 40.6 % (ref 41–53)
HGB BLD-MCNC: 13.4 G/DL (ref 13.5–17.5)
INR PPP: 0.9 (ref 0.91–1.09)
LYMPHOCYTES # BLD AUTO: 1.4 THOUSANDS/ΜL (ref 0.5–4)
LYMPHOCYTES NFR BLD AUTO: 27 % (ref 25–45)
MAGNESIUM SERPL-MCNC: 1.8 MG/DL (ref 1.6–2.3)
MCH RBC QN AUTO: 28.3 PG (ref 26–34)
MCHC RBC AUTO-ENTMCNC: 33.1 G/DL (ref 31–36)
MCV RBC AUTO: 86 FL (ref 80–100)
METHADONE UR QL: NEGATIVE
MONOCYTES # BLD AUTO: 0.4 THOUSAND/ΜL (ref 0.2–0.9)
MONOCYTES NFR BLD AUTO: 8 % (ref 1–10)
NEUTROPHILS # BLD AUTO: 3.2 THOUSANDS/ΜL (ref 1.8–7.8)
NEUTS SEG NFR BLD AUTO: 62 % (ref 45–65)
OPIATES UR QL SCN: NEGATIVE
P AXIS: 69 DEGREES
PCP UR QL: NEGATIVE
PLATELET # BLD AUTO: 208 THOUSANDS/UL (ref 150–450)
PMV BLD AUTO: 8.2 FL (ref 8.9–12.7)
POTASSIUM SERPL-SCNC: 4 MMOL/L (ref 3.6–5)
POTASSIUM SERPL-SCNC: 4.1 MMOL/L (ref 3.6–5)
PR INTERVAL: 154 MS
PROTHROMBIN TIME: 10 SECONDS (ref 9.8–12)
QRS AXIS: 22 DEGREES
QRS AXIS: 29 DEGREES
QRSD INTERVAL: 86 MS
QRSD INTERVAL: 88 MS
QT INTERVAL: 312 MS
QT INTERVAL: 402 MS
QTC INTERVAL: 421 MS
QTC INTERVAL: 469 MS
RBC # BLD AUTO: 4.74 MILLION/UL (ref 4.5–5.9)
SODIUM SERPL-SCNC: 134 MMOL/L (ref 137–147)
SODIUM SERPL-SCNC: 137 MMOL/L (ref 137–147)
T WAVE AXIS: 24 DEGREES
T WAVE AXIS: 28 DEGREES
THC UR QL: NEGATIVE
VENTRICULAR RATE: 136 BPM
VENTRICULAR RATE: 66 BPM
WBC # BLD AUTO: 5.2 THOUSAND/UL (ref 4.5–11)

## 2020-02-05 PROCEDURE — 93010 ELECTROCARDIOGRAM REPORT: CPT | Performed by: INTERNAL MEDICINE

## 2020-02-05 PROCEDURE — NC001 PR NO CHARGE: Performed by: FAMILY MEDICINE

## 2020-02-05 PROCEDURE — 80048 BASIC METABOLIC PNL TOTAL CA: CPT | Performed by: FAMILY MEDICINE

## 2020-02-05 PROCEDURE — 99223 1ST HOSP IP/OBS HIGH 75: CPT | Performed by: FAMILY MEDICINE

## 2020-02-05 PROCEDURE — 93005 ELECTROCARDIOGRAM TRACING: CPT

## 2020-02-05 PROCEDURE — 90682 RIV4 VACC RECOMBINANT DNA IM: CPT | Performed by: FAMILY MEDICINE

## 2020-02-05 PROCEDURE — 85025 COMPLETE CBC W/AUTO DIFF WBC: CPT | Performed by: FAMILY MEDICINE

## 2020-02-05 PROCEDURE — 80307 DRUG TEST PRSMV CHEM ANLYZR: CPT | Performed by: FAMILY MEDICINE

## 2020-02-05 PROCEDURE — 85610 PROTHROMBIN TIME: CPT | Performed by: FAMILY MEDICINE

## 2020-02-05 PROCEDURE — 83735 ASSAY OF MAGNESIUM: CPT | Performed by: FAMILY MEDICINE

## 2020-02-05 PROCEDURE — 90471 IMMUNIZATION ADMIN: CPT | Performed by: FAMILY MEDICINE

## 2020-02-05 PROCEDURE — 99221 1ST HOSP IP/OBS SF/LOW 40: CPT | Performed by: INTERNAL MEDICINE

## 2020-02-05 PROCEDURE — 85730 THROMBOPLASTIN TIME PARTIAL: CPT | Performed by: FAMILY MEDICINE

## 2020-02-05 RX ORDER — DILTIAZEM HYDROCHLORIDE 240 MG/1
240 CAPSULE, COATED, EXTENDED RELEASE ORAL DAILY
Status: DISCONTINUED | OUTPATIENT
Start: 2020-02-05 | End: 2020-02-06 | Stop reason: HOSPADM

## 2020-02-05 RX ADMIN — DILTIAZEM HYDROCHLORIDE 30 MG: 30 TABLET, FILM COATED ORAL at 03:26

## 2020-02-05 RX ADMIN — Medication 800 MG: at 20:07

## 2020-02-05 RX ADMIN — METFORMIN HYDROCHLORIDE 1000 MG: 500 TABLET ORAL at 18:12

## 2020-02-05 RX ADMIN — METFORMIN HYDROCHLORIDE 1000 MG: 500 TABLET ORAL at 07:54

## 2020-02-05 RX ADMIN — ATORVASTATIN CALCIUM 20 MG: 20 TABLET, FILM COATED ORAL at 09:08

## 2020-02-05 RX ADMIN — NICOTINE 1 PATCH: 7 PATCH, EXTENDED RELEASE TRANSDERMAL at 09:08

## 2020-02-05 RX ADMIN — APIXABAN 5 MG: 5 TABLET, FILM COATED ORAL at 09:08

## 2020-02-05 RX ADMIN — INFLUENZA A VIRUS A/BRISBANE/02/2018 (H1N1) RECOMBINANT HEMAGGLUTININ ANTIGEN, INFLUENZA A VIRUS A/KANSAS/14/2017 (H3N2) RECOMBINANT HEMAGGLUTININ ANTIGEN, INFLUENZA B VIRUS B/PHUKET/3073/2013 RECOMBINANT HEMAGGLUTININ ANTIGEN, AND INFLUENZA B VIRUS B/MARYLAND/15/2016 RECOMBINANT HEMAGGLUTININ ANTIGEN 0.5 ML: 45; 45; 45; 45 INJECTION INTRAMUSCULAR at 05:08

## 2020-02-05 RX ADMIN — DILTIAZEM HYDROCHLORIDE 240 MG: 240 CAPSULE, EXTENDED RELEASE ORAL at 09:08

## 2020-02-05 RX ADMIN — FLUTICASONE FUROATE AND VILANTEROL TRIFENATATE 1 PUFF: 100; 25 POWDER RESPIRATORY (INHALATION) at 07:53

## 2020-02-05 RX ADMIN — ACETAMINOPHEN 975 MG: 325 TABLET ORAL at 20:10

## 2020-02-05 NOTE — ED NOTES
Cardizem drip started at 5mg/hour IV, will titrate for rate control       Lennox Maas, RN  02/04/20 9799

## 2020-02-05 NOTE — ED PROVIDER NOTES
History  Chief Complaint   Patient presents with    Shoulder Pain     left arm pain and numbness over past 2 years; had an injection the first of January for it; 2 weeks ago the left shoulder started hurting; states having trouble now with sleeping due to the pain    Rapid Heart Rate     My patient is a 60-year-old male who presents today for left arm pain and numbness that has been present over the past 2 weeks  Patient reports he did not take anything for pain today  He denies any chest pain or shortness of breath  No nausea, vomiting, or diarrhea  It was noted that patient had an elevated heart rate of 150 upon presentation to the emergency department  EKG revealed atrial fibrillation with RVR  This is new onset  Patient is completely asymptomatic  Blood pressure normal           Prior to Admission Medications   Prescriptions Last Dose Informant Patient Reported? Taking? Alcohol Swabs (ALCOHOL PREP) 70 % PADS Past Week at Unknown time Self Yes Yes   Sig: daily Test   Alcohol Swabs (ALCOHOL PREP) PADS Past Week at Unknown time  Yes Yes   Sig: TEST DAILY   Lancets MISC Past Week at Unknown time Self Yes Yes   Sig: Use to test blood glucose once a day  Dx: Type 2 DM  E11 9   Misc  Devices MISC Past Week at Unknown time  No Yes   Sig: by Does not apply route daily   Urine Diagnostic Calibration STRP Past Week at Unknown time Self Yes Yes   Sig: Use to test blood glucose once a day  Dx: Type 2 DM   E11 9   albuterol (PROVENTIL HFA,VENTOLIN HFA) 90 mcg/act inhaler Past Week at Unknown time  No Yes   Sig: Inhale 2 puffs every 4 (four) hours as needed for wheezing   aspirin (ASPIRIN EC ADULT LOW STRENGTH) 81 mg EC tablet 2/4/2020 at 0900  No Yes   Sig: Take 1 tablet (81 mg total) by mouth daily   atorvastatin (LIPITOR) 20 mg tablet 2/4/2020 at 0900  No Yes   Sig: Take 1 tablet (20 mg total) by mouth daily   fluticasone-vilanterol (BREO ELLIPTA) 100-25 mcg/inh inhaler 2/4/2020 at 0900  No Yes   Sig: Inhale 1 puff daily Rinse mouth after use  lisinopril (ZESTRIL) 10 mg tablet 2/4/2020 at 0900  No Yes   Sig: Take 1 tablet (10 mg total) by mouth daily   metFORMIN (GLUCOPHAGE) 1000 MG tablet 2/4/2020 at 0900  No Yes   Sig: Take 1 tablet (1,000 mg total) by mouth 2 (two) times a day with meals   miconazole (MICOTIN) 2 % powder Not Taking at Unknown time  No No   Sig: Apply topically as needed for itching Apply to affected area twice daily for 28 days  Patient not taking: Reported on 2/4/2020   nicotine (NICODERM CQ) 7 mg/24hr TD 24 hr patch Not Taking at Unknown time  No No   Sig: Place 1 patch on the skin daily   Patient not taking: Reported on 2/4/2020   tadalafil (CIALIS) 20 MG tablet Past Week at Unknown time  No Yes   Sig: Take 1 tablet (20 mg total) by mouth daily as needed for erectile dysfunction      Facility-Administered Medications: None       Past Medical History:   Diagnosis Date    Asthma     Carpal tunnel syndrome     Chronic pain     left arm    Diabetes mellitus ([address] )     Erectile dysfunction     Hypertension     Umbilical hernia        Past Surgical History:   Procedure Laterality Date    CHOLECYSTECTOMY      CHOLECYSTECTOMY LAPAROSCOPIC N/A 6/20/2019    Procedure: CHOLECYSTECTOMY LAPAROSCOPIC;  Surgeon: Alma Padron MD;  Location: [address] [place] [address];  Service: General    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    HAND SURGERY      UMBILICAL HERNIA REPAIR         Family History   Problem Relation Age of Onset    Breast cancer additional onset Mother     Diabetes Father     Hyperlipidemia Father     Hypertension Father     Seizures Father     No Known Problems Son     No Known Problems Son     Vitiligo Son      I have reviewed and agree with the history as documented      Social History     Tobacco Use    Smoking status: Current Every Day Smoker     Packs/day: 0 25     Types: Cigarettes    Smokeless tobacco: Never Used   Substance Use Topics    Alcohol use: Yes     Frequency: 2-3 times a week     Drinks per session: 1 or 2     Binge frequency: Never     Comment: occ    Drug use: No        Review of Systems   Constitutional: Negative  HENT: Negative  Eyes: Negative  Respiratory: Negative  Negative for chest tightness  Cardiovascular: Negative  Gastrointestinal: Negative  Endocrine: Negative  Genitourinary: Negative  Musculoskeletal: Positive for arthralgias and myalgias  Allergic/Immunologic: Negative  Neurological: Negative  Hematological: Negative  Psychiatric/Behavioral: Negative  Physical Exam  Physical Exam   Constitutional: He appears well-developed and well-nourished  HENT:   Head: Normocephalic  Cardiovascular: Normal rate  No murmur heard  Irregular rate and rhythm   Pulmonary/Chest: Effort normal and breath sounds normal    Abdominal: Soft  Musculoskeletal:   Patient has full range of motion of left shoulder  He reports tenderness anteriorly and posteriorly  Plus two radial pulse present  Strength intact  Skin: Skin is warm  Capillary refill takes less than 2 seconds  Psychiatric: He has a normal mood and affect   His behavior is normal  Judgment and thought content normal        Vital Signs  ED Triage Vitals [02/04/20 1823]   Temperature Pulse Respirations Blood Pressure SpO2   99 1 °F (37 3 °C) (!) 120 18 138/81 99 %      Temp Source Heart Rate Source Patient Position - Orthostatic VS BP Location FiO2 (%)   Tympanic Monitor Sitting Right arm --      Pain Score       7           Vitals:    02/04/20 1823   BP: 138/81   Pulse: (!) 120   Patient Position - Orthostatic VS: Sitting         Visual Acuity      ED Medications  Medications   diltiazem (CARDIZEM) injection 15 mg (15 mg Intravenous Given 2/4/20 1844)   diltiazem (CARDIZEM) 125 mg in sodium chloride 0 9 % 125 mL infusion (10 mg/hr Intravenous Rate/Dose Change 2/4/20 1944)   ketorolac (TORADOL) injection 30 mg (30 mg Intravenous Given 2/4/20 1919)       Diagnostic Studies  Results Reviewed     Procedure Component Value Units Date/Time    TSH [[id number]]  (Normal) Collected:  02/04/20 1843    Lab Status:  Final result Specimen:  Blood from Arm, Left Updated:  02/04/20 1940     TSH 3RD GENERATON 1 670 uIU/mL     Narrative:       Patients undergoing fluorescein dye angiography may retain small amounts of fluorescein in the body for 48-72 hours post procedure  Samples containing fluorescein can produce falsely depressed TSH values  If the patient had this procedure,a specimen should be resubmitted post fluorescein clearance        Troponin I [[id number]]  (Normal) Collected:  02/04/20 1843    Lab Status:  Final result Specimen:  Blood from Arm, Left Updated:  02/04/20 1912     Troponin I <0 01 ng/mL     Magnesium [[id number]]  (Normal) Collected:  02/04/20 1843    Lab Status:  Final result Specimen:  Blood from Arm, Left Updated:  02/04/20 1901     Magnesium 1 9 mg/dL     Comprehensive metabolic panel [[id number]]  (Abnormal) Collected:  02/04/20 1843    Lab Status:  Final result Specimen:  Blood from Arm, Left Updated:  02/04/20 1901     Sodium 141 mmol/L      Potassium 4 2 mmol/L      Chloride 103 mmol/L      CO2 27 mmol/L      ANION GAP 11 mmol/L      BUN 17 mg/dL      Creatinine 0 90 mg/dL      Glucose 119 mg/dL      Calcium 9 4 mg/dL      AST 18 U/L      ALT 19 U/L      Alkaline Phosphatase 44 U/L      Total Protein 7 4 g/dL      Albumin 4 2 g/dL      Total Bilirubin 0 30 mg/dL      eGFR 97 ml/min/1 73sq m     Narrative:       [facility] guidelines for Chronic Kidney Disease (CKD):     Stage 1 with normal or high GFR (GFR > 90 mL/min/1 73 square meters)    Stage 2 Mild CKD (GFR = 60-89 mL/min/1 73 square meters)    Stage 3A Moderate CKD (GFR = 45-59 mL/min/1 73 square meters)    Stage 3B Moderate CKD (GFR = 30-44 mL/min/1 73 square meters)    Stage 4 Severe CKD (GFR = 15-29 mL/min/1 73 square meters)    Stage 5 End Stage CKD (GFR <15 mL/min/1 73 square meters)  Note: GFR calculation is accurate only with a steady state creatinine    CBC and differential [[id number]]  (Abnormal) Collected:  02/04/20 1843    Lab Status:  Final result Specimen:  Blood from Arm, Left Updated:  02/04/20 1852     WBC 6 50 Thousand/uL      RBC 5 02 Million/uL      Hemoglobin 14 1 g/dL      Hematocrit 42 2 %      MCV 84 fL      MCH 28 0 pg      MCHC 33 3 g/dL      RDW 13 7 %      MPV 8 2 fL      Platelets 672 Thousands/uL      Neutrophils Relative 63 %      Lymphocytes Relative 28 %      Monocytes Relative 8 %      Eosinophils Relative 1 %      Basophils Relative 0 %      Neutrophils Absolute 4 10 Thousands/µL      Lymphocytes Absolute 1 80 Thousands/µL      Monocytes Absolute 0 50 Thousand/µL      Eosinophils Absolute 0 10 Thousand/µL      Basophils Absolute 0 00 Thousands/µL                  No orders to display              Procedures  ECG 12 Lead Documentation Only  Date/Time: 2/4/2020 8:10 PM  Performed by: Kamryn Navarro PA-C  Authorized by: Kamryn Navarro PA-C     Indications / Diagnosis:  AF with RVR  Patient location:  ED  Rate:     ECG rate:  136    ECG rate assessment: tachycardic    Rhythm:     Rhythm: atrial fibrillation               ED Course                               MDM  Number of Diagnoses or Management Options  Atrial fibrillation with RVR [facility]):   Diagnosis management comments: Patient is a 58-year-old male who presents today for evaluation of left shoulder pain  While in the emergency department was noted that he was in atrial fibrillation with RVR  Patient was given Cardizem bolus of 10 mg with minimal to no effect  He was started on a Cardizem drip with titration recommendations to be heart rate less than 100  Patient has a chads 2 Vasc score of 3  He will need to be anticoagulated  Labs are unremarkable  TSH negative  Magnesium within normal limits  Patient admitted to [facility]          Disposition  Final diagnoses:   Atrial fibrillation with RVR ([facility] [address] )     Time reflects when diagnosis was documented in both MDM as applicable and the Disposition within this note     Time User Action Codes Description Comment    2/4/2020  7:53 PM Adrienne Michaels Add [I48 91] Atrial fibrillation with RVR [facility])       ED Disposition     ED Disposition Condition Date/Time Comment    Admit Stable Tue Feb 4, 2020  7:53 PM Case was discussed with Family Medicine and the patient's admission status was agreed to be Admission Status: inpatient status to the service of Dr Tracey Alcazar   Follow-up Information    None         Patient's Medications   Discharge Prescriptions    No medications on file     No discharge procedures on file      ED Provider  Electronically Signed by           Terry Warner PA-C  02/06/20 2058

## 2020-02-05 NOTE — ASSESSMENT & PLAN NOTE
Acute onset 1 5 months ago, progressively worsening   Differential diagnoses: subacromial bursitis vs arthritis vs rotator cuff tendinopathy vs adhesive capsulitis vs bicep tendinopathy  Rotator cuff tear and fracture less likely given no history of injury or falls   Follows with orthopedic surgeon, most recent visit 01/16/2020 for left hand and shoulder pain with diagnostic and therapeutic treatment with carpal tunnel injection to treat carpal tunnel syndrome and shoulder pain  Pain control: Tylenol 975 mg Q8hrs PRN   Consider shoulder X-ray and PT outpatient   Follow up outpatient with orthopedics

## 2020-02-05 NOTE — ASSESSMENT & PLAN NOTE
Lab Results   Component Value Date    HGBA1C 6 4 01/13/2020       Blood Sugar Average: Last 72 hrs:     Well-controlled;  Continue home regimen of metformin 1000 mg BID  Diabetic diet

## 2020-02-05 NOTE — ASSESSMENT & PLAN NOTE
Current tobacco use 5 cigarettes/day  Discussed smoking cessation benefits    Continue to hold Nicotine patch

## 2020-02-05 NOTE — UTILIZATION REVIEW
Initial Clinical Review    Admission: Date/Time/Statement: Admission Orders (From admission, onward)     Ordered        02/04/20 2020  Inpatient Admission (expected length of stay for this patient Order details is greater than two midnights)  Once         02/04/20 1954  Inpatient Admission (expected length of stay for this patient Order details is greater than two midnights)  Once                   02/04/20 2021  Inpatient Admission (expected length of stay for this patient Order details is greater than two midnights) Once     Transfer Service: General Medicine       Question Answer Comment   Admitting Physician PERLA GREEN    Level of Care Level 2 Stepdown / HOT    Estimated length of stay More than 2 Midnights    Certification I certify that inpatient services are medically necessary for this patient for a duration of greater than two midnights  See H&P and MD Progress Notes for additional information about the patient's course of treatment  02/04/20 2020         ED Arrival Information     Expected Arrival Acuity Means of Arrival Escorted By Service Admission Type    - 2/4/2020 18:08 Urgent Walk-In Self General Medicine Urgent    Arrival Complaint    shoulder/arm pain        Chief Complaint   Patient presents with    Shoulder Pain     left arm pain and numbness over past 2 years; had an injection the first of January for it; 2 weeks ago the left shoulder started hurting; states having trouble now with sleeping due to the pain    Rapid Heart Rate     Assessment/Plan:    49  Y O male  Presents to ED from home  With left shoulder pain for  Past  1 1/2 months, getting progressively  Worse the pat week  States he has  Tingling, stiffness, restricted ROM and weakness of  Left shoulder  Denies  Falls or  Injury  His  Job  Does  Involve  Physical work  Did  Have  An injection in early   January   To left shoulder  PMH  Is  Essential hypertension, DM2, tobacco use and mild asthma    In ED, patient  Found with  New onset atrial fibrillation with  RVR, heart rate in the  150's  On EKG  Labs  Normal, troponin and  BNP  Normal     Patient  Totally  Asymptomatic  States he  Took an increased does  Of  cialis  The am of  Admission,  40 mg  Instead of 20 mg   Given loading  Dose  Of  [facility] ED with minimal effect  Started on  cardizem drip, continued with uncontrolled HR in the  120-140 range  Admit  IP   With  New onset atrial fibrillation with  RVR  And   Left shoulder pain and plan is  Continue cardizem drip,  Consult cardiology, repeat  EKG,  Monitor  Labs and  Vital signs, possible  LAURENCE,  Hold  aspirin  For now  Due to bleeding risk and  Pain control for  Shoulder  Per  Cardiology  Consult:     Found with  Atrial fibrillation with  RVR,  Now in normal sinus rhythm  Start  Po cardizem daily  Cardizem  Drip  Now  D/c  Continue to  Monitor heart  Rate      ED Triage Vitals [02/04/20 1823]   Temperature Pulse Respirations Blood Pressure SpO2   99 1 °F (37 3 °C) (!) 120 18 138/81 99 %      Temp Source Heart Rate Source Patient Position - Orthostatic VS BP Location FiO2 (%)   Tympanic Monitor Sitting Right arm --      Pain Score       7        Wt Readings from Last 1 Encounters:   02/04/20 117 kg (257 lb 15 oz)     Additional Vital Signs:   02/05/20 1200    61  25Abnormal   140/81  100  100 %  None (Room air)     02/05/20 1100    57  19  127/74  91  97 %       02/05/20 0859    63  29Abnormal   127/86  99  97 %       02/05/20 0757    66  22  114/77  89  95 %       02/05/20 0700  97 3 °F (36 3 °C)Abnormal   57  19  119/61  80  96 %  None (Room air)  Lying   02/05/20 0326        117/58           02/05/20 0300    61  19  117/58  77  94 %       02/05/20 0200  97 6 °F (36 4 °C)  69  19  105/63  77  96 %    Lying   02/05/20 0100    90  18  113/77  89  92 %       02/05/20 0000    126Abnormal   14  124/61  82  98 %  None (Room air)  Sitting   02/04/20 2213  97 2 °F (36 2 °C)Abnormal 136Abnormal   14  142/81  101  95 %  None (Room air)  Lying   02/04/20 2200  97 2 °F (36 2 °C)Abnormal       Milderd Fast       02/04/20 2145    128Abnormal   7Abnormal   99/51    97 %       02/04/20 2130    144Abnormal   21  93/58    98 %       02/04/20 2115    128Abnormal   20  119/51    98 %       02/04/20 1900              None (Room air)     02/04/20 1823  99 1 °F (37 3 °C)  120Abnormal   18  138/81    99 %  None (Room air)  Sitting         Pertinent Labs/Diagnostic Test Results:   Results from last 7 days   Lab Units 02/05/20  0508 02/04/20  1843   WBC Thousand/uL 5 20 6 50   HEMOGLOBIN g/dL 13 4* 14 1   HEMATOCRIT % 40 6* 42 2   PLATELETS Thousands/uL 208 237   NEUTROS ABS Thousands/µL 3 20 4 10         Results from last 7 days   Lab Units 02/05/20  0508 02/04/20  1843   SODIUM mmol/L 137 141   POTASSIUM mmol/L 4 0 4 2   CHLORIDE mmol/L 106 103   CO2 mmol/L 25 27   ANION GAP mmol/L 6 11   BUN mg/dL 16 17   CREATININE mg/dL 0 85 0 90   EGFR ml/min/1 73sq m 99 97   CALCIUM mg/dL 8 8 9 4   MAGNESIUM mg/dL  --  1 9     Results from last 7 days   Lab Units 02/04/20  1843   AST U/L 18   ALT U/L 19   ALK PHOS U/L 44   TOTAL PROTEIN g/dL 7 4   ALBUMIN g/dL 4 2   TOTAL BILIRUBIN mg/dL 0 30         Results from last 7 days   Lab Units 02/05/20  0508 02/04/20  1843   GLUCOSE RANDOM mg/dL 142* 119*           Results from last 7 days   Lab Units 02/04/20  1843   TROPONIN I ng/mL <0 01         Results from last 7 days   Lab Units 02/05/20  0508   PROTIME seconds 10 0   INR  0 90*   PTT seconds 29     Results from last 7 days   Lab Units 02/04/20  1843   TSH 3RD GENERATON uIU/mL 1 670                     Results from last 7 days   Lab Units 02/04/20  1843   NT-PRO BNP pg/mL 94 0         ED Treatment:   Medication Administration from 02/04/2020 1808 to 02/04/2020 2209       Date/Time Order Dose Route Action Comments     02/04/2020 1844 diltiazem (CARDIZEM) injection 15 mg 15 mg Intravenous Given 02/04/2020 2022 diltiazem (CARDIZEM) 125 mg in sodium chloride 0 9 % 125 mL infusion 15 mg/hr Intravenous Rate/Dose Change /72 and heart rate of 140     02/04/2020 1944 diltiazem (CARDIZEM) 125 mg in sodium chloride 0 9 % 125 mL infusion 10 mg/hr Intravenous Rate/Dose Change /62,      02/04/2020 1923 diltiazem (CARDIZEM) 125 mg in sodium chloride 0 9 % 125 mL infusion 5 mg/hr Intravenous New Bag      02/04/2020 1919 ketorolac (TORADOL) injection 30 mg 30 mg Intravenous Given         Present on Admission:   Atrial fibrillation with RVR ([facility])   Shoulder pain   Essential hypertension   Other male erectile dysfunction   Other hyperlipidemia   Mild persistent asthma   Type 2 diabetes mellitus without complication, without long-term current use of insulin ([facility])   Tobacco dependence      Admitting Diagnosis: Rapid heart rate [R00 0]  Shoulder pain [M25 519]  New onset a-fib ([facility] [address] ) [I48 91]  At[facility] fibrillation with RVR ([facility] ) [I48 91]  Age/Sex: 49 y o  male  Admission Orders:  Scheduled Medications:    Medications:  apixaban 5 mg Oral BID   atorvastatin 20 mg Oral Daily   diltiazem 240 mg Oral Daily   fluticasone-vilanterol 1 puff Inhalation Daily   metFORMIN 1,000 mg Oral BID With Meals   nicotine 1 patch Transdermal Daily     IV DIG   x1  DOSE    2/4    Continuous IV Infusions:  cardizem drip  D/c   2/5  @   0646     PRN Meds:    acetaminophen 975 mg Oral Q8H PRN   albuterol 2 puff Inhalation Q4H PRN       IP CONSULT TO CARDIOLOGY   tele    Network Utilization Review Department  [email or web address]  ATTENTION: Please call with any questions or concerns to [phone] and carefully listen to the prompts so that you are directed to the right person   All voicemails are confidential   Lalo Ortega all requests for admission clinical reviews, approved or denied determinations and any other requests to dedicated fax number below belonging to the campus where the patient is receiving treatment   List of dedicated fax numbers for the Facilities:  [address] DENIALS (Administrative/Medical Necessity) [phone]   [address] (Maternity/NICU/Pediatrics) [phone]   Thiago Harris [phone]   Ruddyl Dominic [phone]   Sam Wilson [phone]   Farazna Jaime [phone]   [address] [facility] [address] [facility] [phone]   [facility]  [phone]   [address] [facility]  [address] [facility] [address] [facility] [phone]

## 2020-02-05 NOTE — NURSING NOTE
Transfer Note  Patient arrived on unit alert and oriented times 4  Pleasant and cooperative  Oriented to the room with call bell and telephone within reach  Stated shoulder pain at level 4  All safety maintained  Will continue to monitor

## 2020-02-05 NOTE — ASSESSMENT & PLAN NOTE
Well-controlled   BP goal <140/90  Home regimen: Lisinopril 10 mg daily   Patient started on Cardizem 240 mg daily for new onset A-fib  Continue to hold lisinopril given the well controlled-blood pressures

## 2020-02-05 NOTE — ASSESSMENT & PLAN NOTE
New onset, unclear etiology, suspicious for sleep apnea, hypertension or idiopathic cause  Thyroid disease, excessive alcohol intake, and recent infection have been ruled out  UDS currently pending  Recent echocardiogram on 1/15/2020 shows normal LV size and systolic and diastolic function with an EF around 61% with mild biatrial enlargement  Patient was started on Cardizem drip with maximal dosing and received one time dose of digoxin 250 mcg IV with conversion to NSR at rate ranging from 50-70 through the morning  Cardiology consulted an started patient on Cardizem 240 mg PO daily - appreciate all input   - Continue Cardizem 240 mg daily  - Continue Eliquis 5 mg BID  - Overnight telemetry to monitor HR  If patient tolerating cardizem PO and HR above >50 bmp, then discharge in the AM   - Follow-up with cardiology out patient in 2 weeks  - Chest pain described in HPI not likely cardiogenic in etiology as it only lasts less than a minute      [id number]- VASC score: 2 points, moderate-high risk (2 9 % risk of stroke/TIA/systemic embolism)  HAS-BLED score: 1 point , relatively low risk of major bleeding

## 2020-02-05 NOTE — CONSULTS
Consultation - Cardiology   Abdoulaye Guerrero  49 y o  male MRN: [id number]  Unit/Bed#:  Encounter: [id number]    Physician Requesting Consult: Saintclair Speed, MD  Reason for Consult / Principal Problem:    Atrial fibrillationConsulting physician:  Braulio Irene MD      Assessment/Plan     Assessment:  1  Atrial fibrillation with a rapid ventricular response now normal sinus rhythm  2  Orthopedic problem of left shoulder, possibly biceps tendinitis    Plan:  1  Begin Cardizem  mg daily  2  if patient is tolerating the medication and heart rate is above 50 bpm, patient may be discharged this afternoon  3  Would be willing to see patient in the office for follow-up in several weeks  Call my office for an appointment  History of Present Illness   HPI: Abdoulaye Guerrero  is a 49y o  year old male who presented last night with left shoulder pain which appears to be orthopedic in nature  During evaluation, he was found to be in atrial fibrillation with a rapid ventricular response  He was unaware of his rapid irregular heartbeat  He was given a IV bolus of Cardizem and then was placed on a drip at 15 milligrams/hour  His heart rate remained rapid and he was administered digoxin 0 25 mg IV  He subsequently converted to normal sinus rhythm  Since patient was on a IV Cardizem drip at 15 mg an hour which calculates to 360 mg per day, will start patient on Cardizem  mg daily although if he develops atrial fibrillation, he will need additional doses to obtain good rate control  Patient was in the hospital on January 15, 2020 for shortness of breath  He was in sinus rhythm at that time  His echocardiogram demonstrated normal systolic and diastolic left ventricular function  Interestingly, he had biatrial enlargement which could be the pathology leading to atrial fibrillation    In addition, he could have had atrial fibrillation before he arrived at the hospital causing his shortness of breath but converted to normal sinus rhythm before he arrived in the emergency department for that admission  Historical Information   Past Medical History:   Diagnosis Date    Asthma     Carpal tunnel syndrome     Chronic pain     left arm    Diabetes mellitus ([address] )     Erectile dysfunction     Hypertension     Umbilical hernia      Past Surgical History:   Procedure Laterality Date    CHOLECYSTECTOMY      CHOLECYSTECTOMY LAPAROSCOPIC N/A 6/20/2019    Procedure: CHOLECYSTECTOMY LAPAROSCOPIC;  Surgeon: Adrianne Mccloud MD;  Location: [facility] MAIN OR;  Service: General    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    HAND SURGERY      UMBILICAL HERNIA REPAIR       Social History:  Social History     Substance and Sexual Activity   Alcohol Use Yes    Frequency: 2-3 times a week    Drinks per session: 1 or 2    Binge frequency: Never    Comment: occ     Social History     Substance and Sexual Activity   Drug Use No     Social History     Tobacco Use   Smoking Status Current Every Day Smoker    Packs/day: 0 25    Types: Cigarettes   Smokeless Tobacco Never Used     Family History:   family history includes Breast cancer additional onset in his mother; Diabetes in his father; Hyperlipidemia in his father; Hypertension in his father; No Known Problems in his son and son; Seizures in his father; [place] in his son      Meds/Allergies   Current Facility-Administered Medications   Medication Dose Route Frequency    acetaminophen (TYLENOL) tablet 975 mg  975 mg Oral Q8H PRN    albuterol (PROVENTIL HFA,VENTOLIN HFA) inhaler 2 puff  2 puff Inhalation Q4H PRN    apixaban (ELIQUIS) tablet 5 mg  5 mg Oral BID    atorvastatin (LIPITOR) tablet 20 mg  20 mg Oral Daily    diltiazem (CARDIZEM CD) 24 hr capsule 240 mg  240 mg Oral Daily    fluticasone-vilanterol (BREO ELLIPTA) 100-25 mcg/inh inhaler 1 puff  1 puff Inhalation Daily    metFORMIN (GLUCOPHAGE) tablet 1,000 mg  1,000 mg Oral BID With Meals    nicotine (NICODERM CQ) 7 mg/24hr TD 24 hr patch 1 patch  1 patch Transdermal Daily     No Known Allergies    Review of Systems   Constitutional: Negative  HENT: Negative  Eyes: Negative  Respiratory: Negative for chest tightness and shortness of breath  Cardiovascular: Negative for chest pain, palpitations and leg swelling  Gastrointestinal: Negative  Endocrine: Negative  Musculoskeletal:        Left shoulder and upper arm pain   Skin: Negative  Allergic/Immunologic: Negative  Neurological: Negative  Hematological: Negative  Psychiatric/Behavioral: Negative  Objective   Vitals: Blood pressure 127/86, pulse 63, temperature (!) 97 3 °F (36 3 °C), temperature source Temporal, resp  rate (!) 29, height 5' 10" (1 778 m), weight 117 kg (257 lb 15 oz), SpO2 97 %  Orthostatic Blood Pressures      Most Recent Value   Blood Pressure  127/86 filed at 02/05/2020 0152   Patient Position - Orthostatic VS  Lying filed at 02/05/2020 0700          Intake/Output Summary (Last 24 hours) at 2/5/2020 1007  Last data filed at 2/5/2020 0038  Gross per 24 hour   Intake 480 ml   Output 350 ml   Net 130 ml     Invasive Devices     Peripheral Intravenous Line            Peripheral IV 02/04/20 Left Antecubital less than 1 day                Physical Exam   Constitutional: He is oriented to person, place, and time  He appears well-developed and well-nourished  HENT:   Head: Normocephalic and atraumatic  Neck: Normal range of motion  Neck supple  No JVD present  No tracheal deviation present  Cardiovascular: Normal rate, regular rhythm, normal heart sounds and intact distal pulses  Pulmonary/Chest: Effort normal and breath sounds normal  No respiratory distress  He has no wheezes  He has no rales  Abdominal: Soft  Bowel sounds are normal    Musculoskeletal: He exhibits no edema  Neurological: He is alert and oriented to person, place, and time  Skin: Skin is warm and dry     Psychiatric: He has a normal mood and affect  His behavior is normal        Lab Results:     I have personally reviewed pertinent lab results  CBC with diff:   Results from last 7 days   Lab Units 02/05/20  0508   WBC Thousand/uL 5 20   RBC Million/uL 4 74   HEMOGLOBIN g/dL 13 4*   HEMATOCRIT % 40 6*   MCV fL 86   MCH pg 28 3   MCHC g/dL 33 1   RDW % 13 8   MPV fL 8 2*   PLATELETS Thousands/uL 208     CMP:   Results from last 7 days   Lab Units 02/05/20  0508 02/04/20  1843   SODIUM mmol/L 137 141   POTASSIUM mmol/L 4 0 4 2   CHLORIDE mmol/L 106 103   CO2 mmol/L 25 27   BUN mg/dL 16 17   CREATININE mg/dL 0 85 0 90   CALCIUM mg/dL 8 8 9 4   AST U/L  --  18   ALT U/L  --  19   ALK PHOS U/L  --  44   EGFR ml/min/1 73sq m 99 97     Troponin:   0   Lab Value Date/Time    TROPONINI <0 01 02/04/2020 1843    TROPONINI <0 01 01/14/2020 1518    TROPONINI <0 01 12/09/2019 1529    TROPONINI <0 01 08/16/2019 0756    TROPONINI <0 01 12/06/2018 1158       Imaging:   I have personally reviewed pertinent reports  EKG:  Normal sinus rhythm rate 66 beats per minute  Normal tracing

## 2020-02-05 NOTE — SOCIAL WORK
CM met with pt to introduce CM role and begin discharge planning  Pt lives with his fiance and step-son in a 3 story house  They rent a room on the 2nd floor  There are about 2STE and a flight of stairs to the 2nd floor  Pt's emergency contacts are listed as his ex-wife, Omie Councilman ([phone]) and Lindsay Cassidy ([phone])  Pt requested that his fiance be changed to first emergency contact  No designated POA  Pt reports being independent with ADLs PTA, no use of DMEs but has access to a cane  Pt reports no history of VNA, STR, inpatient MH treatment or D/A treatment  Cocaine and Marijuana use listed in pt's chart about ten years ago  Pt drives but does not have a car so he either walks to takes public transportation  Pt works  PCP is Dr Ellen Davison ([phone]) and pt uses [email or web address] for prescriptions  Pt reported that he may have trouble finding transportation home at time of discharge  CM reviewed d/c planning process including the following: identifying help at home, patient preference for d/c planning needs, Discharge Lounge, [facility] Meds to Bed program, availability of treatment team to discuss questions or concerns patient and/or family may have regarding understanding medications and recognizing signs and symptoms once discharged  CM also encouraged patient to follow up with all recommended appointments after discharge  Patient advised of importance for patient and family to participate in managing patients medical well being

## 2020-02-05 NOTE — DISCHARGE SUMMARY
[id number] [facility] [place]  49 y o  male MRN: [id number]  Unit/Bed#: 7T [facility] 709-02 Encounter: [id number]    Discharging Physician: Le Barnes MD  PCP: Jania Hanks MD    Date of Admission:  2/4/2020 1827  Date of Discharge:  02/06/20    Service: Family Medicine    Discharge diagnosis  Principal Problem:    Atrial fibrillation with RVR (resolved)   Active Problems:    Shoulder pain    Type 2 diabetes mellitus without complication, without long-term current use of insulin    Essential hypertension    Other male erectile dysfunction    Tobacco dependence    Mild persistent asthma     Paroxysmal atrial fibrillation    Consultations   Cardiology    Incidental Findings   A fib    Discharge Plan  Jamie Reed Cardiology referral sent for outpatient follow-up and to discuss restarting Cialis   Sleep consultation sent    Outpatient Recommendations   Weight loss, exercise, life style    Summary    HPI: 49 M with presumably new onset, asymptomatic, afib  Most likely due to biatrial enlargement as evidenced by echo done two weeks ago  Cardiology consulted  Patient treated with IV to PO Cardizem and monitored on telemetry  Detected because he came to the ER for acute on chronic left shoulder pain and work-up included cardiac evaluation  His medical problems include DM2 (Metformin only, last A1C 1/2020 6 4), chronic shoulder pain (work-up in progress by ortho), tobacco use, and erectile dysfunction (took 2 tablets of Cialis 20 mg on the day of admission)  Cardizem gtt which was tolerated at maximum dose but patient was still with RVR  A one time dose of Digoxin given with good ventricular response  Beta blockers not used due to risk of hypotension in the setting of Cialis and high dose Cardizem  He converted to sinus rhythm in the middle of the night  Plan for LAURENCE and possible cardioversion cancelled  Monitored through the day and discharged the next morning in sinus rhythm   Of note, he had occasional asymptomatic PACs, PVCs and a short episode of ventricular bigeminy through the night  Electrolytes checked and he was given magnesium for a magnesium level of 1 8  AC with Xarelto 20 mg daily due to high cost of Eliquis  [id number] score 2, HAS-BLED 1  He is aware of the increased risk bleeding from falls, etc  ER precautions discussed and included in AVS  Due to his large clusters of lower extremity varicosities, we explained to him if he bumps them they may start bleeding due to the new medication  He has follow-up with vascular planned later this month and will update them on his new medication  Etiology of his atrial fibrillation remains unclear  Most likely due to biatrial enlargement seen on echo 2 weeks ago  He has normal systolic and diastolic function, EF 27%  Medical risk factors include current smoker, obesity, HTN, and DM  He has presumed energy supplement use secondary to excessive daytime sleepiness associated with snoring and apnic episodes  TSH and UDS non-yielding  He is not completely dependent on Cialis which is suggestive of underlying atherosclerotic process for ED vs stress related  Medication changes   Cardizem 240 mg daily   Xarelto 20 mg daily   Hold Cialis for at least one month until cardiology follow-up   Stop all caffeine and energy supplements, including coffee and energy drinks    Physical Exam   Constitutional: He is oriented to person, place, and time  He appears well-developed and well-nourished  No distress  HENT:   Head: Normocephalic and atraumatic  Eyes: Conjunctivae are normal    Neck: Normal range of motion  Cardiovascular: Normal rate, regular rhythm and normal heart sounds  No murmur heard  Pulmonary/Chest: Effort normal and breath sounds normal  No respiratory distress  He has no wheezes  Musculoskeletal: Normal range of motion  He exhibits no edema  B/L LE with severe varicosities   R medial thigh with significant >6 cm cluster  Neurological: He is alert and oriented to person, place, and time  Psychiatric: He has a normal mood and affect  His behavior is normal    Nursing note and vitals reviewed

## 2020-02-05 NOTE — UTILIZATION REVIEW
Notification of Inpatient Admission/Inpatient Authorization Request   This is a Notification of Inpatient Admission for [address]  Be advised that this patient was admitted to our facility under Inpatient Status  Contact Sierra Tripathi at [phone] for additional admission information  [address] [facility] DEPT DEDICATED [facility] [phone]  Patient Name:   Lise Cobian  YOB: 1965       [address]   [address]:   Jayson Mcmullen  Tax ID: [id number]  NPI: [id number] Attending Provider/NPI: Arias Ferguson Md [[id number]]   Place of Service Code: 24     Place of Service Name:  [facility]   Start Date: 2/4/20 1954     Discharge Date & Time: No discharge date for patient encounter  Type of Admission: Inpatient Status Discharge Disposition (if discharged): Home/Self Care   Patient Diagnoses: Rapid heart rate [R00 0]  Shoulder pain [M25 519]  New onset a-fib ([address] ) [I48 91]  Atrial fibrillation with RVR ([address] ) [I48 91]     Orders: Admission Orders (From admission, onward)     Ordered        02/04/20 2020  Inpatient Admission (expected length of stay for this patient Order details is greater than two midnights)  Once         02/04/20 1954  Inpatient Admission (expected length of stay for this patient Order details is greater than two midnights)  Once                    Assigned Utilization Review Contact: Sierra Tripathi  Utilization   Network Utilization Review Department  Phone: [phone]; Fax [phone]  Email: [email or web address]   ATTENTION PAYERS: Please call the assigned Utilization  directly with any questions or concerns ALL voicemails in the department are confidential  Send all requests for admission clinical reviews, approved or denied determinations and any other requests to dedicated fax number belonging to the campus where the patient is receiving treatment

## 2020-02-05 NOTE — NURSING NOTE
Report called to Rosibel, RN  Patient's personal belongings packed by nursing assistant and transported via wheel chair to 709  Urine sample sent to the lab by volunteer

## 2020-02-05 NOTE — SOCIAL WORK
CM spoke with physician who requested that pt have an outpatient cardio follow up appointment within two weeks of discharge  CM spoke with Vamsi Mcallister from [place] [address] [facility] ([phone]) who reported that their soonest appointment is on March 5th at 3:00 PM   Physician also recommended follow up appointment with Dr Martha James for ortho   CM spoke with Corazon Calhoun at Dr Avery Campa office ([phone]) who informed CM that pt already has an appointment scheduled on March 19th at 2:45 PM  Appointments on AVS

## 2020-02-05 NOTE — PROGRESS NOTES
Progress Note    Yudith Woodard  49 y o  male MRN: [id number]  Unit/Bed#:  Encounter: [id number]  Admitting Physician: Royal Bereket MD  PCP: Leatrice Baumgarten, MD  Date of Admission:  2/4/2020  6:27 PM    Assessment and Plan    * Atrial fibrillation with RVR [facility])  Assessment & Plan  Likely due to biatrial enlargement as evidenced by new onset of Afib with Echo findings of biatrial enlargement  Other medical risk factors include hypertension, suspicious for sleep apnea or idiopathic cause  Thyroid disease, excessive alcohol intake, and recent infection have been ruled out  UDS currently pending  Recent echocardiogram on 1/15/2020 shows normal LV size and systolic and diastolic function with an EF around 61% with mild biatrial enlargement  Patient was started on Cardizem drip with maximal dosing and received one time dose of digoxin 250 mcg IV with conversion to NSR at rate ranging from 50-70 through the morning  Cardiology consulted an started patient on Cardizem 240 mg PO daily - appreciate all input   - Continue Cardizem 240 mg daily  - Continue Eliquis 5 mg BID  - Overnight telemetry to monitor HR  If patient tolerating cardizem PO and HR above >50 bmp, then discharge in the AM   - Follow-up with cardiology out patient in 2 weeks  - Chest pain described in HPI not likely cardiogenic in etiology as it only lasts less than a minute  [id number]- VASC score: 2 points, moderate-high risk (2 9 % risk of stroke/TIA/systemic embolism)  HAS-BLED score: 1 point , relatively low risk of major bleeding       Shoulder pain  Assessment & Plan  Acute onset 1 5 months ago, progressively worsening   Differential diagnoses: subacromial bursitis vs arthritis vs rotator cuff tendinopathy vs adhesive capsulitis vs bicep tendinopathy   Rotator cuff tear and fracture less likely given no history of injury or falls   Follows with orthopedic surgeon, most recent visit 01/16/2020 for left hand and shoulder pain with diagnostic and therapeutic treatment with carpal tunnel injection to treat carpal tunnel syndrome and shoulder pain  Pain control: Tylenol 975 mg Q8hrs PRN   Consider shoulder X-ray and PT outpatient   Follow up outpatient with orthopedics  Mild persistent asthma  Assessment & Plan  Not in acute exacerbation   Continued home Ventolin Q4hrs PRN     Other hyperlipidemia  Assessment & Plan  Home regimen: Lipitor 20 mg, will continue     Tobacco dependence  Assessment & Plan  Current tobacco use 5 cigarettes/day  Discussed smoking cessation benefits  Continue to hold Nicotine patch        Other male erectile dysfunction  Assessment & Plan  Per chart review, patient follows with urology outpatient  Two year history of ED, previously on Viagra with no improvement   Patient took 2 Cialis 20 mg tablets one hour prior to ED presentation   -Cialis held       Essential hypertension  Assessment & Plan  Well-controlled   BP goal <140/90  Home regimen: Lisinopril 10 mg daily   Patient started on Cardizem 240 mg daily for new onset A-fib  Continue to hold lisinopril given the well controlled-blood pressures  Type 2 diabetes mellitus without complication, without long-term current use of insulin ([facility])  Assessment & Plan  Lab Results   Component Value Date    HGBA1C 6 4 01/13/2020       Blood Sugar Average: Last 72 hrs:     Well-controlled;  Continue home regimen of metformin 1000 mg BID  Diabetic diet       VTE Pharmacologic Prophylaxis:   Pharmacologic: Apixaban (Eliquis)  Mechanical VTE Prophylaxis in Place: Yes    Patient Centered Rounds: I have performed bedside rounds with nursing staff today  Discussions with Specialists or Other Care Team Provider: Cardiology    Education and Discussions with Family / Patient: Patient and Fiance    Time Spent for Care: 45 minutes  More than 50% of total time spent on counseling and coordination of care as described above      Current Length of Stay: 1 day(s)    Current Patient Status: Inpatient   Certification Statement: The patient will continue to require additional inpatient hospital stay due to Overnight telemetry monitoring after starting Cardizem 240 mg PO daily    Discharge Plan: Will continue to monitor HR via telemetry after starting Cardizem  If HR continues to be above 50 bmp, then patient will be discharge tomorrow morning with cardiology outpatient follow-up in 2 weeks and orthopedic outpatient follow-up for shoulder pain  Code Status: Level 1 - Full Code    Subjective:   Patient seen and examined at bedside with cardiology present  He denies any acute overnight events and is glad that he converted back to NSR  He denies any recent excessive alcohol use, recent illness, and does not know of any family history of Afib  He admits to left sided upper chest pain that is sharp/burning in quality that lasts about one minute and does not radiate anywhere, he experienced this pain 2 weeks ago and last night  Shoulder pain still present with movement, at times affecting sleep  He states that numbness and tingling of left hand first 3 digits have improved  Collateral information obtained from [facility] reveals that patient snores at night and at time has to catch his breath  He states that a sleep study was planned but has not been able to schedule it due to work schedule  Objective:     Vitals:   Temp (24hrs), Av 7 °F (36 5 °C), Min:97 2 °F (36 2 °C), Max:99 1 °F (37 3 °C)    Temp:  [97 2 °F (36 2 °C)-99 1 °F (37 3 °C)] 97 3 °F (36 3 °C)  HR:  [] 61  Resp:  [7-29] 25  BP: ()/(51-86) 140/81  SpO2:  [92 %-100 %] 100 %  Body mass index is 37 01 kg/m²  Input and Output Summary (last 24 hours): Intake/Output Summary (Last 24 hours) at 2020 1240  Last data filed at 2020 1155  Gross per 24 hour   Intake 1080 ml   Output 350 ml   Net 730 ml       Physical Exam:     Physical Exam   Constitutional: He appears well-developed and well-nourished  No distress  HENT:   Head: Normocephalic and atraumatic  Eyes: Conjunctivae and EOM are normal  Right eye exhibits no discharge  Left eye exhibits no discharge  No scleral icterus  Neck: Normal range of motion  Neck supple  Cardiovascular: Normal rate, regular rhythm and normal heart sounds  No murmur heard  Pulmonary/Chest: Effort normal and breath sounds normal  No respiratory distress  He has no wheezes  Abdominal: Soft  Bowel sounds are normal  He exhibits no distension  There is no tenderness  Musculoskeletal: Normal range of motion  He exhibits tenderness (tenderness to palpation on the anterior aspect of the shoulder at the point of bicep tendon  )  He exhibits no edema  Full passive and active range of motion of the shoulder on abduction, adduction, flexion and extension  Pain reported with movement  Lymphadenopathy:     He has no cervical adenopathy  Skin: He is not diaphoretic  Vitals reviewed  Additional Data:     Labs:    Results from last 7 days   Lab Units 02/05/20  0508   WBC Thousand/uL 5 20   HEMOGLOBIN g/dL 13 4*   HEMATOCRIT % 40 6*   PLATELETS Thousands/uL 208   NEUTROS PCT % 62   LYMPHS PCT % 27   MONOS PCT % 8   EOS PCT % 2     Results from last 7 days   Lab Units 02/05/20  0508 02/04/20  1843   POTASSIUM mmol/L 4 0 4 2   CHLORIDE mmol/L 106 103   CO2 mmol/L 25 27   BUN mg/dL 16 17   CREATININE mg/dL 0 85 0 90   CALCIUM mg/dL 8 8 9 4   ALK PHOS U/L  --  44   ALT U/L  --  19   AST U/L  --  18     Results from last 7 days   Lab Units 02/05/20  0508   INR  0 90*                 * I Have Reviewed All Lab Data Listed Above  * Additional Pertinent Lab Tests Reviewed:  All Labs Within Last 24 Hours Reviewed    Imaging:    Imaging Reports Reviewed Today Include: none  Imaging Personally Reviewed by Myself Includes:  none    Recent Cultures (last 7 days):           Last 24 Hours Medication List:     Current Facility-Administered Medications:  acetaminophen 975 mg Oral Q8H PRN Carley MD Samantha   albuterol 2 puff Inhalation Q4H PRN Boo Avila MD   apixaban 5 mg Oral BID Nurys Das MD   atorvastatin 20 mg Oral Daily Boo Avila MD   diltiazem 240 mg Oral Daily Kendell Dupree MD   fluticasone-vilanterol 1 puff Inhalation Daily Boo Avila MD   metFORMIN 1,000 mg Oral BID With Meals Boo Avila MD   nicotine 1 patch Transdermal Daily Boo Avila MD        ** Please Note: Dictation voice to text software may have been used in the creation of this document   Mehreen Whalen MD  02/05/20  12:40 PM

## 2020-02-05 NOTE — QUICK NOTE
Received a message from the nurse that patient has converted to the sinus rhythm about 20 min ago and remains in 60s-70s  He has been on Cardizem drip for approximately 7 hrs with average rate 15 mg/hr that is approximately 105 mg  Will transition patient to PO Cardizem as per discussion with cardiology earlier this pm  Will give 30 mg of immediate release Cardizem in about an hour from now (will order as scheduled 30 mg every 6 hrs until evaluated by cardiology in am)  Will slowly titrate Cardizem drip down (2 5-5 mg at a time) every 40-60 min as tolerated and stop if remains in sinus rhythm  Currently patient is asymptomatic, in sinus rhythm with HR 60-70  SBP in 110-120

## 2020-02-05 NOTE — NURSING NOTE
Monitor shows SR with stable vitals  No c/o chest discomfort or sob  Alert and oriented x4  Lungs are clear  Right leg continues to have swelling but is unchanged from earlier assessments  [facility]   Will monitor

## 2020-02-05 NOTE — NURSING NOTE
Monitor continues to show SR with stable vitals  No c/o chest discomfort or SOB  Lungs are clear  Right leg edematous possibly r/t varicosities seen on that calf  + pedal pulse  Leg is warm to touch  No pain with the leg  Left shoulder pain with activity  Very little pain at rest  Bathed at bed side with help of wife   Will monitor

## 2020-02-05 NOTE — H&P
History and Physical - Jayson Mcmullen    Patient Information: Dinora Davalos  49 y o  male MRN: [id number]  Unit/Bed#:  Encounter: [id number]  Admitting Physician: Candelario Rodriguez MD  PCP: Joann Richard MD  Date of Admission:  02/05/20    Assessment and Plan    * Atrial fibrillation with RVR [facility])  Assessment & Plan  New onset atrial fibrillation,  bpm   Asymptomatic presentation   Possible etiology multifactorial, given history of hypertension, DM type 2, COPD, suspicion for MARIAN  Thyroid disease and infection ruled out at this time with TSH normal, and no leukocytosis present  In ED, Cardizem bolus 10 mg initiated with minimal improvement  Patient was transitioned to Cardizem drip at 5mg/hour IV, HR remained uncontrolled  Drip was then increased again to maximum rate of 15mg/hour IV for rate control   [id number]- VASC score: 2 points, moderate-high risk (2 9 % risk of stroke/TIA/systemic embolism)  HAS-BLED score: 1 point , relatively low risk of major bleeding     -Cardiology consult placed  Dr Omid Bryant contacted via [employer] for guiding further treatment plan  Recommendation was made to start one time dose of Digoxin 0 25 mg  Discussed anticoagulation with Eliquis 5 mg BID, first dose starting tonight  Will also make patient NPO after midnight for possible LAURENCE procedure tomorrow  Per cardiology it is acceptable for HR to remain in 120-130s as long as patient is stable and endorses no new symptoms  If patient converts to NSR overnight, oral Cardizem to be initiated based on calculated dose of IV Cardizem received  Patient will be reevaluated in the morning      -Will hold Aspirin at this time for bleeding risk   -Will continue to monitor VS  -EKG in the morning       Shoulder pain  Assessment & Plan  Acute onset 1 5 months ago, progressively worsening   Differential diagnoses: subacromial bursitis vs arthritis vs rotator cuff tendinopathy vs adhesive capsulitis   Rotator cuff tear and fracture less likely given no history of injury or falls   Follows with orthopedic surgeon, most recent visit 01/16/2020 for left hand pain  During that visit, left shoulder pain was briefly mentioned in the note  Pain control: Tylenol 975 mg Q8hrs PRN   Consider shoulder X-ray and PT outpatient   Follow up outpatient with orthopedics      Type 2 diabetes mellitus without complication, without long-term current use of insulin (HCC)  Assessment & Plan  Lab Results   Component Value Date    HGBA1C 6 4 01/13/2020       No results for input(s): POCGLU in the last 72 hours  Blood Sugar Average: Last 72 hrs:     Well-controlled   Home regimen: Metformin 1000 mg BID, will continue   Diabetic diet     Essential hypertension  Assessment & Plan  Well-controlled   BP goal <140/90  Home regimen: Lisinopril 10 mg daily     -On Cardizem drip, BP on the softer side, therefore will hold Lisinopril at this time     Other male erectile dysfunction  Assessment & Plan  Per chart review, patient follows with urology outpatient  Two year history of ED, previously on Viagra with no improvement   Patient took 2 Cialis 20 mg tablets today, one hour prior to ED presentation     -Cialis held       Other hyperlipidemia  Assessment & Plan  Home regimen: Lipitor 20 mg, will continue     Tobacco dependence  Assessment & Plan  Current tobacco use 5 cigarettes/day   Will hold Nicotine patch at this time         Mild persistent asthma  Assessment & Plan  Not in acute exacerbation   Continued home Ventolin Q4hrs PRN       VTE Prophylaxis: Apixaban (Eliquis)  Code Status: Level 1 - Full Code  Anticipated Length of Stay:  Patient will be admitted on an Inpatient basis with an anticipated length of stay of  more than 2 midnights  Justification for Hospital Stay: New onset atrial fibrillation with RVR   Total Time for Visit, including Counseling / Coordination of Care: 60 mins   Greater than 50% of this total time spent on direct patient counseling and coordination of care  Chief Complaint:     Chief Complaint   Patient presents with    Shoulder Pain     left arm pain and numbness over past 2 years; had an injection the first of January for it; 2 weeks ago the left shoulder started hurting; states having trouble now with sleeping due to the pain    Rapid Heart Rate     History of Present Illness:    Swathi Michelle  is a 49 y o  male with past medical history of essential HTN, HLD, Type 2 DM, erectile dysfunction, tobacco use and mild persistent asthma who presents to the hospital on 02/04/2020 for concern of left shoulder pain starting 1 5 months ago  Patient reports pain started to progressively worsen in the last week  Endorses tingling, stiffness, restricted ROM, and weakness of left shoulder with use  No history of falls or recent injury  Patient works for a [employer] [address] and uses his hands a lot  While in ED, patient was noted to be in new onset atrial fibrillation with RVR ('S)  Denies any dizziness, headaches, palpitations, skipped beats, chest pain, shortness of breath, gastrointestinal symptoms and lower extremity edema  Patient does mention he took an increased dose of Cialis today, 40 mg as oppose to 20 mg regular dose  Social history: Smokes currently 5 cigarettes/day  Prior from age [age] smoked 3 packs/day  Social drinking, occasionally 1 beer  Illicit drug use (cocaine and marijuana) in the past, approximately 10 years ago  ED course: In ED labs were found wnl  Troponin and pro-BNP normal   EKG showed atrial fibrillation with RVR  Toradol 30 mg IV administered once for pain  Loading dose Cardizem bolus 10 mg was initiated with minimal effect  Cardizem IV drip was started at 5 mg/hour followed by an increase to 15 mg/hour  Patient continued with uncontrolled HR in the 120-140s, asymptomatic  Patient was [facility] to [facility] Medicine service for further management of new onset atrial-fibrillation with RVR       Review of Systems:  Review of Systems   Constitutional: Negative for chills and fever  HENT: Negative for sore throat  Eyes: Negative for visual disturbance  Respiratory: Negative  Negative for cough and shortness of breath  Cardiovascular: Negative for chest pain, palpitations and leg swelling  Gastrointestinal: Negative  Negative for abdominal pain, blood in stool, constipation, diarrhea, nausea and vomiting  Genitourinary: Negative  Negative for dysuria and hematuria  Musculoskeletal:        Left shoulder pain    Skin: Negative for rash  Neurological: Positive for weakness and numbness  Negative for dizziness and headaches  Left shoulder numbness and tingling    Psychiatric/Behavioral: The patient is not nervous/anxious  Past Medical and Surgical History:   Past Medical History:   Diagnosis Date    Asthma     Carpal tunnel syndrome     Chronic pain     left arm    Diabetes mellitus ([address] )     Erectile dysfunction     Hypertension     Umbilical hernia      Past Surgical History:   Procedure Laterality Date    CHOLECYSTECTOMY      CHOLECYSTECTOMY LAPAROSCOPIC N/A 6/20/2019    Procedure: CHOLECYSTECTOMY LAPAROSCOPIC;  Surgeon: Kayode Mohamud MD;  Location: [facility] MAIN OR;  Service: General    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    HAND SURGERY      UMBILICAL HERNIA REPAIR       Meds/Allergies: Allergies: No Known Allergies  Prior to Admission Medications   Prescriptions Last Dose Informant Patient Reported? Taking? Alcohol Swabs (ALCOHOL PREP) 70 % PADS Past Week at Unknown time Self Yes Yes   Sig: daily Test   Alcohol Swabs (ALCOHOL PREP) PADS Past Week at Unknown time  Yes Yes   Sig: TEST DAILY   Lancets MISC Past Week at Unknown time Self Yes Yes   Sig: Use to test blood glucose once a day  Dx: Type 2 DM  E11 9   Misc   Devices MISC Past Week at Unknown time  No Yes   Sig: by Does not apply route daily   Urine Diagnostic Calibration STRP Past Week at Unknown time Self Yes Yes Sig: Use to test blood glucose once a day  Dx: Type 2 DM  E11 9   albuterol (PROVENTIL HFA,VENTOLIN HFA) 90 mcg/act inhaler Past Week at Unknown time  No Yes   Sig: Inhale 2 puffs every 4 (four) hours as needed for wheezing   aspirin (ASPIRIN EC ADULT LOW STRENGTH) 81 mg EC tablet 2/4/2020 at 0900  No Yes   Sig: Take 1 tablet (81 mg total) by mouth daily   atorvastatin (LIPITOR) 20 mg tablet 2/4/2020 at 0900  No Yes   Sig: Take 1 tablet (20 mg total) by mouth daily   fluticasone-vilanterol (BREO ELLIPTA) 100-25 mcg/inh inhaler Past Week at 0900  No Yes   Sig: Inhale 1 puff daily Rinse mouth after use  lisinopril (ZESTRIL) 10 mg tablet 2/4/2020 at 0900  No Yes   Sig: Take 1 tablet (10 mg total) by mouth daily   metFORMIN (GLUCOPHAGE) 1000 MG tablet 2/4/2020 at 0900  No Yes   Sig: Take 1 tablet (1,000 mg total) by mouth 2 (two) times a day with meals   miconazole (MICOTIN) 2 % powder Not Taking at Unknown time  No No   Sig: Apply topically as needed for itching Apply to affected area twice daily for 28 days     Patient not taking: Reported on 2/4/2020   nicotine (NICODERM CQ) 7 mg/24hr TD 24 hr patch Not Taking at Unknown time  No No   Sig: Place 1 patch on the skin daily   Patient not taking: Reported on 2/4/2020   tadalafil (CIALIS) 20 MG tablet 2/4/2020 at Unknown time  No Yes   Sig: Take 1 tablet (20 mg total) by mouth daily as needed for erectile dysfunction      Facility-Administered Medications: None     Social History:     Social History     Socioeconomic History    Marital status:      Spouse name: Not on file    Number of children: Not on file    Years of education: Not on file    Highest education level: Not on file   Occupational History    Not on file   Social Needs    Financial resource strain: Not on file    Food insecurity:     Worry: Not on file     Inability: Not on file    Transportation needs:     Medical: Not on file     Non-medical: Not on file   Tobacco Use    Smoking status: Current Every Day Smoker     Packs/day: 0 25     Types: Cigarettes    Smokeless tobacco: Never Used   Substance and Sexual Activity    Alcohol use: Yes     Frequency: 2-3 times a week     Drinks per session: 1 or 2     Binge frequency: Never     Comment: occ    Drug use: No    Sexual activity: Yes     Partners: Female   Lifestyle    Physical activity:     Days per week: Not on file     Minutes per session: Not on file    Stress: Not on file   Relationships    Social connections:     Talks on phone: Not on file     Gets together: Not on file     Attends Mu-ism service: Not on file     Active member of club or organization: Not on file     Attends meetings of clubs or organizations: Not on file     Relationship status: Not on file    Intimate partner violence:     Fear of current or ex partner: Not on file     Emotionally abused: Not on file     Physically abused: Not on file     Forced sexual activity: Not on file   Other Topics Concern    Not on file   Social History Narrative    Not on file     Patient Pre-hospital Living Situation: Home with angel and rodolfo  Patient Pre-hospital Level of Mobility: Ambulating  Patient Pre-hospital Diet Restrictions: Regular diet     Family History:  Family History   Problem Relation Age of Onset    Breast cancer additional onset Mother     Diabetes Father     Hyperlipidemia Father     Hypertension Father     Seizures Father     No Known Problems Son     No Known Problems Son     Vitiligo Son      Physical Exam:   Vitals:   Blood Pressure: 117/58 (02/05/20 0326)  Pulse: 61 (02/05/20 0300)  Temperature: 97 6 °F (36 4 °C) (02/05/20 0200)  Temp Source: Temporal (02/05/20 0200)  Respirations: 19 (02/05/20 0300)  Height: 5' 10" (177 8 cm) (02/04/20 2213)  Weight - Scale: 117 kg (257 lb 15 oz) (02/04/20 2213)  SpO2: 94 % (02/05/20 0300)    Physical Exam   Constitutional: He is oriented to person, place, and time  He appears well-developed and well-nourished   No distress  HENT:   Head: Normocephalic and atraumatic  Nose: Nose normal    Mouth/Throat: Oropharynx is clear and moist    Eyes: Conjunctivae and EOM are normal    Neck: Normal range of motion  Neck supple  Cardiovascular: Regular rhythm, normal heart sounds and intact distal pulses  Irregularly irregular, uncontrolled HR   Pulmonary/Chest: Effort normal and breath sounds normal  No respiratory distress  Abdominal: Soft  Bowel sounds are normal  He exhibits no distension  There is no tenderness  Musculoskeletal: He exhibits tenderness  He exhibits no edema  Arms:  Right ankle edema, non-pitting   Varicose veins b/l   Neurological: He is alert and oriented to person, place, and time  Skin: Skin is warm  No rash noted  He is not diaphoretic  Psychiatric: He has a normal mood and affect  His behavior is normal  Judgment and thought content normal    Nursing note and vitals reviewed  Lab Results: I have personally reviewed pertinent reports  Results from last 7 days   Lab Units 02/04/20  1843   WBC Thousand/uL 6 50   HEMOGLOBIN g/dL 14 1   HEMATOCRIT % 42 2   PLATELETS Thousands/uL 237   NEUTROS PCT % 63   LYMPHS PCT % 28   MONOS PCT % 8   EOS PCT % 1     Results from last 7 days   Lab Units 02/04/20  1843   POTASSIUM mmol/L 4 2   CHLORIDE mmol/L 103   CO2 mmol/L 27   BUN mg/dL 17   CREATININE mg/dL 0 90   CALCIUM mg/dL 9 4   ALK PHOS U/L 44   ALT U/L 19   AST U/L 18   EGFR ml/min/1 73sq m 97   MAGNESIUM mg/dL 1 9         Results from last 7 days   Lab Units 02/04/20  1843   TROPONIN I ng/mL <0 01             Results from last 7 days   Lab Units 02/04/20  1843   NT-PRO BNP pg/mL 94 0            Invalid input(s): URIBILINOGEN          Imaging: I have personally reviewed pertinent reports  Xr Chest 2 Views    Result Date: 1/14/2020  Narrative: CHEST INDICATION:  Cough   COMPARISON:  12/9/2019 EXAM PERFORMED/VIEWS:  XR CHEST PA & LATERAL Images: 3 FINDINGS: Cardiomediastinal silhouette appears unremarkable  The lungs are clear  No pneumothorax or pleural effusion  Paravertebral ossifications thoracic spine  Impression: No acute cardiopulmonary disease  Workstation performed: [id number]       EKG, Pathology, and Other Studies Reviewed on Admission:   EKG  Result Date: 02/05/20  Impression: Atrial fibrillation, RVR ( bpm)  When compared to EKG from 01/15/2020, NSR noted at that time       Entire H&P was discussed with Dr Renaldo Miller who agreed to what is noted above    Sandra Bryant MD  02/05/20  3:50 AM

## 2020-02-05 NOTE — ASSESSMENT & PLAN NOTE
Per chart review, patient follows with urology outpatient   Two year history of ED, previously on Viagra with no improvement   Patient took 2 Cialis 20 mg tablets one hour prior to ED presentation   -Cialis held

## 2020-02-06 VITALS
RESPIRATION RATE: 20 BRPM | TEMPERATURE: 98.1 F | BODY MASS INDEX: 36.93 KG/M2 | HEART RATE: 62 BPM | SYSTOLIC BLOOD PRESSURE: 135 MMHG | HEIGHT: 70 IN | OXYGEN SATURATION: 94 % | WEIGHT: 257.94 LBS | DIASTOLIC BLOOD PRESSURE: 87 MMHG

## 2020-02-06 LAB
ATRIAL RATE: 65 BPM
ATRIAL RATE: 69 BPM
P AXIS: 70 DEGREES
P AXIS: 73 DEGREES
PR INTERVAL: 146 MS
PR INTERVAL: 148 MS
QRS AXIS: 24 DEGREES
QRS AXIS: 25 DEGREES
QRSD INTERVAL: 90 MS
QRSD INTERVAL: 92 MS
QT INTERVAL: 408 MS
QT INTERVAL: 422 MS
QTC INTERVAL: 437 MS
QTC INTERVAL: 438 MS
T WAVE AXIS: 30 DEGREES
T WAVE AXIS: 30 DEGREES
VENTRICULAR RATE: 65 BPM
VENTRICULAR RATE: 69 BPM

## 2020-02-06 PROCEDURE — ND001 PR NO DOCUMENTATION: Performed by: FAMILY MEDICINE

## 2020-02-06 PROCEDURE — 93005 ELECTROCARDIOGRAM TRACING: CPT

## 2020-02-06 PROCEDURE — 99231 SBSQ HOSP IP/OBS SF/LOW 25: CPT | Performed by: INTERNAL MEDICINE

## 2020-02-06 PROCEDURE — 93010 ELECTROCARDIOGRAM REPORT: CPT | Performed by: INTERNAL MEDICINE

## 2020-02-06 RX ORDER — DILTIAZEM HYDROCHLORIDE 240 MG/1
240 CAPSULE, COATED, EXTENDED RELEASE ORAL DAILY
Qty: 30 CAPSULE | Refills: 1 | Status: SHIPPED | OUTPATIENT
Start: 2020-02-06 | End: 2020-11-06 | Stop reason: SDUPTHER

## 2020-02-06 RX ADMIN — NICOTINE 1 PATCH: 7 PATCH, EXTENDED RELEASE TRANSDERMAL at 09:09

## 2020-02-06 RX ADMIN — METFORMIN HYDROCHLORIDE 1000 MG: 500 TABLET ORAL at 09:10

## 2020-02-06 RX ADMIN — ATORVASTATIN CALCIUM 20 MG: 20 TABLET, FILM COATED ORAL at 09:10

## 2020-02-06 RX ADMIN — FLUTICASONE FUROATE AND VILANTEROL TRIFENATATE 1 PUFF: 100; 25 POWDER RESPIRATORY (INHALATION) at 09:13

## 2020-02-06 RX ADMIN — APIXABAN 5 MG: 5 TABLET, FILM COATED ORAL at 09:10

## 2020-02-06 RX ADMIN — ACETAMINOPHEN 975 MG: 325 TABLET ORAL at 06:01

## 2020-02-06 RX ADMIN — DILTIAZEM HYDROCHLORIDE 240 MG: 240 CAPSULE, EXTENDED RELEASE ORAL at 09:10

## 2020-02-06 NOTE — QUICK NOTE
After signout, went to check on the patient  Reviewed tele and noted presence of PAC and PVC starting around 1800  EKG was obtained which showed NSR, HR 70 bpm, no specific ST-T wave abnormalities, normal axis, no QTc prolongation (437)  Patient seen at bedside, offers no complaints or concerns  Mentions he still has left shoulder pain but otherwise appears to be in good spirits  Denies any cardiovascular symptoms  Dr Grzegorz Erickson made aware and as per discussion will check K & Mg and observe otherwise  Electrolyte abnormality is a potential  etiology for onset of PVCs  STAT BMP and Mg ordered  K (4 1) and Mg (1 8)  Mg is preferred to be around 2, replacement initiated with PO Mag-Ox 800 mg one time dose  Will continue to monitor

## 2020-02-06 NOTE — DISCHARGE INSTRUCTIONS
A-fib (Atrial Fibrillation)   WHAT YOU NEED TO KNOW:   A-fib may come and go, or it may be a long-term condition  A-fib can cause blood clots, stroke, or heart failure  These conditions may become life-threatening  It is important to treat and manage a-fib to help prevent a blood clot, stroke, or heart failure  DISCHARGE INSTRUCTIONS:   Call 911 for any of the following:   · You have any of the following signs of a heart attack:      ¨ Squeezing, pressure, or pain in your chest that lasts longer than 5 minutes or returns    ¨ Discomfort or pain in your back, neck, jaw, stomach, or arm     ¨ Trouble breathing    ¨ Nausea or vomiting    ¨ Lightheadedness or a sudden cold sweat, especially with chest pain or trouble breathing    · You have any of the following signs of a stroke:      ¨ Numbness or drooping on one side of your face     ¨ Weakness in an arm or leg    ¨ Confusion or difficulty speaking    ¨ Dizziness, a severe headache, or vision loss  Seek care immediately if:  You have any of the following signs of a blood clot:  · You feel lightheaded, are short of breath, and have chest pain  · You cough up blood  · You have swelling, redness, pain, or warmth in your arm or leg  Contact your cardiologist or healthcare provider if:   · Your heart rate is higher than your healthcare provider said it should be  · You have new or worsening swelling in your legs, feet, ankles, or abdomen  · You are short of breath, even at rest      · You have questions or concerns about your condition or care  Medicines: You may need any of the following:  · Heart medicines  help control your heart rate and rhythm  You may need more than one medicine to treat your symptoms  · Blood thinners    help prevent blood clots  Examples of blood thinners include heparin and warfarin  Clots can cause strokes, heart attacks, and death   The following are general safety guidelines to follow while you are taking a blood thinner:    ¨ Watch for bleeding and bruising while you take blood thinners  Watch for bleeding from your gums or nose  Watch for blood in your urine and bowel movements  Use a soft washcloth on your skin, and a soft toothbrush to brush your teeth  This can keep your skin and gums from bleeding  If you shave, use an electric shaver  Do not play contact sports  ¨ Tell your dentist and other healthcare providers that you take anticoagulants  Wear a bracelet or necklace that says you take this medicine  ¨ Do not start or stop any medicines unless your healthcare provider tells you to  Many medicines cannot be used with blood thinners  ¨ Tell your healthcare provider right away if you forget to take the medicine, or if you take too much  ¨ Warfarin  is a blood thinner that you may need to take  The following are things you should be aware of if you take warfarin  § Foods and medicines can affect the amount of warfarin in your blood  Do not make major changes to your diet while you take warfarin  Warfarin works best when you eat about the same amount of vitamin K every day  Vitamin K is found in green leafy vegetables and certain other foods  Ask for more information about what to eat when you are taking warfarin  § You will need to see your healthcare provider for follow-up visits when you are on warfarin  You will need regular blood tests  These tests are used to decide how much medicine you need  · Antiplatelets , such as aspirin, help prevent blood clots  Take your antiplatelet medicine exactly as directed  These medicines make it more likely for you to bleed or bruise  If you are told to take aspirin, do not take acetaminophen or ibuprofen instead  · Take your medicine as directed  Contact your healthcare provider if you think your medicine is not helping or if you have side effects  Tell him or her if you are allergic to any medicine   Keep a list of the medicines, vitamins, and herbs you take  Include the amounts, and when and why you take them  Bring the list or the pill bottles to follow-up visits  Carry your medicine list with you in case of an emergency  Follow up with your cardiologist as directed: You will need regular blood tests and monitoring  Write down your questions so you remember to ask them during your visits  Manage A-fib:   · Know your target heart rate  Learn how to take your pulse and monitor your heart rate  · Manage other health conditions  This includes high blood pressure, sleep apnea, thyroid disease, diabetes, and other heart conditions  Take medicine as directed and follow your treatment plan  · Limit or do not drink alcohol  Alcohol can make a-fib hard to manage  Ask your healthcare provider if it is safe for you to drink alcohol  A drink of alcohol is 12 ounces of beer, 5 ounces of wine, or 1½ ounces of liquor  · Do not smoke  Nicotine and other chemicals in cigarettes and cigars can cause heart and lung damage  Ask your healthcare provider for information if you currently smoke and need help to quit  E-cigarettes or smokeless tobacco still contain nicotine  Talk to your healthcare provider before you use these products  · Eat heart-healthy foods  Heart healthy foods will help keep your cholesterol low  These include fruits, vegetables, whole-grain breads, low-fat dairy products, beans, lean meats, and fish  Replace butter and margarine with heart-healthy oils such as olive oil and canola oil  · Maintain a healthy weight  Ask your healthcare provider how much you should weigh  Ask him to help you create a weight loss plan if you are overweight  · Exercise for 30 minutes  most days of the week  Ask your healthcare provider about the best exercise plan for you  © 2017 [address] [employer] Information is for End User's use only and may not be sold, redistributed or otherwise used for commercial purposes   All illustrations and images included in CareNotes® are the copyrighted property of [employer]  or [employer]var[employer]  The above information is an  only  It is not intended as medical advice for individual conditions or treatments  Talk to your doctor, nurse or pharmacist before following any medical regimen to see if it is safe and effective for you  How to Quit Using Smokeless Tobacco   WHAT YOU NEED TO KNOW:   Smokeless tobacco comes in many forms  Examples include chew, snuff, dip, dissolvable tobacco, and snus  All smokeless tobacco products contain nicotine and may contain as much nicotine as 3 cigarettes  You may be physically dependent on nicotine  You may also be emotionally addicted to it  The cravings can be strong, but it is important to quit using smokeless tobacco  You will improve your health and decrease your cancer, stroke, and heart attack risk  Mouth sores and tooth problems will also improve when you quit  You can benefit from quitting no matter how long you have used smokeless tobacco    DISCHARGE INSTRUCTIONS:   Prepare to stop using smokeless tobacco:  Nicotine is a highly addictive drug  Withdrawal symptoms can happen when you stop and make it hard to quit  The following can help keep you on track:  · Set a quit date  If possible, set the date about 3 to 4 weeks in the future  This will help you prepare your home and routine for the change  Do not set the date too far away  You might change your mind or lose your resolve to quit  Know the triggers that tempt you, and make a plan to avoid them  · Tell friends, family, and coworkers that you plan to quit  Explain that you may have withdrawal symptoms when you quit  Ask them to support you  They may be able to encourage you and help reduce your stress to make it easier for you to quit  Ask them not to use any tobacco products around you  Do not allow them to use tobacco products in your home or car       · Remove all smokeless tobacco products from your home, car, and workplace  Remove anything else that will tempt you  Tools that can help you quit:   · Counseling  from a healthcare provider can provide you with support and skills to quit  The counselor can also teach you to manage your withdrawal symptoms and cravings  He may help you learn methods such as meditation that can help you feel less anxious or jittery  You may receive counseling from one counselor, in group therapy, or through phone therapy called a quit line  · Taper down  means you use less smokeless tobacco each day until your body no longer craves the nicotine  You can also reduce the number of places you use it or use a different kind that has less nicotine  Wait as long as possible before you use smokeless tobacco when you have a craving  You may be able to increase the amount of time you can wait after each craving  This will help decrease the amount you use and the number of cravings each day  · Nicotine replacement therapy (NRT)  such as patches, gum, or lozenges may help reduce your nicotine cravings and withdrawal symptoms  NRT is available without a doctor's order  Follow directions so you do not get too much nicotine  An overdose can be life-threatening  Do not switch to cigarettes as a way to quit using smokeless tobacco  If you are pregnant or have heart disease, talk to your healthcare provider before you start NRT  He may recommend other ways to quit, or he may want you to come in for follow-up visits while you use NRT  · Prescription medicines  such as nasal sprays or nicotine inhalers may help reduce your withdrawal symptoms  Ask your healthcare provider about these and other medicines to help reduce cravings  You may need to start certain medicines 2 weeks before your quit date for them to work well       · Chew sugarless gum or sunflower seeds  as a substitute for smokeless tobacco   Manage weight gain after you quit:  Nicotine can affect your metabolism  You may gain a few pounds after you quit  The following can help you control your weight:  · Eat healthy foods  Healthy foods include fruits, vegetables, whole-grain breads, low-fat dairy products, beans, lean meats, and fish  You may also find it helpful to chew sugarless gum or eat healthy snacks  · Drink water before, during, and between meals  This will make your stomach feel full and help prevent you from overeating  Ask your healthcare provider how much liquid to drink each day and which liquids are best for you  · Exercise as directed  Exercise may help reduce cravings and stress from nicotine withdrawal  Take a walk or do some kind of exercise every day  © 2017 [address] [employer]  Information is for End User's use only and may not be sold, redistributed or otherwise used for commercial purposes  All illustrations and images included in CareNotes® are the copyrighted property of [employer]  or [employer] Alvarado  The above information is an  only  It is not intended as medical advice for individual conditions or treatments  Talk to your doctor, nurse or pharmacist before following any medical regimen to see if it is safe and effective for you  Asthma   WHAT YOU NEED TO KNOW:   What is asthma? Asthma is a lung disease that makes breathing difficult  Chronic inflammation and reactions to triggers narrow the airways in the lungs  Asthma can become life-threatening if it is not managed  What is cough-variant asthma? Cough-variant asthma is a type of asthma that causes a dry cough that keeps coming back  A dry cough may be your only symptom, or you may also have chest tightness  These symptoms may be caused by exercise or exposure to odors, allergens, or respiratory tract infections  Cough-variant asthma is treated the same way as typical asthma  What are the signs and symptoms of asthma?    · Coughing     · Wheezing · Shortness of breath     · Chest tightness  What may trigger an asthma attack? · A cold, the flu, or a sinus infection     · Exercise     · Weather changes, especially cold, dry air    · Smoking or secondhand smoke    · Fumes from chemicals, dust, air pollution, or other small particles in the air    · Pets, pollen, dust mites, or cockroaches       How is asthma diagnosed? Your healthcare provider will examine you and listen to your lungs  He or she will ask how often you have symptoms and what makes them worse  Tell him or her if you have trouble sleeping, exercising, or doing other activities because of shortness of breath  Your provider will ask about your allergies and past colds, and if anyone in your family has allergies or asthma  Tell your healthcare provider about medicines you take, including over-the-counter drugs and herbal supplements  You may need a chest x-ray to check for lung problems, or a lung function test  Lung function tests show how well you can breathe  How is asthma treated? · Medicines  decrease inflammation, open airways, and make it easier to breathe  Medicines may be inhaled, taken as a pill, or injected  Short-term medicines relieve your symptoms quickly  Long-term medicines are used to prevent future attacks  You may also need medicine to help control your allergies  · Allergy testing  may find allergies that trigger an asthma attack  You may need allergy shots or medicine to control allergies that make your asthma worse  How can I manage my symptoms and prevent future attacks? · Follow your Asthma Action Plan (AAP)  This is a written plan that you and your healthcare provider create  It explains which medicine you need and when to change doses if necessary  It also explains how you can monitor symptoms and use a peak flow meter  The meter measures how well your lungs are working       · Manage other health conditions , such as allergies, acid reflux, and sleep apnea      · Identify and avoid triggers  These may include pets, dust mites, mold, and cockroaches  · Do not smoke or be around others who smoke  Nicotine and other chemicals in cigarettes and cigars can cause lung damage  Ask your healthcare provider for information if you currently smoke and need help to quit  E-cigarettes or smokeless tobacco still contain nicotine  Talk to your healthcare provider before you use these products  · Ask about the flu vaccine  The flu can make your asthma worse  You may need a yearly flu shot  When should I seek immediate care? · You have severe shortness of breath  · Your lips or nails turn blue or gray  · The skin around your neck and ribs pulls in with each breath  · You have shortness of breath, even after you take your short-term medicine as directed  · Your peak flow numbers are in the red zone of your AAP  When should I contact my healthcare provider? · You run out of medicine before your next refill is due  · Your symptoms get worse  · You need to take more medicine than usual to control your symptoms  · You have questions or concerns about your condition or care  CARE AGREEMENT:   You have the right to help plan your care  Learn about your health condition and how it may be treated  Discuss treatment options with your caregivers to decide what care you want to receive  You always have the right to refuse treatment  The above information is an  only  It is not intended as medical advice for individual conditions or treatments  Talk to your doctor, nurse or pharmacist before following any medical regimen to see if it is safe and effective for you  © 2017 [address] [employer] Information is for End User's use only and may not be sold, redistributed or otherwise used for commercial purposes   All illustrations and images included in CareNotes® are the copyrighted property of [employer]  or [employer]  Apixaban (By mouth)   Apixaban (a-PIX-a-ban)  Treats and prevents blood clots  This medicine is a blood thinner  Brand Name(s): Eliquis   There may be other brand names for this medicine  When This Medicine Should Not Be Used: This medicine is not right for everyone  Do not use it if you had an allergic reaction to apixaban or you have active bleeding  How to Use This Medicine:   Tablet  · Your doctor will tell you how much medicine to use  Do not use more than directed  · If you are not able to swallow the tablets whole, they may be crushed and mixed in water, 5% dextrose in water (D5W), apple juice, or applesauce  The crushed tablets may be mixed with 60 mL of water or D5W dose and given through a nasogastric tube (NGT)  · This medicine should come with a Medication Guide  Ask your pharmacist for a copy if you do not have one  · Missed dose: Take a dose as soon as you remember  If it is almost time for your next dose, wait until then and take a regular dose  Do not take extra medicine to make up for a missed dose  · Store the medicine in a closed container at room temperature, away from heat, moisture, and direct light  Drugs and Foods to Avoid:   Ask your doctor or pharmacist before using any other medicine, including over-the-counter medicines, vitamins, and herbal products  · Some medicines can affect how apixaban works  Tell your doctor if you are using any of the following:   ¨ Carbamazepine, clarithromycin, itraconazole, ketoconazole, phenytoin, rifampin, ritonavir, Sathya's wort  ¨ Blood thinner (including clopidogrel, heparin, prasugrel, warfarin)  ¨ Medicine to treat depression  ¨ NSAID pain or arthritis medicine (including aspirin, celecoxib, diclofenac, ibuprofen, naproxen)  Warnings While Using This Medicine:   · Tell your doctor if you are pregnant or breastfeeding, or if you have kidney disease, liver disease, bleeding problems, or an artificial heart valve    · Do not stop using this medicine suddenly without asking your doctor  You might have a higher risk of stroke for a short time after you stop using this medicine  · This medicine increases your risk for bleeding that can become serious if not controlled  You may also bruise easily, and it may take longer than usual for bleeding to stop  · This medicine may increase your risk for blood clots in your spine or back if you undergo an epidural or spinal puncture  This could lead to paralysis  Tell your doctor if you ever had spine problems or back surgery  · Tell any doctor or dentist who treats you that you are using this medicine  With your doctor's supervision, you may need to stop using this medicine several days before you have surgery or medical tests  · Your doctor will do lab tests at regular visits to check on the effects of this medicine  Keep all appointments  · Keep all medicine out of the reach of children  Never share your medicine with anyone  Possible Side Effects While Using This Medicine:   Call your doctor right away if you notice any of these side effects:  · Allergic reaction: Itching or hives, swelling in your face or hands, swelling or tingling in your mouth or throat, chest tightness, trouble breathing  · Change in how much or how often you urinate, red or pink urine  · Chest pain, trouble breathing  · Coughing up blood, vomiting blood or material that looks like coffee grounds  · Numbness, tingling, or muscle weakness in your legs or feet  · Red or black, tarry stools  · Unusual bleeding, bruising, or weakness  If you notice other side effects that you think are caused by this medicine, tell your doctor  Call your doctor for medical advice about side effects  You may report side effects to FDA at 0-414-NLL-8197  © 2017 [address] Information is for End User's use only and may not be sold, redistributed or otherwise used for commercial purposes    The above information is an  only  It is not intended as medical advice for individual conditions or treatments  Talk to your doctor, nurse or pharmacist before following any medical regimen to see if it is safe and effective for you  Diltiazem (By mouth)   Diltiazem ([id number]-AOJ-d-[id number])  Treats high blood pressure and angina (chest pain)  This medicine is a calcium channel blocker  Brand Name(s): Cardizem, Cardizem CD, Cardizem LA, Cartia XT, Dilt-XR, Matzim LA, Taztia XT, Tiazac   There may be other brand names for this medicine  When This Medicine Should Not Be Used: This medicine is not right for everyone  Do not use it if you had an allergic reaction to diltiazem or similar medicines  How to Use This Medicine:   Long Acting Capsule, 12 Hour Capsule, 24 Hour Capsule, Tablet, Long Acting Tablet  · Take your medicine as directed  Your dose may need to be changed several times to find what works best for you  · It is best to take this medicine on an empty stomach  · Swallow this medicine whole  Do not crush, break, chew, or open the capsule or tablet  · Missed dose: Take a dose as soon as you remember  If it is almost time for your next dose, wait until then and take a regular dose  Do not take extra medicine to make up for a missed dose  · Store the medicine in a closed container at room temperature, away from heat, moisture, and direct light  Drugs and Foods to Avoid:   Ask your doctor or pharmacist before using any other medicine, including over-the-counter medicines, vitamins, and herbal products  · Some medicines can affect how diltiazem works  Tell your doctor if you are using the following:  ¨ Buspirone, carbamazepine, cimetidine, clonidine, cyclosporine, digoxin, ivabradine, lovastatin, midazolam, quinidine, rifampin, simvastatin, triazolam  ¨ Other blood pressure medicine, including a beta-blocker      Warnings While Using This Medicine:   · Tell your doctor if you are pregnant or breastfeeding, or if you have kidney disease, liver disease, or digestion problems  Tell your doctor about all heart problems that you have, including heart failure or rhythm problems  · This medicine may cause the following problems:  ¨ Slow heartbeat  ¨ Worsening of heart failure  ¨ Serious skin reactions  · This medicine could lower your blood pressure too much, especially when you first use it or if you are dehydrated  Stand or sit up slowly if you feel lightheaded or dizzy  Do not drive or do anything else that could be dangerous until you know how this medicine affects you  · Do not stop using this medicine without asking your doctor, even if you feel well  This medicine will not cure high blood pressure, but it will help keep it in normal range  You may have to take blood pressure medicine for the rest of your life  · Your doctor will do lab tests at regular visits to check on the effects of this medicine  Keep all appointments  · Keep all medicine out of the reach of children  Never share your medicine with anyone  Possible Side Effects While Using This Medicine:   Call your doctor right away if you notice any of these side effects:  · Allergic reaction: Itching or hives, swelling in your face or hands, swelling or tingling in your mouth or throat, chest tightness, trouble breathing  · Blistering, peeling, red skin rash  · Dark urine or pale stools, nausea, vomiting, loss of appetite, stomach pain, yellow skin or eyes  · Fast, slow, uneven, or pounding heartbeat  · Rapid weight gain, swelling in your hands, ankles, or feet  If you notice other side effects that you think are caused by this medicine, tell your doctor  Call your doctor for medical advice about side effects  You may report side effects to FDA at 4-625-FDA-8630  © 2017 [address] [employer] Information is for End User's use only and may not be sold, redistributed or otherwise used for commercial purposes  The above information is an  only  It is not intended as medical advice for individual conditions or treatments  Talk to your doctor, nurse or pharmacist before following any medical regimen to see if it is safe and effective for you  Fluticasone/Vilanterol (By breathing)   Fluticasone Furoate (kkao-UDZ-g-sone FURE-oh-ate), Vilanterol Trifenatate (vye-MARY-ter-ol [id number]-johnson)  Treats asthma and chronic obstructive pulmonary disease (COPD)  This medicine contains a steroid  Brand Name(s): Breo Ellipta   There may be other brand names for this medicine  When This Medicine Should Not Be Used: This medicine is not right for everyone  Do not use it if you had an allergic reaction to fluticasone, vilanterol, or milk proteins  How to Use This Medicine:   Powder  · Use this medicine at the same time every day exactly as directed  Never use it more often than your doctor told you to  · This medicine should come with a Medication Guide  Ask your pharmacist for a copy if you do not have one  · This medicine is a powder that is used with its own inhaler device  Keep the medicine in the foil tray until you are ready to use the inhaler  · Each time you open the cover of the inhaler and hear a click, the inhaler is ready to use  Do not close the cover again until you have taken your dose  You will lose the dose if you open and close the cover without inhaling the medicine  · When you take a dose, inhale through your mouth  Do not breath in through your nose  · When the counter on the medicine turns red, there are less than 10 doses left  Refill your prescription as soon as possible  · When you have finished all your inhalations, rinse your mouth out with water  · Missed dose: Take a dose as soon as you remember  If it is almost time for your next dose, wait until then and take a regular dose  Do not take extra medicine to make up for a missed dose  Do not take more than 1 puff per day    · Store the medicine in a closed container at room temperature, away from heat, moisture, and direct light  Throw away this medicine 6 weeks after it was opened or when the counter reads "0 "  Drugs and Foods to Avoid:   Ask your doctor or pharmacist before using any other medicine, including over-the-counter medicines, vitamins, and herbal products  · Do not use this medicine together with similar inhaled medicines, including arformoterol, budesonide/formoterol, formoterol, indacaterol, or salmeterol  · Some medicines can affect how this medicine works  Tell your doctor if you are using any of the following:  ¨ Clarithromycin, conivaptan, indinavir, itraconazole, ketoconazole, lopinavir, nefazodone, nelfinavir, ritonavir, saquinavir, telithromycin, troleandomycin, voriconazole  ¨ Beta-blocker  ¨ Diuretic (water pill)  ¨ Tricyclic antidepressant or MAO inhibitor within the past 2 weeks  Warnings While Using This Medicine:   · Tell your doctor if you are pregnant or breastfeeding, or if you have liver disease, diabetes, cataracts, glaucoma, heart or blood vessel disease, high blood pressure, heart rhythm problems, osteoporosis, thyroid problems, or a history of seizures  · This medicine may cause the following problems:  ¨ Increased risk of pneumonia  ¨ Adrenal gland problems  ¨ Increased risk of paradoxical bronchospasm (trouble breathing right after use) and asthma-related death  ¨ Changes in heart rhythm  ¨ Osteoporosis (when used for a long time)  ¨ Glaucoma or cataracts  · Do not use this medicine to treat acute attacks  You should have another medicine to use for an acute asthma attack or COPD flare-up  Tell your doctor right away if your condition gets worse or you need to use your other medicine more often than usual   · This medicine may weaken your immune system and increase your risk for infection  Tell your doctor about any immune system problems or infections you have, including herpes in your eye or tuberculosis   Tell your doctor right away if you have been exposed to chickenpox or measles  · Your doctor will check your progress and the effects of this medicine at regular visits  Keep all appointments  · Keep all medicine out of the reach of children  Never share your medicine with anyone  Possible Side Effects While Using This Medicine:   Call your doctor right away if you notice any of these side effects:  · Allergic reaction: Itching or hives, swelling in your face or hands, swelling or tingling in your mouth or throat, chest tightness, trouble breathing  · Changes in skin color, dark freckles, weakness, tiredness, nausea, vomiting, weight loss  · Chest pain  · Eye pain, vision loss, seeing halos around lights  · Fast, pounding, or uneven heartbeat  · Fever, chills, cough, runny or stuffy nose, sore throat, body aches  · Lightheadedness, dizziness, fainting  · Worsening of breathing problems, shortness of breath, wheezing  If you notice these less serious side effects, talk with your doctor:   · Dry mouth  · White patches in your mouth or throat, pain when you eat or swallow  If you notice other side effects that you think are caused by this medicine, tell your doctor  Call your doctor for medical advice about side effects  You may report side effects to FDA at 0-575-FDA-5255  © 2017 [address] [employer] Information is for End User's use only and may not be sold, redistributed or otherwise used for commercial purposes  The above information is an  only  It is not intended as medical advice for individual conditions or treatments  Talk to your doctor, nurse or pharmacist before following any medical regimen to see if it is safe and effective for you

## 2020-02-06 NOTE — NURSING NOTE
Discharge instructions given both written and verbally with details on f/u apts and medications  Reviewed medications, next dose due, and which are stopped  Note for work given to patient signed by physician  IV d/c by me with catheter intact  Dressed in street clothes  Calling security for belongings   working with pharmacy for medications

## 2020-02-06 NOTE — PROGRESS NOTES
Progress Note - Cardiology   Sanjuana Wolfe  49 y o  male MRN: [id number]  Unit/Bed#: 7T [facility] 709-02 Encounter: [id number]    Assessment:  1  Paroxysmal atrial fibrillation now in normal sinus rhythm on Cardizem  2  Occasional PVCs and PACs overnight on monitor     Plan:  1  Continue Cardizem  mg daily  2  Patient may be discharged from a cardiac standpoint       Interval history:  Patient feels good and is anxious to go home  He an occasional PVC and PACs on monitor last night  He had a short episode of ventricular bigeminy  His electrolytes were checked and he was given magnesium for a magnesium level 1 8 no recent arrhythmias today  Vitals: /87 (BP Location: Right arm)   Pulse 62   Temp 98 1 °F (36 7 °C) (Temporal)   Resp 20   Ht 5' 10" (1 778 m)   Wt 117 kg (257 lb 15 oz)   SpO2 94%   BMI 37 01 kg/m²   Vitals:    02/04/20 1823 02/04/20 2213   Weight: 120 kg (265 lb 6 oz) 117 kg (257 lb 15 oz)     Orthostatic Blood Pressures      Most Recent Value   Blood Pressure  135/87 filed at 02/06/2020 0813   Patient Position - Orthostatic VS  Sitting filed at 02/06/2020 0813            Intake/Output Summary (Last 24 hours) at 2/6/2020 4649  Last data filed at 2/6/2020 1289  Gross per 24 hour   Intake 2340 ml   Output 700 ml   Net 1640 ml       Invasive Devices     Peripheral Intravenous Line            Peripheral IV 02/04/20 Left Antecubital 1 day                Review of Systems   Constitutional: Negative for activity change  Respiratory: Negative for cough, chest tightness, shortness of breath and wheezing  Cardiovascular: Negative for chest pain, palpitations and leg swelling  Musculoskeletal: Negative for gait problem  Left shoulder pain   Skin: Negative for color change  Neurological: Negative for dizziness, tremors, syncope, weakness, light-headedness and headaches  Psychiatric/Behavioral: Negative for agitation and confusion         Physical Exam   Constitutional: He is oriented to person, place, and time  He appears well-developed and well-nourished  No distress  HENT:   Head: Normocephalic and atraumatic  Neck: No JVD present  Cardiovascular: Normal rate, regular rhythm, normal heart sounds and intact distal pulses  Exam reveals no gallop and no friction rub  No murmur heard  Pulmonary/Chest: Effort normal and breath sounds normal  No respiratory distress  He has no wheezes  He has no rales  He exhibits no tenderness  Abdominal: Soft  Bowel sounds are normal  He exhibits no distension  Musculoskeletal: He exhibits no edema  Neurological: He is alert and oriented to person, place, and time  Skin: Skin is warm and dry  Psychiatric: He has a normal mood and affect  His behavior is normal        Lab Results:   CBC with diff:   Results from last 7 days   Lab Units 02/05/20  0508   WBC Thousand/uL 5 20   RBC Million/uL 4 74   HEMOGLOBIN g/dL 13 4*   HEMATOCRIT % 40 6*   MCV fL 86   MCH pg 28 3   MCHC g/dL 33 1   RDW % 13 8   MPV fL 8 2*   PLATELETS Thousands/uL 208     CMP:   Results from last 7 days   Lab Units 02/05/20  1934 02/04/20  1843   SODIUM mmol/L 134*   < > 141   POTASSIUM mmol/L 4 1   < > 4 2   CHLORIDE mmol/L 102   < > 103   CO2 mmol/L 26   < > 27   BUN mg/dL 16   < > 17   CREATININE mg/dL 0 87   < > 0 90   CALCIUM mg/dL 8 9   < > 9 4   AST U/L  --   --  18   ALT U/L  --   --  19   ALK PHOS U/L  --   --  44   EGFR ml/min/1 73sq m 99   < > 97    < > = values in this interval not displayed  Magnesium:   Results from last 7 days   Lab Units 02/05/20  1934   MAGNESIUM mg/dL 1 8       Imaging: I have personally reviewed pertinent reports        EKG:  Normal sinus rhythm

## 2020-02-07 ENCOUNTER — TRANSITIONAL CARE MANAGEMENT (OUTPATIENT)
Dept: FAMILY MEDICINE CLINIC | Facility: CLINIC | Age: 55
End: 2020-02-07

## 2020-02-07 DIAGNOSIS — J45.30 MILD PERSISTENT ASTHMA: ICD-10-CM

## 2020-02-07 DIAGNOSIS — J44.9 COPD (CHRONIC OBSTRUCTIVE PULMONARY DISEASE) (HCC): ICD-10-CM

## 2020-02-07 NOTE — ED ATTENDING ATTESTATION
I was the attending physician on duty at the time the patient visited the emergency department  The patient was evaluated and dispositioned by the APC  I was personally available for consultation  I am administratively signing the chart after the fact      Denise Hernandez MD

## 2020-02-10 RX ORDER — FLUTICASONE FUROATE AND VILANTEROL TRIFENATATE 100; 25 UG/1; UG/1
POWDER RESPIRATORY (INHALATION)
Qty: 60 EACH | Refills: 5 | Status: SHIPPED | OUTPATIENT
Start: 2020-02-10 | End: 2020-03-23 | Stop reason: SDUPTHER

## 2020-02-13 ENCOUNTER — OFFICE VISIT (OUTPATIENT)
Dept: FAMILY MEDICINE CLINIC | Facility: CLINIC | Age: 55
End: 2020-02-13

## 2020-02-13 VITALS
SYSTOLIC BLOOD PRESSURE: 112 MMHG | BODY MASS INDEX: 38.78 KG/M2 | WEIGHT: 270.9 LBS | RESPIRATION RATE: 18 BRPM | HEIGHT: 70 IN | HEART RATE: 85 BPM | OXYGEN SATURATION: 98 % | TEMPERATURE: 98 F | DIASTOLIC BLOOD PRESSURE: 78 MMHG

## 2020-02-13 DIAGNOSIS — I10 ESSENTIAL HYPERTENSION: ICD-10-CM

## 2020-02-13 DIAGNOSIS — I48.91 ATRIAL FIBRILLATION WITH RVR (HCC): Primary | ICD-10-CM

## 2020-02-13 PROCEDURE — 99496 TRANSJ CARE MGMT HIGH F2F 7D: CPT | Performed by: FAMILY MEDICINE

## 2020-02-13 PROCEDURE — 3008F BODY MASS INDEX DOCD: CPT | Performed by: FAMILY MEDICINE

## 2020-02-13 PROCEDURE — 1111F DSCHRG MED/CURRENT MED MERGE: CPT | Performed by: FAMILY MEDICINE

## 2020-02-13 PROCEDURE — 93000 ELECTROCARDIOGRAM COMPLETE: CPT | Performed by: FAMILY MEDICINE

## 2020-02-13 PROCEDURE — 3008F BODY MASS INDEX DOCD: CPT | Performed by: ORTHOPAEDIC SURGERY

## 2020-02-13 RX ORDER — ASPIRIN 81 MG/1
81 TABLET ORAL DAILY
COMMUNITY

## 2020-02-13 RX ORDER — PEN NEEDLE, DIABETIC 31 GX5/16"
NEEDLE, DISPOSABLE MISCELLANEOUS
COMMUNITY
Start: 2020-02-10 | End: 2020-02-13

## 2020-02-13 NOTE — PROGRESS NOTES
Assessment/Plan:    Atrial fibrillation with RVR ([facility])  EKG today NSR  Patient did not continue Xarelto or Cardizem due to cost     Counseled patient to start medications immediately after his appointment  Reiterated to stop Cialis until seen by Cardiology  Will send referral to social work to help cause medications  Essential hypertension  BP Readings from Last 1 Encounters:   02/13/20 112/78   Lisinopril stopped and patient due to the addition of Cardizem and well controlled blood pressures  Patient restarted lisinopril and has not continued Cardizem due to cost of medication  Advised patient to discontinue lisinopril start Cardizem as soon as possible  Will continue to monitor  Diagnoses and all orders for this visit:    Atrial fibrillation with RVR ([facility] [address] )  -     Ambulatory referral to social work care management program; Future    Essential hypertension    Other orders  -     aspirin (ECOTRIN LOW STRENGTH) 81 mg EC tablet; Take 81 mg by mouth daily  -     Discontinue: Alcohol Swabs (ALCOHOL PREP) PADS; TEST DAILY          Subjective:      Patient ID: Esther Winn  is a 49 y o  male  This is a very pleasant 49 y o  male who presents to the clinic for follow up on his recent admission to the hospital on 02/04/2020, discharged on 02/06/2020  He was found to have Afib RVR, was started on Caredizem and Xarelto, his Lisinopril was DC'd due to low normal BP on Cardizem  [facility] was [facility] on the [facility]  Patient reports he did not start Cardizem or Xarelto after discahrge due to cost  He also reports continued use of Cialis  He was advised to start Cardizem and Xarelto ASAP and DC Cialis, he demonstrated understanding and agreed to this plan        The following portions of the patient's history were reviewed and updated as appropriate: allergies, current medications, past family history, past medical history, past social history, past surgical history and problem list     Review of Systems Constitutional: Negative for chills and fever  HENT: Negative for ear pain  Eyes: Negative for pain  Respiratory: Negative for cough, shortness of breath and wheezing  Cardiovascular: Negative for chest pain, palpitations and leg swelling  Gastrointestinal: Negative for abdominal pain, constipation, diarrhea, nausea and vomiting  Genitourinary: Negative for dysuria and hematuria  Musculoskeletal: Negative for arthralgias  Skin: Negative for rash  Neurological: Negative for dizziness and headaches  Psychiatric/Behavioral: Negative for dysphoric mood  Objective:      /78 (BP Location: Right arm, Patient Position: Sitting, Cuff Size: Standard)   Pulse 85   Temp 98 °F (36 7 °C) (Temporal)   Resp 18   Ht 5' 10" (1 778 m)   Wt 123 kg (270 lb 14 4 oz)   SpO2 98%   BMI 38 87 kg/m²          Physical Exam   Constitutional: He is oriented to person, place, and time  He appears well-developed and well-nourished  HENT:   Head: Normocephalic and atraumatic  Right Ear: External ear normal    Left Ear: External ear normal    Nose: Nose normal    Mouth/Throat: Oropharynx is clear and moist    Eyes: Pupils are equal, round, and reactive to light  Conjunctivae and EOM are normal    Neck: Normal range of motion  Neck supple  Cardiovascular: Normal rate, regular rhythm, normal heart sounds and intact distal pulses  He was noted to have a regularly irregular pulse on exam    Pulmonary/Chest: Effort normal and breath sounds normal    Abdominal: Soft  Bowel sounds are normal  There is no tenderness  Musculoskeletal: Normal range of motion  He exhibits no edema or tenderness  Lymphadenopathy:     He has no cervical adenopathy  Neurological: He is alert and oriented to person, place, and time  Skin: Skin is warm and dry  Psychiatric: He has a normal mood and affect   His behavior is normal

## 2020-02-13 NOTE — PATIENT INSTRUCTIONS
A-fib (Atrial Fibrillation)   WHAT YOU NEED TO KNOW:   A-fib may come and go, or it may be a long-term condition  A-fib can cause blood clots, stroke, or heart failure  These conditions may become life-threatening  It is important to treat and manage a-fib to help prevent a blood clot, stroke, or heart failure  DISCHARGE INSTRUCTIONS:   Call 911 for any of the following:   · You have any of the following signs of a heart attack:      ¨ Squeezing, pressure, or pain in your chest that lasts longer than 5 minutes or returns    ¨ Discomfort or pain in your back, neck, jaw, stomach, or arm     ¨ Trouble breathing    ¨ Nausea or vomiting    ¨ Lightheadedness or a sudden cold sweat, especially with chest pain or trouble breathing    · You have any of the following signs of a stroke:      ¨ Numbness or drooping on one side of your face     ¨ Weakness in an arm or leg    ¨ Confusion or difficulty speaking    ¨ Dizziness, a severe headache, or vision loss  Return to the emergency department if:  You have any of the following signs of a blood clot:  · You feel lightheaded, are short of breath, and have chest pain  · You cough up blood  · You have swelling, redness, pain, or warmth in your arm or leg  Contact your cardiologist or healthcare provider if:   · Your target heart rate is not in the range it should be  · You have new or worsening swelling in your legs, feet, ankles, or abdomen  · You are short of breath, even at rest      · You have questions or concerns about your condition or care  Medicines: You may need any of the following:  · Heart medicines  help control your heart rate and rhythm  You may need more than one medicine to treat your symptoms  · Blood thinners    help prevent blood clots  Examples of blood thinners include heparin and warfarin  Clots can cause strokes, heart attacks, and death   The following are general safety guidelines to follow while you are taking a blood thinner:    ¨ Watch for bleeding and bruising while you take blood thinners  Watch for bleeding from your gums or nose  Watch for blood in your urine and bowel movements  Use a soft washcloth on your skin, and a soft toothbrush to brush your teeth  This can keep your skin and gums from bleeding  If you shave, use an electric shaver  Do not play contact sports  ¨ Tell your dentist and other healthcare providers that you take anticoagulants  Wear a bracelet or necklace that says you take this medicine  ¨ Do not start or stop any medicines unless your healthcare provider tells you to  Many medicines cannot be used with blood thinners  ¨ Tell your healthcare provider right away if you forget to take the medicine, or if you take too much  ¨ Warfarin  is a blood thinner that you may need to take  The following are things you should be aware of if you take warfarin  § Foods and medicines can affect the amount of warfarin in your blood  Do not make major changes to your diet while you take warfarin  Warfarin works best when you eat about the same amount of vitamin K every day  Vitamin K is found in green leafy vegetables and certain other foods  Ask for more information about what to eat when you are taking warfarin  § You will need to see your healthcare provider for follow-up visits when you are on warfarin  You will need regular blood tests  These tests are used to decide how much medicine you need  · Antiplatelets , such as aspirin, help prevent blood clots  Take your antiplatelet medicine exactly as directed  These medicines make it more likely for you to bleed or bruise  If you are told to take aspirin, do not take acetaminophen or ibuprofen instead  · Take your medicine as directed  Contact your healthcare provider if you think your medicine is not helping or if you have side effects  Tell him or her if you are allergic to any medicine   Keep a list of the medicines, vitamins, and herbs you take  Include the amounts, and when and why you take them  Bring the list or the pill bottles to follow-up visits  Carry your medicine list with you in case of an emergency  Follow up with your cardiologist as directed: You will need regular blood tests and monitoring  Write down your questions so you remember to ask them during your visits  Manage A-fib:   · Know your target heart rate  Learn how to take your pulse and monitor your heart rate  · Manage other health conditions  This includes high blood pressure, sleep apnea, thyroid disease, diabetes, and other heart conditions  Take medicine as directed and follow your treatment plan  · Limit or do not drink alcohol  Alcohol can make a-fib hard to manage  Ask your healthcare provider if it is safe for you to drink alcohol  A drink of alcohol is 12 ounces of beer, 5 ounces of wine, or 1½ ounces of liquor  · Do not smoke  Nicotine and other chemicals in cigarettes and cigars can cause heart and lung damage  Ask your healthcare provider for information if you currently smoke and need help to quit  E-cigarettes or smokeless tobacco still contain nicotine  Talk to your healthcare provider before you use these products  · Eat heart-healthy foods  Heart healthy foods will help keep your cholesterol low  These include fruits, vegetables, whole-grain breads, low-fat dairy products, beans, lean meats, and fish  Replace butter and margarine with heart-healthy oils such as olive oil and canola oil  · Maintain a healthy weight  Ask your healthcare provider how much you should weigh  Ask him to help you create a weight loss plan if you are overweight  · Exercise for 30 minutes  most days of the week  Ask your healthcare provider about the best exercise plan for you  © 2017 [employer] Information is for End User's use only and may not be sold, redistributed or otherwise used for commercial purposes   All illustrations and images included in CareNotes® are the copyrighted property of [employer]  or [employer] Alvarado  The above information is an  only  It is not intended as medical advice for individual conditions or treatments  Talk to your doctor, nurse or pharmacist before following any medical regimen to see if it is safe and effective for you

## 2020-02-13 NOTE — ASSESSMENT & PLAN NOTE
BP Readings from Last 1 Encounters:   02/13/20 112/78   Lisinopril stopped and patient due to the addition of Cardizem and well controlled blood pressures  Patient restarted lisinopril and has not continued Cardizem due to cost of medication  Advised patient to discontinue lisinopril start Cardizem as soon as possible  Will continue to monitor

## 2020-02-13 NOTE — ASSESSMENT & PLAN NOTE
EKG today NSR  Patient did not continue Xarelto or Cardizem due to cost     Counseled patient to start medications immediately after his appointment  Reiterated to stop Cialis until seen by Cardiology  Will send referral to social work to help cause medications

## 2020-02-17 ENCOUNTER — OFFICE VISIT (OUTPATIENT)
Dept: FAMILY MEDICINE CLINIC | Facility: CLINIC | Age: 55
End: 2020-02-17

## 2020-02-17 VITALS
TEMPERATURE: 97.8 F | BODY MASS INDEX: 38.74 KG/M2 | WEIGHT: 270 LBS | HEART RATE: 70 BPM | DIASTOLIC BLOOD PRESSURE: 78 MMHG | SYSTOLIC BLOOD PRESSURE: 142 MMHG | RESPIRATION RATE: 18 BRPM

## 2020-02-17 DIAGNOSIS — I48.91 ATRIAL FIBRILLATION WITH RVR (HCC): ICD-10-CM

## 2020-02-17 DIAGNOSIS — I10 ESSENTIAL HYPERTENSION: Primary | ICD-10-CM

## 2020-02-17 PROCEDURE — 99213 OFFICE O/P EST LOW 20 MIN: CPT | Performed by: FAMILY MEDICINE

## 2020-02-17 PROCEDURE — 3078F DIAST BP <80 MM HG: CPT | Performed by: FAMILY MEDICINE

## 2020-02-17 PROCEDURE — 4004F PT TOBACCO SCREEN RCVD TLK: CPT | Performed by: FAMILY MEDICINE

## 2020-02-17 PROCEDURE — 1111F DSCHRG MED/CURRENT MED MERGE: CPT | Performed by: FAMILY MEDICINE

## 2020-02-17 PROCEDURE — 3044F HG A1C LEVEL LT 7.0%: CPT | Performed by: FAMILY MEDICINE

## 2020-02-17 PROCEDURE — 3077F SYST BP >= 140 MM HG: CPT | Performed by: FAMILY MEDICINE

## 2020-02-17 NOTE — ASSESSMENT & PLAN NOTE
BP Readings from Last 1 Encounters:   02/17/20 142/78   Currently elevated, patient has not started Cardizem, will hold off on adding additional agent at this time, as patient will start Cardizem on Friday when he gets paid  Continue current management  Reassess at next visit

## 2020-02-17 NOTE — PROGRESS NOTES
Assessment/Plan:    Atrial fibrillation with RVR ([facility] [address] )  Patient currently with normal rate and regular pulse  Patient now taking Xarelto  Has not picked up Cardizem due to cost    Essential hypertension  BP Readings from Last 1 Encounters:   02/17/20 142/78   Currently elevated, patient has not started Cardizem, will hold off on adding additional agent at this time  Continue current management  Reassess at next visit  Diagnoses and all orders for this visit:    Essential hypertension    Atrial fibrillation with RVR ([facility])          Subjective:      Patient ID: Abdoulaye Guerrero  is a 49 y o  male  This is a very pleasant 49 y o  male who presents to the clinic for management of their chronic medical conditions  Patient's medical conditions are stable unless noted otherwise above  Patient has not had any recent hospitalizations, or medical emergencies since last visit  Patient has no further complaints other than what is mentioned in the ROS  The following portions of the patient's history were reviewed and updated as appropriate: allergies, current medications, past family history, past medical history, past social history, past surgical history and problem list     Review of Systems   Constitutional: Negative for chills and fever  HENT: Negative for ear pain  Eyes: Negative for pain  Respiratory: Negative for cough, shortness of breath and wheezing  Cardiovascular: Negative for chest pain, palpitations and leg swelling  Gastrointestinal: Negative for abdominal pain, constipation, diarrhea, nausea and vomiting  Genitourinary: Negative for dysuria and hematuria  Musculoskeletal: Negative for arthralgias  Skin: Negative for rash  Neurological: Negative for dizziness and headaches  Psychiatric/Behavioral: Negative for dysphoric mood           Objective:      /78 (BP Location: Right arm, Patient Position: Sitting, Cuff Size: Large)   Pulse 70   Temp 97 8 °F (36 6 °C) Resp 18   Wt 122 kg (270 lb)   BMI 38 74 kg/m²          Physical Exam   Constitutional: He is oriented to person, place, and time  He appears well-developed and well-nourished  HENT:   Head: Normocephalic and atraumatic  Right Ear: External ear normal    Left Ear: External ear normal    Nose: Nose normal    Mouth/Throat: Oropharynx is clear and moist    Eyes: Pupils are equal, round, and reactive to light  Conjunctivae and EOM are normal    Neck: Normal range of motion  Neck supple  Cardiovascular: Normal rate, regular rhythm, normal heart sounds and intact distal pulses  Pulmonary/Chest: Effort normal and breath sounds normal    Abdominal: Soft  Bowel sounds are normal  There is no tenderness  Musculoskeletal: Normal range of motion  He exhibits no edema or tenderness  Lymphadenopathy:     He has no cervical adenopathy  Neurological: He is alert and oriented to person, place, and time  Skin: Skin is warm and dry  Psychiatric: He has a normal mood and affect   His behavior is normal

## 2020-02-17 NOTE — ASSESSMENT & PLAN NOTE
Patient currently with normal rate and regular pulse  Patient now taking Xarelto  Has not picked up Cardizem due to cost

## 2020-02-17 NOTE — PATIENT INSTRUCTIONS

## 2020-02-19 ENCOUNTER — TELEPHONE (OUTPATIENT)
Dept: FAMILY MEDICINE CLINIC | Facility: CLINIC | Age: 55
End: 2020-02-19

## 2020-02-19 NOTE — TELEPHONE ENCOUNTER
Pt angel left message on nurse line stating they were told to call for an arm injection if he continues with pain   I don't see mention of this in visit note so if you would like this scheduled, please route to clerical pool to schedule procedure

## 2020-02-20 NOTE — TELEPHONE ENCOUNTER
Patient states he is having a lot of pain he can't wait till your next available appointment on 03/19/20  I offer patient to be seen with any other available provider for this procedure  Patient requested that I ask you what do you recommend for patient to do if you knows history or pain

## 2020-02-28 ENCOUNTER — PATIENT OUTREACH (OUTPATIENT)
Dept: FAMILY MEDICINE CLINIC | Facility: CLINIC | Age: 55
End: 2020-02-28

## 2020-02-28 DIAGNOSIS — N52.8 OTHER MALE ERECTILE DYSFUNCTION: Primary | ICD-10-CM

## 2020-02-28 NOTE — PROGRESS NOTES
MAGGI COFFEY received a referral from patient's PCP regarding patient's difficulty affording medication and need for assistance  MAGGI COFFEY contacted patient to assess  Patient states he was recently laid off  Patient explained he has already applied for unemployment and will be looking into promptly obtaining another job  Patient confirmed being familiar with [employer] and the services they offer  Patient expressed difficulty with affording his medications, particularly his Xarelto  MAGGI COFFEY explained to patient that many of his medications are on the [facility] $4 list and therefore do not have any additional patient assistance  MAGGI COFFEY to inquire about possible patient assistance programs that would alleviate the costs of prescriptions  Patient expressed gratitude for the assistance  MAGGI COFFEY will continue to remain available for additional assistance/support as needed

## 2020-03-03 RX ORDER — TADALAFIL 20 MG/1
TABLET ORAL
Qty: 10 TABLET | Refills: 3 | Status: SHIPPED | OUTPATIENT
Start: 2020-03-03 | End: 2020-06-22 | Stop reason: SDUPTHER

## 2020-03-10 ENCOUNTER — PATIENT OUTREACH (OUTPATIENT)
Dept: FAMILY MEDICINE CLINIC | Facility: CLINIC | Age: 55
End: 2020-03-10

## 2020-03-11 NOTE — PROGRESS NOTES
MAGGI COFFEY contacted patient regarding previous contact  MAGGI COFFEY informed patient that there is a patient assistance prescription program for his Xarelto, through [employer]  MAGGI COFFEY informed patient that in order to complete this application, patient would need to provide a copy of his 1040 tax return  Patient declined having a copy of this available despite having filed his federal taxes  MAGGI COFFEY explained proof of income is needed to complete the application and the application will not be accepted with out it  Patient states he would like to attempt to complete the application without this information  MAGGI COFFEY scheduled an appointment to complete application with patient for 3/11/20 at 9:00 AM  MAGGI COFFEY will continue to follow      --    Patient no call/no showed his scheduled social work appointment for 3/11/20 at 9:00 AM

## 2020-03-23 ENCOUNTER — TELEMEDICINE (OUTPATIENT)
Dept: FAMILY MEDICINE CLINIC | Facility: CLINIC | Age: 55
End: 2020-03-23

## 2020-03-23 DIAGNOSIS — J44.9 COPD (CHRONIC OBSTRUCTIVE PULMONARY DISEASE) (HCC): ICD-10-CM

## 2020-03-23 DIAGNOSIS — J45.30 MILD PERSISTENT ASTHMA: ICD-10-CM

## 2020-03-23 DIAGNOSIS — I48.91 ATRIAL FIBRILLATION WITH RVR (HCC): ICD-10-CM

## 2020-03-23 PROCEDURE — 99213 OFFICE O/P EST LOW 20 MIN: CPT | Performed by: FAMILY MEDICINE

## 2020-03-23 RX ORDER — FLUTICASONE FUROATE AND VILANTEROL 100; 25 UG/1; UG/1
1 POWDER RESPIRATORY (INHALATION) DAILY
Qty: 60 EACH | Refills: 5 | Status: SHIPPED | OUTPATIENT
Start: 2020-03-23 | End: 2022-04-25 | Stop reason: SDUPTHER

## 2020-03-23 RX ORDER — ALBUTEROL SULFATE 90 UG/1
2 AEROSOL, METERED RESPIRATORY (INHALATION) EVERY 4 HOURS PRN
Qty: 1 INHALER | Refills: 0 | Status: SHIPPED | OUTPATIENT
Start: 2020-03-23 | End: 2020-06-09 | Stop reason: SDUPTHER

## 2020-03-23 NOTE — PROGRESS NOTES
Virtual Regular Visit    Problem List Items Addressed This Visit        Respiratory    COPD (chronic obstructive pulmonary disease) ([facility] [address] )    Relevant Medications    fluticasone-vilanterol (Breo Ellipta) 100-25 mcg/inh inhaler    albuterol (PROVENTIL HFA,VENTOLIN HFA) 90 mcg/act inhaler       Cardiovascular and Mediastinum    Atrial fibrillation with RVR (HCC)     Patient adherent to Xarelto 20 mg daily  Not taking Cardizem due to cost   Reports normal heart rate, no chest pain palpitations  Relevant Medications    rivaroxaban (XARELTO) 20 mg tablet               Reason for visit is follow up on chronic conditions  Encounter provider Corazon Vaughn MD    Provider located at [address] [place] [id number] [phone]      Recent Visits  No visits were found meeting these conditions  Showing recent visits within past 7 days and meeting all other requirements     Future Appointments  No visits were found meeting these conditions  Showing future appointments within next 150 days and meeting all other requirements        After connecting through [employer], the patient was identified by name and date of birth  Efrain Santiago  was informed that this is a telemedicine visit and that the visit is being conducted through telephone which may not be secure and therefore, might not be HIPAA-compliant  My office door was closed  No one else was in the room  He acknowledged consent and understanding of privacy and security of the video platform  The patient has agreed to participate and understands they can discontinue the visit at any time  Subjective  Efrain Santiago  is a 49 y o  male seen virtually for follow up on chronic conditions  He offers no complaints at this time   He denies fever, chills, ear pain, eye pain, sore throat, cough, wheezing, shortness of breath, chest pain, palpitations, ab pain, nausea, vomiting, diarrhea, constipation, blood in stool, blood in urine, dysuria, leg swelling, or rash         Past Medical History:   Diagnosis Date    Asthma     Carpal tunnel syndrome     Chronic pain     left arm    Diabetes mellitus ([address] )     Erectile dysfunction     Hypertension     Umbilical hernia        Past Surgical History:   Procedure Laterality Date    CHOLECYSTECTOMY      CHOLECYSTECTOMY LAPAROSCOPIC N/A 6/20/2019    Procedure: CHOLECYSTECTOMY LAPAROSCOPIC;  Surgeon: Emy Oconnor MD;  Location: [address] [place] [address] OR;  Service: General    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    HAND SURGERY      UMBILICAL HERNIA REPAIR         Current Outpatient Medications   Medication Sig Dispense Refill    albuterol (PROVENTIL HFA,VENTOLIN HFA) 90 mcg/act inhaler Inhale 2 puffs every 4 (four) hours as needed for wheezing 1 Inhaler 0    Alcohol Swabs (ALCOHOL PREP) 70 % PADS daily Test  5    aspirin (ECOTRIN LOW STRENGTH) 81 mg EC tablet Take 81 mg by mouth daily      atorvastatin (LIPITOR) 20 mg tablet Take 1 tablet (20 mg total) by mouth daily 90 tablet 3    diltiazem (CARDIZEM CD) 240 mg 24 hr capsule Take 1 capsule (240 mg total) by mouth daily 30 capsule 1    fluticasone-vilanterol (Breo Ellipta) 100-25 mcg/inh inhaler Inhale 1 puff daily Rinse mouth after use  60 each 5    Lancets MISC Use to test blood glucose once a day  Dx: Type 2 DM  E11 9      metFORMIN (GLUCOPHAGE) 1000 MG tablet Take 1 tablet (1,000 mg total) by mouth 2 (two) times a day with meals 180 tablet 3    Misc  Devices MISC by Does not apply route daily 1 each 0    rivaroxaban (XARELTO) 20 mg tablet Take 1 tablet (20 mg total) by mouth daily with breakfast 90 tablet 3    Tadalafil, PAH, 20 MG TABS TAKE ONE TABLET BY MOUTH DAILY AS NEEDED FOR ERECTILE DYSFUNCTION 10 tablet 3     No current facility-administered medications for this visit  No Known Allergies          I spent 20 minutes with the patient during this visit

## 2020-03-23 NOTE — ASSESSMENT & PLAN NOTE
Patient adherent to Xarelto 20 mg daily  Not taking Cardizem due to cost   Reports normal heart rate, no chest pain palpitations

## 2020-06-09 DIAGNOSIS — J45.30 MILD PERSISTENT ASTHMA: ICD-10-CM

## 2020-06-09 DIAGNOSIS — J44.9 COPD (CHRONIC OBSTRUCTIVE PULMONARY DISEASE) (HCC): ICD-10-CM

## 2020-06-09 RX ORDER — ALBUTEROL SULFATE 90 UG/1
2 AEROSOL, METERED RESPIRATORY (INHALATION) EVERY 4 HOURS PRN
Qty: 1 INHALER | Refills: 0 | Status: SHIPPED | OUTPATIENT
Start: 2020-06-09 | End: 2021-09-03 | Stop reason: SDUPTHER

## 2020-06-19 ENCOUNTER — PATIENT OUTREACH (OUTPATIENT)
Dept: FAMILY MEDICINE CLINIC | Facility: CLINIC | Age: 55
End: 2020-06-19

## 2020-06-22 DIAGNOSIS — N52.8 OTHER MALE ERECTILE DYSFUNCTION: ICD-10-CM

## 2020-06-22 RX ORDER — TADALAFIL 20 MG/1
20 TABLET ORAL AS NEEDED
Qty: 10 TABLET | Refills: 12 | Status: SHIPPED | OUTPATIENT
Start: 2020-06-22 | End: 2021-05-21 | Stop reason: SDUPTHER

## 2020-11-06 ENCOUNTER — OFFICE VISIT (OUTPATIENT)
Dept: FAMILY MEDICINE CLINIC | Facility: CLINIC | Age: 55
End: 2020-11-06

## 2020-11-06 VITALS
RESPIRATION RATE: 18 BRPM | BODY MASS INDEX: 41.66 KG/M2 | SYSTOLIC BLOOD PRESSURE: 126 MMHG | OXYGEN SATURATION: 96 % | WEIGHT: 291 LBS | HEART RATE: 106 BPM | TEMPERATURE: 97.6 F | DIASTOLIC BLOOD PRESSURE: 84 MMHG | HEIGHT: 70 IN

## 2020-11-06 DIAGNOSIS — N47.1 PHIMOSIS: ICD-10-CM

## 2020-11-06 DIAGNOSIS — E11.9 TYPE 2 DIABETES MELLITUS WITHOUT COMPLICATION, WITHOUT LONG-TERM CURRENT USE OF INSULIN (HCC): Primary | ICD-10-CM

## 2020-11-06 DIAGNOSIS — E78.49 OTHER HYPERLIPIDEMIA: ICD-10-CM

## 2020-11-06 DIAGNOSIS — Z11.4 SCREENING FOR HIV (HUMAN IMMUNODEFICIENCY VIRUS): ICD-10-CM

## 2020-11-06 DIAGNOSIS — I48.91 ATRIAL FIBRILLATION WITH RVR (HCC): ICD-10-CM

## 2020-11-06 DIAGNOSIS — I10 ESSENTIAL HYPERTENSION: ICD-10-CM

## 2020-11-06 LAB
CREAT UR-MCNC: 106 MG/DL
MICROALBUMIN UR-MCNC: 64.6 MG/L (ref 0–20)
MICROALBUMIN/CREAT 24H UR: 61 MG/G CREATININE (ref 0–30)

## 2020-11-06 PROCEDURE — 3060F POS MICROALBUMINURIA REV: CPT | Performed by: FAMILY MEDICINE

## 2020-11-06 PROCEDURE — 3725F SCREEN DEPRESSION PERFORMED: CPT | Performed by: FAMILY MEDICINE

## 2020-11-06 PROCEDURE — 82043 UR ALBUMIN QUANTITATIVE: CPT | Performed by: FAMILY MEDICINE

## 2020-11-06 PROCEDURE — 82570 ASSAY OF URINE CREATININE: CPT | Performed by: FAMILY MEDICINE

## 2020-11-06 PROCEDURE — 3060F POS MICROALBUMINURIA REV: CPT | Performed by: PHYSICIAN ASSISTANT

## 2020-11-06 PROCEDURE — 4004F PT TOBACCO SCREEN RCVD TLK: CPT | Performed by: FAMILY MEDICINE

## 2020-11-06 PROCEDURE — 99213 OFFICE O/P EST LOW 20 MIN: CPT | Performed by: FAMILY MEDICINE

## 2020-11-06 RX ORDER — ATORVASTATIN CALCIUM 20 MG/1
20 TABLET, FILM COATED ORAL DAILY
Qty: 90 TABLET | Refills: 3 | Status: SHIPPED | OUTPATIENT
Start: 2020-11-06 | End: 2022-02-07 | Stop reason: SDUPTHER

## 2020-11-06 RX ORDER — TRIAMCINOLONE ACETONIDE 5 MG/G
CREAM TOPICAL 3 TIMES DAILY
Qty: 30 G | Refills: 0 | Status: SHIPPED | OUTPATIENT
Start: 2020-11-06 | End: 2020-12-01

## 2020-11-06 RX ORDER — DILTIAZEM HYDROCHLORIDE 240 MG/1
240 CAPSULE, COATED, EXTENDED RELEASE ORAL DAILY
Qty: 30 CAPSULE | Refills: 1 | Status: SHIPPED | OUTPATIENT
Start: 2020-11-06 | End: 2020-11-29

## 2020-11-20 ENCOUNTER — TELEPHONE (OUTPATIENT)
Dept: UROLOGY | Facility: CLINIC | Age: 55
End: 2020-11-20

## 2020-11-20 ENCOUNTER — OFFICE VISIT (OUTPATIENT)
Dept: UROLOGY | Facility: CLINIC | Age: 55
End: 2020-11-20
Payer: COMMERCIAL

## 2020-11-20 VITALS
BODY MASS INDEX: 41.8 KG/M2 | HEIGHT: 70 IN | WEIGHT: 292 LBS | SYSTOLIC BLOOD PRESSURE: 136 MMHG | DIASTOLIC BLOOD PRESSURE: 84 MMHG

## 2020-11-20 DIAGNOSIS — N47.6 BALANOPOSTHITIS: ICD-10-CM

## 2020-11-20 DIAGNOSIS — Z12.5 PROSTATE CANCER SCREENING: Primary | ICD-10-CM

## 2020-11-20 PROCEDURE — 3008F BODY MASS INDEX DOCD: CPT | Performed by: FAMILY MEDICINE

## 2020-11-20 PROCEDURE — 3008F BODY MASS INDEX DOCD: CPT | Performed by: PHYSICIAN ASSISTANT

## 2020-11-20 PROCEDURE — 99214 OFFICE O/P EST MOD 30 MIN: CPT | Performed by: PHYSICIAN ASSISTANT

## 2020-11-20 PROCEDURE — 4004F PT TOBACCO SCREEN RCVD TLK: CPT | Performed by: PHYSICIAN ASSISTANT

## 2020-11-20 PROCEDURE — 3075F SYST BP GE 130 - 139MM HG: CPT | Performed by: PHYSICIAN ASSISTANT

## 2020-11-20 PROCEDURE — 3079F DIAST BP 80-89 MM HG: CPT | Performed by: PHYSICIAN ASSISTANT

## 2020-11-20 RX ORDER — CLOTRIMAZOLE AND BETAMETHASONE DIPROPIONATE 10; .64 MG/G; MG/G
CREAM TOPICAL 2 TIMES DAILY
Qty: 30 G | Refills: 0 | Status: SHIPPED | OUTPATIENT
Start: 2020-11-20 | End: 2020-11-30

## 2020-11-25 DIAGNOSIS — I48.91 ATRIAL FIBRILLATION WITH RVR (HCC): ICD-10-CM

## 2020-11-29 RX ORDER — DILTIAZEM HYDROCHLORIDE 240 MG/1
240 CAPSULE, COATED, EXTENDED RELEASE ORAL DAILY
Qty: 90 CAPSULE | Refills: 1 | Status: SHIPPED | OUTPATIENT
Start: 2020-11-29 | End: 2020-11-30

## 2020-11-30 DIAGNOSIS — I48.91 ATRIAL FIBRILLATION WITH RVR (HCC): ICD-10-CM

## 2020-11-30 DIAGNOSIS — N47.6 BALANOPOSTHITIS: ICD-10-CM

## 2020-11-30 RX ORDER — DILTIAZEM HYDROCHLORIDE 240 MG/1
240 CAPSULE, COATED, EXTENDED RELEASE ORAL DAILY
Qty: 30 CAPSULE | Refills: 3 | Status: SHIPPED | OUTPATIENT
Start: 2020-11-30 | End: 2021-01-06

## 2020-11-30 RX ORDER — CLOTRIMAZOLE AND BETAMETHASONE DIPROPIONATE 10; .64 MG/G; MG/G
CREAM TOPICAL 2 TIMES DAILY
Qty: 30 G | Refills: 0 | Status: SHIPPED | OUTPATIENT
Start: 2020-11-30 | End: 2020-12-14

## 2020-12-01 DIAGNOSIS — N47.1 PHIMOSIS: ICD-10-CM

## 2020-12-01 RX ORDER — TRIAMCINOLONE ACETONIDE 5 MG/G
CREAM TOPICAL 3 TIMES DAILY
Qty: 30 G | Refills: 0 | Status: SHIPPED | OUTPATIENT
Start: 2020-12-01 | End: 2021-01-06

## 2020-12-12 DIAGNOSIS — N47.6 BALANOPOSTHITIS: ICD-10-CM

## 2020-12-14 RX ORDER — CLOTRIMAZOLE AND BETAMETHASONE DIPROPIONATE 10; .64 MG/G; MG/G
CREAM TOPICAL 2 TIMES DAILY
Qty: 30 G | Refills: 0 | Status: SHIPPED | OUTPATIENT
Start: 2020-12-14 | End: 2021-01-08

## 2021-01-05 DIAGNOSIS — I48.91 ATRIAL FIBRILLATION WITH RVR (HCC): ICD-10-CM

## 2021-01-05 DIAGNOSIS — N47.1 PHIMOSIS: ICD-10-CM

## 2021-01-06 RX ORDER — DILTIAZEM HYDROCHLORIDE 240 MG/1
240 CAPSULE, COATED, EXTENDED RELEASE ORAL DAILY
Qty: 30 CAPSULE | Refills: 3 | Status: SHIPPED | OUTPATIENT
Start: 2021-01-06 | End: 2021-03-22

## 2021-01-06 RX ORDER — TRIAMCINOLONE ACETONIDE 5 MG/G
CREAM TOPICAL 3 TIMES DAILY
Qty: 30 G | Refills: 0 | Status: SHIPPED | OUTPATIENT
Start: 2021-01-06 | End: 2021-03-18

## 2021-01-07 ENCOUNTER — OFFICE VISIT (OUTPATIENT)
Dept: FAMILY MEDICINE CLINIC | Facility: CLINIC | Age: 56
End: 2021-01-07

## 2021-01-07 VITALS
HEART RATE: 90 BPM | DIASTOLIC BLOOD PRESSURE: 90 MMHG | BODY MASS INDEX: 41.8 KG/M2 | HEIGHT: 70 IN | RESPIRATION RATE: 16 BRPM | TEMPERATURE: 98 F | OXYGEN SATURATION: 96 % | SYSTOLIC BLOOD PRESSURE: 152 MMHG | WEIGHT: 292 LBS

## 2021-01-07 DIAGNOSIS — E11.9 TYPE 2 DIABETES MELLITUS WITHOUT COMPLICATION, WITHOUT LONG-TERM CURRENT USE OF INSULIN (HCC): Primary | ICD-10-CM

## 2021-01-07 LAB — SL AMB POCT HEMOGLOBIN AIC: 11.4 (ref ?–6.5)

## 2021-01-07 PROCEDURE — 3080F DIAST BP >= 90 MM HG: CPT | Performed by: FAMILY MEDICINE

## 2021-01-07 PROCEDURE — 3077F SYST BP >= 140 MM HG: CPT | Performed by: FAMILY MEDICINE

## 2021-01-07 PROCEDURE — 3008F BODY MASS INDEX DOCD: CPT | Performed by: FAMILY MEDICINE

## 2021-01-07 PROCEDURE — 83036 HEMOGLOBIN GLYCOSYLATED A1C: CPT | Performed by: FAMILY MEDICINE

## 2021-01-07 PROCEDURE — 3046F HEMOGLOBIN A1C LEVEL >9.0%: CPT | Performed by: FAMILY MEDICINE

## 2021-01-07 PROCEDURE — 4004F PT TOBACCO SCREEN RCVD TLK: CPT | Performed by: FAMILY MEDICINE

## 2021-01-07 PROCEDURE — 99213 OFFICE O/P EST LOW 20 MIN: CPT | Performed by: FAMILY MEDICINE

## 2021-01-07 PROCEDURE — 4010F ACE/ARB THERAPY RXD/TAKEN: CPT | Performed by: FAMILY MEDICINE

## 2021-01-07 RX ORDER — LISINOPRIL 10 MG/1
10 TABLET ORAL DAILY
Qty: 90 TABLET | Refills: 0 | Status: SHIPPED | OUTPATIENT
Start: 2021-01-07 | End: 2021-04-02

## 2021-01-07 NOTE — ASSESSMENT & PLAN NOTE
Lab Results   Component Value Date    HGBA1C 11 4 (A) 01/07/2021   Improved from 13 two months ago, commended on effort  Will continue with current regimen of Metformin Jardiance and statin  Lisinopril added in light or recent microalbumin  Encouraged to continue with dietary and lifestyle modifications  - Follow up virtually in 3 months, will have a1c drawn in lab prior to visit to accommodate him for his work schedule     - BMP in 6 weeks

## 2021-01-07 NOTE — PROGRESS NOTES
Assessment/Plan:    Type 2 diabetes mellitus without complication, without long-term current use of insulin ([facility])    Lab Results   Component Value Date    HGBA1C 11 4 (A) 01/07/2021   Improved from 13 two months ago, commended on effort  Will continue with current regimen of Metformin Jardiance and statin  Lisinopril added in light or recent microalbumin  Encouraged to continue with dietary and lifestyle modifications  - Follow up virtually in 3 months, will have a1c drawn in lab prior to visit to accommodate him for his work schedule  - [facility] in 6 weeks       Diagnoses and all orders for this visit:    Type 2 diabetes mellitus without complication, without long-term current use of insulin (HCC)  -     POCT hemoglobin A1c  -     lisinopril (ZESTRIL) 10 mg tablet; Take 1 tablet (10 mg total) by mouth daily          Subjective:      Patient ID: Scott Johnson  is a 48 y o  male  HPI  This is a very pleasant 48 y o  male who presents to the clinic for management of their chronic medical conditions  Patient's medical conditions are stable unless noted otherwise above  Patient has not had any recent hospitalizations, or medical emergencies since last visit  Patient has no further complaints other than what is mentioned in the ROS  Has began working and has been following ketogenic diet  The following portions of the patient's history were reviewed and updated as appropriate: allergies, current medications, past family history, past medical history, past social history, past surgical history and problem list     Review of Systems   Constitutional: Negative for activity change, appetite change, chills, diaphoresis, fatigue and fever  HENT: Negative for congestion, ear pain, hearing loss, postnasal drip, rhinorrhea, sneezing, sore throat and tinnitus  Eyes: Negative for pain  Respiratory: Negative for apnea, cough, choking, chest tightness, shortness of breath and wheezing      Cardiovascular: Negative for chest pain, palpitations and leg swelling  Gastrointestinal: Negative for abdominal distention, abdominal pain, constipation, diarrhea, nausea and vomiting  Genitourinary: Negative for decreased urine volume and dysuria  Musculoskeletal: Negative for back pain  Skin: Negative for rash  Neurological: Negative for dizziness, tremors and syncope  Psychiatric/Behavioral: Negative for agitation and suicidal ideas  Objective:      /90 (BP Location: Left arm, Patient Position: Sitting, Cuff Size: Large)   Pulse 90   Temp 98 °F (36 7 °C) (Temporal)   Resp 16   Ht 5' 10" (1 778 m)   Wt 132 kg (292 lb)   SpO2 96%   BMI 41 90 kg/m²          Physical Exam  Constitutional:       General: He is not in acute distress  Appearance: He is well-developed  He is not diaphoretic  HENT:      Head: Normocephalic and atraumatic  Right Ear: External ear normal       Left Ear: External ear normal       Mouth/Throat:      Pharynx: No oropharyngeal exudate  Eyes:      General: No scleral icterus  Conjunctiva/sclera: Conjunctivae normal       Pupils: Pupils are equal, round, and reactive to light  Neck:      Musculoskeletal: Normal range of motion and neck supple  Thyroid: No thyromegaly  Trachea: No tracheal deviation  Cardiovascular:      Rate and Rhythm: Normal rate and regular rhythm  Heart sounds: Normal heart sounds  No murmur  Pulmonary:      Effort: Pulmonary effort is normal  No respiratory distress  Breath sounds: Normal breath sounds  No wheezing  Abdominal:      General: Bowel sounds are normal  There is no distension  Palpations: Abdomen is soft  Tenderness: There is no rebound  Musculoskeletal: Normal range of motion  Skin:     General: Skin is warm  Neurological:      Mental Status: He is alert and oriented to person, place, and time

## 2021-01-07 NOTE — LETTER
January 7, 2021     Patient: Lise Cobian  YOB: 1965   Date of Visit: 1/7/2021       To Whom it May Concern:    Chery Fischer is under my professional care  He was seen in my office on 1/7/2021  If you have any questions or concerns, please don't hesitate to call           Sincerely,          Alfredo Phoenix MD

## 2021-01-08 DIAGNOSIS — N47.6 BALANOPOSTHITIS: ICD-10-CM

## 2021-01-08 RX ORDER — CLOTRIMAZOLE AND BETAMETHASONE DIPROPIONATE 10; .64 MG/G; MG/G
CREAM TOPICAL 2 TIMES DAILY
Qty: 30 G | Refills: 0 | Status: SHIPPED | OUTPATIENT
Start: 2021-01-08 | End: 2021-02-03

## 2021-01-13 ENCOUNTER — TELEPHONE (OUTPATIENT)
Dept: FAMILY MEDICINE CLINIC | Facility: CLINIC | Age: 56
End: 2021-01-13

## 2021-02-03 DIAGNOSIS — N47.6 BALANOPOSTHITIS: ICD-10-CM

## 2021-02-03 RX ORDER — CLOTRIMAZOLE AND BETAMETHASONE DIPROPIONATE 10; .64 MG/G; MG/G
CREAM TOPICAL 2 TIMES DAILY
Qty: 30 G | Refills: 0 | Status: SHIPPED | OUTPATIENT
Start: 2021-02-03 | End: 2021-03-17

## 2021-03-04 DIAGNOSIS — E11.9 TYPE 2 DIABETES MELLITUS WITHOUT COMPLICATION, WITHOUT LONG-TERM CURRENT USE OF INSULIN (HCC): ICD-10-CM

## 2021-03-05 DIAGNOSIS — L02.92 BOIL: Primary | ICD-10-CM

## 2021-03-17 DIAGNOSIS — N47.6 BALANOPOSTHITIS: ICD-10-CM

## 2021-03-17 RX ORDER — CLOTRIMAZOLE AND BETAMETHASONE DIPROPIONATE 10; .64 MG/G; MG/G
CREAM TOPICAL 2 TIMES DAILY
Qty: 30 G | Refills: 0 | Status: SHIPPED | OUTPATIENT
Start: 2021-03-17 | End: 2021-04-09

## 2021-03-18 DIAGNOSIS — L02.92 BOIL: ICD-10-CM

## 2021-03-18 DIAGNOSIS — N47.1 PHIMOSIS: ICD-10-CM

## 2021-03-18 RX ORDER — TRIAMCINOLONE ACETONIDE 5 MG/G
CREAM TOPICAL 3 TIMES DAILY
Qty: 30 G | Refills: 0 | Status: SHIPPED | OUTPATIENT
Start: 2021-03-18 | End: 2021-04-12

## 2021-03-20 DIAGNOSIS — I48.91 ATRIAL FIBRILLATION WITH RVR (HCC): ICD-10-CM

## 2021-03-22 RX ORDER — DILTIAZEM HYDROCHLORIDE 240 MG/1
240 CAPSULE, COATED, EXTENDED RELEASE ORAL DAILY
Qty: 30 CAPSULE | Refills: 3 | Status: SHIPPED | OUTPATIENT
Start: 2021-03-22 | End: 2021-05-21 | Stop reason: SDUPTHER

## 2021-04-01 ENCOUNTER — APPOINTMENT (OUTPATIENT)
Dept: LAB | Facility: HOSPITAL | Age: 56
End: 2021-04-01
Payer: COMMERCIAL

## 2021-04-01 DIAGNOSIS — Z11.4 SCREENING FOR HIV (HUMAN IMMUNODEFICIENCY VIRUS): ICD-10-CM

## 2021-04-01 DIAGNOSIS — E11.9 TYPE 2 DIABETES MELLITUS WITHOUT COMPLICATION, WITHOUT LONG-TERM CURRENT USE OF INSULIN (HCC): ICD-10-CM

## 2021-04-01 LAB
ANION GAP SERPL CALCULATED.3IONS-SCNC: 7 MMOL/L (ref 5–14)
BUN SERPL-MCNC: 11 MG/DL (ref 5–25)
CALCIUM SERPL-MCNC: 9.7 MG/DL (ref 8.4–10.2)
CHLORIDE SERPL-SCNC: 97 MMOL/L (ref 97–108)
CHOLEST SERPL-MCNC: 146 MG/DL
CO2 SERPL-SCNC: 30 MMOL/L (ref 22–30)
CREAT SERPL-MCNC: 0.83 MG/DL (ref 0.7–1.5)
GFR SERPL CREATININE-BSD FRML MDRD: 100 ML/MIN/1.73SQ M
GLUCOSE P FAST SERPL-MCNC: 218 MG/DL (ref 70–99)
HDLC SERPL-MCNC: 51 MG/DL
LDLC SERPL CALC-MCNC: 74 MG/DL
NONHDLC SERPL-MCNC: 95 MG/DL
POTASSIUM SERPL-SCNC: 4.4 MMOL/L (ref 3.6–5)
SODIUM SERPL-SCNC: 134 MMOL/L (ref 137–147)
TRIGL SERPL-MCNC: 107 MG/DL
TSH SERPL DL<=0.05 MIU/L-ACNC: 1.84 UIU/ML (ref 0.47–4.68)

## 2021-04-01 PROCEDURE — 80048 BASIC METABOLIC PNL TOTAL CA: CPT

## 2021-04-01 PROCEDURE — 84443 ASSAY THYROID STIM HORMONE: CPT

## 2021-04-01 PROCEDURE — 36415 COLL VENOUS BLD VENIPUNCTURE: CPT

## 2021-04-01 PROCEDURE — 80061 LIPID PANEL: CPT

## 2021-04-01 PROCEDURE — 87389 HIV-1 AG W/HIV-1&-2 AB AG IA: CPT

## 2021-04-02 DIAGNOSIS — E11.9 TYPE 2 DIABETES MELLITUS WITHOUT COMPLICATION, WITHOUT LONG-TERM CURRENT USE OF INSULIN (HCC): ICD-10-CM

## 2021-04-02 LAB — HIV 1+2 AB+HIV1 P24 AG SERPL QL IA: NORMAL

## 2021-04-02 RX ORDER — LISINOPRIL 10 MG/1
10 TABLET ORAL DAILY
Qty: 90 TABLET | Refills: 0 | Status: SHIPPED | OUTPATIENT
Start: 2021-04-02 | End: 2021-04-10

## 2021-04-06 DIAGNOSIS — L02.92 BOIL: ICD-10-CM

## 2021-04-09 DIAGNOSIS — E11.9 TYPE 2 DIABETES MELLITUS WITHOUT COMPLICATION, WITHOUT LONG-TERM CURRENT USE OF INSULIN (HCC): ICD-10-CM

## 2021-04-09 DIAGNOSIS — N47.6 BALANOPOSTHITIS: ICD-10-CM

## 2021-04-09 RX ORDER — CLOTRIMAZOLE AND BETAMETHASONE DIPROPIONATE 10; .64 MG/G; MG/G
CREAM TOPICAL 2 TIMES DAILY
Qty: 30 G | Refills: 0 | Status: SHIPPED | OUTPATIENT
Start: 2021-04-09 | End: 2021-05-04

## 2021-04-10 RX ORDER — LISINOPRIL 10 MG/1
10 TABLET ORAL DAILY
Qty: 90 TABLET | Refills: 0 | Status: SHIPPED | OUTPATIENT
Start: 2021-04-10 | End: 2021-05-21 | Stop reason: SDUPTHER

## 2021-04-12 DIAGNOSIS — N47.1 PHIMOSIS: ICD-10-CM

## 2021-04-12 RX ORDER — TRIAMCINOLONE ACETONIDE 5 MG/G
CREAM TOPICAL 3 TIMES DAILY
Qty: 30 G | Refills: 0 | Status: SHIPPED | OUTPATIENT
Start: 2021-04-12

## 2021-05-04 DIAGNOSIS — N47.6 BALANOPOSTHITIS: ICD-10-CM

## 2021-05-04 RX ORDER — CLOTRIMAZOLE AND BETAMETHASONE DIPROPIONATE 10; .64 MG/G; MG/G
CREAM TOPICAL 2 TIMES DAILY
Qty: 30 G | Refills: 0 | Status: SHIPPED | OUTPATIENT
Start: 2021-05-04 | End: 2022-03-10 | Stop reason: SDUPTHER

## 2021-05-19 RX ORDER — PEN NEEDLE, DIABETIC 31 GX5/16"
NEEDLE, DISPOSABLE MISCELLANEOUS
COMMUNITY
Start: 2021-02-09

## 2021-05-20 ENCOUNTER — TELEPHONE (OUTPATIENT)
Dept: FAMILY MEDICINE CLINIC | Facility: CLINIC | Age: 56
End: 2021-05-20

## 2021-05-20 ENCOUNTER — OFFICE VISIT (OUTPATIENT)
Dept: FAMILY MEDICINE CLINIC | Facility: CLINIC | Age: 56
End: 2021-05-20

## 2021-05-20 VITALS
HEART RATE: 103 BPM | TEMPERATURE: 97.7 F | DIASTOLIC BLOOD PRESSURE: 70 MMHG | WEIGHT: 283 LBS | RESPIRATION RATE: 16 BRPM | HEIGHT: 70 IN | SYSTOLIC BLOOD PRESSURE: 130 MMHG | OXYGEN SATURATION: 95 % | BODY MASS INDEX: 40.52 KG/M2

## 2021-05-20 DIAGNOSIS — E11.9 TYPE 2 DIABETES MELLITUS WITHOUT COMPLICATION, WITHOUT LONG-TERM CURRENT USE OF INSULIN (HCC): Primary | ICD-10-CM

## 2021-05-20 DIAGNOSIS — G56.02 LEFT CARPAL TUNNEL SYNDROME: ICD-10-CM

## 2021-05-20 PROBLEM — M54.6 CHRONIC MIDLINE THORACIC BACK PAIN: Status: ACTIVE | Noted: 2021-05-20

## 2021-05-20 PROBLEM — G89.29 CHRONIC MIDLINE THORACIC BACK PAIN: Status: ACTIVE | Noted: 2021-05-20

## 2021-05-20 LAB — SL AMB POCT HEMOGLOBIN AIC: 12.6 (ref ?–6.5)

## 2021-05-20 PROCEDURE — 83036 HEMOGLOBIN GLYCOSYLATED A1C: CPT | Performed by: FAMILY MEDICINE

## 2021-05-20 PROCEDURE — 99213 OFFICE O/P EST LOW 20 MIN: CPT | Performed by: FAMILY MEDICINE

## 2021-05-20 NOTE — PROGRESS NOTES
Assessment/Plan:    Type 2 diabetes mellitus without complication, without long-term current use of insulin (HCC)    Lab Results   Component Value Date    HGBA1C 12 6 (A) 05/20/2021   Poorly controlled, not at goal  Continue metformin, jardiance ace and statin  Will begin insulin; Lantus 15u qhs  Follow up 4 weeks for titration    Left carpal tunnel syndrome  We discussed the etiology and natural course of carpal tunnel syndrome  We discussed that carpal tunnel syndrome is related to increased pressure on the nerve in the carpal canal at the level of the wrist   Increasing pressure can be a result of wrist flexion or extension or changes to the contents of the carpal tunnel  We discussed that progression of this condition from mild to severe can result in numbness and tingling as well as dysfunction of the hand and even atrophy and weakness to the thumb musculature  Treatment options for this condition range from nonoperative to operative and the mainstays are nighttime splinting for nonoperative measures and carpal tunnel release for operative measures  Trial of nonoperative measures for around 6 weeks is typically beneficial prior to any surgical intervention and can help to avoid surgery  I also discussed with the patient that if carpal tunnel persists in both wrists that surgical intervention can be performed simultaneously and that recovery is expedited  - trial splint, symptoms likely worsened due to DM      Chronic midline thoracic back pain  Likely fibromyalgia  Has been undergoing life stressors  Trial cymbalta for interim  Declined therapist        Diagnoses and all orders for this visit:    Type 2 diabetes mellitus without complication, without long-term current use of insulin ([facility])  -     POCT hemoglobin A1c    Left carpal tunnel syndrome          Subjective:      Patient ID: Mitzi Ramos  is a 48 y o  male      HPI  This is a very pleasant 48 y o  male who presents to the clinic for management of their chronic medical conditions  Patient's medical conditions are stable unless noted otherwise above  Patient has not had any recent hospitalizations, or medical emergencies since last visit  Patient has no further complaints other than what is mentioned in the ROS  The following portions of the patient's history were reviewed and updated as appropriate: allergies, current medications, past family history, past medical history, past social history, past surgical history and problem list     Review of Systems   Constitutional: Negative for activity change, appetite change, chills, diaphoresis, fatigue and fever  HENT: Negative for congestion, ear pain, hearing loss, postnasal drip, rhinorrhea, sneezing, sore throat and tinnitus  Eyes: Negative for pain  Respiratory: Negative for apnea, cough, choking, chest tightness, shortness of breath and wheezing  Cardiovascular: Negative for chest pain, palpitations and leg swelling  Gastrointestinal: Negative for abdominal distention, abdominal pain, constipation, diarrhea, nausea and vomiting  Genitourinary: Negative for decreased urine volume and dysuria  Musculoskeletal: Negative for back pain  Skin: Negative for rash  Neurological: Negative for dizziness, tremors and syncope  Psychiatric/Behavioral: Negative for agitation and suicidal ideas  Objective:      /70 (BP Location: Left arm, Patient Position: Sitting, Cuff Size: Large)   Pulse 103   Temp 97 7 °F (36 5 °C) (Temporal)   Resp 16   Ht 5' 10" (1 778 m)   Wt 128 kg (283 lb)   SpO2 95%   BMI 40 61 kg/m²          Physical Exam  Constitutional:       General: He is not in acute distress  Appearance: He is well-developed  He is not diaphoretic  HENT:      Head: Normocephalic and atraumatic  Right Ear: External ear normal       Left Ear: External ear normal       Mouth/Throat:      Pharynx: No oropharyngeal exudate  Eyes:      General: No scleral icterus  Conjunctiva/sclera: Conjunctivae normal       Pupils: Pupils are equal, round, and reactive to light  Neck:      Musculoskeletal: Normal range of motion and neck supple  Thyroid: No thyromegaly  Trachea: No tracheal deviation  Cardiovascular:      Rate and Rhythm: Normal rate and regular rhythm  Heart sounds: No murmur  Pulmonary:      Effort: Pulmonary effort is normal  No respiratory distress  Breath sounds: Normal breath sounds  No wheezing  Abdominal:      General: Bowel sounds are normal  There is no distension  Palpations: Abdomen is soft  Tenderness: There is no rebound  Musculoskeletal: Normal range of motion  Comments: L DIP amputation 5th digit  + Tinel + Phalen + flick    Skin:     General: Skin is warm  Neurological:      Mental Status: He is alert and oriented to person, place, and time

## 2021-05-20 NOTE — ASSESSMENT & PLAN NOTE
We discussed the etiology and natural course of carpal tunnel syndrome  We discussed that carpal tunnel syndrome is related to increased pressure on the nerve in the carpal canal at the level of the wrist   Increasing pressure can be a result of wrist flexion or extension or changes to the contents of the carpal tunnel  We discussed that progression of this condition from mild to severe can result in numbness and tingling as well as dysfunction of the hand and even atrophy and weakness to the thumb musculature  Treatment options for this condition range from nonoperative to operative and the mainstays are nighttime splinting for nonoperative measures and carpal tunnel release for operative measures  Trial of nonoperative measures for around 6 weeks is typically beneficial prior to any surgical intervention and can help to avoid surgery  I also discussed with the patient that if carpal tunnel persists in both wrists that surgical intervention can be performed simultaneously and that recovery is expedited     - trial splint, symptoms likely worsened due to DM

## 2021-05-20 NOTE — ASSESSMENT & PLAN NOTE
Likely fibromyalgia  Has been undergoing life stressors  Trial cymbalta for interim  Declined therapist

## 2021-05-20 NOTE — ASSESSMENT & PLAN NOTE
Lab Results   Component Value Date    HGBA1C 12 6 (A) 05/20/2021   Poorly controlled, not at goal  Continue metformin, jardiance ace and statin  Will begin insulin;  Lantus 15u qhs  Follow up 4 weeks for titration

## 2021-05-21 DIAGNOSIS — E11.9 TYPE 2 DIABETES MELLITUS WITHOUT COMPLICATION, WITHOUT LONG-TERM CURRENT USE OF INSULIN (HCC): Primary | ICD-10-CM

## 2021-05-21 DIAGNOSIS — I48.91 ATRIAL FIBRILLATION WITH RVR (HCC): ICD-10-CM

## 2021-05-21 DIAGNOSIS — M25.511 BILATERAL SHOULDER PAIN, UNSPECIFIED CHRONICITY: ICD-10-CM

## 2021-05-21 DIAGNOSIS — M25.512 BILATERAL SHOULDER PAIN, UNSPECIFIED CHRONICITY: ICD-10-CM

## 2021-05-21 DIAGNOSIS — N52.8 OTHER MALE ERECTILE DYSFUNCTION: ICD-10-CM

## 2021-05-21 RX ORDER — DULOXETIN HYDROCHLORIDE 30 MG/1
30 CAPSULE, DELAYED RELEASE ORAL
Qty: 90 CAPSULE | Refills: 0 | Status: SHIPPED | OUTPATIENT
Start: 2021-05-21 | End: 2021-07-10

## 2021-05-21 RX ORDER — LISINOPRIL 10 MG/1
10 TABLET ORAL DAILY
Qty: 90 TABLET | Refills: 0 | Status: SHIPPED | OUTPATIENT
Start: 2021-05-21 | End: 2021-09-03 | Stop reason: SDUPTHER

## 2021-05-21 RX ORDER — DILTIAZEM HYDROCHLORIDE 240 MG/1
240 CAPSULE, COATED, EXTENDED RELEASE ORAL DAILY
Qty: 90 CAPSULE | Refills: 3 | Status: SHIPPED | OUTPATIENT
Start: 2021-05-21 | End: 2022-02-08

## 2021-05-21 RX ORDER — INSULIN GLARGINE 100 [IU]/ML
15 INJECTION, SOLUTION SUBCUTANEOUS
Qty: 15 ML | Refills: 0 | Status: SHIPPED | OUTPATIENT
Start: 2021-05-21 | End: 2021-06-24

## 2021-05-21 RX ORDER — LANCETS
EACH MISCELLANEOUS
Qty: 100 EACH | Refills: 3 | Status: SHIPPED | OUTPATIENT
Start: 2021-05-21 | End: 2021-08-16

## 2021-05-21 RX ORDER — TADALAFIL 20 MG/1
20 TABLET ORAL AS NEEDED
Qty: 10 TABLET | Refills: 12 | Status: SHIPPED | OUTPATIENT
Start: 2021-05-21 | End: 2022-03-10 | Stop reason: SDUPTHER

## 2021-05-25 ENCOUNTER — TELEPHONE (OUTPATIENT)
Dept: FAMILY MEDICINE CLINIC | Facility: CLINIC | Age: 56
End: 2021-05-25

## 2021-05-25 NOTE — TELEPHONE ENCOUNTER
rivaroxaban (XARELTO) 20 mg tablet    Medication is a preferred medication just requires a PA completed on cover my meds

## 2021-06-04 ENCOUNTER — HOSPITAL ENCOUNTER (EMERGENCY)
Facility: HOSPITAL | Age: 56
Discharge: HOME | End: 2021-06-04
Attending: EMERGENCY MEDICINE | Admitting: EMERGENCY MEDICINE
Payer: COMMERCIAL

## 2021-06-04 VITALS
WEIGHT: 290.1 LBS | DIASTOLIC BLOOD PRESSURE: 71 MMHG | HEART RATE: 84 BPM | RESPIRATION RATE: 18 BRPM | BODY MASS INDEX: 41.63 KG/M2 | SYSTOLIC BLOOD PRESSURE: 136 MMHG | TEMPERATURE: 98.7 F | OXYGEN SATURATION: 96 %

## 2021-06-04 DIAGNOSIS — R73.9 HYPERGLYCEMIA: ICD-10-CM

## 2021-06-04 DIAGNOSIS — G56.00 CARPAL TUNNEL SYNDROME: Primary | ICD-10-CM

## 2021-06-04 LAB
ALBUMIN SERPL BCP-MCNC: 4 G/DL (ref 3–5.2)
ALP SERPL-CCNC: 44 U/L (ref 43–122)
ALT SERPL W P-5'-P-CCNC: 26 U/L
ANION GAP SERPL CALCULATED.3IONS-SCNC: 3 MMOL/L (ref 5–14)
AST SERPL W P-5'-P-CCNC: 24 U/L (ref 17–59)
BASOPHILS # BLD AUTO: 0 THOUSANDS/ΜL (ref 0–0.1)
BASOPHILS NFR BLD AUTO: 1 % (ref 0–1)
BILIRUB SERPL-MCNC: 0.36 MG/DL
BUN SERPL-MCNC: 15 MG/DL (ref 5–25)
CALCIUM SERPL-MCNC: 9.7 MG/DL (ref 8.4–10.2)
CHLORIDE SERPL-SCNC: 101 MMOL/L (ref 97–108)
CO2 SERPL-SCNC: 30 MMOL/L (ref 22–30)
CREAT SERPL-MCNC: 0.97 MG/DL (ref 0.7–1.5)
EOSINOPHIL # BLD AUTO: 0.1 THOUSAND/ΜL (ref 0–0.4)
EOSINOPHIL NFR BLD AUTO: 1 % (ref 0–6)
ERYTHROCYTE [DISTWIDTH] IN BLOOD BY AUTOMATED COUNT: 13.9 %
GFR SERPL CREATININE-BSD FRML MDRD: 88 ML/MIN/1.73SQ M
GLUCOSE SERPL-MCNC: 308 MG/DL (ref 65–140)
GLUCOSE SERPL-MCNC: 319 MG/DL (ref 70–99)
HCT VFR BLD AUTO: 44.4 % (ref 41–53)
HGB BLD-MCNC: 14.9 G/DL (ref 13.5–17.5)
LYMPHOCYTES # BLD AUTO: 1.4 THOUSANDS/ΜL (ref 0.5–4)
LYMPHOCYTES NFR BLD AUTO: 20 % (ref 25–45)
MCH RBC QN AUTO: 28.3 PG (ref 26–34)
MCHC RBC AUTO-ENTMCNC: 33.5 G/DL (ref 31–36)
MCV RBC AUTO: 84 FL (ref 80–100)
MONOCYTES # BLD AUTO: 0.4 THOUSAND/ΜL (ref 0.2–0.9)
MONOCYTES NFR BLD AUTO: 6 % (ref 1–10)
NEUTROPHILS # BLD AUTO: 5 THOUSANDS/ΜL (ref 1.8–7.8)
NEUTS SEG NFR BLD AUTO: 73 % (ref 45–65)
PLATELET # BLD AUTO: 235 THOUSANDS/UL (ref 150–450)
PMV BLD AUTO: 8.8 FL (ref 8.9–12.7)
POTASSIUM SERPL-SCNC: 4.8 MMOL/L (ref 3.6–5)
PROT SERPL-MCNC: 7.1 G/DL (ref 5.9–8.4)
RBC # BLD AUTO: 5.26 MILLION/UL (ref 4.5–5.9)
SODIUM SERPL-SCNC: 134 MMOL/L (ref 137–147)
WBC # BLD AUTO: 6.9 THOUSAND/UL (ref 4.5–11)

## 2021-06-04 PROCEDURE — 99283 EMERGENCY DEPT VISIT LOW MDM: CPT

## 2021-06-04 PROCEDURE — 36415 COLL VENOUS BLD VENIPUNCTURE: CPT | Performed by: PHYSICIAN ASSISTANT

## 2021-06-04 PROCEDURE — 85025 COMPLETE CBC W/AUTO DIFF WBC: CPT | Performed by: PHYSICIAN ASSISTANT

## 2021-06-04 PROCEDURE — 82948 REAGENT STRIP/BLOOD GLUCOSE: CPT

## 2021-06-04 PROCEDURE — 99284 EMERGENCY DEPT VISIT MOD MDM: CPT | Performed by: PHYSICIAN ASSISTANT

## 2021-06-04 PROCEDURE — 96372 THER/PROPH/DIAG INJ SC/IM: CPT

## 2021-06-04 PROCEDURE — 80053 COMPREHEN METABOLIC PANEL: CPT | Performed by: PHYSICIAN ASSISTANT

## 2021-06-04 RX ORDER — KETOROLAC TROMETHAMINE 30 MG/ML
15 INJECTION, SOLUTION INTRAMUSCULAR; INTRAVENOUS ONCE
Status: COMPLETED | OUTPATIENT
Start: 2021-06-04 | End: 2021-06-04

## 2021-06-04 RX ADMIN — KETOROLAC TROMETHAMINE 15 MG: 30 INJECTION, SOLUTION INTRAMUSCULAR; INTRAVENOUS at 13:35

## 2021-06-04 NOTE — ED PROVIDER NOTES
=History  Chief Complaint   Patient presents with    Arm Pain     Pt came to ER c/o left side arm pain, numbess, and tingling for "2-3 months"  Pt saw PCP who said that they could perform procedures until pt's BG is controlled  Capillary  this am  Pt did not take medication PTA to ER  Pt denies trauma to extremity  Patient is a 60-year-old male presents today for evaluation of left hand pain and tingling which is a chronic issue for him patient has been diagnosed carpal tunnel in the past and has discussed the options for treatment including surgery however is aware that surgical treatment cannot be completed until the patient's blood sugars are better controlled  The patient presents reporting his blood sugars have been excessively high in the past 2 days with sugars over 300 in the morning  Patient reports he uses insulin on a regular basis is prescribed 15 units at nighttime and uses metformin as well to help control his blood sugar  Patient reports no chest pain or shortness of breath, abdominal pain, nausea vomiting  Patient denies any direct traumatic inciting events  Patient reports at work he constantly is using his fingers and wrist and reports exacerbation at work  History provided by:  Patient  Arm Pain  Location:  Left hand / wrist  Quality:  Tingling / numbness / pain  Severity:  Moderate  Onset quality:  Gradual  Timing:  Intermittent  Progression:  Waxing and waning  Chronicity:  New  Context:  Chronic carpal tunnel  Relieved by:  None  Worsened by:  None  Ineffective treatments:  Has not tried any, no splinting nor otc's  Associated symptoms: no abdominal pain, no chest pain, no congestion, no ear pain, no fatigue, no fever, no nausea, no rash, no rhinorrhea, no shortness of breath, no sore throat and no vomiting        Prior to Admission Medications   Prescriptions Last Dose Informant Patient Reported? Taking?    Alcohol Swabs (ALCOHOL PREP) 70 % PADS  Self Yes No   Sig: daily Test   Alcohol Swabs (Alcohol Prep) PADS   Yes No   Sig: TEST DAILY   DULoxetine (CYMBALTA) 30 mg delayed release capsule   No No   Sig: Take 1 capsule (30 mg total) by mouth daily at bedtime   Empagliflozin 25 MG TABS   No No   Sig: Take 1 tablet (25 mg total) by mouth every morning   Lancets (onetouch ultrasoft) lancets   No No   Sig: Use as instructed   Lancets MISC  Self Yes No   Sig: Use to test blood glucose once a day  Dx: Type 2 DM  E11 9   Misc  Devices MISC  Self No No   Sig: by Does not apply route daily   Tadalafil, PAH, 20 MG TABS   No No   Sig: Take 1 tablet (20 mg total) by mouth as needed (erectile dysfunction)   albuterol (PROVENTIL HFA,VENTOLIN HFA) 90 mcg/act inhaler   No No   Sig: Inhale 2 puffs every 4 (four) hours as needed for wheezing   aspirin (ECOTRIN LOW STRENGTH) 81 mg EC tablet  Self Yes No   Sig: Take 81 mg by mouth daily   atorvastatin (Lipitor) 20 mg tablet   No No   Sig: Take 1 tablet (20 mg total) by mouth daily   clotrimazole-betamethasone (LOTRISONE) 1-0 05 % cream   No No   Sig: APPLY TOPICALLY 2 (TWO) TIMES A DAY   diltiazem (CARDIZEM CD) 240 mg 24 hr capsule   No No   Sig: Take 1 capsule (240 mg total) by mouth daily   fluticasone-vilanterol (Breo Ellipta) 100-25 mcg/inh inhaler   No No   Sig: Inhale 1 puff daily Rinse mouth after use     glucose blood test strip   No No   Sig: Use as instructed   insulin glargine (Lantus SoloStar) 100 units/mL injection pen   No No   Sig: Inject 15 Units under the skin daily at bedtime   lisinopril (ZESTRIL) 10 mg tablet   No No   Sig: Take 1 tablet (10 mg total) by mouth daily   metFORMIN (GLUCOPHAGE) 1000 MG tablet   No No   Sig: Take 1 tablet (1,000 mg total) by mouth 2 (two) times a day with meals   mupirocin (BACTROBAN) 2 % ointment   No No   Sig: APPLY TOPICALLY 3 (THREE) TIMES A DAY   rivaroxaban (XARELTO) 20 mg tablet   No No   Sig: Take 1 tablet (20 mg total) by mouth daily with breakfast   triamcinolone (KENALOG) 0 5 % cream   No No Sig: APPLY TOPICALLY 3 (THREE) TIMES A DAY      Facility-Administered Medications: None       Past Medical History:   Diagnosis Date    Asthma     Carpal tunnel syndrome     Chronic pain     left arm    Diabetes mellitus ([address] )     Erectile dysfunction     Hypertension     Umbilical hernia        Past Surgical History:   Procedure Laterality Date    CHOLECYSTECTOMY      CHOLECYSTECTOMY LAPAROSCOPIC N/A 6/20/2019    Procedure: CHOLECYSTECTOMY LAPAROSCOPIC;  Surgeon: Mike Barahona MD;  Location: [address] [place] [address] OR;  Service: General    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    HAND SURGERY      UMBILICAL HERNIA REPAIR         Family History   Problem Relation Age of Onset    Breast cancer additional onset Mother     Diabetes Father     Hyperlipidemia Father     Hypertension Father     Seizures Father     No Known Problems Son     No Known Problems Son     Vitiligo Son      I have reviewed and agree with the history as documented  E-Cigarette/Vaping    E-Cigarette Use Never User      E-Cigarette/Vaping Substances     Social History     Tobacco Use    Smoking status: Current Every Day Smoker     Packs/day: 0 50     Types: Cigarettes    Smokeless tobacco: Never Used   Substance Use Topics    Alcohol use: Yes     Frequency: 2-3 times a week     Drinks per session: 1 or 2     Binge frequency: Never     Comment: occ    Drug use: No       Review of Systems   Constitutional: Negative for chills, fatigue and fever  HENT: Negative for congestion, ear pain, rhinorrhea and sore throat  Eyes: Negative for redness  Respiratory: Negative for chest tightness and shortness of breath  Cardiovascular: Negative for chest pain and palpitations  Gastrointestinal: Negative for abdominal pain, nausea and vomiting  Genitourinary: Negative for dysuria and hematuria  Musculoskeletal: Positive for arthralgias ( left wrist / hand pain)  Skin: Negative for rash     Neurological: Negative for dizziness, syncope, light-headedness and numbness  Physical Exam  Physical Exam  Vitals signs and nursing note reviewed  Constitutional:       Appearance: Normal appearance  He is well-developed  HENT:      Head: Normocephalic and atraumatic  Eyes:      General: No scleral icterus  Pupils: Pupils are equal, round, and reactive to light  Neck:      Musculoskeletal: Normal range of motion  Cardiovascular:      Rate and Rhythm: Normal rate and regular rhythm  Pulses: Normal pulses  Pulmonary:      Effort: Pulmonary effort is normal  No respiratory distress  Breath sounds: No stridor  Abdominal:      General: There is no distension  Palpations: There is no mass  Musculoskeletal:         General: Tenderness present  Comments: + Tinels test, pain on palpation of wrist (dorsal) 2+ radial pulse, less than 2 second capillary refill, normal ROM, no deformities or discolorations noted  Skin:     General: Skin is warm and dry  Capillary Refill: Capillary refill takes less than 2 seconds  Coloration: Skin is not jaundiced  Neurological:      Mental Status: He is alert and oriented to person, place, and time        Gait: Gait normal    Psychiatric:         Mood and Affect: Mood normal          Vital Signs  ED Triage Vitals [06/04/21 1107]   Temperature Pulse Respirations Blood Pressure SpO2   98 7 °F (37 1 °C) 84 18 136/71 96 %      Temp Source Heart Rate Source Patient Position - Orthostatic VS BP Location FiO2 (%)   Tympanic Monitor Sitting Right arm --      Pain Score       Worst Possible Pain           Vitals:    06/04/21 1107   BP: 136/71   Pulse: 84   Patient Position - Orthostatic VS: Sitting         Visual Acuity      ED Medications  Medications   ketorolac (TORADOL) injection 15 mg (15 mg Intramuscular Given 6/4/21 1335)       Diagnostic Studies  Results Reviewed     Procedure Component Value Units Date/Time    Comprehensive metabolic panel [[id number]]  (Abnormal) Collected: 06/04/21 1219    Lab Status: Final result Specimen: Blood from Arm, Left Updated: 06/04/21 1306     Sodium 134 mmol/L      Potassium 4 8 mmol/L      Chloride 101 mmol/L      CO2 30 mmol/L      ANION GAP 3 mmol/L      BUN 15 mg/dL      Creatinine 0 97 mg/dL      Glucose 319 mg/dL      Calcium 9 7 mg/dL      AST 24 U/L      ALT 26 U/L      Alkaline Phosphatase 44 U/L      Total Protein 7 1 g/dL      Albumin 4 0 g/dL      Total Bilirubin 0 36 mg/dL      eGFR 88 ml/min/1 73sq m     Narrative:      National Kidney Disease Foundation guidelines for Chronic Kidney Disease (CKD):     Stage 1 with normal or high GFR (GFR > 90 mL/min/1 73 square meters)    Stage 2 Mild CKD (GFR = 60-89 mL/min/1 73 square meters)    Stage 3A Moderate CKD (GFR = 45-59 mL/min/1 73 square meters)    Stage 3B Moderate CKD (GFR = 30-44 mL/min/1 73 square meters)    Stage 4 Severe CKD (GFR = 15-29 mL/min/1 73 square meters)    Stage 5 End Stage CKD (GFR <15 mL/min/1 73 square meters)  Note: GFR calculation is accurate only with a steady state creatinine    CBC and differential [[id number]]  (Abnormal) Collected: 06/04/21 1126    Lab Status: Final result Specimen: Blood from Arm, Right Updated: 06/04/21 1142     WBC 6 90 Thousand/uL      RBC 5 26 Million/uL      Hemoglobin 14 9 g/dL      Hematocrit 44 4 %      MCV 84 fL      MCH 28 3 pg      MCHC 33 5 g/dL      RDW 13 9 %      MPV 8 8 fL      Platelets 138 Thousands/uL      Neutrophils Relative 73 %      Lymphocytes Relative 20 %      Monocytes Relative 6 %      Eosinophils Relative 1 %      Basophils Relative 1 %      Neutrophils Absolute 5 00 Thousands/µL      Lymphocytes Absolute 1 40 Thousands/µL      Monocytes Absolute 0 40 Thousand/µL      Eosinophils Absolute 0 10 Thousand/µL      Basophils Absolute 0 00 Thousands/µL     Fingerstick Glucose (POCT) [[id number]]  (Abnormal) Collected: 06/04/21 1116    Lab Status: Final result Updated: 06/04/21 1117     POC Glucose 308 mg/dl No orders to display              Procedures  Procedures         ED Course  ED Course as of Jun 04 1427   Fri Jun 04, 2021   1320 CO2: 30   1320 Patient was reexamined at this time and informed of laboratory and/or imaging results and was found to be stable for discharge  Return to emergency department criteria was reviewed with the patient who verbalized understanding and was agreeable to discharge and the treatment plan at this time  Anion Gap(!): 3                                           MDM  Number of Diagnoses or Management Options  Carpal tunnel syndrome:   Hyperglycemia:   Diagnosis management comments: All imaging and/or lab testing discussed with patient, no indication of DKA, CO2 is WNL  Cr and GFR are WNL  strict return to ED precautions discussed  Patient recommended to follow up promptly with appropriate outpatient provider  Patient and/or family members verbalizes understanding and agrees with plan  Patient is stable for discharge      Portions of the record may have been created with voice recognition software  Occasional wrong word or "sound a like" substitutions may have occurred due to the inherent limitations of voice recognition software  Read the chart carefully and recognize, using context, where substitutions have occurred  Disposition  Final diagnoses:   Carpal tunnel syndrome   Hyperglycemia     Time reflects when diagnosis was documented in both MDM as applicable and the Disposition within this note     Time User Action Codes Description Comment    6/4/2021  1:29 PM Trey Vega Add [G56 00] Carpal tunnel syndrome     6/4/2021  1:29 PM Jaky Thomas [R73 9] Hyperglycemia       ED Disposition     ED Disposition Condition Date/Time Comment    Discharge Good Fri Jun 4, 2021  1:29 PM Diana Ward  discharge to home/self care              Follow-up Information     Follow up With Specialties Details Why Romaine Gar MD Family Medicine Schedule an appointment as soon as possible for a visit   [address] [place] [address] [address]             Discharge Medication List as of 6/4/2021  1:30 PM      CONTINUE these medications which have NOT CHANGED    Details   albuterol (PROVENTIL HFA,VENTOLIN HFA) 90 mcg/act inhaler Inhale 2 puffs every 4 (four) hours as needed for wheezing, Starting Tue 6/9/2020, Normal      !! Alcohol Swabs (ALCOHOL PREP) 70 % PADS daily Test, Starting Tue 5/28/2019, Historical Med      !! Alcohol Swabs (Alcohol Prep) PADS TEST DAILY, Historical Med      aspirin (ECOTRIN LOW STRENGTH) 81 mg EC tablet Take 81 mg by mouth daily, Historical Med      atorvastatin (Lipitor) 20 mg tablet Take 1 tablet (20 mg total) by mouth daily, Starting Fri 11/6/2020, Until Sat 11/6/2021, Normal      clotrimazole-betamethasone (LOTRISONE) 1-0 05 % cream APPLY TOPICALLY 2 (TWO) TIMES A DAY, Starting Tue 5/4/2021, Normal      diltiazem (CARDIZEM CD) 240 mg 24 hr capsule Take 1 capsule (240 mg total) by mouth daily, Starting Fri 5/21/2021, Normal      DULoxetine (CYMBALTA) 30 mg delayed release capsule Take 1 capsule (30 mg total) by mouth daily at bedtime, Starting Fri 5/21/2021, Normal      Empagliflozin 25 MG TABS Take 1 tablet (25 mg total) by mouth every morning, Starting Fri 11/6/2020, Normal      fluticasone-vilanterol (Breo Ellipta) 100-25 mcg/inh inhaler Inhale 1 puff daily Rinse mouth after use , Starting Mon 3/23/2020, Normal      glucose blood test strip Use as instructed, Normal      insulin glargine (Lantus SoloStar) 100 units/mL injection pen Inject 15 Units under the skin daily at bedtime, Starting Fri 5/21/2021, Normal      !! Lancets (onetouch ultrasoft) lancets Use as instructed, Normal      !! Lancets MISC Use to test blood glucose once a day  Dx: Type 2 DM   E11 9, Historical Med      lisinopril (ZESTRIL) 10 mg tablet Take 1 tablet (10 mg total) by mouth daily, Starting Fri 5/21/2021, Normal      metFORMIN (GLUCOPHAGE) 1000 MG tablet Take 1 tablet (1,000 mg total) by mouth 2 (two) times a day with meals, Starting Fri 5/21/2021, Normal      Misc  Devices MISC by Does not apply route daily, Starting Thu 10/31/2019, Normal      mupirocin (BACTROBAN) 2 % ointment APPLY TOPICALLY 3 (THREE) TIMES A DAY, Starting Wed 4/7/2021, Normal      rivaroxaban (XARELTO) 20 mg tablet Take 1 tablet (20 mg total) by mouth daily with breakfast, Starting Fri 5/21/2021, Normal      Tadalafil, PAH, 20 MG TABS Take 1 tablet (20 mg total) by mouth as needed (erectile dysfunction), Starting Fri 5/21/2021, Normal      triamcinolone (KENALOG) 0 5 % cream APPLY TOPICALLY 3 (THREE) TIMES A DAY, Starting Mon 4/12/2021, Normal       !! - Potential duplicate medications found  Please discuss with provider  No discharge procedures on file      PDMP Review     None          ED Provider  Electronically Signed by           Ammy Seay PA-C  06/04/21 3587

## 2021-06-24 ENCOUNTER — TELEMEDICINE (OUTPATIENT)
Dept: FAMILY MEDICINE CLINIC | Facility: CLINIC | Age: 56
End: 2021-06-24

## 2021-06-24 DIAGNOSIS — G56.02 LEFT CARPAL TUNNEL SYNDROME: ICD-10-CM

## 2021-06-24 DIAGNOSIS — E11.9 TYPE 2 DIABETES MELLITUS WITHOUT COMPLICATION, WITHOUT LONG-TERM CURRENT USE OF INSULIN (HCC): ICD-10-CM

## 2021-06-24 DIAGNOSIS — G56.22 ULNAR NEUROPATHY AT ELBOW, LEFT: Primary | ICD-10-CM

## 2021-06-24 PROCEDURE — G2012 BRIEF CHECK IN BY MD/QHP: HCPCS | Performed by: FAMILY MEDICINE

## 2021-06-24 PROCEDURE — T1015 CLINIC SERVICE: HCPCS | Performed by: FAMILY MEDICINE

## 2021-06-24 RX ORDER — INSULIN GLARGINE 100 [IU]/ML
25 INJECTION, SOLUTION SUBCUTANEOUS
Qty: 15 ML | Refills: 0 | Status: SHIPPED | OUTPATIENT
Start: 2021-06-24 | End: 2021-08-25

## 2021-06-24 NOTE — PROGRESS NOTES
Virtual Brief Visit    Assessment/Plan:    Problem List Items Addressed This Visit        Endocrine    Type 2 diabetes mellitus without complication, without long-term current use of insulin ([facility] )       Lab Results   Component Value Date    HGBA1C 12 6 (A) 05/20/2021   Poorly controlled, not at goal  Glucose logs reviewed, will increase Lantus to 25u QHS  Continue Metformin, Jardiance, ACE and Lipitor  Follow up in 4 weeks to review logs         Relevant Medications    insulin glargine (Lantus SoloStar) 100 units/mL injection pen       Nervous and Auditory    Ulnar neuropathy at elbow, left - Primary    Relevant Orders    Ambulatory referral to Hand Surgery    Left carpal tunnel syndrome    Relevant Orders    Ambulatory referral to Hand Surgery                Reason for visit is   Chief Complaint   Patient presents with    Virtual Brief Visit        Encounter provider Kym Schmitt MD    Provider located at [address] [address] [phone]    Recent Visits  Date Type Provider Dept   06/24/21 Ina Erickson MD  [facility]   Showing recent visits within past 7 days and meeting all other requirements  Future Appointments  No visits were found meeting these conditions  Showing future appointments within next 150 days and meeting all other requirements       After connecting through telephone, the patient was identified by name and date of birth  Alexi Peacock  was informed that this is a telemedicine visit and that the visit is being conducted through telephone  My office door was closed  No one else was in the room  He acknowledged consent and understanding of privacy and security of the platform  The patient has agreed to participate and understands he can discontinue the visit at any time  Patient is aware this is a billable service       Subjective    Alexi Peacock  is a 48 y o  male presents for follow up glucose logs  Has been compliant with regimen, denies any hypoglycemia, levels range 240-350 range  Still with hand complaints, will refer to hand surgery  HPI     Past Medical History:   Diagnosis Date    Asthma     Carpal tunnel syndrome     Chronic pain     left arm    Diabetes mellitus ([address] )     Erectile dysfunction     Hypertension     Umbilical hernia        Past Surgical History:   Procedure Laterality Date    CHOLECYSTECTOMY      CHOLECYSTECTOMY LAPAROSCOPIC N/A 6/20/2019    Procedure: CHOLECYSTECTOMY LAPAROSCOPIC;  Surgeon: Choco Rojo MD;  Location: [address] [place] [address] MAIN OR;  Service: General    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    HAND SURGERY      UMBILICAL HERNIA REPAIR         Current Outpatient Medications   Medication Sig Dispense Refill    albuterol (PROVENTIL HFA,VENTOLIN HFA) 90 mcg/act inhaler Inhale 2 puffs every 4 (four) hours as needed for wheezing 1 Inhaler 0    Alcohol Swabs (ALCOHOL PREP) 70 % PADS daily Test  5    Alcohol Swabs (Alcohol Prep) PADS TEST DAILY      aspirin (ECOTRIN LOW STRENGTH) 81 mg EC tablet Take 81 mg by mouth daily      atorvastatin (Lipitor) 20 mg tablet Take 1 tablet (20 mg total) by mouth daily 90 tablet 3    clotrimazole-betamethasone (LOTRISONE) 1-0 05 % cream APPLY TOPICALLY 2 (TWO) TIMES A DAY 30 g 0    diltiazem (CARDIZEM CD) 240 mg 24 hr capsule Take 1 capsule (240 mg total) by mouth daily 90 capsule 3    DULoxetine (CYMBALTA) 30 mg delayed release capsule Take 1 capsule (30 mg total) by mouth daily at bedtime 90 capsule 0    Empagliflozin 25 MG TABS Take 1 tablet (25 mg total) by mouth every morning 90 tablet 0    fluticasone-vilanterol (Breo Ellipta) 100-25 mcg/inh inhaler Inhale 1 puff daily Rinse mouth after use   60 each 5    glucose blood test strip Use as instructed 100 each 2    insulin glargine (Lantus SoloStar) 100 units/mL injection pen Inject 25 Units under the skin daily at bedtime 15 mL 0    Lancets (onetouch ultrasoft) lancets Use as instructed 100 each 3    Lancets MISC Use to test blood glucose once a day  Dx: Type 2 DM  E11 9      lisinopril (ZESTRIL) 10 mg tablet Take 1 tablet (10 mg total) by mouth daily 90 tablet 0    metFORMIN (GLUCOPHAGE) 1000 MG tablet Take 1 tablet (1,000 mg total) by mouth 2 (two) times a day with meals 180 tablet 3    Misc  Devices MISC by Does not apply route daily 1 each 0    mupirocin (BACTROBAN) 2 % ointment APPLY TOPICALLY 3 (THREE) TIMES A DAY 22 g 0    rivaroxaban (XARELTO) 20 mg tablet Take 1 tablet (20 mg total) by mouth daily with breakfast 90 tablet 3    Tadalafil, PAH, 20 MG TABS Take 1 tablet (20 mg total) by mouth as needed (erectile dysfunction) 10 tablet 12    triamcinolone (KENALOG) 0 5 % cream APPLY TOPICALLY 3 (THREE) TIMES A DAY 30 g 0     No current facility-administered medications for this visit  No Known Allergies    Review of Systems    There were no vitals filed for this visit  I spent 20 minutes directly with the patient during this visit    [address] [place] [address]  acknowledges that he has consented to an online visit or consultation  He understands that the online visit is based solely on information provided by him, and that, in the absence of a face-to-face physical evaluation by the physician, the diagnosis he receives is both limited and provisional in terms of accuracy and completeness  This is not intended to replace a full medical face-to-face evaluation by the physician  Willistine Collet  understands and accepts these terms

## 2021-06-26 NOTE — ASSESSMENT & PLAN NOTE
Lab Results   Component Value Date    HGBA1C 12 6 (A) 05/20/2021   Poorly controlled, not at goal  Glucose logs reviewed, will increase Lantus to 25u QHS  Continue Metformin, Jardiance, ACE and Lipitor  Follow up in 4 weeks to review logs

## 2021-07-08 DIAGNOSIS — M25.512 BILATERAL SHOULDER PAIN, UNSPECIFIED CHRONICITY: ICD-10-CM

## 2021-07-08 DIAGNOSIS — M25.511 BILATERAL SHOULDER PAIN, UNSPECIFIED CHRONICITY: ICD-10-CM

## 2021-07-10 RX ORDER — DULOXETIN HYDROCHLORIDE 30 MG/1
30 CAPSULE, DELAYED RELEASE ORAL
Qty: 90 CAPSULE | Refills: 0 | Status: SHIPPED | OUTPATIENT
Start: 2021-07-10 | End: 2021-09-24

## 2021-07-14 DIAGNOSIS — E11.9 TYPE 2 DIABETES MELLITUS WITHOUT COMPLICATION, WITHOUT LONG-TERM CURRENT USE OF INSULIN (HCC): ICD-10-CM

## 2021-07-15 RX ORDER — BLOOD SUGAR DIAGNOSTIC
STRIP MISCELLANEOUS
Qty: 100 EACH | Refills: 2 | Status: SHIPPED | OUTPATIENT
Start: 2021-07-15 | End: 2021-09-07

## 2021-07-27 ENCOUNTER — TELEPHONE (OUTPATIENT)
Dept: FAMILY MEDICINE CLINIC | Facility: CLINIC | Age: 56
End: 2021-07-27

## 2021-08-11 DIAGNOSIS — E11.9 TYPE 2 DIABETES MELLITUS WITHOUT COMPLICATION, WITHOUT LONG-TERM CURRENT USE OF INSULIN (HCC): ICD-10-CM

## 2021-08-12 ENCOUNTER — TELEPHONE (OUTPATIENT)
Dept: UROLOGY | Facility: CLINIC | Age: 56
End: 2021-08-12

## 2021-08-12 NOTE — TELEPHONE ENCOUNTER
Mailed letter to pt advising that appt at [address] [place] [address] with Carina Ruth on 11/26/21 has been cancelled and rescheduled, due to provider unavailable  Reschedule letter sent with new appt for [facility] with Carina Ruth on 12/3/21  Scanned letter in chart

## 2021-08-16 RX ORDER — LANCETS
EACH MISCELLANEOUS
Qty: 100 EACH | Refills: 3 | Status: SHIPPED | OUTPATIENT
Start: 2021-08-16 | End: 2021-11-22

## 2021-08-24 DIAGNOSIS — E11.9 TYPE 2 DIABETES MELLITUS WITHOUT COMPLICATION, WITHOUT LONG-TERM CURRENT USE OF INSULIN (HCC): ICD-10-CM

## 2021-08-25 RX ORDER — INSULIN GLARGINE 100 [IU]/ML
INJECTION, SOLUTION SUBCUTANEOUS
Qty: 15 ML | Refills: 0 | Status: SHIPPED | OUTPATIENT
Start: 2021-08-25 | End: 2021-09-30

## 2021-09-03 ENCOUNTER — OFFICE VISIT (OUTPATIENT)
Dept: OBGYN CLINIC | Facility: HOSPITAL | Age: 56
End: 2021-09-03
Payer: COMMERCIAL

## 2021-09-03 ENCOUNTER — TELEPHONE (OUTPATIENT)
Dept: FAMILY MEDICINE CLINIC | Facility: CLINIC | Age: 56
End: 2021-09-03

## 2021-09-03 ENCOUNTER — OFFICE VISIT (OUTPATIENT)
Dept: FAMILY MEDICINE CLINIC | Facility: CLINIC | Age: 56
End: 2021-09-03

## 2021-09-03 VITALS
WEIGHT: 280.4 LBS | BODY MASS INDEX: 40.14 KG/M2 | SYSTOLIC BLOOD PRESSURE: 148 MMHG | DIASTOLIC BLOOD PRESSURE: 80 MMHG | HEIGHT: 70 IN | HEART RATE: 111 BPM

## 2021-09-03 VITALS
RESPIRATION RATE: 18 BRPM | HEART RATE: 118 BPM | HEIGHT: 70 IN | OXYGEN SATURATION: 98 % | WEIGHT: 280 LBS | SYSTOLIC BLOOD PRESSURE: 126 MMHG | TEMPERATURE: 97.6 F | DIASTOLIC BLOOD PRESSURE: 80 MMHG | BODY MASS INDEX: 40.09 KG/M2

## 2021-09-03 DIAGNOSIS — G56.02 CARPAL TUNNEL SYNDROME OF LEFT WRIST: ICD-10-CM

## 2021-09-03 DIAGNOSIS — J44.9 COPD (CHRONIC OBSTRUCTIVE PULMONARY DISEASE) (HCC): ICD-10-CM

## 2021-09-03 DIAGNOSIS — E11.9 TYPE 2 DIABETES MELLITUS WITHOUT COMPLICATION, WITHOUT LONG-TERM CURRENT USE OF INSULIN (HCC): Primary | ICD-10-CM

## 2021-09-03 DIAGNOSIS — I48.91 ATRIAL FIBRILLATION WITH RVR (HCC): ICD-10-CM

## 2021-09-03 DIAGNOSIS — J45.30 MILD PERSISTENT ASTHMA: ICD-10-CM

## 2021-09-03 DIAGNOSIS — G56.22 CUBITAL TUNNEL SYNDROME ON LEFT: Primary | ICD-10-CM

## 2021-09-03 DIAGNOSIS — I10 ESSENTIAL HYPERTENSION: ICD-10-CM

## 2021-09-03 LAB — SL AMB POCT HEMOGLOBIN AIC: 12.1 (ref ?–6.5)

## 2021-09-03 PROCEDURE — 99214 OFFICE O/P EST MOD 30 MIN: CPT | Performed by: NURSE PRACTITIONER

## 2021-09-03 PROCEDURE — 3046F HEMOGLOBIN A1C LEVEL >9.0%: CPT | Performed by: SURGERY

## 2021-09-03 PROCEDURE — 3074F SYST BP LT 130 MM HG: CPT | Performed by: NURSE PRACTITIONER

## 2021-09-03 PROCEDURE — 99243 OFF/OP CNSLTJ NEW/EST LOW 30: CPT | Performed by: SURGERY

## 2021-09-03 PROCEDURE — 3079F DIAST BP 80-89 MM HG: CPT | Performed by: NURSE PRACTITIONER

## 2021-09-03 PROCEDURE — 83036 HEMOGLOBIN GLYCOSYLATED A1C: CPT | Performed by: NURSE PRACTITIONER

## 2021-09-03 RX ORDER — LISINOPRIL 10 MG/1
10 TABLET ORAL DAILY
Qty: 90 TABLET | Refills: 0 | Status: SHIPPED | OUTPATIENT
Start: 2021-09-03 | End: 2022-02-09

## 2021-09-03 RX ORDER — ALBUTEROL SULFATE 90 UG/1
2 AEROSOL, METERED RESPIRATORY (INHALATION) EVERY 4 HOURS PRN
Qty: 18 G | Refills: 2 | Status: SHIPPED | OUTPATIENT
Start: 2021-09-03 | End: 2021-09-28

## 2021-09-03 NOTE — ASSESSMENT & PLAN NOTE
Stable  Continue current medication regimen  He needs to bring a list of current medication because it's unclear what he's actually using  Will reassess at next scheduled follow-up

## 2021-09-03 NOTE — PROGRESS NOTES
Assessment/Plan:    Problem List Items Addressed This Visit        Endocrine    Type 2 diabetes mellitus without complication, without long-term current use of insulin ([facility] [address] ) - Primary       Lab Results   Component Value Date    HGBA1C 12 1 (A) 09/03/2021    HGBA1C 12 6 (A) 05/20/2021    HGBA1C 11 4 (A) 01/07/2021 ·   pattern of uncontrolled diabetes due to noncompliance continues  · Presented patient with ASCVD risk illustration in Epic, explained severe risk of CVD and other dangers including MI, Stroke, death  · Discussed dangers of uncontrolled DM including kidney failure and blindness  · Advised pt to restart treatment regimen  Referred to Endo as well as in-house CARE clinic at [facility] for DM patients  Relevant Medications    Empagliflozin 25 MG TABS    lisinopril (ZESTRIL) 10 mg tablet    Other Relevant Orders    POCT hemoglobin A1c (Completed)    Ambulatory referral to Endocrinology       Respiratory    COPD (chronic obstructive pulmonary disease) ([facility])    Relevant Medications    albuterol (PROVENTIL HFA,VENTOLIN HFA) 90 mcg/act inhaler    Mild persistent asthma     Stable  Continue current medication regimen  Will reassess at next scheduled follow-up  Relevant Medications    albuterol (PROVENTIL HFA,VENTOLIN HFA) 90 mcg/act inhaler       Cardiovascular and Mediastinum    Essential hypertension     Stable  Continue current medication regimen  He needs to bring a list of current medication because it's unclear what he's actually using  Will reassess at next scheduled follow-up  Relevant Medications    lisinopril (ZESTRIL) 10 mg tablet    Atrial fibrillation with RVR ([facility])     Regular rhythm on exam today  Denies SOB, chest pain  Continue Xarelto, Echo ordered, referred to Cardiology           Relevant Medications    rivaroxaban (XARELTO) 20 mg tablet    Other Relevant Orders    Ambulatory referral to Cardiology    Echo complete with contrast if indicated Return in about 3 months (around 12/3/2021) for Recheck A1C  A chart review was performed and previous primary care visit notes were reviewed  All applicable imaging studies were reviewed and images were reviewed personally  All applicable laboratory studies were reviewed personally  Care everywhere review was performed if  available and all pertinent notes were reviewed  Subjective:     HPI: Pako Benson  is a 48 y o  male who  has a past medical history of Asthma, Carpal tunnel syndrome, Chronic pain, Diabetes mellitus ([address] ), Erectile dysfunction, Hypertension, and Umbilical hernia  who presented to the office today for  A follow-up  Patient has a history of uncontrolled diabetes secondary to noncompliance  It does appear there were issues in the past with insurance and the patient's ability to acquire his medications  He states he does not have active insurance and would like to restart his medication regimen  We discussed the dangers of ongoing noncompliance including elevated ASCVD risk and what can happen over the course of 10 years with that risk score  He does have a history of AFib and requires lifelong Xarelto per Cardiology but has not followed up with Cardiology in years  He denies any dyspnea or chest pain  He needs refills on his medications and also does endorse stable COPD and asthma      The following portions of the patient's history were reviewed and updated as appropriate: allergies, current medications, past family history, past medical history, past social history, past surgical history, and problem list     Current Outpatient Medications on File Prior to Visit   Medication Sig Dispense Refill    Alcohol Swabs (ALCOHOL PREP) 70 % PADS daily Test (Patient not taking: Reported on 9/3/2021)  5    Alcohol Swabs (Alcohol Prep) PADS TEST DAILY (Patient not taking: Reported on 9/3/2021)      aspirin (ECOTRIN LOW STRENGTH) 81 mg EC tablet Take 81 mg by mouth daily      atorvastatin (Lipitor) 20 mg tablet Take 1 tablet (20 mg total) by mouth daily 90 tablet 3    clotrimazole-betamethasone (LOTRISONE) 1-0 05 % cream APPLY TOPICALLY 2 (TWO) TIMES A DAY (Patient not taking: Reported on 9/3/2021) 30 g 0    Contour Next Test test strip USE AS INSTRUCTED 100 each 2    diltiazem (CARDIZEM CD) 240 mg 24 hr capsule Take 1 capsule (240 mg total) by mouth daily 90 capsule 3    DULoxetine (CYMBALTA) 30 mg delayed release capsule TAKE 1 CAPSULE (30 MG TOTAL) BY MOUTH DAILY AT BEDTIME 90 capsule 0    fluticasone-vilanterol (Breo Ellipta) 100-25 mcg/inh inhaler Inhale 1 puff daily Rinse mouth after use  (Patient not taking: Reported on 9/3/2021) 60 each 5    Lancets MISC Use to test blood glucose once a day  Dx: Type 2 DM  E11 9      Lantus SoloStar 100 units/mL injection pen INJECT 15 UNITS UNDER THE SKIN DAILY AT BEDTIME 15 mL 0    metFORMIN (GLUCOPHAGE) 1000 MG tablet Take 1 tablet (1,000 mg total) by mouth 2 (two) times a day with meals 180 tablet 3    Microlet Lancets MISC USE AS INSTRUCTED 100 each 3    Misc  Devices MISC by Does not apply route daily (Patient not taking: Reported on 9/3/2021) 1 each 0    mupirocin (BACTROBAN) 2 % ointment APPLY TOPICALLY 3 (THREE) TIMES A DAY (Patient not taking: Reported on 9/3/2021) 22 g 0    Tadalafil, PAH, 20 MG TABS Take 1 tablet (20 mg total) by mouth as needed (erectile dysfunction) 10 tablet 12    triamcinolone (KENALOG) 0 5 % cream APPLY TOPICALLY 3 (THREE) TIMES A DAY 30 g 0     No current facility-administered medications on file prior to visit  Review of Systems   Constitutional: Negative  HENT: Negative  Eyes: Negative  Respiratory: Negative  Cardiovascular: Negative  Gastrointestinal: Negative  Genitourinary: Negative  Musculoskeletal: Negative  Neurological: Negative  Psychiatric/Behavioral: Negative  BMI Counseling: Body mass index is 40 18 kg/m²   The BMI is above normal  Nutrition recommendations include decreasing portion sizes, encouraging healthy choices of fruits and vegetables, decreasing fast food intake, consuming healthier snacks, limiting drinks that contain sugar, moderation in carbohydrate intake, increasing intake of lean protein, reducing intake of saturated and trans fat and reducing intake of cholesterol  Exercise recommendations include moderate physical activity 150 minutes/week, exercising 3-5 times per week and obtaining a gym membership  No pharmacotherapy was ordered  Objective:    /80 (BP Location: Right arm, Patient Position: Sitting, Cuff Size: Large)   Pulse (!) 118   Temp 97 6 °F (36 4 °C) (Temporal)   Resp 18   Ht 5' 10" (1 778 m)   Wt 127 kg (280 lb)   SpO2 98%   BMI 40 18 kg/m²     Physical Exam  Constitutional:       Appearance: Normal appearance  HENT:      Head: Normocephalic  Right Ear: Tympanic membrane, ear canal and external ear normal  There is no impacted cerumen  Left Ear: Tympanic membrane, ear canal and external ear normal  There is no impacted cerumen  Nose: Nose normal       Mouth/Throat:      Mouth: Mucous membranes are moist    Eyes:      Extraocular Movements: Extraocular movements intact  Pupils: Pupils are equal, round, and reactive to light  Cardiovascular:      Rate and Rhythm: Normal rate and regular rhythm  Pulses: Normal pulses  Heart sounds: Normal heart sounds  Pulmonary:      Effort: Pulmonary effort is normal       Breath sounds: Normal breath sounds  Abdominal:      General: Bowel sounds are normal       Palpations: Abdomen is soft  Musculoskeletal:         General: No tenderness or signs of injury  Normal range of motion  Cervical back: Normal range of motion  Right lower leg: No edema  Left lower leg: No edema  Skin:     General: Skin is warm and dry  Capillary Refill: Capillary refill takes less than 2 seconds        Findings: No bruising, lesion or rash    Neurological:      General: No focal deficit present  Mental Status: He is alert and oriented to person, place, and time  Psychiatric:         Mood and Affect: Mood normal          Behavior: Behavior normal          Thought Content: Thought content normal          CHARLES Watts  09/07/21  11:23 AM    Patient Instructions   Type 2 Diabetes in Adults: New Diagnosis   WHAT YOU NEED TO KNOW:   Type 2 diabetes is a disease that affects how your body uses glucose (sugar)  Normally, when the blood sugar level increases, the pancreas makes more insulin  Insulin helps move sugar out of the blood so it can be used for energy  Type 2 diabetes develops because either the body cannot make enough insulin, or it cannot use the insulin correctly  Type 2 diabetes can be controlled to prevent damage to your heart, blood vessels, and other organs  WHILE YOU ARE HERE:   Informed consent  is a legal document that explains the tests, treatments, or procedures that you may need  Informed consent means you understand what will be done and can make decisions about what you want  You give your permission when you sign the consent form  You can have someone sign this form for you if you are not able to sign it  You have the right to understand your medical care in words you know  Before you sign the consent form, understand the risks and benefits of what will be done  Make sure all your questions are answered  A dietitian  will work with you to create a meal plan that will help you control your blood sugar  Ask how your favorite foods may fit into this meal plan  Ask your care team provider for more information about meal planning  An IV  is a small tube placed in your vein that is used to give you medicine or liquids  Medicines:  Diabetes medicine or insulin may be given to decrease the amount of sugar in your blood  These medicines help your body move the sugar to your cells where it is used for energy   You may also need medicine to lower your risk for heart disease  An example includes medicine to lower or control your cholesterol  Tests:   · Blood glucose tests  may be checked 3 times a day or more  A glucose meter will be placed at your bedside  You will be taught how to check your blood sugar levels using the meter  Then, you will be encouraged to check your own levels and write down the results  · A urine sample  may show the amount of ketones and sugar in your urine  This test helps show how well your blood sugar level is being controlled and if you need more tests  Diabetes education:  Your diabetes care team will teach you how to manage your diabetes  A care team provider may also visit you at home to teach you more about diabetes, or you may attend classes  They will teach you what to do if your blood sugar level goes too high or too low  They will also help you plan sick day management  RISKS:   Diabetes that is not controlled can damage your nerves, veins, and arteries  High blood sugar levels may damage other body tissues and organs over time  Damage to arteries may increase your risk for heart attack and stroke  Nerve damage may also lead to other heart, stomach, and nerve problems  Diabetes is life-threatening if it is not controlled  Control your blood glucose levels to prevent health problems  You may need weight loss surgery to help improve or slow the effects of complications  CARE AGREEMENT:   You have the right to help plan your care  Learn about your health condition and how it may be treated  Discuss treatment options with your healthcare providers to decide what care you want to receive  You always have the right to refuse treatment  © Copyright [employer] 2021 Information is for End User's use only and may not be sold, redistributed or otherwise used for commercial purposes   All illustrations and images included in CareNotes® are the copyrighted property of [employer]  or [employer]  The above information is an  only  It is not intended as medical advice for individual conditions or treatments  Talk to your doctor, nurse or pharmacist before following any medical regimen to see if it is safe and effective for you

## 2021-09-03 NOTE — ASSESSMENT & PLAN NOTE
Current smoker  Discussed cessation as documented below  Pharmacotherapy offered and handout provided  Declines pharmacotherapy

## 2021-09-03 NOTE — PROGRESS NOTES
Joe LOPEZ  Attending, Orthopaedic Surgery  Hand, Wrist, and Elbow Surgery  [facility]      ORTHOPAEDIC HAND, WRIST, AND ELBOW OFFICE  VISIT       ASSESSMENT/PLAN:        81-year-old male here for his left carpal tunnel and cubital tunnel syndrome  An old EMG December 2019 were reviewed confirming his symptoms that he exhibited on exam today  His hemoglobin A1c and blood sugar was discussed in depth in regards to getting it down from 12 4 to 8 0  Explained that increased blood sugars can increase his rate of infection in we want his surgery to be successful  Patient shows understanding and does have a new family doctor visit later today  Where  Management of his diabetes will be discussed  A cock-up wrist brace was provided for the left wrist today and instructed to be worn at night  Instructed the patient to work on his hemoglobin A1c and contact us for surgical intervention once it is 8 or below  The patient verbalized understanding of exam findings and treatment plan  We engaged in the shared decision-making process and treatment options were discussed at length with the patient  Surgical and conservative management discussed today along with risks and benefits  Diagnoses and all orders for this visit:    Cubital tunnel syndrome on left    Carpal tunnel syndrome of left wrist  -     Cock Up Wrist Splint        Follow Up:  Return for Patient will call in once his hemaglobin A1C is down to 8  To Do Next Visit:  Re-evaluation of current issue      General Discussions:  Carpal Tunnel Syndrome: The anatomy and physiology of carpal tunnel syndrome was discussed with the patient today  Increase pressure localized under the transverse carpal ligament can cause pain, numbness, tingling, or dysesthesias within the median nerve distribution as well as feelings of fatigue, clumsiness, or awkwardness    These symptoms typically occur at night and worse in the morning upon waking  Eventually, untreated carpal tunnel syndrome can result in weakness and permanent loss of muscle within the thenar compartment of the hand  Treatment options were discussed with the patient  Conservative treatment includes nocturnal resting splints to keep the nerve in a neutral position, ergonomic changes within the work or home environment, activity modification, and tendon gliding exercises  Vitamin B6 one tablet daily over the counter may helpful to reduce symptoms  Steroid injections within the carpal canal can help a majority of patients, however this is often self-limited in a majority of patients  Surgical intervention to divide the transverse carpal ligament typically results in a long-lasting relief of the patient's complaints, with the recurrence rate of less than 1%                                                                                                                                                                                    ____________________________________________________________________________________________________________________________________________      CHIEF COMPLAINT:  Chief Complaint   Patient presents with    Left Elbow - Pain       SUBJECTIVE:  Eduarda Morocho  is a 48y o  year old RHD male who presents today for consultation for his left hand numbness and tingling referred by Dr Dominic Alejandra  Patient states he has known carpal tunnel syndrome,  But has never had surgical intervention for it  Patient is an uncontrolled diabetic with his last hemoglobin A1c of 12 4 recorded on 05/20/2021  Patient has well-healed scars the palm both hands 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 lacerating them  At the time of repair the right hand had a graft and a carpal tunnel release at the time, 1997  On the left hand patient has a 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 dumpster,  2016    Patient states that he gets numbness tingling, pain and itching in the palms and into the fingers        Pain/symptom timing:  Worse during the day when active  Pain/symptom context:  Worse with activites and work  Pain/symptom modifying factors:  Rest makes better, activities make worse  Pain/symptom associated signs/symptoms: none    Prior treatment   · NSAIDsNo   · Injections No   · Bracing/Orthotics No    Physical Therapy No     I have personally reviewed all the relevant PMH, PSH, SH, FH, Medications and allergies      PAST MEDICAL HISTORY:  Past Medical History:   Diagnosis Date    Asthma     Carpal tunnel syndrome     Chronic pain     left arm    Diabetes mellitus ([address] )     Erectile dysfunction     Hypertension     Umbilical hernia        PAST SURGICAL HISTORY:  Past Surgical History:   Procedure Laterality Date    CHOLECYSTECTOMY      CHOLECYSTECTOMY LAPAROSCOPIC N/A 6/20/2019    Procedure: CHOLECYSTECTOMY LAPAROSCOPIC;  Surgeon: Taylor Ramirez MD;  Location:  MAIN OR;  Service: General    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    HAND SURGERY      UMBILICAL HERNIA REPAIR         FAMILY HISTORY:  Family History   Problem Relation Age of Onset    Breast cancer additional onset Mother     Diabetes Father     Hyperlipidemia Father     Hypertension Father     Seizures Father     No Known Problems Son     No Known Problems Son     Vitiligo Son        SOCIAL HISTORY:  Social History     Tobacco Use    Smoking status: Current Every Day Smoker     Packs/day: 0 50     Types: Cigarettes    Smokeless tobacco: Never Used   Vaping Use    Vaping Use: Never used   Substance Use Topics    Alcohol use: Yes     Comment: occ    Drug use: No       MEDICATIONS:    Current Outpatient Medications:     aspirin (ECOTRIN LOW STRENGTH) 81 mg EC tablet, Take 81 mg by mouth daily, Disp: , Rfl:     atorvastatin (Lipitor) 20 mg tablet, Take 1 tablet (20 mg total) by mouth daily, Disp: 90 tablet, Rfl: 3    Contour Next Test test strip, USE AS INSTRUCTED, Disp: 100 each, Rfl: 2    diltiazem (CARDIZEM CD) 240 mg 24 hr capsule, Take 1 capsule (240 mg total) by mouth daily, Disp: 90 capsule, Rfl: 3    DULoxetine (CYMBALTA) 30 mg delayed release capsule, TAKE 1 CAPSULE (30 MG TOTAL) BY MOUTH DAILY AT BEDTIME, Disp: 90 capsule, Rfl: 0    Empagliflozin 25 MG TABS, Take 1 tablet (25 mg total) by mouth every morning, Disp: 90 tablet, Rfl: 0    Lancets MISC, Use to test blood glucose once a day  Dx: Type 2 DM   E11 9, Disp: , Rfl:     Lantus SoloStar 100 units/mL injection pen, INJECT 15 UNITS UNDER THE SKIN DAILY AT BEDTIME, Disp: 15 mL, Rfl: 0    lisinopril (ZESTRIL) 10 mg tablet, Take 1 tablet (10 mg total) by mouth daily, Disp: 90 tablet, Rfl: 0    metFORMIN (GLUCOPHAGE) 1000 MG tablet, Take 1 tablet (1,000 mg total) by mouth 2 (two) times a day with meals, Disp: 180 tablet, Rfl: 3    Microlet Lancets MISC, USE AS INSTRUCTED, Disp: 100 each, Rfl: 3    rivaroxaban (XARELTO) 20 mg tablet, Take 1 tablet (20 mg total) by mouth daily with breakfast, Disp: 90 tablet, Rfl: 3    Tadalafil, PAH, 20 MG TABS, Take 1 tablet (20 mg total) by mouth as needed (erectile dysfunction), Disp: 10 tablet, Rfl: 12    triamcinolone (KENALOG) 0 5 % cream, APPLY TOPICALLY 3 (THREE) TIMES A DAY, Disp: 30 g, Rfl: 0    albuterol (PROVENTIL HFA,VENTOLIN HFA) 90 mcg/act inhaler, Inhale 2 puffs every 4 (four) hours as needed for wheezing (Patient not taking: Reported on 9/3/2021), Disp: 1 Inhaler, Rfl: 0    Alcohol Swabs (ALCOHOL PREP) 70 % PADS, daily Test (Patient not taking: Reported on 9/3/2021), Disp: , Rfl: 5    Alcohol Swabs (Alcohol Prep) PADS, TEST DAILY (Patient not taking: Reported on 9/3/2021), Disp: , Rfl:     clotrimazole-betamethasone (LOTRISONE) 1-0 05 % cream, APPLY TOPICALLY 2 (TWO) TIMES A DAY (Patient not taking: Reported on 9/3/2021), Disp: 30 g, Rfl: 0    fluticasone-vilanterol (Breo Ellipta) 100-25 mcg/inh inhaler, Inhale 1 puff daily Rinse mouth after use  (Patient not taking: Reported on 9/3/2021), Disp: 60 each, Rfl: 5    Misc  Devices MISC, by Does not apply route daily (Patient not taking: Reported on 9/3/2021), Disp: 1 each, Rfl: 0    mupirocin (BACTROBAN) 2 % ointment, APPLY TOPICALLY 3 (THREE) TIMES A DAY (Patient not taking: Reported on 9/3/2021), Disp: 22 g, Rfl: 0    ALLERGIES:  No Known Allergies        REVIEW OF SYSTEMS:  Review of Systems   Constitutional: Negative for chills, fever and unexpected weight change  HENT: Negative for hearing loss, nosebleeds and sore throat  Eyes: Negative for pain, redness and visual disturbance  Respiratory: Negative for cough, shortness of breath and wheezing  Cardiovascular: Negative for chest pain, palpitations and leg swelling  Gastrointestinal: Negative for abdominal pain, nausea and vomiting  Endocrine: Negative for polydipsia and polyuria  Genitourinary: Negative for dysuria and hematuria  Skin: Negative for rash and wound  Neurological: Negative for dizziness, light-headedness and headaches  Psychiatric/Behavioral: Negative for decreased concentration, dysphoric mood and suicidal ideas  The patient is not nervous/anxious          VITALS:  Vitals:    09/03/21 1316   BP: 148/80   Pulse: (!) 111       LABS:  HgA1c:   Lab Results   Component Value Date    HGBA1C 12 6 (A) 05/20/2021     BMP:   Lab Results   Component Value Date    CALCIUM 9 7 06/04/2021    K 4 8 06/04/2021    CO2 30 06/04/2021     06/04/2021    BUN 15 06/04/2021    CREATININE 0 97 06/04/2021       _____________________________________________________  PHYSICAL EXAMINATION:  General: well developed and well nourished, alert, oriented times 3 and appears comfortable  Psychiatric: Normal  HEENT: Normocephalic, Atraumatic Trachea Midline, No torticollis  Pulmonary: No audible wheezing or respiratory distress   Abdomen/GI: Non tender, non distended   Cardiovascular: No pitting edema, 2+ radial pulse   Skin: No masses, erythema, lacerations, fluctation, ulcerations  Neurovascular: Sensation Intact to the Radial Nerve, Decreased Sensation to  the Median Nerve, Decreased Sensation to  the Ulnar Nerve, Motor Intact to the Median, Ulnar, Radial Nerve and Pulses Intact  Musculoskeletal: Normal, except as noted in detailed exam and in HPI  MUSCULOSKELETAL EXAMINATION:    Left Ulnar Nerve Exam:    Negative intrinsic atrophy  Negative  deformity at the elbow  Full range of motion with flexion and extension of the elbow  Positive ulnar nerve compression test at the elbow  Positive tinels over the ulnar nerve at the elbow  Negative cross finger test in the index and long fingers  Intrinsic strength 5/5   Left Carpal Tunnel Exam:    Negative thenar atrophy  Positive phalen's test  Positive carpal tunnel compression  Positive tinels over median nerve at the wrist   Opposition strength 5/5  Abduction strength 5/5         ___________________________________________________  STUDIES REVIEWED:  I have personally reviewed EMG of left upper extremity of 12/2019 which demonstrate  Moderate to severe left median neuropathy and mild left ulnar neuropathy with no signs of left cervical radiculopathy        PROCEDURES PERFORMED:  Procedures  No Procedures performed today    _____________________________________________________      Leelee Carballo    I,:  Katelyn Melvin am acting as a scribe while in the presence of the attending physician :       I,:  Porsche Yancey MD personally performed the services described in this documentation    as scribed in my presence :

## 2021-09-03 NOTE — PATIENT INSTRUCTIONS
Type 2 Diabetes in Adults: New Diagnosis   WHAT YOU NEED TO KNOW:   Type 2 diabetes is a disease that affects how your body uses glucose (sugar)  Normally, when the blood sugar level increases, the pancreas makes more insulin  Insulin helps move sugar out of the blood so it can be used for energy  Type 2 diabetes develops because either the body cannot make enough insulin, or it cannot use the insulin correctly  Type 2 diabetes can be controlled to prevent damage to your heart, blood vessels, and other organs  WHILE YOU ARE HERE:   Informed consent  is a legal document that explains the tests, treatments, or procedures that you may need  Informed consent means you understand what will be done and can make decisions about what you want  You give your permission when you sign the consent form  You can have someone sign this form for you if you are not able to sign it  You have the right to understand your medical care in words you know  Before you sign the consent form, understand the risks and benefits of what will be done  Make sure all your questions are answered  A dietitian  will work with you to create a meal plan that will help you control your blood sugar  Ask how your favorite foods may fit into this meal plan  Ask your care team provider for more information about meal planning  An IV  is a small tube placed in your vein that is used to give you medicine or liquids  Medicines:  Diabetes medicine or insulin may be given to decrease the amount of sugar in your blood  These medicines help your body move the sugar to your cells where it is used for energy  You may also need medicine to lower your risk for heart disease  An example includes medicine to lower or control your cholesterol  Tests:   · Blood glucose tests  may be checked 3 times a day or more  A glucose meter will be placed at your bedside  You will be taught how to check your blood sugar levels using the meter   Then, you will be encouraged to

## 2021-09-07 NOTE — ASSESSMENT & PLAN NOTE
Lab Results   Component Value Date    HGBA1C 12 1 (A) 09/03/2021    HGBA1C 12 6 (A) 05/20/2021    HGBA1C 11 4 (A) 01/07/2021   · pattern of uncontrolled diabetes due to noncompliance continues  · Presented patient with ASCVD risk illustration in Epic, explained severe risk of CVD and other dangers including MI, Stroke, death  · Discussed dangers of uncontrolled DM including kidney failure and blindness  · Advised pt to restart treatment regimen  Referred to Endo as well as in-house CARE clinic at [facility], [facility] for DM patients

## 2021-09-07 NOTE — ASSESSMENT & PLAN NOTE
Regular rhythm on exam today  Denies SOB, chest pain  Continue Xarelto, Echo ordered, referred to Cardiology

## 2021-09-08 ENCOUNTER — HOSPITAL ENCOUNTER (EMERGENCY)
Facility: HOSPITAL | Age: 56
Discharge: HOME | End: 2021-09-08
Attending: EMERGENCY MEDICINE
Payer: COMMERCIAL

## 2021-09-08 VITALS
HEART RATE: 107 BPM | RESPIRATION RATE: 18 BRPM | TEMPERATURE: 96.8 F | DIASTOLIC BLOOD PRESSURE: 94 MMHG | SYSTOLIC BLOOD PRESSURE: 121 MMHG | BODY MASS INDEX: 39.49 KG/M2 | WEIGHT: 275.19 LBS | OXYGEN SATURATION: 98 %

## 2021-09-08 DIAGNOSIS — H53.9 VISUAL DISTURBANCE: Primary | ICD-10-CM

## 2021-09-08 PROCEDURE — 82948 REAGENT STRIP/BLOOD GLUCOSE: CPT

## 2021-09-08 PROCEDURE — 99283 EMERGENCY DEPT VISIT LOW MDM: CPT

## 2021-09-08 PROCEDURE — 99284 EMERGENCY DEPT VISIT MOD MDM: CPT | Performed by: EMERGENCY MEDICINE

## 2021-09-08 NOTE — ED ATTENDING ATTESTATION
Anne Pisano MD, saw and evaluated the patient  I have discussed the patient with the resident and agree with the resident's findings, Plan of Care, and MDM as documented in the resident's note, except where noted  All available labs and Radiology studies were reviewed  I was present for key portions of any procedure(s) performed by the resident and I was immediately available to provide assistance  At this point I agree with the current assessment done in the Emergency Department  I have conducted an independent evaluation of this patient a history and physical is as follows:    47 yo male with a complicated past medical history including DM, HTN, hyperlipidemia, COPD, and atrial fibrillation with RVR presents to the ED complaining of blurred vision x 9 months  The patient reports blurred vision in the right eye since January 2021  He went to see an eye doctor at [facility] early this year and was told that he had "blood spots" in the back of the eye  He was referred to a specialist at that time but lost the information and never followed up  2 days ago he developed similar "blurriness" in the left eye  He decided to come to the ED today "to find out what's going on"  No pain in the eyes  No redness, drainage, or irritation  He has been using "dollar store reading glasses" at home with some relief  No headache  No dizziness/lightheadedness  No sudden loss of vision  No other specific complaints  Gen: NAD, AA&Ox3  HEENT: PERRL, EOMI, no discoloration or drainage  Neck: supple  CV: RRR  Lungs: CTA B/L  Abdomen: soft, NT/ND  Ext: no swelling or deformity  Neuro: 5/5 strength all extremities      ED Course  The patient is comfortable appearing with stable vital signs and a benign eye exam  Left eye symptoms have been present for more than 9 months  Right eye blurriness started 2 days ago and is unchanged since onset  Visual acuity only mildly decreased   History is not consistent with CRAO, CRVO, or acute angle closure glaucoma  Possible worsening diabatic retinopathy vs macular degeneration? Plan for close follow up with Ophthalmology later this week for full dilated eye exam  Resident physician left a message with Dr Brenda Osborne' office --> patient will schedule an appointment with them as soon as possible  The patient is agreeable to this plan  Strict return precautions provided        Critical Care Time  Procedures

## 2021-09-08 NOTE — Clinical Note
Nola Puga was seen and treated in our emergency department on 9/8/2021  Diagnosis:     Radha Dennison  may return to work on return date  He may return on this date: 09/13/2021         If you have any questions or concerns, please don't hesitate to call        Maddie Gunn MD    ______________________________           _______________          _______________  Hospital Representative                              Date                                Time
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
## 2021-09-09 ENCOUNTER — TELEPHONE (OUTPATIENT)
Dept: FAMILY MEDICINE CLINIC | Facility: CLINIC | Age: 56
End: 2021-09-09

## 2021-09-10 NOTE — TELEPHONE ENCOUNTER
Form completed by provider on 09/10/21    Faxed to [facility]  Confirmation received   yes    Completed form placed in pod   folder

## 2021-09-14 LAB — GLUCOSE SERPL-MCNC: 308 MG/DL (ref 65–140)

## 2021-09-21 DIAGNOSIS — M25.511 BILATERAL SHOULDER PAIN, UNSPECIFIED CHRONICITY: ICD-10-CM

## 2021-09-21 DIAGNOSIS — M25.512 BILATERAL SHOULDER PAIN, UNSPECIFIED CHRONICITY: ICD-10-CM

## 2021-09-24 RX ORDER — DULOXETIN HYDROCHLORIDE 30 MG/1
30 CAPSULE, DELAYED RELEASE ORAL
Qty: 90 CAPSULE | Refills: 0 | Status: SHIPPED | OUTPATIENT
Start: 2021-09-24 | End: 2021-11-10

## 2021-09-27 DIAGNOSIS — J45.30 MILD PERSISTENT ASTHMA: ICD-10-CM

## 2021-09-27 DIAGNOSIS — J44.9 COPD (CHRONIC OBSTRUCTIVE PULMONARY DISEASE) (HCC): ICD-10-CM

## 2021-09-27 DIAGNOSIS — E11.9 TYPE 2 DIABETES MELLITUS WITHOUT COMPLICATION, WITHOUT LONG-TERM CURRENT USE OF INSULIN (HCC): ICD-10-CM

## 2021-09-28 RX ORDER — ALBUTEROL SULFATE 90 UG/1
2 AEROSOL, METERED RESPIRATORY (INHALATION) EVERY 4 HOURS PRN
Qty: 18 G | Refills: 2 | Status: SHIPPED | OUTPATIENT
Start: 2021-09-28 | End: 2021-10-25

## 2021-09-28 RX ORDER — EMPAGLIFLOZIN 25 MG/1
TABLET, FILM COATED ORAL
Qty: 90 TABLET | Refills: 0 | Status: SHIPPED | OUTPATIENT
Start: 2021-09-28 | End: 2021-12-11 | Stop reason: SDUPTHER

## 2021-09-30 ENCOUNTER — OFFICE VISIT (OUTPATIENT)
Dept: FAMILY MEDICINE CLINIC | Facility: CLINIC | Age: 56
End: 2021-09-30

## 2021-09-30 VITALS
HEART RATE: 107 BPM | OXYGEN SATURATION: 96 % | BODY MASS INDEX: 39.61 KG/M2 | HEIGHT: 70 IN | WEIGHT: 276.7 LBS | RESPIRATION RATE: 18 BRPM | SYSTOLIC BLOOD PRESSURE: 132 MMHG | DIASTOLIC BLOOD PRESSURE: 90 MMHG | TEMPERATURE: 97.4 F

## 2021-09-30 DIAGNOSIS — E11.9 TYPE 2 DIABETES MELLITUS WITHOUT COMPLICATION, WITHOUT LONG-TERM CURRENT USE OF INSULIN (HCC): Primary | ICD-10-CM

## 2021-09-30 PROCEDURE — 3075F SYST BP GE 130 - 139MM HG: CPT | Performed by: FAMILY MEDICINE

## 2021-09-30 PROCEDURE — 3080F DIAST BP >= 90 MM HG: CPT | Performed by: FAMILY MEDICINE

## 2021-09-30 PROCEDURE — 99213 OFFICE O/P EST LOW 20 MIN: CPT | Performed by: FAMILY MEDICINE

## 2021-09-30 RX ORDER — INSULIN GLARGINE 100 [IU]/ML
25 INJECTION, SOLUTION SUBCUTANEOUS
Qty: 15 ML | Refills: 0 | Status: SHIPPED | OUTPATIENT
Start: 2021-09-30 | End: 2022-01-07

## 2021-09-30 NOTE — LETTER
Medical Fitness Referral    Patient: Jeison Joiner  : 1965  MRN: [id number]  Address: [address] [place] [address]  Phone Number: [phone]  E-mail: [email or web address]    [x] Medical Disorders (Ex  Diabetes)   [] Cardiovascular Disorders (Ex  Hypertension)   [] Pulmonary Disorders (Ex  Asthma)   [] Cancer Disorders (Ex  Breast, Prostate)   [] Immunological & Hematological Disorders (Rheumatoid Arthritis)   [] Neurological Disorders (Ex  Parkinson's Disease)   [] Cognitive Disorders (Ex  Dementia)   [] Children & Adolescents (Ex  BMI)   [] Older Adults (Ex  Balance & Ambulation)   [] Female Specific Disorders (Ex  Osteoporosis)       Diagnoses:   1   Type 2 diabetes mellitus without complication, without long-term current use of insulin ([facility] )  Ambulatory referral to complex care management program    Ambulatory referral to Diabetic Education    Ambulatory referral to Podiatry     Additional Comments: none    Service Requested:  [] Medical Fitness Consult- Screening For Appropriateness of Medical Fitness Program   [] Medical Fitness Program- Assessment of Body Composition, Aerobic, Anaerobic, Muscle Strength & Endurance, Flexibility, Neuromuscular & Functional Fitness   [x] Medical Fitness Assessment- Individualized Evidence-Based Exercise Program       Requesting Provider: Joe Peacock MD  Date: 21

## 2021-09-30 NOTE — PROGRESS NOTES
[id number]     DIABETES CARES PROGRAM  INTAKE VISIT    NAME: Flavio Hancokc  AGE: 48 y o  SEX: male  : 1965     DATE: 2021    Diabetes Cares Program Intake Visit      Chief Complaint: Enrollment in Diabetes Cares Program        History of Present Illness:     Patient has past medical history of Type II Diabetes and HTN  Patient has history of Diabetes Mellitus since   He does not have any glucose logs today  Last A1c: 12 1 on 9/3/2021  History of oral anti-DM meds: Jardiance 25 mg and metformin 1000mg BID  History of insulin therapy: On lantus HS 25 units  Any financial or food insecurity: none  Any factors prohibiting DM Control: none           Review of Systems:     Review of Systems   Constitutional: Negative for appetite change, chills, fatigue and fever  HENT: Negative for congestion, ear discharge and ear pain  Eyes: Negative for visual disturbance  Respiratory: Negative for shortness of breath and wheezing  Cardiovascular: Negative for chest pain  Gastrointestinal: Negative for abdominal pain, blood in stool, constipation, diarrhea, nausea and vomiting  Neurological: Negative for dizziness, tremors, weakness, light-headedness, numbness and headaches         Allergies:     No Known Allergies    Current Medications:       Current Outpatient Medications:     albuterol (PROVENTIL HFA,VENTOLIN HFA) 90 mcg/act inhaler, INHALE 2 PUFFS EVERY 4 (FOUR) HOURS AS NEEDED FOR WHEEZING, Disp: 18 g, Rfl: 2    aspirin (ECOTRIN LOW STRENGTH) 81 mg EC tablet, Take 81 mg by mouth daily, Disp: , Rfl:     atorvastatin (Lipitor) 20 mg tablet, Take 1 tablet (20 mg total) by mouth daily, Disp: 90 tablet, Rfl: 3    diltiazem (CARDIZEM CD) 240 mg 24 hr capsule, Take 1 capsule (240 mg total) by mouth daily, Disp: 90 capsule, Rfl: 3    DULoxetine (CYMBALTA) 30 mg delayed release capsule, TAKE 1 CAPSULE (30 MG TOTAL) BY MOUTH DAILY AT BEDTIME, Disp: 90 capsule, Rfl: 0    glucose blood (Contour Next Test) test strip, Use 1 each 3 (three) times a day, Disp: 100 each, Rfl: 2    insulin glargine (Lantus SoloStar) 100 units/mL injection pen, Inject 25 Units under the skin daily at bedtime, Disp: 15 mL, Rfl: 0    Jardiance 25 MG TABS, TAKE 1 TABLET (25 MG TOTAL) BY MOUTH EVERY MORNING, Disp: 90 tablet, Rfl: 0    Lancets MISC, Use to test blood glucose once a day  Dx: Type 2 DM  E11 9, Disp: , Rfl:     lisinopril (ZESTRIL) 10 mg tablet, Take 1 tablet (10 mg total) by mouth daily, Disp: 90 tablet, Rfl: 0    metFORMIN (GLUCOPHAGE) 1000 MG tablet, Take 1 tablet (1,000 mg total) by mouth 2 (two) times a day with meals, Disp: 180 tablet, Rfl: 3    Microlet Lancets MISC, USE AS INSTRUCTED, Disp: 100 each, Rfl: 3    rivaroxaban (XARELTO) 20 mg tablet, Take 1 tablet (20 mg total) by mouth daily with breakfast, Disp: 90 tablet, Rfl: 3    Tadalafil, PAH, 20 MG TABS, Take 1 tablet (20 mg total) by mouth as needed (erectile dysfunction), Disp: 10 tablet, Rfl: 12    triamcinolone (KENALOG) 0 5 % cream, APPLY TOPICALLY 3 (THREE) TIMES A DAY, Disp: 30 g, Rfl: 0    Alcohol Swabs (ALCOHOL PREP) 70 % PADS, daily Test (Patient not taking: Reported on 9/3/2021), Disp: , Rfl: 5    Alcohol Swabs (Alcohol Prep) PADS, TEST DAILY (Patient not taking: Reported on 9/3/2021), Disp: , Rfl:     clotrimazole-betamethasone (LOTRISONE) 1-0 05 % cream, APPLY TOPICALLY 2 (TWO) TIMES A DAY (Patient not taking: Reported on 9/3/2021), Disp: 30 g, Rfl: 0    fluticasone-vilanterol (Breo Ellipta) 100-25 mcg/inh inhaler, Inhale 1 puff daily Rinse mouth after use  (Patient not taking: Reported on 9/3/2021), Disp: 60 each, Rfl: 5    Misc   Devices MISC, by Does not apply route daily (Patient not taking: Reported on 9/3/2021), Disp: 1 each, Rfl: 0    mupirocin (BACTROBAN) 2 % ointment, APPLY TOPICALLY 3 (THREE) TIMES A DAY (Patient not taking: Reported on 9/3/2021), Disp: 22 g, Rfl: 0    Past Medical History: Past Medical History:   Diagnosis Date    Asthma     Carpal tunnel syndrome     Chronic pain     left arm    Diabetes mellitus ([address] )     Erectile dysfunction     Hypertension     Umbilical hernia         Past Surgical History:   Procedure Laterality Date    CHOLECYSTECTOMY      CHOLECYSTECTOMY LAPAROSCOPIC N/A 6/20/2019    Procedure: CHOLECYSTECTOMY LAPAROSCOPIC;  Surgeon: Kimi Myles MD;  Location: [address] [place] [address];  Service: General    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    HAND SURGERY      UMBILICAL HERNIA REPAIR          Family History   Problem Relation Age of Onset    Breast cancer additional onset Mother     Diabetes Father     Hyperlipidemia Father     Hypertension Father     Seizures Father     No Known Problems Son     No Known Problems Son     Vitiligo Son         Social History     Socioeconomic History    Marital status:      Spouse name: Not on file    Number of children: Not on file    Years of education: Not on file    Highest education level: Not on file   Occupational History    Not on file   Tobacco Use    Smoking status: Current Every Day Smoker     Packs/day: 0 50     Types: Cigarettes    Smokeless tobacco: Never Used   Vaping Use    Vaping Use: Never used   Substance and Sexual Activity    Alcohol use: Yes     Comment: occ    Drug use: No    Sexual activity: Yes     Partners: Female   Other Topics Concern    Not on file   Social History Narrative    Not on file     Social Determinants of Health     Financial Resource Strain:     Difficulty of Paying Living Expenses:    Food Insecurity:     Worried About Running Out of Food in the Last Year:     Ran Out of Food in the Last Year:    Transportation Needs:     Lack of Transportation (Medical):      Lack of Transportation (Non-Medical):    Physical Activity:     Days of Exercise per Week:     Minutes of Exercise per Session:    Stress:     Feeling of Stress :    Social Connections:     Frequency of Communication with Friends and Family:     Frequency of Social Gatherings with Friends and Family:     Attends Nondenominational Services:     Active Member of Clubs or Organizations:     Attends Club or Organization Meetings:     Marital Status:    Intimate Partner Violence:     Fear of Current or Ex-Partner:     Emotionally Abused:     Physically Abused:     Sexually Abused:         Physical Exam:     /90 (BP Location: Left arm, Patient Position: Sitting, Cuff Size: Standard)   Pulse (!) 107   Temp (!) 97 4 °F (36 3 °C) (Temporal)   Resp 18   Ht 5' 10" (1 778 m)   Wt 126 kg (276 lb 11 2 oz)   SpO2 96%   BMI 39 70 kg/m²     Physical Exam  Constitutional:       General: He is not in acute distress  Appearance: He is well-developed  He is not ill-appearing, toxic-appearing or diaphoretic  HENT:      Head: Normocephalic and atraumatic  Right Ear: External ear normal       Left Ear: External ear normal       Nose: Nose normal       Mouth/Throat:      Pharynx: No oropharyngeal exudate  Eyes:      General: No scleral icterus  Right eye: No discharge  Left eye: No discharge  Extraocular Movements: Extraocular movements intact  Conjunctiva/sclera: Conjunctivae normal    Cardiovascular:      Rate and Rhythm: Normal rate and regular rhythm  Pulses: no weak pulses          Dorsalis pedis pulses are 2+ on the right side and 2+ on the left side  Posterior tibial pulses are 2+ on the right side and 2+ on the left side  Heart sounds: Normal heart sounds  No murmur heard  No friction rub  No gallop  Pulmonary:      Effort: Pulmonary effort is normal  No respiratory distress  Breath sounds: Normal breath sounds  No stridor  No wheezing or rhonchi  Abdominal:      General: Bowel sounds are normal  There is no distension  Palpations: Abdomen is soft  There is no mass  Tenderness: There is no abdominal tenderness   There is no guarding or rebound  Hernia: No hernia is present  Musculoskeletal:         General: No deformity or signs of injury  Normal range of motion  Cervical back: Normal range of motion and neck supple  Right lower leg: No edema  Left lower leg: No edema  Feet:      Right foot:      Skin integrity: Callus present  No ulcer, skin breakdown, erythema, warmth or dry skin  Left foot:      Skin integrity: Callus present  No ulcer, skin breakdown, erythema, warmth or dry skin  Lymphadenopathy:      Cervical: No cervical adenopathy  Skin:     General: Skin is warm  Capillary Refill: Capillary refill takes less than 2 seconds  Findings: No bruising, erythema, lesion or rash  Comments: Bilateral chronic venous changes stasis dermatitis   Neurological:      General: No focal deficit present  Mental Status: He is alert and oriented to person, place, and time  Cranial Nerves: No cranial nerve deficit  Sensory: No sensory deficit  Motor: No weakness  Coordination: Coordination normal    Psychiatric:         Mood and Affect: Mood normal          Right Foot/Ankle   Right Foot Inspection  Skin Exam: skin normal, skin intact, callus and callus no dry skin, no warmth, no erythema, no maceration, no abnormal color, no pre-ulcer and no ulcer                          Toe Exam: ROM and strength within normal limits  Sensory     Proprioception: intact   Monofilament testing: intact  Vascular  Capillary refills: < 3 seconds  The right DP pulse is 2+  The right PT pulse is 2+  Left Foot/Ankle  Left Foot Inspection  Skin Exam: skin normal, skin intact and callusno dry skin, no warmth, no erythema, no maceration, normal color, no pre-ulcer and no ulcer                         Toe Exam: ROM and strength within normal limits                   Sensory     Proprioception: intact  Monofilament: intact  Vascular  Capillary refills: < 3 seconds  The left DP pulse is 2+   The left PT pulse is 2+    Assign Risk Category:  No deformity present; No loss of protective sensation; No weak pulses       Risk: 0      Assessment & Plan:     Type 2 diabetes mellitus without complication, without long-term current use of insulin ([facility])    Lab Results   Component Value Date    HGBA1C 12 1 (A) 09/03/2021   -Poorly controlled  -Referral to complex care and diabetic education  -Referral to podiatry  -Extensive Dietary counseling provided  -Continue current medications ( Lantus 25 units, metformin 1000mg BID, and Jardiance 25 mg daily)  -Recommend bringing fasting sugar log next appointment  -Follow up with Endocrinology    Type 2 diabetes mellitus without complication, without long-term current use of insulin ([facility])    Lab Results   Component Value Date    HGBA1C 12 1 (A) 09/03/2021   -Poorly controlled  -Referral to complex care and diabetic education  -Referral to podiatry  -Extensive Dietary counseling provided  -Continue current medications ( Lantus 25 units, metformin 1000mg BID, and Jardiance 25 mg daily)  -Recommend bringing fasting sugar log next appointment  -Follow up with Endocrinology    Patient  qualifies for enrollment in Diabetes Cares Program  Benefits, risks and intended goals of this program have been explained by project investigator and written consent was received from the patient  All questions were answered and patient is agreeable to proceed  I will make the necessary referrals to:  1  Diabetes Educator  2  Diabetes Nutritionist  3  Medical   4  Complex Care Management      Patient to start on [employer] Connect CGM with predictive alerts to help prevent hypoglycemic events, due to wide fluctuations in blood sugars, elevated HA1c and increased risk of micro and macrovascular complications  a  Please see attached narrative letter  I will repeat A1c in 3 months  Follow-up visit in 1 months for next Diabetes Cares Program Follow Up, or sooner as needed

## 2021-09-30 NOTE — ASSESSMENT & PLAN NOTE
Lab Results   Component Value Date    HGBA1C 12 1 (A) 09/03/2021   -Poorly controlled  -Referral to complex care and diabetic education  -Referral to podiatry  -Extensive Dietary counseling provided  -Continue current medications ( Lantus 25 units, metformin 1000mg BID, and Jardiance 25 mg daily)  -Recommend bringing fasting sugar log next appointment  -Follow up with Endocrinology

## 2021-10-01 ENCOUNTER — HOSPITAL ENCOUNTER (OUTPATIENT)
Facility: HOSPITAL | Age: 56
Setting detail: OBSERVATION
Discharge: HOME | End: 2021-10-02
Attending: EMERGENCY MEDICINE | Admitting: FAMILY MEDICINE
Payer: COMMERCIAL

## 2021-10-01 ENCOUNTER — APPOINTMENT (OUTPATIENT)
Dept: MRI IMAGING | Facility: HOSPITAL | Age: 56
End: 2021-10-01
Payer: COMMERCIAL

## 2021-10-01 ENCOUNTER — TELEPHONE (OUTPATIENT)
Dept: FAMILY MEDICINE CLINIC | Facility: CLINIC | Age: 56
End: 2021-10-01

## 2021-10-01 ENCOUNTER — APPOINTMENT (EMERGENCY)
Dept: RADIOLOGY | Facility: HOSPITAL | Age: 56
End: 2021-10-01
Payer: COMMERCIAL

## 2021-10-01 ENCOUNTER — APPOINTMENT (EMERGENCY)
Dept: CT IMAGING | Facility: HOSPITAL | Age: 56
End: 2021-10-01
Payer: COMMERCIAL

## 2021-10-01 ENCOUNTER — PATIENT OUTREACH (OUTPATIENT)
Dept: CASE MANAGEMENT | Facility: OTHER | Age: 56
End: 2021-10-01

## 2021-10-01 DIAGNOSIS — G56.02 LEFT CARPAL TUNNEL SYNDROME: ICD-10-CM

## 2021-10-01 DIAGNOSIS — R55 NEAR SYNCOPE: ICD-10-CM

## 2021-10-01 DIAGNOSIS — R20.2 NUMBNESS AND TINGLING IN LEFT HAND: ICD-10-CM

## 2021-10-01 DIAGNOSIS — I65.29 CAROTID STENOSIS: ICD-10-CM

## 2021-10-01 DIAGNOSIS — R20.0 NUMBNESS AND TINGLING IN LEFT HAND: ICD-10-CM

## 2021-10-01 DIAGNOSIS — R07.9 CHEST PAIN: Primary | ICD-10-CM

## 2021-10-01 DIAGNOSIS — I48.91 ATRIAL FIBRILLATION WITH RVR (HCC): ICD-10-CM

## 2021-10-01 DIAGNOSIS — G56.22 ULNAR NEUROPATHY AT ELBOW, LEFT: ICD-10-CM

## 2021-10-01 LAB
ANION GAP SERPL CALCULATED.3IONS-SCNC: 6 MMOL/L (ref 5–14)
ATRIAL RATE: 57 BPM
ATRIAL RATE: 68 BPM
ATRIAL RATE: 75 BPM
BASOPHILS # BLD AUTO: 0 THOUSANDS/ΜL (ref 0–0.1)
BASOPHILS NFR BLD AUTO: 1 % (ref 0–1)
BUN SERPL-MCNC: 15 MG/DL (ref 5–25)
CALCIUM SERPL-MCNC: 9.9 MG/DL (ref 8.4–10.2)
CHLORIDE SERPL-SCNC: 103 MMOL/L (ref 97–108)
CO2 SERPL-SCNC: 30 MMOL/L (ref 22–30)
CREAT SERPL-MCNC: 0.88 MG/DL (ref 0.7–1.5)
EOSINOPHIL # BLD AUTO: 0.1 THOUSAND/ΜL (ref 0–0.4)
EOSINOPHIL NFR BLD AUTO: 2 % (ref 0–6)
ERYTHROCYTE [DISTWIDTH] IN BLOOD BY AUTOMATED COUNT: 14.4 %
GFR SERPL CREATININE-BSD FRML MDRD: 97 ML/MIN/1.73SQ M
GLUCOSE SERPL-MCNC: 116 MG/DL (ref 65–140)
GLUCOSE SERPL-MCNC: 126 MG/DL (ref 65–140)
GLUCOSE SERPL-MCNC: 142 MG/DL (ref 70–99)
GLUCOSE SERPL-MCNC: 160 MG/DL (ref 65–140)
GLUCOSE SERPL-MCNC: 92 MG/DL (ref 65–140)
HCT VFR BLD AUTO: 44.7 % (ref 41–53)
HGB BLD-MCNC: 14.4 G/DL (ref 13.5–17.5)
LYMPHOCYTES # BLD AUTO: 1.6 THOUSANDS/ΜL (ref 0.5–4)
LYMPHOCYTES NFR BLD AUTO: 24 % (ref 25–45)
MCH RBC QN AUTO: 27.1 PG (ref 26–34)
MCHC RBC AUTO-ENTMCNC: 32.2 G/DL (ref 31–36)
MCV RBC AUTO: 84 FL (ref 80–100)
MONOCYTES # BLD AUTO: 0.5 THOUSAND/ΜL (ref 0.2–0.9)
MONOCYTES NFR BLD AUTO: 7 % (ref 1–10)
NEUTROPHILS # BLD AUTO: 4.6 THOUSANDS/ΜL (ref 1.8–7.8)
NEUTS SEG NFR BLD AUTO: 67 % (ref 45–65)
P AXIS: 76 DEGREES
P AXIS: 77 DEGREES
P AXIS: 78 DEGREES
PLATELET # BLD AUTO: 252 THOUSANDS/UL (ref 150–450)
PMV BLD AUTO: 8.2 FL (ref 8.9–12.7)
POTASSIUM SERPL-SCNC: 4.7 MMOL/L (ref 3.6–5)
PR INTERVAL: 144 MS
PR INTERVAL: 144 MS
PR INTERVAL: 146 MS
QRS AXIS: 25 DEGREES
QRS AXIS: 31 DEGREES
QRS AXIS: 31 DEGREES
QRSD INTERVAL: 78 MS
QRSD INTERVAL: 86 MS
QRSD INTERVAL: 86 MS
QT INTERVAL: 388 MS
QT INTERVAL: 444 MS
QT INTERVAL: 454 MS
QTC INTERVAL: 433 MS
QTC INTERVAL: 441 MS
QTC INTERVAL: 472 MS
RBC # BLD AUTO: 5.3 MILLION/UL (ref 4.5–5.9)
SODIUM SERPL-SCNC: 139 MMOL/L (ref 137–147)
T WAVE AXIS: 37 DEGREES
T WAVE AXIS: 41 DEGREES
T WAVE AXIS: 44 DEGREES
TROPONIN I SERPL-MCNC: <0.01 NG/ML (ref 0–0.03)
VENTRICULAR RATE: 57 BPM
VENTRICULAR RATE: 68 BPM
VENTRICULAR RATE: 75 BPM
WBC # BLD AUTO: 6.8 THOUSAND/UL (ref 4.5–11)

## 2021-10-01 PROCEDURE — 71045 X-RAY EXAM CHEST 1 VIEW: CPT

## 2021-10-01 PROCEDURE — 93010 ELECTROCARDIOGRAM REPORT: CPT | Performed by: INTERNAL MEDICINE

## 2021-10-01 PROCEDURE — 70496 CT ANGIOGRAPHY HEAD: CPT

## 2021-10-01 PROCEDURE — 99285 EMERGENCY DEPT VISIT HI MDM: CPT | Performed by: EMERGENCY MEDICINE

## 2021-10-01 PROCEDURE — 80048 BASIC METABOLIC PNL TOTAL CA: CPT | Performed by: EMERGENCY MEDICINE

## 2021-10-01 PROCEDURE — NC001 PR NO CHARGE: Performed by: FAMILY MEDICINE

## 2021-10-01 PROCEDURE — G1004 CDSM NDSC: HCPCS

## 2021-10-01 PROCEDURE — 93005 ELECTROCARDIOGRAM TRACING: CPT

## 2021-10-01 PROCEDURE — 82948 REAGENT STRIP/BLOOD GLUCOSE: CPT

## 2021-10-01 PROCEDURE — 99285 EMERGENCY DEPT VISIT HI MDM: CPT

## 2021-10-01 PROCEDURE — 84484 ASSAY OF TROPONIN QUANT: CPT

## 2021-10-01 PROCEDURE — 85025 COMPLETE CBC W/AUTO DIFF WBC: CPT | Performed by: EMERGENCY MEDICINE

## 2021-10-01 PROCEDURE — 36415 COLL VENOUS BLD VENIPUNCTURE: CPT | Performed by: EMERGENCY MEDICINE

## 2021-10-01 PROCEDURE — A9585 GADOBUTROL INJECTION: HCPCS | Performed by: FAMILY MEDICINE

## 2021-10-01 PROCEDURE — 70553 MRI BRAIN STEM W/O & W/DYE: CPT

## 2021-10-01 PROCEDURE — 84484 ASSAY OF TROPONIN QUANT: CPT | Performed by: FAMILY MEDICINE

## 2021-10-01 PROCEDURE — 84484 ASSAY OF TROPONIN QUANT: CPT | Performed by: EMERGENCY MEDICINE

## 2021-10-01 PROCEDURE — 70498 CT ANGIOGRAPHY NECK: CPT

## 2021-10-01 RX ORDER — INSULIN GLARGINE 100 [IU]/ML
25 INJECTION, SOLUTION SUBCUTANEOUS
Status: CANCELLED | OUTPATIENT
Start: 2021-10-01

## 2021-10-01 RX ORDER — ATORVASTATIN CALCIUM 20 MG/1
20 TABLET, FILM COATED ORAL DAILY
Status: DISCONTINUED | OUTPATIENT
Start: 2021-10-02 | End: 2021-10-02 | Stop reason: HOSPADM

## 2021-10-01 RX ORDER — ALBUTEROL SULFATE 90 UG/1
2 AEROSOL, METERED RESPIRATORY (INHALATION) EVERY 4 HOURS PRN
Status: DISCONTINUED | OUTPATIENT
Start: 2021-10-01 | End: 2021-10-02 | Stop reason: HOSPADM

## 2021-10-01 RX ORDER — LISINOPRIL 10 MG/1
10 TABLET ORAL DAILY
Status: CANCELLED | OUTPATIENT
Start: 2021-10-02

## 2021-10-01 RX ORDER — DILTIAZEM HYDROCHLORIDE 120 MG/1
120 CAPSULE, COATED, EXTENDED RELEASE ORAL DAILY
Status: DISCONTINUED | OUTPATIENT
Start: 2021-10-01 | End: 2021-10-02 | Stop reason: HOSPADM

## 2021-10-01 RX ORDER — ACETAMINOPHEN 325 MG/1
650 TABLET ORAL EVERY 6 HOURS PRN
Status: DISCONTINUED | OUTPATIENT
Start: 2021-10-01 | End: 2021-10-02 | Stop reason: HOSPADM

## 2021-10-01 RX ORDER — INSULIN GLARGINE 100 [IU]/ML
15 INJECTION, SOLUTION SUBCUTANEOUS
Status: DISCONTINUED | OUTPATIENT
Start: 2021-10-01 | End: 2021-10-02 | Stop reason: HOSPADM

## 2021-10-01 RX ADMIN — INSULIN GLARGINE 15 UNITS: 100 INJECTION, SOLUTION SUBCUTANEOUS at 21:00

## 2021-10-01 RX ADMIN — IOHEXOL 100 ML: 350 INJECTION, SOLUTION INTRAVENOUS at 08:39

## 2021-10-01 RX ADMIN — DILTIAZEM HYDROCHLORIDE 120 MG: 120 CAPSULE, COATED, EXTENDED RELEASE ORAL at 13:45

## 2021-10-01 RX ADMIN — GADOBUTROL 10 ML: 604.72 INJECTION INTRAVENOUS at 14:29

## 2021-10-01 RX ADMIN — ACETAMINOPHEN 650 MG: 325 TABLET ORAL at 20:59

## 2021-10-02 VITALS
HEART RATE: 71 BPM | OXYGEN SATURATION: 97 % | WEIGHT: 273.15 LBS | HEIGHT: 66 IN | BODY MASS INDEX: 43.9 KG/M2 | DIASTOLIC BLOOD PRESSURE: 88 MMHG | SYSTOLIC BLOOD PRESSURE: 137 MMHG | TEMPERATURE: 97.4 F | RESPIRATION RATE: 18 BRPM

## 2021-10-02 LAB
ANION GAP SERPL CALCULATED.3IONS-SCNC: 7 MMOL/L (ref 5–14)
BUN SERPL-MCNC: 12 MG/DL (ref 5–25)
CALCIUM SERPL-MCNC: 9 MG/DL (ref 8.4–10.2)
CHLORIDE SERPL-SCNC: 104 MMOL/L (ref 97–108)
CHOLEST SERPL-MCNC: 138 MG/DL
CO2 SERPL-SCNC: 28 MMOL/L (ref 22–30)
CREAT SERPL-MCNC: 0.74 MG/DL (ref 0.7–1.5)
GFR SERPL CREATININE-BSD FRML MDRD: 105 ML/MIN/1.73SQ M
GLUCOSE SERPL-MCNC: 107 MG/DL (ref 65–140)
GLUCOSE SERPL-MCNC: 108 MG/DL (ref 70–99)
GLUCOSE SERPL-MCNC: 116 MG/DL (ref 65–140)
GLUCOSE SERPL-MCNC: 237 MG/DL (ref 65–140)
HDLC SERPL-MCNC: 46 MG/DL
LDLC SERPL CALC-MCNC: 75 MG/DL
NONHDLC SERPL-MCNC: 92 MG/DL
POTASSIUM SERPL-SCNC: 4.3 MMOL/L (ref 3.6–5)
SODIUM SERPL-SCNC: 139 MMOL/L (ref 137–147)
TRIGL SERPL-MCNC: 83 MG/DL

## 2021-10-02 PROCEDURE — 80048 BASIC METABOLIC PNL TOTAL CA: CPT

## 2021-10-02 PROCEDURE — 99217 PR OBSERVATION CARE DISCHARGE MANAGEMENT: CPT | Performed by: FAMILY MEDICINE

## 2021-10-02 PROCEDURE — 99244 OFF/OP CNSLTJ NEW/EST MOD 40: CPT | Performed by: NURSE PRACTITIONER

## 2021-10-02 PROCEDURE — 82948 REAGENT STRIP/BLOOD GLUCOSE: CPT

## 2021-10-02 PROCEDURE — 80061 LIPID PANEL: CPT | Performed by: NURSE PRACTITIONER

## 2021-10-02 RX ORDER — ASPIRIN 81 MG/1
81 TABLET, CHEWABLE ORAL DAILY
Status: DISCONTINUED | OUTPATIENT
Start: 2021-10-02 | End: 2021-10-02 | Stop reason: HOSPADM

## 2021-10-02 RX ADMIN — ATORVASTATIN CALCIUM 20 MG: 20 TABLET, FILM COATED ORAL at 08:09

## 2021-10-02 RX ADMIN — ASPIRIN 81 MG CHEWABLE TABLET 81 MG: 81 TABLET CHEWABLE at 11:56

## 2021-10-02 RX ADMIN — NICOTINE 1 PATCH: 7 PATCH, EXTENDED RELEASE TRANSDERMAL at 08:09

## 2021-10-02 RX ADMIN — DILTIAZEM HYDROCHLORIDE 120 MG: 120 CAPSULE, COATED, EXTENDED RELEASE ORAL at 08:09

## 2021-10-02 RX ADMIN — INSULIN LISPRO 5 UNITS: 100 INJECTION, SOLUTION INTRAVENOUS; SUBCUTANEOUS at 08:15

## 2021-10-02 RX ADMIN — INSULIN LISPRO 5 UNITS: 100 INJECTION, SOLUTION INTRAVENOUS; SUBCUTANEOUS at 11:56

## 2021-10-02 RX ADMIN — RIVAROXABAN 20 MG: 20 TABLET, FILM COATED ORAL at 07:32

## 2021-10-02 RX ADMIN — INSULIN LISPRO 4 UNITS: 100 INJECTION, SOLUTION INTRAVENOUS; SUBCUTANEOUS at 10:45

## 2021-10-04 ENCOUNTER — PATIENT OUTREACH (OUTPATIENT)
Dept: FAMILY MEDICINE CLINIC | Facility: CLINIC | Age: 56
End: 2021-10-04

## 2021-10-05 ENCOUNTER — TRANSITIONAL CARE MANAGEMENT (OUTPATIENT)
Dept: FAMILY MEDICINE CLINIC | Facility: CLINIC | Age: 56
End: 2021-10-05

## 2021-10-08 ENCOUNTER — OFFICE VISIT (OUTPATIENT)
Dept: FAMILY MEDICINE CLINIC | Facility: CLINIC | Age: 56
End: 2021-10-08

## 2021-10-08 VITALS
SYSTOLIC BLOOD PRESSURE: 132 MMHG | WEIGHT: 271 LBS | BODY MASS INDEX: 43.55 KG/M2 | TEMPERATURE: 98 F | DIASTOLIC BLOOD PRESSURE: 90 MMHG | HEART RATE: 106 BPM | HEIGHT: 66 IN | OXYGEN SATURATION: 98 % | RESPIRATION RATE: 18 BRPM

## 2021-10-08 DIAGNOSIS — H53.8 BLURRED VISION, RIGHT EYE: ICD-10-CM

## 2021-10-08 DIAGNOSIS — E11.9 TYPE 2 DIABETES MELLITUS WITHOUT COMPLICATION, WITHOUT LONG-TERM CURRENT USE OF INSULIN (HCC): ICD-10-CM

## 2021-10-08 DIAGNOSIS — G89.29 BILATERAL CHRONIC KNEE PAIN: ICD-10-CM

## 2021-10-08 DIAGNOSIS — F17.200 TOBACCO DEPENDENCE: ICD-10-CM

## 2021-10-08 DIAGNOSIS — M25.561 BILATERAL CHRONIC KNEE PAIN: ICD-10-CM

## 2021-10-08 DIAGNOSIS — M25.562 BILATERAL CHRONIC KNEE PAIN: ICD-10-CM

## 2021-10-08 DIAGNOSIS — Z09 HOSPITAL DISCHARGE FOLLOW-UP: Primary | ICD-10-CM

## 2021-10-08 PROCEDURE — 99496 TRANSJ CARE MGMT HIGH F2F 7D: CPT | Performed by: NURSE PRACTITIONER

## 2021-10-11 ENCOUNTER — TELEPHONE (OUTPATIENT)
Dept: FAMILY MEDICINE CLINIC | Facility: CLINIC | Age: 56
End: 2021-10-11

## 2021-10-12 PROBLEM — G89.29 BILATERAL CHRONIC KNEE PAIN: Status: ACTIVE | Noted: 2021-10-12

## 2021-10-12 PROBLEM — M25.561 BILATERAL CHRONIC KNEE PAIN: Status: ACTIVE | Noted: 2021-10-12

## 2021-10-12 PROBLEM — M25.562 BILATERAL CHRONIC KNEE PAIN: Status: ACTIVE | Noted: 2021-10-12

## 2021-10-13 ENCOUNTER — TELEPHONE (OUTPATIENT)
Dept: DIABETES SERVICES | Facility: CLINIC | Age: 56
End: 2021-10-13

## 2021-10-15 ENCOUNTER — PATIENT OUTREACH (OUTPATIENT)
Dept: FAMILY MEDICINE CLINIC | Facility: CLINIC | Age: 56
End: 2021-10-15

## 2021-10-19 ENCOUNTER — CLINICAL SUPPORT (OUTPATIENT)
Dept: FAMILY MEDICINE CLINIC | Facility: CLINIC | Age: 56
End: 2021-10-19

## 2021-10-19 DIAGNOSIS — Z01.00 DIABETIC EYE EXAM (HCC): Primary | ICD-10-CM

## 2021-10-19 DIAGNOSIS — E11.9 DIABETIC EYE EXAM (HCC): Primary | ICD-10-CM

## 2021-10-19 LAB
LEFT EYE DIABETIC RETINOPATHY: NORMAL
LEFT EYE IMAGE QUALITY: NORMAL
LEFT EYE MACULAR EDEMA: NORMAL
LEFT EYE OTHER RETINOPATHY: NORMAL
RIGHT EYE DIABETIC RETINOPATHY: NORMAL
RIGHT EYE IMAGE QUALITY: NORMAL
RIGHT EYE MACULAR EDEMA: NORMAL
RIGHT EYE OTHER RETINOPATHY: NORMAL
SEVERITY (EYE EXAM): NORMAL

## 2021-10-19 PROCEDURE — 92250 FUNDUS PHOTOGRAPHY W/I&R: CPT | Performed by: FAMILY MEDICINE

## 2021-10-22 ENCOUNTER — TELEPHONE (OUTPATIENT)
Dept: NEUROLOGY | Facility: CLINIC | Age: 56
End: 2021-10-22

## 2021-10-25 DIAGNOSIS — J45.30 MILD PERSISTENT ASTHMA: ICD-10-CM

## 2021-10-25 DIAGNOSIS — J44.9 COPD (CHRONIC OBSTRUCTIVE PULMONARY DISEASE) (HCC): ICD-10-CM

## 2021-10-25 RX ORDER — ALBUTEROL SULFATE 90 UG/1
2 AEROSOL, METERED RESPIRATORY (INHALATION) EVERY 4 HOURS PRN
Qty: 18 G | Refills: 2 | Status: SHIPPED | OUTPATIENT
Start: 2021-10-25 | End: 2021-12-14

## 2021-10-27 ENCOUNTER — PATIENT OUTREACH (OUTPATIENT)
Dept: FAMILY MEDICINE CLINIC | Facility: CLINIC | Age: 56
End: 2021-10-27

## 2021-10-28 ENCOUNTER — PATIENT OUTREACH (OUTPATIENT)
Dept: FAMILY MEDICINE CLINIC | Facility: CLINIC | Age: 56
End: 2021-10-28

## 2021-11-10 DIAGNOSIS — M25.511 BILATERAL SHOULDER PAIN, UNSPECIFIED CHRONICITY: ICD-10-CM

## 2021-11-10 DIAGNOSIS — M25.512 BILATERAL SHOULDER PAIN, UNSPECIFIED CHRONICITY: ICD-10-CM

## 2021-11-10 RX ORDER — DULOXETIN HYDROCHLORIDE 30 MG/1
30 CAPSULE, DELAYED RELEASE ORAL
Qty: 90 CAPSULE | Refills: 0 | Status: SHIPPED | OUTPATIENT
Start: 2021-11-10 | End: 2021-12-06

## 2021-11-12 ENCOUNTER — PATIENT OUTREACH (OUTPATIENT)
Dept: FAMILY MEDICINE CLINIC | Facility: CLINIC | Age: 56
End: 2021-11-12

## 2021-11-17 ENCOUNTER — TELEPHONE (OUTPATIENT)
Dept: FAMILY MEDICINE CLINIC | Facility: CLINIC | Age: 56
End: 2021-11-17

## 2021-11-22 DIAGNOSIS — E11.9 TYPE 2 DIABETES MELLITUS WITHOUT COMPLICATION, WITHOUT LONG-TERM CURRENT USE OF INSULIN (HCC): ICD-10-CM

## 2021-11-22 RX ORDER — GLUCOSAM/CHON-MSM1/C/MANG/BOSW 500-416.6
TABLET ORAL
Qty: 100 EACH | Refills: 3 | Status: SHIPPED | OUTPATIENT
Start: 2021-11-22 | End: 2022-02-25

## 2021-11-23 ENCOUNTER — TELEPHONE (OUTPATIENT)
Dept: FAMILY MEDICINE CLINIC | Facility: CLINIC | Age: 56
End: 2021-11-23

## 2021-12-05 DIAGNOSIS — M25.512 BILATERAL SHOULDER PAIN, UNSPECIFIED CHRONICITY: ICD-10-CM

## 2021-12-05 DIAGNOSIS — M25.511 BILATERAL SHOULDER PAIN, UNSPECIFIED CHRONICITY: ICD-10-CM

## 2021-12-06 RX ORDER — DULOXETIN HYDROCHLORIDE 30 MG/1
30 CAPSULE, DELAYED RELEASE ORAL
Qty: 90 CAPSULE | Refills: 0 | Status: SHIPPED | OUTPATIENT
Start: 2021-12-06 | End: 2022-01-03

## 2021-12-11 DIAGNOSIS — E11.9 TYPE 2 DIABETES MELLITUS WITHOUT COMPLICATION, WITHOUT LONG-TERM CURRENT USE OF INSULIN (HCC): ICD-10-CM

## 2021-12-13 RX ORDER — EMPAGLIFLOZIN 25 MG/1
25 TABLET, FILM COATED ORAL EVERY MORNING
Qty: 90 TABLET | Refills: 0 | Status: SHIPPED | OUTPATIENT
Start: 2021-12-13 | End: 2022-01-24 | Stop reason: SDUPTHER

## 2021-12-14 DIAGNOSIS — J45.30 MILD PERSISTENT ASTHMA: ICD-10-CM

## 2021-12-14 DIAGNOSIS — J44.9 COPD (CHRONIC OBSTRUCTIVE PULMONARY DISEASE) (HCC): ICD-10-CM

## 2021-12-14 RX ORDER — ALBUTEROL SULFATE 90 UG/1
2 AEROSOL, METERED RESPIRATORY (INHALATION) EVERY 4 HOURS PRN
Qty: 18 G | Refills: 2 | Status: SHIPPED | OUTPATIENT
Start: 2021-12-14 | End: 2022-02-24

## 2021-12-16 ENCOUNTER — TELEPHONE (OUTPATIENT)
Dept: FAMILY MEDICINE CLINIC | Facility: CLINIC | Age: 56
End: 2021-12-16

## 2021-12-16 DIAGNOSIS — E11.9 TYPE 2 DIABETES MELLITUS WITHOUT COMPLICATION, WITHOUT LONG-TERM CURRENT USE OF INSULIN (HCC): Primary | ICD-10-CM

## 2021-12-16 RX ORDER — PEN NEEDLE, DIABETIC 32GX 5/32"
NEEDLE, DISPOSABLE MISCELLANEOUS
Qty: 100 EACH | Refills: 0 | Status: SHIPPED | OUTPATIENT
Start: 2021-12-16

## 2021-12-30 DIAGNOSIS — M25.512 BILATERAL SHOULDER PAIN, UNSPECIFIED CHRONICITY: ICD-10-CM

## 2021-12-30 DIAGNOSIS — M25.511 BILATERAL SHOULDER PAIN, UNSPECIFIED CHRONICITY: ICD-10-CM

## 2022-01-03 RX ORDER — DULOXETIN HYDROCHLORIDE 30 MG/1
30 CAPSULE, DELAYED RELEASE ORAL
Qty: 90 CAPSULE | Refills: 0 | Status: SHIPPED | OUTPATIENT
Start: 2022-01-03 | End: 2022-02-03

## 2022-01-07 DIAGNOSIS — E11.9 TYPE 2 DIABETES MELLITUS WITHOUT COMPLICATION, WITHOUT LONG-TERM CURRENT USE OF INSULIN (HCC): ICD-10-CM

## 2022-01-07 RX ORDER — INSULIN GLARGINE 100 [IU]/ML
INJECTION, SOLUTION SUBCUTANEOUS
Qty: 15 ML | Refills: 0 | Status: SHIPPED | OUTPATIENT
Start: 2022-01-07 | End: 2022-01-24 | Stop reason: SDUPTHER

## 2022-01-24 ENCOUNTER — OFFICE VISIT (OUTPATIENT)
Dept: FAMILY MEDICINE CLINIC | Facility: CLINIC | Age: 57
End: 2022-01-24

## 2022-01-24 VITALS
BODY MASS INDEX: 43.07 KG/M2 | WEIGHT: 268 LBS | DIASTOLIC BLOOD PRESSURE: 60 MMHG | TEMPERATURE: 97.5 F | HEIGHT: 66 IN | OXYGEN SATURATION: 95 % | SYSTOLIC BLOOD PRESSURE: 118 MMHG | RESPIRATION RATE: 16 BRPM | HEART RATE: 79 BPM

## 2022-01-24 DIAGNOSIS — Z00.00 ANNUAL PHYSICAL EXAM: ICD-10-CM

## 2022-01-24 DIAGNOSIS — Z12.11 SCREENING FOR COLORECTAL CANCER: ICD-10-CM

## 2022-01-24 DIAGNOSIS — Z11.59 NEED FOR HEPATITIS C SCREENING TEST: ICD-10-CM

## 2022-01-24 DIAGNOSIS — F17.200 TOBACCO DEPENDENCE: ICD-10-CM

## 2022-01-24 DIAGNOSIS — I10 ESSENTIAL HYPERTENSION: ICD-10-CM

## 2022-01-24 DIAGNOSIS — Z12.12 SCREENING FOR COLORECTAL CANCER: ICD-10-CM

## 2022-01-24 DIAGNOSIS — Z11.4 SCREENING FOR HIV (HUMAN IMMUNODEFICIENCY VIRUS): ICD-10-CM

## 2022-01-24 DIAGNOSIS — I48.91 ATRIAL FIBRILLATION WITH RVR (HCC): ICD-10-CM

## 2022-01-24 DIAGNOSIS — E11.9 TYPE 2 DIABETES MELLITUS WITHOUT COMPLICATION, WITHOUT LONG-TERM CURRENT USE OF INSULIN (HCC): Primary | ICD-10-CM

## 2022-01-24 LAB — SL AMB POCT HEMOGLOBIN AIC: 8.2 (ref ?–6.5)

## 2022-01-24 PROCEDURE — 83036 HEMOGLOBIN GLYCOSYLATED A1C: CPT | Performed by: NURSE PRACTITIONER

## 2022-01-24 PROCEDURE — 99396 PREV VISIT EST AGE 40-64: CPT | Performed by: NURSE PRACTITIONER

## 2022-01-24 RX ORDER — EMPAGLIFLOZIN 25 MG/1
25 TABLET, FILM COATED ORAL EVERY MORNING
Qty: 90 TABLET | Refills: 0 | Status: SHIPPED | OUTPATIENT
Start: 2022-01-24 | End: 2022-02-03

## 2022-01-24 RX ORDER — INSULIN GLARGINE 100 [IU]/ML
25 INJECTION, SOLUTION SUBCUTANEOUS
Qty: 15 ML | Refills: 5 | Status: SHIPPED | OUTPATIENT
Start: 2022-01-24 | End: 2022-04-25

## 2022-01-24 NOTE — PROGRESS NOTES
[address] [facility]    NAME: Roxanne Jordan  AGE: 55 y o  SEX: male  : 1965     DATE: 2022     Assessment and Plan:     Problem List Items Addressed This Visit        Endocrine    Type 2 diabetes mellitus without complication, without long-term current use of insulin ([facility] [address] ) - Primary       Lab Results   Component Value Date    HGBA1C 8 2 (A) 2022    HGBA1C 12 1 (A) 2021    HGBA1C 12 6 (A) 2021 ·   Much improved after restarting Jardiance, also better compliance with insulin and metformin  · Was scheduled with DM educator/nutritionist on  but missed the appt  · I encouraged continued compliance, referred back to DM nutritionist and optholmologist         Relevant Medications    Empagliflozin (Jardiance) 25 MG TABS    insulin glargine (Lantus SoloStar) 100 units/mL injection pen    Other Relevant Orders    Ambulatory Referral to Ophthalmology    POCT hemoglobin A1c (Completed)    Ambulatory referral to Diabetic Education       Cardiovascular and Mediastinum    Essential hypertension     At goal, reviewed current meds per EMR, advised continue weight loss and good control of DM II  Atrial fibrillation with RVR (HCC)     · Continue xarelto, cardizem 120mg, echo,  · Cardiology appt arranged by hospital discharge team, advised pt to f/u with cardiology on Friday, wrote down reminder and it is on AVS             Other    Tobacco dependence     States he greatly cut back on cigarettes, wants to quit altogether  Declines pharmacotherapy but accepts Lung screening program CT           Relevant Orders    CT lung screening program      Other Visit Diagnoses     Screening for colorectal cancer        Relevant Orders    Ambulatory referral to Gastroenterology    Annual physical exam        Need for hepatitis C screening test        Relevant Orders    Hepatitis C Antibody ([facility])    HIV 1/2 Antigen/Antib[facility] (4th Generation) [facility]    Screening for HIV (human immunodeficiency virus)              Immunizations and preventive care screenings were discussed with patient today  Appropriate education was printed on patient's after visit summary  Counseling:  Alcohol/drug use: discussed moderation in alcohol intake, the recommendations for healthy alcohol use, and avoidance of illicit drug use  Dental Health: discussed importance of regular tooth brushing, flossing, and dental visits  Injury prevention: discussed safety/seat belts, safety helmets, smoke detectors, carbon dioxide detectors, and smoking near bedding or upholstery  Sexual health: discussed sexually transmitted diseases, partner selection, use of condoms, avoidance of unintended pregnancy, and contraceptive alternatives  · Exercise: the importance of regular exercise/physical activity was discussed  Recommend exercise 3-5 times per week for at least 30 minutes  BMI Counseling: Body mass index is 43 26 kg/m²  The BMI is above normal  Nutrition recommendations include decreasing portion sizes, encouraging healthy choices of fruits and vegetables, decreasing fast food intake, consuming healthier snacks, limiting drinks that contain sugar, moderation in carbohydrate intake, increasing intake of lean protein, reducing intake of saturated and trans fat and reducing intake of cholesterol  Exercise recommendations include moderate physical activity 150 minutes/week, exercising 3-5 times per week and obtaining a gym membership  No pharmacotherapy was ordered  Rationale for BMI follow-up plan is due to patient being overweight or obese  Depression Screening and Follow-up Plan: Patient was screened for depression during today's encounter  They screened negative with a PHQ-2 score of 2  Tobacco Cessation Counseling: Tobacco cessation counseling was provided   The patient is sincerely urged to quit consumption of tobacco  He is not ready to quit tobacco  Medication options discussed  Patient refused medication  Return in 3 months (on 4/24/2022)  Chief Complaint:     Chief Complaint   Patient presents with    Follow-up     a1c      History of Present Illness:     Adult Annual Physical   Patient here for a comprehensive physical exam    States he has restarted the Jardiance that I did a prior authorization for  This as well as better compliance with insulin and metformin has resulted in much improvement on A1c with almost 3 point drop  States he wants to continue to try to quit smoking, declines pharmacotherapy but would like CT lung screening  He is amenable to colonoscopy screening as well  He needs to follow up with Cardiology as he was referred from his hospitalization last October due to chest pain, there was no clear etiology found at the time, he is on Xarelto for AFib and Cardizem 120 mg, BP today normal   He has no new health concerns but does continue to endorse blurry vision, did not follow-up with ophthalmology referral   Furthermore he missed his diabetic educator nutritionist appointment in December, he also did not follow up with Neurology after his discharge from the hospital as well  Diet and Physical Activity  · Diet/Nutrition: well balanced diet  · Exercise: 3-4 times a week on average  Depression Screening  PHQ-2/9 Depression Screening    Little interest or pleasure in doing things: 0 - not at all  Feeling down, depressed, or hopeless: 2 - more than half the days  PHQ-2 Score: 2  PHQ-2 Interpretation: Negative depression screen       General Health  · Sleep: sleeps well  · Hearing: normal - bilateral   · Vision: vision problems: blurry  · Dental: no dental visits for >1 year   Health  · Symptoms include: none     Review of Systems:     Review of Systems   Constitutional: Positive for fatigue  HENT: Negative  Eyes: Positive for visual disturbance  Respiratory: Negative  Cardiovascular: Negative  Gastrointestinal: Negative  Genitourinary: Negative  Musculoskeletal:        Bilateral knee pain   Neurological: Negative  Psychiatric/Behavioral: Negative         Past Medical History:     Past Medical History:   Diagnosis Date    Asthma     Carpal tunnel syndrome     Chronic pain     left arm    Diabetes mellitus ([address] )     Erectile dysfunction     Hypertension     Umbilical hernia       Past Surgical History:     Past Surgical History:   Procedure Laterality Date    CHOLECYSTECTOMY      CHOLECYSTECTOMY LAPAROSCOPIC N/A 6/20/2019    Procedure: CHOLECYSTECTOMY LAPAROSCOPIC;  Surgeon: Hugo Scanlon MD;  Location: [address] [place] [address] MAIN OR;  Service: General    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    HAND SURGERY      UMBILICAL HERNIA REPAIR        Family History:     Family History   Problem Relation Age of Onset    Breast cancer additional onset Mother     Diabetes Father     Hyperlipidemia Father     Hypertension Father     Seizures Father     No Known Problems Son     No Known Problems Son     Vitiligo Son       Social History:     Social History     Socioeconomic History    Marital status:      Spouse name: None    Number of children: None    Years of education: None    Highest education level: None   Occupational History    None   Tobacco Use    Smoking status: Current Every Day Smoker     Packs/day: 0 25     Types: Cigarettes    Smokeless tobacco: Never Used   Vaping Use    Vaping Use: Never used   Substance and Sexual Activity    Alcohol use: Yes     Comment: occassional    Drug use: Never    Sexual activity: None   Other Topics Concern    None   Social History Narrative    None     Social Determinants of Health     Financial Resource Strain: High Risk    Difficulty of Paying Living Expenses: Very hard   Food Insecurity: No Food Insecurity    Worried About Running Out of Food in the Last Year: Never true    Fuentes of Food in the Last Year: Never true   Transportation Needs: No Transportation Needs    Lack of Transportation (Medical): No    Lack of Transportation (Non-Medical): No   Physical Activity: Not on file   Stress: Not on file   Social Connections: Not on file   Intimate Partner Violence: Not on file   Housing Stability: Not on file      Current Medications:     Current Outpatient Medications   Medication Sig Dispense Refill    albuterol (PROVENTIL HFA,VENTOLIN HFA) 90 mcg/act inhaler INHALE 2 PUFFS EVERY 4 (FOUR) HOURS AS NEEDED FOR WHEEZING 18 g 2    Alcohol Swabs (ALCOHOL PREP) 70 % PADS daily Test (Patient not taking: Reported on 9/3/2021)  5    Alcohol Swabs (Alcohol Prep) PADS TEST DAILY (Patient not taking: Reported on 9/3/2021)      aspirin (ECOTRIN LOW STRENGTH) 81 mg EC tablet Take 81 mg by mouth daily      atorvastatin (Lipitor) 20 mg tablet Take 1 tablet (20 mg total) by mouth daily 90 tablet 3    BD Pen Needle Carolyn U/F 32G X 4 MM MISC USE AS DIRECTED TO INJECT INSULIN 100 each 0    clotrimazole-betamethasone (LOTRISONE) 1-0 05 % cream APPLY TOPICALLY 2 (TWO) TIMES A DAY (Patient not taking: Reported on 9/3/2021) 30 g 0    Contour Next Test test strip USE 1 EACH 3 (THREE) TIMES A  each 10    diltiazem (CARDIZEM CD) 240 mg 24 hr capsule Take 1 capsule (240 mg total) by mouth daily 90 capsule 3    DULoxetine (CYMBALTA) 30 mg delayed release capsule TAKE 1 CAPSULE (30 MG TOTAL) BY MOUTH DAILY AT BEDTIME 90 capsule 0    Empagliflozin (Jardiance) 25 MG TABS Take 1 tablet (25 mg total) by mouth every morning 90 tablet 0    fluticasone-vilanterol (Breo Ellipta) 100-25 mcg/inh inhaler Inhale 1 puff daily Rinse mouth after use  (Patient not taking: Reported on 9/3/2021) 60 each 5    insulin glargine (Lantus SoloStar) 100 units/mL injection pen Inject 25 Units under the skin daily at bedtime 15 mL 5    Lancets MISC Use to test blood glucose once a day  Dx: Type 2 DM   E11 9      lisinopril (ZESTRIL) 10 mg tablet Take 1 tablet (10 mg total) by mouth daily 90 tablet 0    metFORMIN (GLUCOPHAGE) 1000 MG tablet Take 1 tablet (1,000 mg total) by mouth 2 (two) times a day with meals 180 tablet 3    Misc  Devices MISC by Does not apply route daily (Patient not taking: Reported on 9/3/2021) 1 each 0    mupirocin (BACTROBAN) 2 % ointment APPLY TOPICALLY 3 (THREE) TIMES A DAY (Patient not taking: Reported on 9/3/2021) 22 g 0    rivaroxaban (XARELTO) 20 mg tablet Take 1 tablet (20 mg total) by mouth daily with breakfast 90 tablet 3    Tadalafil, PAH, 20 MG TABS Take 1 tablet (20 mg total) by mouth as needed (erectile dysfunction) 10 tablet 12    triamcinolone (KENALOG) 0 5 % cream APPLY TOPICALLY 3 (THREE) TIMES A DAY 30 g 0    TRUEplus Lancets 33G MISC USE AS INSTRUCTED 100 each 3     No current facility-administered medications for this visit  Allergies:     No Known Allergies   Physical Exam:     /60 (BP Location: Right arm, Patient Position: Sitting, Cuff Size: Large)   Pulse 79   Temp 97 5 °F (36 4 °C)   Resp 16   Ht 5' 6" (1 676 m)   Wt 122 kg (268 lb)   SpO2 95%   BMI 43 26 kg/m²     Physical Exam  Vitals and nursing note reviewed  Constitutional:       Appearance: Normal appearance  He is well-developed  HENT:      Head: Normocephalic and atraumatic  Right Ear: Tympanic membrane, ear canal and external ear normal  There is no impacted cerumen  Left Ear: Tympanic membrane, ear canal and external ear normal  There is no impacted cerumen  Nose: Nose normal       Mouth/Throat:      Mouth: Mucous membranes are moist    Eyes:      Extraocular Movements: Extraocular movements intact  Conjunctiva/sclera: Conjunctivae normal       Pupils: Pupils are equal, round, and reactive to light  Cardiovascular:      Rate and Rhythm: Normal rate and regular rhythm  Pulses: Normal pulses  Heart sounds: Normal heart sounds  No murmur heard        Pulmonary:      Effort: Pulmonary effort is normal  No respiratory distress  Breath sounds: Normal breath sounds  Abdominal:      Palpations: Abdomen is soft  Tenderness: There is no abdominal tenderness  Musculoskeletal:         General: Normal range of motion  Cervical back: Normal range of motion and neck supple  Skin:     General: Skin is warm and dry  Capillary Refill: Capillary refill takes less than 2 seconds  Neurological:      General: No focal deficit present  Mental Status: He is alert and oriented to person, place, and time     Psychiatric:         Mood and Affect: Mood normal          Behavior: Behavior normal           Justin Guthrie, [address] [facility]

## 2022-01-24 NOTE — PATIENT INSTRUCTIONS
Wellness Visit for Adults   AMBULATORY CARE:   A wellness visit  is when you see your healthcare provider to get screened for health problems  Your healthcare provider will also give you advice on how to stay healthy  Write down your questions so you remember to ask them  Ask your healthcare provider how often you should have a wellness visit  What happens at a wellness visit:  Your healthcare provider will ask about your health, and your family history of health problems  This includes high blood pressure, heart disease, and cancer  He or she will ask if you have symptoms that concern you, if you smoke, and about your mood  You may also be asked about your intake of medicines, supplements, food, and alcohol  Any of the following may be done:  · Your weight  will be checked  Your height may also be checked so your body mass index (BMI) can be calculated  Your BMI shows if you are at a healthy weight  · Your blood pressure  and heart rate will be checked  Your temperature may also be checked  · Blood and urine tests  may be done  Blood tests may be done to check your cholesterol levels  Abnormal cholesterol levels increase your risk for heart disease and stroke  You may also need a blood or urine test to check for diabetes if you are at increased risk  Urine tests may be done to look for signs of an infection or kidney disease  · A physical exam  includes checking your heartbeat and lungs with a stethoscope  Your healthcare provider may also check your skin to look for sun damage  · Screening tests  may be recommended  A screening test is done to check for diseases that may not cause symptoms  The screening tests you may need depend on your age, gender, family history, and lifestyle habits  For example, colorectal screening may be recommended if you are 48years old or older  Screening tests you need if you are a woman:   · A Pap smear  is used to screen for cervical cancer   Pap smears are usually done every 3 to 5 years depending on your age  You may need them more often if you have had abnormal Pap smear test results in the past  Ask your healthcare provider how often you should have a Pap smear  · A mammogram  is an x-ray of your breasts to screen for breast cancer  Experts recommend mammograms every 2 years starting at age 48 years  You may need a mammogram at age 52 years or younger if you have an increased risk for breast cancer  Talk to your healthcare provider about when you should start having mammograms and how often you need them  Vaccines you may need:   · Get an influenza vaccine  every year  The influenza vaccine protects you from the flu  Several types of viruses cause the flu  The viruses change over time, so new vaccines are made each year  · Get a tetanus-diphtheria (Td) booster vaccine  every 10 years  This vaccine protects you against tetanus and diphtheria  Tetanus is a severe infection that may cause painful muscle spasms and lockjaw  Diphtheria is a severe bacterial infection that causes a thick covering in the back of your mouth and throat  · Get a human papillomavirus (HPV) vaccine  if you are female and aged 23 to 32 or male 23 to 24 and never received it  This vaccine protects you from HPV infection  HPV is the most common infection spread by sexual contact  HPV may also cause vaginal, penile, and anal cancers  · Get a pneumococcal vaccine  if you are aged 72 years or older  The pneumococcal vaccine is an injection given to protect you from pneumococcal disease  Pneumococcal disease is an infection caused by pneumococcal bacteria  The infection may cause pneumonia, meningitis, or an ear infection  · Get a shingles vaccine  if you are 60 or older, even if you have had shingles before  The shingles vaccine is an injection to protect you from the varicella-zoster virus  This is the same virus that causes chickenpox   Shingles is a painful rash that develops in people who had chickenpox or have been exposed to the virus  How to eat healthy:  My Plate is a model for planning healthy meals  It shows the types and amounts of foods that should go on your plate  Fruits and vegetables make up about half of your plate, and grains and protein make up the other half  A serving of dairy is included on the side of your plate  The amount of calories and serving sizes you need depends on your age, gender, weight, and height  Examples of healthy foods are listed below:  · Eat a variety of vegetables  such as dark green, red, and orange vegetables  You can also include canned vegetables low in sodium (salt) and frozen vegetables without added butter or sauces  · Eat a variety of fresh fruits , canned fruit in 100% juice, frozen fruit, and dried fruit  · Include whole grains  At least half of the grains you eat should be whole grains  Examples include whole-wheat bread, wheat pasta, brown rice, and whole-grain cereals such as oatmeal     · Eat a variety of protein foods such as seafood (fish and shellfish), lean meat, and poultry without skin (turkey and chicken)  Examples of lean meats include pork leg, shoulder, or tenderloin, and beef round, sirloin, tenderloin, and extra lean ground beef  Other protein foods include eggs and egg substitutes, beans, peas, soy products, nuts, and seeds  · Choose low-fat dairy products such as skim or 1% milk or low-fat yogurt, cheese, and cottage cheese  · Limit unhealthy fats  such as butter, hard margarine, and shortening  Exercise:  Exercise at least 30 minutes per day on most days of the week  Some examples of exercise include walking, biking, dancing, and swimming  You can also fit in more physical activity by taking the stairs instead of the elevator or parking farther away from stores  Include muscle strengthening activities 2 days each week  Regular exercise provides many health benefits   It helps you manage your weight, and decreases your risk for type 2 diabetes, heart disease, stroke, and high blood pressure  Exercise can also help improve your mood  Ask your healthcare provider about the best exercise plan for you  General health and safety guidelines:   · Do not smoke  Nicotine and other chemicals in cigarettes and cigars can cause lung damage  Ask your healthcare provider for information if you currently smoke and need help to quit  E-cigarettes or smokeless tobacco still contain nicotine  Talk to your healthcare provider before you use these products  · Limit alcohol  A drink of alcohol is 12 ounces of beer, 5 ounces of wine, or 1½ ounces of liquor  · Lose weight, if needed  Being overweight increases your risk of certain health conditions  These include heart disease, high blood pressure, type 2 diabetes, and certain types of cancer  · Protect your skin  Do not sunbathe or use tanning beds  Use sunscreen with a SPF 15 or higher  Apply sunscreen at least 15 minutes before you go outside  Reapply sunscreen every 2 hours  Wear protective clothing, hats, and sunglasses when you are outside  · Drive safely  Always wear your seatbelt  Make sure everyone in your car wears a seatbelt  A seatbelt can save your life if you are in an accident  Do not use your cell phone when you are driving  This could distract you and cause an accident  Pull over if you need to make a call or send a text message  · Practice safe sex  Use latex condoms if are sexually active and have more than one partner  Your healthcare provider may recommend screening tests for sexually transmitted infections (STIs)  · Wear helmets, lifejackets, and protective gear  Always wear a helmet when you ride a bike or motorcycle, go skiing, or play sports that could cause a head injury  Wear protective equipment when you play sports  Wear a lifejacket when you are on a boat or doing water sports      © Copyright [employer] 2021 Information is for End User's use only and may not be sold, redistributed or otherwise used for commercial purposes  All illustrations and images included in CareNotes® are the copyrighted property of A D A M , Inc  or Octavio Abdalla  The above information is an  only  It is not intended as medical advice for individual conditions or treatments  Talk to your doctor, nurse or pharmacist before following any medical regimen to see if it is safe and effective for you  Cigarette Smoking and Your Health   AMBULATORY CARE:   Risks to your health if you smoke:  Nicotine and other chemicals found in tobacco and e-cigarettes can damage every cell in your body  Even if you are a light smoker, you have an increased risk for cancer, heart disease, and lung disease  If you are pregnant or have diabetes, smoking increases your risk for complications  Nicotine can affect an adolescent's developing brain  This can lead to trouble thinking, learning, or paying attention  Benefits to your health if you stop smoking:   · You decrease respiratory symptoms such as coughing, wheezing, and shortness of breath  · You reduce your risk for cancers of the lung, mouth, throat, kidney, bladder, pancreas, stomach, and cervix  If you already have cancer, you increase the benefits of chemotherapy  You also reduce your risk for cancer returning or a second cancer from developing  · You reduce your risk for heart disease, blood clots, heart attack, and stroke  · You reduce your risk for lung infections, and diseases such as pneumonia, asthma, chronic bronchitis, and emphysema  · Your circulation improves  More oxygen can be delivered to your body  If you have diabetes, you lower your risk for complications, such as kidney, artery, and eye diseases  You also lower your risk for nerve damage  Nerve damage can lead to amputations, poor vision, and blindness      · You improve your body's ability to heal and to fight infections  · An adolescent can help his or her brain and body develop in a healthy way  Talk to your adolescent about all the health risks of nicotine  If you can, start talking about nicotine when your child is younger than 12 years  This may make it easier for him or her not to start using nicotine as a teenager or adult  Explain to him or her that it is best never to start  It can be hard to try to quit later  Benefits to the health of others if you stop smoking:  Tobacco is harmful to nonsmokers who breathe in your secondhand smoke  The following are ways the health of others around you may improve when you stop smoking:  · You lower the risks for lung cancer and heart disease in nonsmoking adults  · If you are pregnant, you lower the risk for miscarriage, early delivery, low birth weight, and stillbirth  You also lower your baby's risk for SIDS, obesity, developmental delay, and neurobehavioral problems, such as ADHD  · If you have children, you lower their risk for ear infections, colds, pneumonia, bronchitis, and asthma  Follow up with your doctor as directed:  Write down your questions so you remember to ask them during your visits  For support and more information:   · American Lung Association  [address] [facility]  [address]  Phone: [facility]  Phone: 2- 456 - 917-0820  Web Address: [employer]    · Smokefree  gov  Phone: 8- 589 - 629-5488  Web Address: www smokefree  [facility] 2021 Information is for End User's use only and may not be sold, redistributed or otherwise used for commercial purposes  All illustrations and images included in CareNotes® are the copyrighted property of [employer]  or [facility] Ewa Foreman   The above information is an  only  It is not intended as medical advice for individual conditions or treatments   Talk to your doctor, nurse or pharmacist before following any medical regimen to see if it is safe and effective for you

## 2022-01-26 ENCOUNTER — TELEPHONE (OUTPATIENT)
Dept: FAMILY MEDICINE CLINIC | Facility: CLINIC | Age: 57
End: 2022-01-26

## 2022-01-26 NOTE — ASSESSMENT & PLAN NOTE
· Continue xarelto, cardizem 120mg, echo,  · Cardiology appt arranged by hospital discharge team, advised pt to f/u with cardiology on Friday, wrote down reminder and it is on AVS

## 2022-01-26 NOTE — ASSESSMENT & PLAN NOTE
States he greatly cut back on cigarettes, wants to quit altogether  Declines pharmacotherapy but accepts Lung screening program CT

## 2022-01-26 NOTE — TELEPHONE ENCOUNTER
Empagliflozin (Jardiance) 25 MG TABS     Needs a PA   Preferred are   Glipizide ER  Metformin ER  Metformin HCI ER  Glimepiride  Glipizide

## 2022-01-28 ENCOUNTER — TELEPHONE (OUTPATIENT)
Dept: DIABETES SERVICES | Facility: OTHER | Age: 57
End: 2022-01-28

## 2022-01-28 NOTE — TELEPHONE ENCOUNTER
Scheduled initial visit 2/2 at 4:30 with Dejon Vaughan  I will email location and appointment information      Ric Laguerre RDN, LDN

## 2022-02-03 ENCOUNTER — TELEPHONE (OUTPATIENT)
Dept: DIABETES SERVICES | Facility: OTHER | Age: 57
End: 2022-02-03

## 2022-02-03 DIAGNOSIS — M25.511 BILATERAL SHOULDER PAIN, UNSPECIFIED CHRONICITY: ICD-10-CM

## 2022-02-03 DIAGNOSIS — M25.512 BILATERAL SHOULDER PAIN, UNSPECIFIED CHRONICITY: ICD-10-CM

## 2022-02-03 DIAGNOSIS — E11.9 TYPE 2 DIABETES MELLITUS WITHOUT COMPLICATION, WITHOUT LONG-TERM CURRENT USE OF INSULIN (HCC): ICD-10-CM

## 2022-02-03 RX ORDER — DULOXETIN HYDROCHLORIDE 30 MG/1
30 CAPSULE, DELAYED RELEASE ORAL
Qty: 90 CAPSULE | Refills: 0 | Status: SHIPPED | OUTPATIENT
Start: 2022-02-03 | End: 2022-03-30

## 2022-02-03 RX ORDER — EMPAGLIFLOZIN 25 MG/1
25 TABLET, FILM COATED ORAL EVERY MORNING
Qty: 90 TABLET | Refills: 0 | Status: SHIPPED | OUTPATIENT
Start: 2022-02-03 | End: 2022-05-13

## 2022-02-03 NOTE — TELEPHONE ENCOUNTER
Brennen Malhotra was a no show 2/2; called and he rescheduled for 2/16 @ 4:30      Marilu Perez RDN, LDN

## 2022-02-07 DIAGNOSIS — E78.49 OTHER HYPERLIPIDEMIA: ICD-10-CM

## 2022-02-07 RX ORDER — ATORVASTATIN CALCIUM 20 MG/1
20 TABLET, FILM COATED ORAL DAILY
Qty: 90 TABLET | Refills: 3 | Status: SHIPPED | OUTPATIENT
Start: 2022-02-07 | End: 2022-04-25 | Stop reason: SDUPTHER

## 2022-02-08 DIAGNOSIS — J44.9 COPD (CHRONIC OBSTRUCTIVE PULMONARY DISEASE) (HCC): ICD-10-CM

## 2022-02-08 DIAGNOSIS — J45.30 MILD PERSISTENT ASTHMA: ICD-10-CM

## 2022-02-09 RX ORDER — ALBUTEROL SULFATE 90 UG/1
2 AEROSOL, METERED RESPIRATORY (INHALATION) EVERY 4 HOURS PRN
Qty: 18 G | Refills: 2 | OUTPATIENT
Start: 2022-02-09

## 2022-02-24 DIAGNOSIS — J44.9 COPD (CHRONIC OBSTRUCTIVE PULMONARY DISEASE) (HCC): ICD-10-CM

## 2022-02-24 DIAGNOSIS — J45.30 MILD PERSISTENT ASTHMA: ICD-10-CM

## 2022-02-24 RX ORDER — ALBUTEROL SULFATE 90 UG/1
2 AEROSOL, METERED RESPIRATORY (INHALATION) EVERY 4 HOURS PRN
Qty: 18 G | Refills: 2 | Status: SHIPPED | OUTPATIENT
Start: 2022-02-24 | End: 2022-05-24

## 2022-02-25 DIAGNOSIS — E11.9 TYPE 2 DIABETES MELLITUS WITHOUT COMPLICATION, WITHOUT LONG-TERM CURRENT USE OF INSULIN (HCC): ICD-10-CM

## 2022-02-25 RX ORDER — GLUCOSAM/CHON-MSM1/C/MANG/BOSW 500-416.6
TABLET ORAL
Qty: 100 EACH | Refills: 3 | Status: SHIPPED | OUTPATIENT
Start: 2022-02-25

## 2022-03-14 DIAGNOSIS — J44.9 CHRONIC OBSTRUCTIVE PULMONARY DISEASE, UNSPECIFIED COPD TYPE (HCC): Primary | ICD-10-CM

## 2022-03-14 DIAGNOSIS — I10 ESSENTIAL HYPERTENSION: ICD-10-CM

## 2022-03-21 DIAGNOSIS — E11.9 TYPE 2 DIABETES MELLITUS WITHOUT COMPLICATION, WITHOUT LONG-TERM CURRENT USE OF INSULIN (HCC): ICD-10-CM

## 2022-03-25 ENCOUNTER — TELEPHONE (OUTPATIENT)
Dept: FAMILY MEDICINE CLINIC | Facility: CLINIC | Age: 57
End: 2022-03-25

## 2022-03-30 DIAGNOSIS — M25.511 BILATERAL SHOULDER PAIN, UNSPECIFIED CHRONICITY: ICD-10-CM

## 2022-03-30 DIAGNOSIS — M25.512 BILATERAL SHOULDER PAIN, UNSPECIFIED CHRONICITY: ICD-10-CM

## 2022-03-30 RX ORDER — DULOXETIN HYDROCHLORIDE 30 MG/1
30 CAPSULE, DELAYED RELEASE ORAL
Qty: 90 CAPSULE | Refills: 0 | Status: SHIPPED | OUTPATIENT
Start: 2022-03-30 | End: 2022-05-24

## 2022-04-20 ENCOUNTER — TELEPHONE (OUTPATIENT)
Dept: FAMILY MEDICINE CLINIC | Facility: CLINIC | Age: 57
End: 2022-04-20

## 2022-04-20 NOTE — TELEPHONE ENCOUNTER
----- Message from Rachel Santiago sent at 4/19/2022  2:22 PM EDT -----  Patient has cancelled multiple appointments and then was a No Show to the appointment scheduled for today          Guerrero Valerio

## 2022-04-25 ENCOUNTER — OFFICE VISIT (OUTPATIENT)
Dept: FAMILY MEDICINE CLINIC | Facility: CLINIC | Age: 57
End: 2022-04-25

## 2022-04-25 VITALS
HEIGHT: 66 IN | TEMPERATURE: 96.9 F | OXYGEN SATURATION: 97 % | RESPIRATION RATE: 19 BRPM | SYSTOLIC BLOOD PRESSURE: 114 MMHG | HEART RATE: 87 BPM | WEIGHT: 272.6 LBS | DIASTOLIC BLOOD PRESSURE: 76 MMHG | BODY MASS INDEX: 43.81 KG/M2

## 2022-04-25 DIAGNOSIS — K43.9 VENTRAL HERNIA WITHOUT OBSTRUCTION OR GANGRENE: ICD-10-CM

## 2022-04-25 DIAGNOSIS — E11.9 TYPE 2 DIABETES MELLITUS WITHOUT COMPLICATION, WITHOUT LONG-TERM CURRENT USE OF INSULIN (HCC): Primary | ICD-10-CM

## 2022-04-25 DIAGNOSIS — E78.49 OTHER HYPERLIPIDEMIA: ICD-10-CM

## 2022-04-25 DIAGNOSIS — G47.411 CATAPLEXY: ICD-10-CM

## 2022-04-25 DIAGNOSIS — I48.91 ATRIAL FIBRILLATION WITH RVR (HCC): ICD-10-CM

## 2022-04-25 DIAGNOSIS — F51.01 PRIMARY INSOMNIA: ICD-10-CM

## 2022-04-25 DIAGNOSIS — J44.9 COPD (CHRONIC OBSTRUCTIVE PULMONARY DISEASE) (HCC): ICD-10-CM

## 2022-04-25 DIAGNOSIS — J45.30 MILD PERSISTENT ASTHMA: ICD-10-CM

## 2022-04-25 LAB — SL AMB POCT HEMOGLOBIN AIC: 8.8 (ref ?–6.5)

## 2022-04-25 PROCEDURE — 3074F SYST BP LT 130 MM HG: CPT | Performed by: NURSE PRACTITIONER

## 2022-04-25 PROCEDURE — 3078F DIAST BP <80 MM HG: CPT | Performed by: NURSE PRACTITIONER

## 2022-04-25 PROCEDURE — 99215 OFFICE O/P EST HI 40 MIN: CPT | Performed by: NURSE PRACTITIONER

## 2022-04-25 PROCEDURE — 83036 HEMOGLOBIN GLYCOSYLATED A1C: CPT | Performed by: NURSE PRACTITIONER

## 2022-04-25 RX ORDER — FLUTICASONE FUROATE AND VILANTEROL TRIFENATATE 100; 25 UG/1; UG/1
1 POWDER RESPIRATORY (INHALATION) DAILY
Qty: 60 EACH | Refills: 5 | Status: SHIPPED | OUTPATIENT
Start: 2022-04-25

## 2022-04-25 RX ORDER — CHOLECALCIFEROL (VITAMIN D3) 125 MCG
5 CAPSULE ORAL
Qty: 30 TABLET | Refills: 5 | Status: SHIPPED | OUTPATIENT
Start: 2022-04-25

## 2022-04-25 RX ORDER — INSULIN GLARGINE 100 [IU]/ML
30 INJECTION, SOLUTION SUBCUTANEOUS
Qty: 15 ML | Refills: 5 | Status: SHIPPED | OUTPATIENT
Start: 2022-04-25 | End: 2022-07-29 | Stop reason: SDUPTHER

## 2022-04-25 RX ORDER — ATORVASTATIN CALCIUM 20 MG/1
20 TABLET, FILM COATED ORAL DAILY
Qty: 90 TABLET | Refills: 3 | Status: SHIPPED | OUTPATIENT
Start: 2022-04-25 | End: 2023-04-25

## 2022-04-25 RX ORDER — LISINOPRIL 10 MG/1
10 TABLET ORAL DAILY
Qty: 90 TABLET | Refills: 0 | Status: SHIPPED | OUTPATIENT
Start: 2022-04-25 | End: 2022-07-29 | Stop reason: SDUPTHER

## 2022-04-27 PROBLEM — F51.01 PRIMARY INSOMNIA: Status: ACTIVE | Noted: 2022-04-27

## 2022-04-27 PROBLEM — K43.9 VENTRAL HERNIA WITHOUT OBSTRUCTION OR GANGRENE: Status: ACTIVE | Noted: 2022-04-27

## 2022-04-27 NOTE — PROGRESS NOTES
Assessment/Plan:    Problem List Items Addressed This Visit        Endocrine    Type 2 diabetes mellitus without complication, without long-term current use of insulin ([facility] ) - Primary       Lab Results   Component Value Date    HGBA1C 8 8 (A) 04/25/2022    HGBA1C 8 2 (A) 01/24/2022    HGBA1C 12 1 (A) 09/03/2021    HGBA1C 12 6 (A) 05/20/2021 ·   increase in A1c but still doing better than last year when he was noncompliant  · Although we had denial for Jardiance, he confirms that he is indeed receiving this medication and taking it daily  · Increase lantus from 25 to 30 units HS  · Explained connection with ASCVD risk, advised to watch his diet/exercise moderately  · Also explained the repeat yeast infections will improve with better diabetic control - his goal to get his A1c down to be approved by Urology for circumcision  · Did have IRIS at last appt revealed severe retinopathy, referred to to Ophthalmology he did not follow up, provided new referral today  · Recheck A1c in 3 months         Relevant Medications    lisinopril (ZESTRIL) 10 mg tablet    insulin glargine (Lantus SoloStar) 100 units/mL injection pen    Other Relevant Orders    POCT hemoglobin A1c (Completed)    Ambulatory Referral to Ophthalmology       Respiratory    COPD (chronic obstructive pulmonary disease) ([facility] )     Implored him to quit smoking, he is not interested at this time  Cont Breo daily  Relevant Medications    fluticasone-vilanterol (Breo Ellipta) 100-25 mcg/inh inhaler    Mild persistent asthma     Controlled, cont albuterol prn         Relevant Medications    fluticasone-vilanterol (Breo Ellipta) 100-25 mcg/inh inhaler       Cardiovascular and Mediastinum    Atrial fibrillation with RVR (HCC)     · Continue xarelto, cardizem 120mg, echo,  · Card appt arranged by hospital team - he again no-showed  · Card reached out for him to reschedule, he has yet to call them back    · I once again gave him info for Card office to reschedule  Relevant Orders    Ambulatory Referral to Cardiology       Other    Other hyperlipidemia     Cont atorvastatin, not due yet for lipid panel  Relevant Medications    atorvastatin (Lipitor) 20 mg tablet    Ventral hernia without obstruction or gangrene     New issue  Obvious bulge reducible, no gangrene or obstruction suspected - refer to gen surg         Relevant Orders    US abdominal wall    Ambulatory Referral to General Surgery    Primary insomnia     He followed up with sleep medicine, had consult, scheduled for sleep study, strongly encouraged him to follow through with this  Apnea likely also a contributory factor  Relevant Medications    Melatonin 5 MG TABS    Other Relevant Orders    Ambulatory Referral to Sleep Medicine      Other Visit Diagnoses     Cataplexy        Relevant Orders    Ambulatory Referral to Sleep Medicine            No follow-ups on file  A chart review was performed and previous primary care visit notes were reviewed  All applicable imaging studies were reviewed and images were reviewed personally  All applicable laboratory studies were reviewed personally  Care everywhere review was performed if  available and all pertinent notes were reviewed  Subjective:     HPI: Baltazar Sue  is a 55 y o  male who  has a past medical history of Asthma, Carpal tunnel syndrome, Chronic pain, Diabetes mellitus ([address] ), Erectile dysfunction, Hypertension, and Umbilical hernia  who presented to the office today for DM f/u  He is looking to better control his A1c to be approved by urology for circumcision  He has been more compliant this year and at our last encounter had marked improvement with a drop in A1c of nearly 4 points  He is receiving Jardiance from pharmacy despite our notice of denial   He did make his sleep consult and is scheduled for sleep study  He c/o of new hernia of right upper periumbilical region which causes intermittent mild pain  The following portions of the patient's history were reviewed and updated as appropriate: allergies, current medications, past family history, past medical history, past social history, past surgical history, and problem list     Current Outpatient Medications on File Prior to Visit   Medication Sig Dispense Refill    albuterol (PROVENTIL HFA,VENTOLIN HFA) 90 mcg/act inhaler INHALE 2 PUFFS EVERY 4 (FOUR) HOURS AS NEEDED FOR WHEEZING 18 g 2    Alcohol Swabs (ALCOHOL PREP) 70 % PADS daily Test (Patient not taking: Reported on 9/3/2021)  5    Alcohol Swabs (Alcohol Prep) PADS TEST DAILY (Patient not taking: Reported on 9/3/2021)      aspirin (ECOTRIN LOW STRENGTH) 81 mg EC tablet Take 81 mg by mouth daily      BD Pen Needle Carolyn U/F 32G X 4 MM MISC USE AS DIRECTED TO INJECT INSULIN 100 each 0    Blood Pressure Monitoring (Katy Choice BP Monitor/Arm) JM USE AS DIRECTED 1 each 0    clotrimazole-betamethasone (LOTRISONE) 1-0 05 % cream Apply topically 2 (two) times a day 30 g 0    Contour Next Test test strip USE 1 EACH 3 (THREE) TIMES A  each 10    diltiazem (CARDIZEM CD) 240 mg 24 hr capsule TAKE 1 CAPSULE (240 MG TOTAL) BY MOUTH DAILY 30 capsule 2    DULoxetine (CYMBALTA) 30 mg delayed release capsule TAKE 1 CAPSULE (30 MG TOTAL) BY MOUTH DAILY AT BEDTIME 90 capsule 0    Humidifier MISC USE AS DIRECTED 1 each 0    Jardiance 25 MG TABS TAKE 1 TABLET (25 MG TOTAL) BY MOUTH EVERY MORNING 90 tablet 0    Lancets MISC Use to test blood glucose once a day  Dx: Type 2 DM  E11 9      metFORMIN (GLUCOPHAGE) 1000 MG tablet TAKE 1 TABLET (1,000 MG TOTAL) BY MOUTH 2 (TWO) TIMES A DAY WITH MEALS 180 tablet 3    Misc   Devices MISC by Does not apply route daily (Patient not taking: Reported on 9/3/2021) 1 each 0    mupirocin (BACTROBAN) 2 % ointment APPLY TOPICALLY 3 (THREE) TIMES A DAY (Patient not taking: Reported on 9/3/2021) 22 g 0    rivaroxaban (XARELTO) 20 mg tablet Take 1 tablet (20 mg total) by mouth daily with breakfast 90 tablet 3    Tadalafil, PAH, 20 MG TABS Take 1 tablet (20 mg total) by mouth as needed (erectile dysfunction) 30 tablet 5    triamcinolone (KENALOG) 0 5 % cream APPLY TOPICALLY 3 (THREE) TIMES A DAY 30 g 0    TRUEplus Lancets 33G MISC USE AS INSTRUCTED 100 each 3     No current facility-administered medications on file prior to visit  Review of Systems   Constitutional: Positive for fatigue  HENT: Negative  Eyes: Positive for visual disturbance  Respiratory: Negative  Cardiovascular: Negative  Gastrointestinal: Positive for abdominal pain (hernia concern)  Genitourinary: Negative  Musculoskeletal:        Bilateral knee pain   Neurological: Negative  Psychiatric/Behavioral: Negative  Objective:    /76 (BP Location: Left arm, Patient Position: Sitting, Cuff Size: Standard)   Pulse 87   Temp (!) 96 9 °F (36 1 °C) (Temporal)   Resp 19   Ht 5' 6" (1 676 m)   Wt 124 kg (272 lb 9 6 oz)   SpO2 97%   BMI 44 00 kg/m²     Physical Exam  Constitutional:       Appearance: Normal appearance  HENT:      Head: Normocephalic  Right Ear: External ear normal       Left Ear: External ear normal       Nose: Nose normal       Mouth/Throat:      Mouth: Mucous membranes are moist    Eyes:      Extraocular Movements: Extraocular movements intact  Pupils: Pupils are equal, round, and reactive to light  Cardiovascular:      Rate and Rhythm: Normal rate and regular rhythm  Pulses: Normal pulses  Heart sounds: Normal heart sounds  Pulmonary:      Effort: Pulmonary effort is normal       Breath sounds: Normal breath sounds  Abdominal:      Hernia: A hernia is present  Hernia is present in the ventral area  Musculoskeletal:         General: No tenderness or signs of injury  Normal range of motion  Cervical back: Normal range of motion  Right lower leg: No edema  Left lower leg: No edema     Skin:     General: Skin is warm and dry  Capillary Refill: Capillary refill takes less than 2 seconds  Findings: No bruising, lesion or rash  Neurological:      General: No focal deficit present  Mental Status: He is alert and oriented to person, place, and time  Psychiatric:         Mood and Affect: Mood normal          Behavior: Behavior normal          Thought Content: Thought content normal          CHARLES Colon  04/27/22  11:02 AM    There are no Patient Instructions on file for this visit

## 2022-04-27 NOTE — ASSESSMENT & PLAN NOTE
· Continue xarelto, cardizem 120mg, echo,  · Card appt arranged by hospital team - he again no-showed  · Card reached out for him to reschedule, he has yet to call them back  · I once again gave him info for Card office to reschedule

## 2022-04-27 NOTE — ASSESSMENT & PLAN NOTE
He followed up with sleep medicine, had consult, scheduled for sleep study, strongly encouraged him to follow through with this  Apnea likely also a contributory factor

## 2022-04-27 NOTE — ASSESSMENT & PLAN NOTE
Lab Results   Component Value Date    HGBA1C 8 8 (A) 04/25/2022    HGBA1C 8 2 (A) 01/24/2022    HGBA1C 12 1 (A) 09/03/2021    HGBA1C 12 6 (A) 05/20/2021   · increase in A1c but still doing better than last year when he was noncompliant  · Although we had denial for Jardiance, he confirms that he is indeed receiving this medication and taking it daily    · Increase lantus from 25 to 30 units HS  · Explained connection with ASCVD risk, advised to watch his diet/exercise moderately  · Also explained the repeat yeast infections will improve with better diabetic control - his goal to get his A1c down to be approved by Urology for circumcision  · Did have IRIS at last appt revealed severe retinopathy, referred to to Ophthalmology he did not follow up, provided new referral today  · Recheck A1c in 3 months

## 2022-05-12 DIAGNOSIS — E11.9 TYPE 2 DIABETES MELLITUS WITHOUT COMPLICATION, WITHOUT LONG-TERM CURRENT USE OF INSULIN (HCC): ICD-10-CM

## 2022-05-13 RX ORDER — EMPAGLIFLOZIN 25 MG/1
25 TABLET, FILM COATED ORAL EVERY MORNING
Qty: 90 TABLET | Refills: 0 | Status: SHIPPED | OUTPATIENT
Start: 2022-05-13 | End: 2022-07-29 | Stop reason: SDUPTHER

## 2022-05-18 ENCOUNTER — APPOINTMENT (EMERGENCY)
Dept: RADIOLOGY | Facility: HOSPITAL | Age: 57
DRG: 201 | End: 2022-05-18
Payer: COMMERCIAL

## 2022-05-18 ENCOUNTER — HOSPITAL ENCOUNTER (INPATIENT)
Facility: HOSPITAL | Age: 57
LOS: 1 days | DRG: 201 | End: 2022-05-19
Attending: EMERGENCY MEDICINE | Admitting: FAMILY MEDICINE
Payer: COMMERCIAL

## 2022-05-18 DIAGNOSIS — E87.70 VOLUME OVERLOAD: Primary | ICD-10-CM

## 2022-05-18 DIAGNOSIS — H54.8 LEGALLY BLIND IN RIGHT EYE, AS DEFINED IN USA: ICD-10-CM

## 2022-05-18 DIAGNOSIS — I48.4 ATYPICAL ATRIAL FLUTTER (HCC): ICD-10-CM

## 2022-05-18 PROBLEM — J44.1 COPD EXACERBATION (HCC): Status: ACTIVE | Noted: 2020-01-15

## 2022-05-18 PROBLEM — I48.92 ATRIAL FLUTTER WITH RAPID VENTRICULAR RESPONSE (HCC): Status: ACTIVE | Noted: 2022-05-18

## 2022-05-18 PROBLEM — R65.10 SIRS (SYSTEMIC INFLAMMATORY RESPONSE SYNDROME) (HCC): Status: ACTIVE | Noted: 2022-05-18

## 2022-05-18 PROBLEM — A41.9 SEPSIS (HCC): Status: ACTIVE | Noted: 2022-05-18

## 2022-05-18 LAB
4HR DELTA HS TROPONIN: 2 NG/L
ALBUMIN SERPL BCP-MCNC: 3.8 G/DL (ref 3–5.2)
ALP SERPL-CCNC: 67 U/L (ref 43–122)
ALT SERPL W P-5'-P-CCNC: 83 U/L
ANION GAP SERPL CALCULATED.3IONS-SCNC: 5 MMOL/L (ref 5–14)
APTT PPP: 21 SECONDS (ref 23–37)
AST SERPL W P-5'-P-CCNC: 71 U/L (ref 17–59)
ATRIAL RATE: 154 BPM
BACTERIA UR QL AUTO: ABNORMAL /HPF
BASOPHILS # BLD AUTO: 0.02 THOUSANDS/ΜL (ref 0–0.1)
BASOPHILS NFR BLD AUTO: 0 % (ref 0–1)
BILIRUB SERPL-MCNC: 0.47 MG/DL
BILIRUB UR QL STRIP: NEGATIVE
BUN SERPL-MCNC: 10 MG/DL (ref 5–25)
CALCIUM SERPL-MCNC: 8.8 MG/DL (ref 8.4–10.2)
CARDIAC TROPONIN I PNL SERPL HS: 6 NG/L
CARDIAC TROPONIN I PNL SERPL HS: 8 NG/L
CHLORIDE SERPL-SCNC: 102 MMOL/L (ref 97–108)
CLARITY UR: CLEAR
CO2 SERPL-SCNC: 29 MMOL/L (ref 22–30)
COLOR UR: YELLOW
CREAT SERPL-MCNC: 0.67 MG/DL (ref 0.7–1.5)
EOSINOPHIL # BLD AUTO: 0.09 THOUSAND/ΜL (ref 0–0.61)
EOSINOPHIL NFR BLD AUTO: 1 % (ref 0–6)
ERYTHROCYTE [DISTWIDTH] IN BLOOD BY AUTOMATED COUNT: 13.7 % (ref 11.6–15.1)
FLUAV RNA RESP QL NAA+PROBE: NEGATIVE
FLUBV RNA RESP QL NAA+PROBE: NEGATIVE
GFR SERPL CREATININE-BSD FRML MDRD: 108 ML/MIN/1.73SQ M
GLUCOSE SERPL-MCNC: 287 MG/DL (ref 70–99)
GLUCOSE SERPL-MCNC: 309 MG/DL (ref 65–140)
GLUCOSE UR STRIP-MCNC: ABNORMAL MG/DL
HCT VFR BLD AUTO: 43.1 % (ref 36.5–49.3)
HGB BLD-MCNC: 13 G/DL (ref 12–17)
HGB UR QL STRIP.AUTO: NEGATIVE
IMM GRANULOCYTES # BLD AUTO: 0.01 THOUSAND/UL (ref 0–0.2)
IMM GRANULOCYTES NFR BLD AUTO: 0 % (ref 0–2)
INR PPP: 1.01 (ref 0.84–1.19)
KETONES UR STRIP-MCNC: NEGATIVE MG/DL
LACTATE SERPL-SCNC: 1.9 MMOL/L (ref 0.7–2)
LEUKOCYTE ESTERASE UR QL STRIP: NEGATIVE
LYMPHOCYTES # BLD AUTO: 1.22 THOUSANDS/ΜL (ref 0.6–4.47)
LYMPHOCYTES NFR BLD AUTO: 18 % (ref 14–44)
MCH RBC QN AUTO: 26.4 PG (ref 26.8–34.3)
MCHC RBC AUTO-ENTMCNC: 30.2 G/DL (ref 31.4–37.4)
MCV RBC AUTO: 88 FL (ref 82–98)
MONOCYTES # BLD AUTO: 0.57 THOUSAND/ΜL (ref 0.17–1.22)
MONOCYTES NFR BLD AUTO: 9 % (ref 4–12)
MUCOUS THREADS UR QL AUTO: ABNORMAL
NEUTROPHILS # BLD AUTO: 4.71 THOUSANDS/ΜL (ref 1.85–7.62)
NEUTS SEG NFR BLD AUTO: 72 % (ref 43–75)
NITRITE UR QL STRIP: NEGATIVE
NON-SQ EPI CELLS URNS QL MICRO: ABNORMAL /HPF
NRBC BLD AUTO-RTO: 0 /100 WBCS
NT-PROBNP SERPL-MCNC: 1750 PG/ML (ref 0–299)
PH UR STRIP.AUTO: 6 [PH]
PLATELET # BLD AUTO: 177 THOUSANDS/UL (ref 149–390)
PMV BLD AUTO: 11.7 FL (ref 8.9–12.7)
POTASSIUM SERPL-SCNC: 4.2 MMOL/L (ref 3.6–5)
PR INTERVAL: 112 MS
PROCALCITONIN SERPL-MCNC: 0.16 NG/ML
PROT SERPL-MCNC: 7.1 G/DL (ref 5.9–8.4)
PROT UR STRIP-MCNC: ABNORMAL MG/DL
PROTHROMBIN TIME: 12.9 SECONDS (ref 11.6–14.5)
QRS AXIS: 13 DEGREES
QRSD INTERVAL: 176 MS
QT INTERVAL: 310 MS
QTC INTERVAL: 496 MS
RBC # BLD AUTO: 4.92 MILLION/UL (ref 3.88–5.62)
RBC #/AREA URNS AUTO: ABNORMAL /HPF
RSV RNA RESP QL NAA+PROBE: NEGATIVE
SARS-COV-2 RNA RESP QL NAA+PROBE: NEGATIVE
SODIUM SERPL-SCNC: 136 MMOL/L (ref 137–147)
SP GR UR STRIP.AUTO: 1.01 (ref 1–1.04)
T WAVE AXIS: -72 DEGREES
UROBILINOGEN UA: NEGATIVE MG/DL
VENTRICULAR RATE: 154 BPM
WBC # BLD AUTO: 6.62 THOUSAND/UL (ref 4.31–10.16)
WBC #/AREA URNS AUTO: ABNORMAL /HPF

## 2022-05-18 PROCEDURE — 85730 THROMBOPLASTIN TIME PARTIAL: CPT | Performed by: EMERGENCY MEDICINE

## 2022-05-18 PROCEDURE — 99223 1ST HOSP IP/OBS HIGH 75: CPT | Performed by: FAMILY MEDICINE

## 2022-05-18 PROCEDURE — 81001 URINALYSIS AUTO W/SCOPE: CPT | Performed by: EMERGENCY MEDICINE

## 2022-05-18 PROCEDURE — 83605 ASSAY OF LACTIC ACID: CPT | Performed by: EMERGENCY MEDICINE

## 2022-05-18 PROCEDURE — 93005 ELECTROCARDIOGRAM TRACING: CPT

## 2022-05-18 PROCEDURE — 82948 REAGENT STRIP/BLOOD GLUCOSE: CPT

## 2022-05-18 PROCEDURE — 99291 CRITICAL CARE FIRST HOUR: CPT

## 2022-05-18 PROCEDURE — 80053 COMPREHEN METABOLIC PANEL: CPT | Performed by: EMERGENCY MEDICINE

## 2022-05-18 PROCEDURE — 36415 COLL VENOUS BLD VENIPUNCTURE: CPT | Performed by: EMERGENCY MEDICINE

## 2022-05-18 PROCEDURE — 85610 PROTHROMBIN TIME: CPT | Performed by: EMERGENCY MEDICINE

## 2022-05-18 PROCEDURE — 93010 ELECTROCARDIOGRAM REPORT: CPT

## 2022-05-18 PROCEDURE — 84484 ASSAY OF TROPONIN QUANT: CPT | Performed by: EMERGENCY MEDICINE

## 2022-05-18 PROCEDURE — 99292 CRITICAL CARE ADDL 30 MIN: CPT | Performed by: EMERGENCY MEDICINE

## 2022-05-18 PROCEDURE — 92960 CARDIOVERSION ELECTRIC EXT: CPT | Performed by: EMERGENCY MEDICINE

## 2022-05-18 PROCEDURE — 71045 X-RAY EXAM CHEST 1 VIEW: CPT

## 2022-05-18 PROCEDURE — 81003 URINALYSIS AUTO W/O SCOPE: CPT | Performed by: EMERGENCY MEDICINE

## 2022-05-18 PROCEDURE — 92960 CARDIOVERSION ELECTRIC EXT: CPT

## 2022-05-18 PROCEDURE — 83880 ASSAY OF NATRIURETIC PEPTIDE: CPT | Performed by: EMERGENCY MEDICINE

## 2022-05-18 PROCEDURE — 85025 COMPLETE CBC W/AUTO DIFF WBC: CPT | Performed by: EMERGENCY MEDICINE

## 2022-05-18 PROCEDURE — 99291 CRITICAL CARE FIRST HOUR: CPT | Performed by: EMERGENCY MEDICINE

## 2022-05-18 PROCEDURE — 87040 BLOOD CULTURE FOR BACTERIA: CPT | Performed by: EMERGENCY MEDICINE

## 2022-05-18 PROCEDURE — 96376 TX/PRO/DX INJ SAME DRUG ADON: CPT

## 2022-05-18 PROCEDURE — 0241U HB NFCT DS VIR RESP RNA 4 TRGT: CPT | Performed by: EMERGENCY MEDICINE

## 2022-05-18 PROCEDURE — 96375 TX/PRO/DX INJ NEW DRUG ADDON: CPT

## 2022-05-18 PROCEDURE — 84145 PROCALCITONIN (PCT): CPT | Performed by: EMERGENCY MEDICINE

## 2022-05-18 PROCEDURE — 96367 TX/PROPH/DG ADDL SEQ IV INF: CPT

## 2022-05-18 PROCEDURE — 96365 THER/PROPH/DIAG IV INF INIT: CPT

## 2022-05-18 RX ORDER — METOPROLOL SUCCINATE 25 MG/1
25 TABLET, EXTENDED RELEASE ORAL EVERY 6 HOURS PRN
Status: DISCONTINUED | OUTPATIENT
Start: 2022-05-18 | End: 2022-05-19 | Stop reason: HOSPADM

## 2022-05-18 RX ORDER — FUROSEMIDE 10 MG/ML
40 INJECTION INTRAMUSCULAR; INTRAVENOUS ONCE
Status: DISCONTINUED | OUTPATIENT
Start: 2022-05-18 | End: 2022-05-18

## 2022-05-18 RX ORDER — IBUPROFEN 600 MG/1
600 TABLET ORAL ONCE
Status: COMPLETED | OUTPATIENT
Start: 2022-05-18 | End: 2022-05-18

## 2022-05-18 RX ORDER — ADENOSINE 3 MG/ML
6 INJECTION INTRAVENOUS ONCE
Status: COMPLETED | OUTPATIENT
Start: 2022-05-18 | End: 2022-05-18

## 2022-05-18 RX ORDER — ALBUTEROL SULFATE 90 UG/1
2 AEROSOL, METERED RESPIRATORY (INHALATION) EVERY 4 HOURS PRN
Status: DISCONTINUED | OUTPATIENT
Start: 2022-05-18 | End: 2022-05-19 | Stop reason: HOSPADM

## 2022-05-18 RX ORDER — DULOXETIN HYDROCHLORIDE 30 MG/1
30 CAPSULE, DELAYED RELEASE ORAL
Status: DISCONTINUED | OUTPATIENT
Start: 2022-05-18 | End: 2022-05-19 | Stop reason: HOSPADM

## 2022-05-18 RX ORDER — GUAIFENESIN/DEXTROMETHORPHAN 100-10MG/5
10 SYRUP ORAL EVERY 4 HOURS PRN
Status: DISCONTINUED | OUTPATIENT
Start: 2022-05-18 | End: 2022-05-19 | Stop reason: HOSPADM

## 2022-05-18 RX ORDER — METOPROLOL TARTRATE 5 MG/5ML
5 INJECTION INTRAVENOUS EVERY 6 HOURS
Status: DISCONTINUED | OUTPATIENT
Start: 2022-05-18 | End: 2022-05-19

## 2022-05-18 RX ORDER — PREDNISONE 20 MG/1
40 TABLET ORAL DAILY
Status: DISCONTINUED | OUTPATIENT
Start: 2022-05-18 | End: 2022-05-19 | Stop reason: HOSPADM

## 2022-05-18 RX ORDER — NICOTINE 21 MG/24HR
1 PATCH, TRANSDERMAL 24 HOURS TRANSDERMAL DAILY
Status: DISCONTINUED | OUTPATIENT
Start: 2022-05-19 | End: 2022-05-19 | Stop reason: HOSPADM

## 2022-05-18 RX ORDER — DILTIAZEM HYDROCHLORIDE 5 MG/ML
15 INJECTION INTRAVENOUS ONCE
Status: COMPLETED | OUTPATIENT
Start: 2022-05-18 | End: 2022-05-18

## 2022-05-18 RX ORDER — BENZONATATE 100 MG/1
100 CAPSULE ORAL 3 TIMES DAILY PRN
Status: DISCONTINUED | OUTPATIENT
Start: 2022-05-18 | End: 2022-05-19 | Stop reason: HOSPADM

## 2022-05-18 RX ORDER — INSULIN LISPRO 100 [IU]/ML
1-5 INJECTION, SOLUTION INTRAVENOUS; SUBCUTANEOUS
Status: DISCONTINUED | OUTPATIENT
Start: 2022-05-18 | End: 2022-05-18

## 2022-05-18 RX ORDER — ADENOSINE 3 MG/ML
12 INJECTION INTRAVENOUS ONCE
Status: COMPLETED | OUTPATIENT
Start: 2022-05-18 | End: 2022-05-18

## 2022-05-18 RX ORDER — FLUTICASONE FUROATE AND VILANTEROL 100; 25 UG/1; UG/1
1 POWDER RESPIRATORY (INHALATION) DAILY
Status: DISCONTINUED | OUTPATIENT
Start: 2022-05-19 | End: 2022-05-19 | Stop reason: HOSPADM

## 2022-05-18 RX ORDER — CEFEPIME HYDROCHLORIDE 2 G/50ML
2000 INJECTION, SOLUTION INTRAVENOUS ONCE
Status: COMPLETED | OUTPATIENT
Start: 2022-05-18 | End: 2022-05-18

## 2022-05-18 RX ORDER — METOPROLOL TARTRATE 5 MG/5ML
5 INJECTION INTRAVENOUS ONCE
Status: COMPLETED | OUTPATIENT
Start: 2022-05-18 | End: 2022-05-18

## 2022-05-18 RX ORDER — INSULIN GLARGINE 100 [IU]/ML
25 INJECTION, SOLUTION SUBCUTANEOUS
Status: DISCONTINUED | OUTPATIENT
Start: 2022-05-18 | End: 2022-05-19 | Stop reason: HOSPADM

## 2022-05-18 RX ORDER — INSULIN LISPRO 100 [IU]/ML
2-12 INJECTION, SOLUTION INTRAVENOUS; SUBCUTANEOUS
Status: DISCONTINUED | OUTPATIENT
Start: 2022-05-18 | End: 2022-05-19

## 2022-05-18 RX ORDER — ACETAMINOPHEN 325 MG/1
650 TABLET ORAL ONCE
Status: COMPLETED | OUTPATIENT
Start: 2022-05-18 | End: 2022-05-18

## 2022-05-18 RX ORDER — METOPROLOL TARTRATE 5 MG/5ML
5 INJECTION INTRAVENOUS EVERY 6 HOURS
Status: DISCONTINUED | OUTPATIENT
Start: 2022-05-18 | End: 2022-05-18

## 2022-05-18 RX ORDER — DOXYCYCLINE HYCLATE 100 MG/1
100 CAPSULE ORAL ONCE
Status: DISCONTINUED | OUTPATIENT
Start: 2022-05-19 | End: 2022-05-18

## 2022-05-18 RX ORDER — ATORVASTATIN CALCIUM 40 MG/1
40 TABLET, FILM COATED ORAL DAILY
Status: DISCONTINUED | OUTPATIENT
Start: 2022-05-19 | End: 2022-05-19 | Stop reason: HOSPADM

## 2022-05-18 RX ORDER — DOXYCYCLINE HYCLATE 100 MG/1
100 CAPSULE ORAL DAILY
Status: DISCONTINUED | OUTPATIENT
Start: 2022-05-19 | End: 2022-05-19 | Stop reason: HOSPADM

## 2022-05-18 RX ORDER — DOXYCYCLINE HYCLATE 100 MG/1
100 CAPSULE ORAL DAILY
Status: DISCONTINUED | OUTPATIENT
Start: 2022-05-18 | End: 2022-05-18

## 2022-05-18 RX ORDER — ATORVASTATIN CALCIUM 20 MG/1
20 TABLET, FILM COATED ORAL DAILY
Status: DISCONTINUED | OUTPATIENT
Start: 2022-05-19 | End: 2022-05-18

## 2022-05-18 RX ADMIN — ACETAMINOPHEN 650 MG: 325 TABLET ORAL at 13:45

## 2022-05-18 RX ADMIN — METOPROLOL TARTRATE 5 MG: 5 INJECTION INTRAVENOUS at 15:59

## 2022-05-18 RX ADMIN — METOPROLOL TARTRATE 5 MG: 5 INJECTION INTRAVENOUS at 14:05

## 2022-05-18 RX ADMIN — DILTIAZEM HYDROCHLORIDE 15 MG: 5 INJECTION INTRAVENOUS at 15:04

## 2022-05-18 RX ADMIN — DULOXETINE 30 MG: 30 CAPSULE, DELAYED RELEASE ORAL at 22:08

## 2022-05-18 RX ADMIN — GUAIFENESIN AND DEXTROMETHORPHAN 10 ML: 100; 10 SYRUP ORAL at 20:48

## 2022-05-18 RX ADMIN — PREDNISONE 40 MG: 20 TABLET ORAL at 18:42

## 2022-05-18 RX ADMIN — DILTIAZEM HYDROCHLORIDE 15 MG/HR: 5 INJECTION INTRAVENOUS at 23:33

## 2022-05-18 RX ADMIN — ADENOSINE 6 MG: 3 INJECTION INTRAVENOUS at 15:46

## 2022-05-18 RX ADMIN — ADENOSINE 12 MG: 3 INJECTION INTRAVENOUS at 15:37

## 2022-05-18 RX ADMIN — INSULIN GLARGINE 25 UNITS: 100 INJECTION, SOLUTION SUBCUTANEOUS at 22:07

## 2022-05-18 RX ADMIN — CEFEPIME HYDROCHLORIDE 2000 MG: 2 INJECTION, SOLUTION INTRAVENOUS at 13:56

## 2022-05-18 RX ADMIN — DILTIAZEM HYDROCHLORIDE 5 MG/HR: 5 INJECTION INTRAVENOUS at 15:10

## 2022-05-18 RX ADMIN — ADENOSINE 6 MG: 3 INJECTION INTRAVENOUS at 15:26

## 2022-05-18 RX ADMIN — METOPROLOL TARTRATE 25 MG: 25 TABLET, FILM COATED ORAL at 20:48

## 2022-05-18 RX ADMIN — IBUPROFEN 600 MG: 600 TABLET, FILM COATED ORAL at 13:45

## 2022-05-18 RX ADMIN — DILTIAZEM HYDROCHLORIDE 15 MG/HR: 5 INJECTION INTRAVENOUS at 15:53

## 2022-05-18 RX ADMIN — METOPROLOL TARTRATE 5 MG: 5 INJECTION, SOLUTION INTRAVENOUS at 22:08

## 2022-05-18 NOTE — DISCHARGE SUMMARY
Discharge Summary - Jayson Mcmullen    Patient Information: Marychuy Veliz  55 y o  male MRN: [id number]  Unit/Bed#: 7T [facility] 707-01 Encounter: [id number]    Discharging Physician / Practitioner: Dr Suzanne Cavanaugh  PCP: Damien Orosco  Admission Date:   Admission Orders (From admission, onward)     Ordered        05/18/22 1624  INPATIENT ADMISSION  Once                      Discharge Date: 5/19/2022  Reason for Admission: Cough, palpitations with chest discomfort  Discharge Diagnoses:     Principal Problem:    Atrial flutter with rapid ventricular response ([facility])  Active Problems:    COPD exacerbation ([facility])    Essential hypertension    Varicose veins of bilateral lower extremities with other complications    Tobacco dependence    Other hyperlipidemia    Sepsis ([facility] )    Legally blind in right eye, as defined in [place]  Resolved Problems:    SIRS (systemic inflammatory response syndrome) ([facility])    * Atrial flutter with rapid ventricular response ([facility] )  Assessment & Plan  Presented to ED with cough, chest discomfort, SOBOE   ECG displayed Rate >150bpm with narrow complex tachycardia and sawtooth pattern  Adenosine x 24mg given in ED without benefit in ED  Commenced Cardizen drip 15mls/hr  Remained in flutter overnight despite b-blocker PRN   ECHO Left ventricular cavity size is normal  Wall thickness is mildly increased  The left ventricular ejection fraction is 38% by biplane measurement  Systolic function is moderately reduced  Wall motion cannot be accurately assessed due to the extreme tachycardic rate  Plan as per cardiology:  - Continue Cardizem drip 15mls/hr   - IV metoprolol 5 mg every 6 hours and   - p o  Metoprolol 25 mg every 6 hours  - Cardizem 30 mg every 6 hours  - Continue with anticoagulation with Xarelto  - Telemetry  - Additionally loaded with Digoxin 250mcg  Pt returned to Sinus Rhythym, though felt to be intermittent   In view of events will transfer to [facility] for Transesophageal Echo in AM along with cardioversion  Consultations During Hospital Stay:  · Cardiology  Procedures Performed:   · Cardioversion with 24 mg Adenosine in ED  Significant Findings / Test Results:   · EKG: A  Flutter with 2 to 1 block, anterior infarct age undetermined  · BNP 1750  Incidental Findings:   · none     Test Results Pending at Discharge (will require follow up): · Blood cultures     Outpatient Tests Requested:  · none    Outpatient follow-up Requested:   Cardiology   PCP    Complications:  none    Hospital Course:   Bo Lockwood  is a 55 y o  male patient who originally presented to the hospital on 5/18/2022 due to cough, SOBOE and intermittent chest discomfort  On ED evaluation an EKG identified A flutter with rates >150 bpm on telemetry  Cardiology were consulted after 24mg Adenosine was given by the ED prior to Cardizem being loaded as an infusion at 15mg/hr  This failed to revert the rhythm and IV Metoprolol 5mg Q6 hrly and PO Metoprolol 25mg Q 6hrly  Overnight we admitted the patient and commenced oral abx for a copd exacerbation which may have triggered the arythmia  Sinus rhthym was not achieved overnight, and upon AM review symptoms had subsided though rate was >140  IV Digoxin was loaded at 250mcg and Cardizem 30mg PO Q6hrly was commenced  Anticoagulation continued with an ECHO performed (resulted below)  Plan was to remain NPO from midnight and LAURENCE to be performed at [facility] in the AM requiring transfer  Mike Vásquez did revert to SR within a few hours of Digoxin dosing  He remains hemodynamically stable and will be transferred to [facility] to complete his inpt management       Condition at Discharge: stable   Discharge Day Visit / Exam:   Vitals: Blood Pressure: 143/95 (05/19/22 1926)  Pulse: 76 (05/19/22 1926)  Temperature: (!) 96 9 °F (36 1 °C) (05/19/22 1926)  Temp Source: Temporal (05/19/22 1926)  Respirations: 18 (05/19/22 1926)  Height: 5' 9" (175 3 cm) (05/19/22 1000)  Weight - Scale: 129 kg (284 lb) (05/19/22 1000)  SpO2: 96 % (05/19/22 1926)  Exam:   Physical Exam  Constitutional:       General: He is not in acute distress  HENT:      Head: Normocephalic and atraumatic  Right Ear: External ear normal       Left Ear: External ear normal       Nose: No congestion or rhinorrhea  Mouth/Throat:      Mouth: Mucous membranes are moist       Pharynx: No oropharyngeal exudate or posterior oropharyngeal erythema  Eyes:      Extraocular Movements: Extraocular movements intact  Pupils: Pupils are equal, round, and reactive to light  Cardiovascular:      Rate and Rhythm: Normal rate and regular rhythm  Pulses: Normal pulses  Heart sounds: Normal heart sounds  No murmur heard  No friction rub  No gallop  Pulmonary:      Effort: Pulmonary effort is normal  No respiratory distress  Breath sounds: Normal breath sounds  No wheezing or rales  Chest:      Chest wall: No tenderness  Abdominal:      General: There is no distension  Palpations: There is no mass  Tenderness: There is no abdominal tenderness  There is no right CVA tenderness, left CVA tenderness or guarding  Musculoskeletal:         General: No swelling or tenderness  Right lower leg: No edema  Left lower leg: No edema  Skin:     General: Skin is warm  Capillary Refill: Capillary refill takes less than 2 seconds  Findings: No lesion or rash  Neurological:      General: No focal deficit present  Mental Status: He is alert and oriented to person, place, and time  Psychiatric:         Mood and Affect: Mood normal        Discussion with Family: Partner aware of transfer and further planned interventions  Discharge instructions/Information to patient and family:   See after visit summary for information provided to patient and family        Discharge Medications:    Current Facility-Administered Medications:     albuterol (PROVENTIL HFA,VENTOLIN HFA) inhaler 2 puff, 2 puff, Inhalation, Q4H PRN, Maria Teresa Nguyen MD    atorvastatin (LIPITOR) tablet 40 mg, 40 mg, Oral, Daily, Maria Teresa Nguyen MD, 40 mg at 05/19/22 1117    benzonatate (TESSALON PERLES) capsule 100 mg, 100 mg, Oral, TID PRN, Maria Teresa Nguyen MD    dextromethorphan-guaiFENesin [facility] DM) oral syrup 10 mL, 10 mL, Oral, Q4H PRN, Maria Teresa Nguyen MD, 10 mL at 05/19/22 1734    diltiazem (CARDIZEM) 125 mg in sodium chloride 0 9 % 125 mL infusion, 15 mg/hr, Intravenous, Continuous, Maria Teresa Nguyen MD, Stopped at 05/19/22 1100    diltiazem (CARDIZEM) tablet 30 mg, 30 mg, Oral, Q6H [facility], Lili Paez, DO, 30 mg at 05/19/22 1732    doxycycline hyclate (VIBRAMYCIN) capsule 100 mg, 100 mg, Oral, Daily, Teena Harrison MD, 100 mg at 05/19/22 1117    DULoxetine (CYMBALTA) delayed release capsule 30 mg, 30 mg, Oral, HS, Maria Teresa Nguyen MD, 30 mg at 05/18/22 2208    fluticasone-vilanterol (BREO ELLIPTA) 100-25 mcg/inh inhaler 1 puff, 1 puff, Inhalation, Daily, Maria Teresa Nguyen MD, 1 puff at 05/19/22 1126    insulin glargine (LANTUS) subcutaneous injection 25 Units 0 25 mL, 25 Units, Subcutaneous, HS, Maria Teresa Nguyen MD, 25 Units at 05/18/22 2207    insulin lispro (HumaLOG) 100 units/mL subcutaneous injection 2-12 Units, 2-12 Units, Subcutaneous, TID AC, 6 Units at 05/19/22 1732 **AND** Fingerstick Glucose (POCT), , , 4x Daily AC and at bedtime, Teena Harrison MD    insulin lispro (HumaLOG) 100 units/mL subcutaneous injection 5 Units, 5 Units, Subcutaneous, TID With Meals, Teena Harrison MD, 5 Units at 05/19/22 1733    metoprolol (LOPRESSOR) injection 5 mg, 5 mg, Intravenous, Q6H, Humphrey Alejandre MD    metoprolol succinate (TOPROL-XL) 24 hr tablet 25 mg, 25 mg, Oral, Q6H PRN, Humphrey Alejandre MD    metoprolol tartrate (LOPRESSOR) tablet 25 mg, 25 mg, Oral, Q6H, Humphrey Alejandre MD, 25 mg at 05/19/22 1928    nicotine (NICODERM CQ) 14 mg/24hr TD 24 hr patch 1 patch, 1 patch, Transdermal, Daily, Dewitte Bamberger Reymundo Silva MD, 1 patch at 05/19/22 1117    predniSONE tablet 40 mg, 40 mg, Oral, Daily, Domenico Dean MD, 40 mg at 05/19/22 1117    rivaroxaban (XARELTO) tablet 20 mg, 20 mg, Oral, Daily With Breakfast, Domenico Dean MD, 20 mg at 05/19/22 9391    Provisions for Follow-Up Care:  See after visit summary for information related to follow-up care and any pertinent home health orders  Disposition:   BERTIN Petit    Planned Readmission: None at this time     Discharge Statement:  I spent 60 minutes discharging the patient  This time was spent on the day of discharge  I had direct contact with the patient on the day of discharge  Greater than 50% of the total time was spent examining patient, answering all patient questions, arranging and discussing plan of care with patient as well as directly providing post-discharge instructions  Additional time then spent on discharge activities      ** Please Note: This note has been constructed using a voice recognition system **    Domenico Dean MD  05/19/22  7:49 PM

## 2022-05-18 NOTE — ASSESSMENT & PLAN NOTE
Presented to ED with 4 day productive cough, chest discomfort, SOBOE  In ED ECG displayed Rate >150bpm  Labs unremarkable for inflam rise  CXR -ve for consolidation  Given IV Cefepime in ED (2Gram)    Plan as per cardiology for Atrial Flutter  - Respiratory protocol   - Doxycycline 100 mg PO daily for 7 days DAY 1  - Prednisone burst of 40mg PO for 4 days  - Albuterol PRN  - Breo Ellipta 100-25 1 puff qd  - Monitor for fever, Jarvis Blood cultures

## 2022-05-18 NOTE — ASSESSMENT & PLAN NOTE
Met SIRS criteria on presentation to ED  Cough productive of yellow-sputum worsened over past 4 days  HR>150  RR>25  Sats 98% on air  WBC 6 62  Procal 0 16 Lactate 1 9  Given Cefepime 2Gram in ED  CXR: Mild central perihilar pulmonary vascular congestion/edema  Nil consolidation    IMP: Could consider COPD exacerbation      - Tessalon Perles and Robitussin   - As per COPD exacerbation

## 2022-05-18 NOTE — ASSESSMENT & PLAN NOTE
Lab Results   Component Value Date    HGBA1C 8 8 (A) 04/25/2022     Home Lantus  Jiardiance, Metformin  Pt unsure of medication regime and does not check sugars at home       - Lantus at reduced rate of 25U Hs  - Diabetic diet  - Acuchecks ACCHS  - Insulin sliding scale for correctional control

## 2022-05-18 NOTE — H&P
History and Physical - Jayson Mcmullen    Patient Information: Aneudy Lowery  55 y o  male MRN: [id number]  Unit/Bed#: 7T [facility] 707-01 Encounter: [id number]  Admitting Physician: Guerda Mendoza MD  PCP: CHARLES Enriquez  Date of Admission:  05/18/22    Assessment and Plan  * Atrial flutter with rapid ventricular response [facility])  Assessment & Plan  Presented to ED with cough, chest discomfort, SOBOE  In ED ECG displayed Rate >150bpm with narrow complex tachycardia and sawtooth pattern  Adenosine x 24mg given in ED without benefit    Plan as per cardiology:  - Admit for Cardizem drip 15mls/hr   - Lopressor 5mg q 6 hrly  - Telemetry  - Cardiology consulted formally  Will review in AM for consideration of cardioversion  SIRS (systemic inflammatory response syndrome) (HCC)  Assessment & Plan  Met SIRS criteria on presentation to ED  Cough productive of yellow-sputum worsened over past 4 days  HR>150  RR>25  Sats 98% on air  WBC 6 62  Procal 0 16 Lactate 1 9  Given Cefepime 2Gram in ED  CXR: Mild central perihilar pulmonary vascular congestion/edema  Nil consolidation    IMP: Could consider COPD exacerbation      - Rpt AM labs for inflam markers   - Tessalon Perles and Robitussin tonight   - As per COPD exacerbation    COPD exacerbation ([facility] [address] )  Assessment & Plan  Presented to ED with 4 day productive cough, chest discomfort, SOBOE  In ED ECG displayed Rate >150bpm  Labs unremarkable for inflam rise  CXR -ve for consolidation  Given IV Cefepime in ED (2Gram)    Plan as per cardiology for Atrial Flutter  - Respiratory protocol   - Doxycycline 100 mg PO daily for 7 days from tomorrow  - Prednisone burst of 40mg PO for 4 days  - Albuterol PRN  - Breo Ellipta 100-25 1 puff qd  - Monitor for fever, Jarvis Blood cultures     Legally blind in right eye, as defined in [address] coverage for intervention    Other hyperlipidemia  Assessment & Plan  - Home Lipitor 20mg qd    Tobacco dependence  Assessment & Plan  - NRT declined    Varicose veins of bilateral lower extremities with other complications  Assessment & Plan  - Continue Xarelto     Essential hypertension  Assessment & Plan  - Home Diltiazem dosing held in view of current drip  - Monitor pressures  Currently 129/71    Type 2 diabetes mellitus without complication, without long-term current use of insulin [facility])  Assessment & Plan    Lab Results   Component Value Date    HGBA1C 8 8 (A) 04/25/2022     Home Lantus  Jiardiance, Metformin  Pt unsure of medication regime and does not check sugars at home  - Lantus at reduced rate of 25U Hs  - Diabetic diet  - Acuchecks ACCHS  - Insulin sliding scale for correctional control    VTE Prophylaxis: Rivaroxaban (Xarelto)  Code Status: Level 1 - Full Code  Anticipated Length of Stay:  Patient will be admitted on an Inpatient basis with an anticipated length of stay of  < than 2 midnights  Justification for Hospital Stay: Pending stabilization   Total Time for Visit, including Counseling / Coordination of Care: 60 mins  Greater than 50% of this total time spent on direct patient counseling and coordination of care  Chief Complaint:     Chief Complaint   Patient presents with    Shortness of Breath     History of Present Illness:  Abebe Nicholas  is a 55 y o  male who presents with a 4 day hx of productive cough with SOBOE and chest discomfort  No fevers, recent travel nor sick contacts are noted  Pt has been off work due to breathlessness today  Pt has been otherwise well within himself bar the above  He is E&D as normal with normal bowels and bladder fx  ED course was remarkable for SIRS+ on initial assessment triggering sepsis workup with IVabx and lasix given for edema  EKG was performed displaying Aflut and Adenosine was given in an attempt to terminate his rhythm   He continued to be tachycardic >150bpm and cardiology were consulted who advised admission for cardizem infusion with b-blocker coverage overnight with a plan to cardiovert in the morning  Initial labs displayed no overt sepsis and the SIRS was felt to have been triggered secondary to the cardiac arythmia  In view of a change in sputum color and a productive worsening cough we have commenced abx along with steroids for an exacerbation of COPD that may have triggered events  Review of Systems:  Review of Systems   Constitutional: Positive for activity change and fatigue  Negative for chills, fever and unexpected weight change  HENT: Negative for ear pain and sore throat  Eyes: Negative for pain and visual disturbance  Respiratory: Negative for cough and shortness of breath  Cardiovascular: Negative for chest pain and palpitations  Gastrointestinal: Negative for abdominal pain and vomiting  Genitourinary: Negative for dysuria and hematuria  Musculoskeletal: Positive for back pain, gait problem and joint swelling  Negative for arthralgias  Skin: Negative for color change and rash  Neurological: Negative for seizures and syncope  All other systems reviewed and are negative  Past Medical and Surgical History:   Past Medical History:   Diagnosis Date    Asthma     Carpal tunnel syndrome     Chronic pain     left arm    Diabetes mellitus ([address] )     Erectile dysfunction     Hypertension     Umbilical hernia      Past Surgical History:   Procedure Laterality Date    CHOLECYSTECTOMY      CHOLECYSTECTOMY LAPAROSCOPIC N/A 6/20/2019    Procedure: CHOLECYSTECTOMY LAPAROSCOPIC;  Surgeon: Leila Chatman MD;  Location: [address] [place] [address];  Service: General    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    HAND SURGERY      UMBILICAL HERNIA REPAIR       Meds/Allergies: Allergies: No Known Allergies  Prior to Admission Medications   Prescriptions Last Dose Informant Patient Reported? Taking?    Alcohol Swabs (ALCOHOL PREP) 70 % PADS  Self Yes No   Sig: daily Test   Patient not taking: Reported on 9/3/2021 Alcohol Swabs (Alcohol Prep) PADS   Yes No   Sig: TEST DAILY   Patient not taking: Reported on 9/3/2021   BD Pen Needle Carolyn U/F 32G X 4 MM MISC   No No   Sig: USE AS DIRECTED TO INJECT INSULIN   Blood Pressure Monitoring (Raymond Choice BP Monitor/Arm) JM   No No   Sig: USE AS DIRECTED   Contour Next Test test strip   No No   Sig: USE 1 EACH 3 (THREE) TIMES A DAY   DULoxetine (CYMBALTA) 30 mg delayed release capsule   No No   Sig: TAKE 1 CAPSULE (30 MG TOTAL) BY MOUTH DAILY AT BEDTIME   Humidifier MISC   No No   Sig: USE AS DIRECTED   Jardiance 25 MG TABS   No No   Sig: TAKE 1 TABLET (25 MG TOTAL) BY MOUTH EVERY MORNING   Lancets MISC  Self Yes No   Sig: Use to test blood glucose once a day  Dx: Type 2 DM  E11 9   Melatonin 5 MG TABS   No No   Sig: Take 1 tablet (5 mg total) by mouth daily at bedtime   Misc  Devices MISC  Self No No   Sig: by Does not apply route daily   Patient not taking: Reported on 9/3/2021   TRUEplus Lancets 33G MISC   No No   Sig: USE AS INSTRUCTED   Tadalafil, PAH, 20 MG TABS   No No   Sig: Take 1 tablet (20 mg total) by mouth as needed (erectile dysfunction)   albuterol (PROVENTIL HFA,VENTOLIN HFA) 90 mcg/act inhaler   No No   Sig: INHALE 2 PUFFS EVERY 4 (FOUR) HOURS AS NEEDED FOR WHEEZING   aspirin (ECOTRIN LOW STRENGTH) 81 mg EC tablet  Self Yes No   Sig: Take 81 mg by mouth daily   atorvastatin (Lipitor) 20 mg tablet   No No   Sig: Take 1 tablet (20 mg total) by mouth daily   budesonide (Pulmicort Flexhaler) 90 MCG/ACT inhaler   No No   Sig: Inhale 1 puff 2 (two) times a day Rinse mouth after use  clotrimazole-betamethasone (LOTRISONE) 1-0 05 % cream   No No   Sig: Apply topically 2 (two) times a day   diltiazem (CARDIZEM CD) 240 mg 24 hr capsule   No No   Sig: TAKE 1 CAPSULE (240 MG TOTAL) BY MOUTH DAILY   fluticasone-vilanterol (Breo Ellipta) 100-25 mcg/inh inhaler   No No   Sig: Inhale 1 puff daily Rinse mouth after use     insulin glargine (Lantus SoloStar) 100 units/mL injection pen   No No   Sig: Inject 30 Units under the skin daily at bedtime   lisinopril (ZESTRIL) 10 mg tablet   No No   Sig: Take 1 tablet (10 mg total) by mouth daily   metFORMIN (GLUCOPHAGE) 1000 MG tablet   No No   Sig: TAKE 1 TABLET (1,000 MG TOTAL) BY MOUTH 2 (TWO) TIMES A DAY WITH MEALS   mupirocin (BACTROBAN) 2 % ointment   No No   Sig: APPLY TOPICALLY 3 (THREE) TIMES A DAY   Patient not taking: Reported on 9/3/2021   rivaroxaban (XARELTO) 20 mg tablet   No No   Sig: Take 1 tablet (20 mg total) by mouth daily with breakfast   triamcinolone (KENALOG) 0 5 % cream   No No   Sig: APPLY TOPICALLY 3 (THREE) TIMES A DAY      Facility-Administered Medications: None     Social History:   Social History     Socioeconomic History    Marital status:      Spouse name: Not on file    Number of children: Not on file    Years of education: Not on file    Highest education level: Not on file   Occupational History    Not on file   Tobacco Use    Smoking status: Current Every Day Smoker     Packs/day: 0 25     Types: Cigarettes    Smokeless tobacco: Never Used   Vaping Use    Vaping Use: Never used   Substance and Sexual Activity    Alcohol use: Yes     Comment: occassional    Drug use: Never    Sexual activity: Yes     Partners: Female   Other Topics Concern    Not on file   Social History Narrative    Not on file     Social Determinants of Health     Financial Resource Strain: High Risk    Difficulty of Paying Living Expenses: Very hard   Food Insecurity: No Food Insecurity    Worried About Running Out of Food in the Last Year: Never true    Fuentes of Food in the Last Year: Never true   Transportation Needs: No Transportation Needs    Lack of Transportation (Medical): No    Lack of Transportation (Non-Medical):  No   Physical Activity: Not on file   Stress: Not on file   Social Connections: Not on file   Intimate Partner Violence: Not on file   Housing Stability: Not on file     Patient Pre-hospital Living Situation: With family members  Patient Pre-hospital Level of Mobility: mobile  Patient Pre-hospital Diet Restrictions: Nil    Family History:  Family History   Problem Relation Age of Onset    Breast cancer additional onset Mother     Diabetes Father     Hyperlipidemia Father     Hypertension Father     Seizures Father     No Known Problems Son     No Known Problems Son     Vitiligo Son      Physical Exam:   Vitals:   Blood Pressure: 130/98 (05/18/22 1950)  Pulse: (!) 149 (05/18/22 1950)  Temperature: (!) 96 7 °F (35 9 °C) (05/18/22 1950)  Temp Source: Temporal (05/18/22 1745)  Respirations: 20 (05/18/22 1950)  Height: 5' 9 5" (176 5 cm) (05/18/22 1745)  Weight - Scale: 129 kg (284 lb 6 3 oz) (05/18/22 1745)  SpO2: 95 % (05/18/22 1950)    Physical Exam  Vitals and nursing note reviewed  Constitutional:       General: He is not in acute distress  Appearance: Normal appearance  He is not ill-appearing, toxic-appearing or diaphoretic  HENT:      Head: Normocephalic and atraumatic  Right Ear: External ear normal       Left Ear: External ear normal       Nose: Congestion present  Mouth/Throat:      Mouth: Mucous membranes are moist       Pharynx: No oropharyngeal exudate or posterior oropharyngeal erythema  Eyes:      General: No scleral icterus  Right eye: No discharge  Left eye: No discharge  Conjunctiva/sclera: Conjunctivae normal    Cardiovascular:      Rate and Rhythm: Regular rhythm  Tachycardia present  Pulses: Normal pulses  Heart sounds: Normal heart sounds  Comments: Auscultation complicated by rate  Radial Seems regular  Pulmonary:      Effort: Pulmonary effort is normal  No respiratory distress  Breath sounds: Wheezing present  Comments: Good inspiratory effort  Upper airway transmitted sounds with exp wheeze   No dullness to percussion with Lbase=Rbase  Abdominal:      General: Bowel sounds are normal       Palpations: Abdomen is soft  Tenderness: There is no abdominal tenderness  Musculoskeletal:         General: Normal range of motion  Cervical back: Normal range of motion and neck supple  Right lower leg: Edema present  Left lower leg: Edema present  Comments: L>R pitting edema to distal tibial level   Skin:     General: Skin is warm  Capillary Refill: Capillary refill takes 2 to 3 seconds  Findings: No rash  Neurological:      General: No focal deficit present  Mental Status: He is alert and oriented to person, place, and time  Gait: Gait normal    Psychiatric:         Mood and Affect: Mood normal          Lab Results: I have personally reviewed pertinent reports  Results from last 7 days   Lab Units 05/18/22  1317   WBC Thousand/uL 6 62   HEMOGLOBIN g/dL 13 0   HEMATOCRIT % 43 1   PLATELETS Thousands/uL 177   NEUTROS PCT % 72   LYMPHS PCT % 18   MONOS PCT % 9   EOS PCT % 1     Results from last 7 days   Lab Units 05/18/22  1327   POTASSIUM mmol/L 4 2   CHLORIDE mmol/L 102   CO2 mmol/L 29   BUN mg/dL 10   CREATININE mg/dL 0 67*   CALCIUM mg/dL 8 8   ALK PHOS U/L 67   ALT U/L 83*   AST U/L 71*   EGFR ml/min/1 73sq m 108     Results from last 7 days   Lab Units 05/18/22  1327   INR  1 01         Results from last 7 days   Lab Units 05/18/22  1327   LACTIC ACID mmol/L 1 9         Results from last 7 days   Lab Units 05/18/22  1327   NT-PRO BNP pg/mL 1,750*      Imaging: I have personally reviewed pertinent reports  XR chest portable  Result Date: 5/18/2022  Narrative: CHEST INDICATION:   sob  COMPARISON:  None EXAM PERFORMED/VIEWS:  XR CHEST PORTABLE FINDINGS: Cardiomediastinal silhouette appears unremarkable  The lungs are clear without consolidation  Mild central perihilar pulmonary vascular congestion/edema is seen  No pneumothorax or pleural effusion  Osseous structures appear within normal limits for patient age     Impression: Mild central perihilar pulmonary vascular congestion/edema is seen  No pneumothorax or lobar airspace consolidation  Workstation performed: [id number]     EKG, Pathology, and Other Studies Reviewed on Admission:   EKG  Result Date: 05/18/22  Impression:  Rate 157  Sinus  Unable to comment on individual segments       Entire H&P was discussed with Dr Terry Bynum who agreed to what is noted above    John Méndez MD  05/18/22  8:45 PM

## 2022-05-18 NOTE — ED PROVIDER NOTES
History  Chief Complaint   Patient presents with    Shortness of Breath       History provided by:  Patient  Shortness of Breath  Severity:  Moderate  Onset quality:  Gradual  Timing:  Constant  Progression:  Worsening  Chronicity:  New  Context: URI    Relieved by:  Nothing  Worsened by: Activity and coughing  Ineffective treatments:  None tried  Associated symptoms: cough    Associated symptoms: no abdominal pain, no chest pain, no fever, no headaches, no rash, no sore throat and no wheezing        Prior to Admission Medications   Prescriptions Last Dose Informant Patient Reported? Taking? Alcohol Swabs (ALCOHOL PREP) 70 % PADS  Self Yes No   Sig: daily Test   Patient not taking: Reported on 9/3/2021   Alcohol Swabs (Alcohol Prep) PADS   Yes No   Sig: TEST DAILY   Patient not taking: Reported on 9/3/2021   BD Pen Needle Carolyn U/F 32G X 4 MM MISC   No No   Sig: USE AS DIRECTED TO INJECT INSULIN   Blood Pressure Monitoring ([facility] Choice BP Monitor/Arm) JM   No No   Sig: USE AS DIRECTED   Contour Next Test test strip   No No   Sig: USE 1 EACH 3 (THREE) TIMES A DAY   DULoxetine (CYMBALTA) 30 mg delayed release capsule   No No   Sig: TAKE 1 CAPSULE (30 MG TOTAL) BY MOUTH DAILY AT BEDTIME   Humidifier MISC   No No   Sig: USE AS DIRECTED   Jardiance 25 MG TABS   No No   Sig: TAKE 1 TABLET (25 MG TOTAL) BY MOUTH EVERY MORNING   Lancets MISC  Self Yes No   Sig: Use to test blood glucose once a day  Dx: Type 2 DM  E11 9   Melatonin 5 MG TABS   No No   Sig: Take 1 tablet (5 mg total) by mouth daily at bedtime   Misc   Devices MISC  Self No No   Sig: by Does not apply route daily   Patient not taking: Reported on 9/3/2021   TRUEplus Lancets 33G MISC   No No   Sig: USE AS INSTRUCTED   Tadalafil, PAH, 20 MG TABS   No No   Sig: Take 1 tablet (20 mg total) by mouth as needed (erectile dysfunction)   albuterol (PROVENTIL HFA,VENTOLIN HFA) 90 mcg/act inhaler   No No   Sig: INHALE 2 PUFFS EVERY 4 (FOUR) HOURS AS NEEDED FOR WHEEZING   aspirin (ECOTRIN LOW STRENGTH) 81 mg EC tablet  Self Yes No   Sig: Take 81 mg by mouth daily   atorvastatin (Lipitor) 20 mg tablet   No No   Sig: Take 1 tablet (20 mg total) by mouth daily   budesonide (Pulmicort Flexhaler) 90 MCG/ACT inhaler   No No   Sig: Inhale 1 puff 2 (two) times a day Rinse mouth after use  clotrimazole-betamethasone (LOTRISONE) 1-0 05 % cream   No No   Sig: Apply topically 2 (two) times a day   diltiazem (CARDIZEM CD) 240 mg 24 hr capsule   No No   Sig: TAKE 1 CAPSULE (240 MG TOTAL) BY MOUTH DAILY   fluticasone-vilanterol (Breo Ellipta) 100-25 mcg/inh inhaler   No No   Sig: Inhale 1 puff daily Rinse mouth after use     insulin glargine (Lantus SoloStar) 100 units/mL injection pen   No No   Sig: Inject 30 Units under the skin daily at bedtime   lisinopril (ZESTRIL) 10 mg tablet   No No   Sig: Take 1 tablet (10 mg total) by mouth daily   metFORMIN (GLUCOPHAGE) 1000 MG tablet   No No   Sig: TAKE 1 TABLET (1,000 MG TOTAL) BY MOUTH 2 (TWO) TIMES A DAY WITH MEALS   mupirocin (BACTROBAN) 2 % ointment   No No   Sig: APPLY TOPICALLY 3 (THREE) TIMES A DAY   Patient not taking: Reported on 9/3/2021   rivaroxaban (XARELTO) 20 mg tablet   No No   Sig: Take 1 tablet (20 mg total) by mouth daily with breakfast   triamcinolone (KENALOG) 0 5 % cream   No No   Sig: APPLY TOPICALLY 3 (THREE) TIMES A DAY      Facility-Administered Medications: None       Past Medical History:   Diagnosis Date    Asthma     Carpal tunnel syndrome     Chronic pain     left arm    Diabetes mellitus (HCC)     Erectile dysfunction     Hypertension     Umbilical hernia        Past Surgical History:   Procedure Laterality Date    CHOLECYSTECTOMY      CHOLECYSTECTOMY LAPAROSCOPIC N/A 6/20/2019    Procedure: CHOLECYSTECTOMY LAPAROSCOPIC;  Surgeon: Nimisha Guthrie MD;  Location: [address] [place] [address];  Service: General    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    HAND SURGERY      UMBILICAL HERNIA REPAIR         Family History Problem Relation Age of Onset    Breast cancer additional onset Mother     Diabetes Father     Hyperlipidemia Father     Hypertension Father     Seizures Father     No Known Problems Son     No Known Problems Son     Vitiligo Son      I have reviewed and agree with the history as documented  E-Cigarette/Vaping    E-Cigarette Use Never User      E-Cigarette/Vaping Substances     Social History     Tobacco Use    Smoking status: Current Every Day Smoker     Packs/day: 0 25     Types: Cigarettes    Smokeless tobacco: Never Used   Vaping Use    Vaping Use: Never used   Substance Use Topics    Alcohol use: Yes     Comment: occassional    Drug use: Never       Review of Systems   Constitutional: Negative for chills and fever  HENT: Positive for congestion  Negative for rhinorrhea, sore throat and trouble swallowing  Eyes: Negative for pain  Respiratory: Positive for cough and shortness of breath  Negative for wheezing and stridor  Cardiovascular: Negative for chest pain and leg swelling  Gastrointestinal: Negative for abdominal pain, diarrhea and nausea  Endocrine: Negative for polyuria  Genitourinary: Negative for dysuria, flank pain and urgency  Musculoskeletal: Positive for myalgias  Negative for joint swelling and neck stiffness  Skin: Negative for rash  Allergic/Immunologic: Negative for immunocompromised state  Neurological: Positive for weakness  Negative for dizziness, syncope, numbness and headaches  Psychiatric/Behavioral: Negative for confusion and suicidal ideas  All other systems reviewed and are negative  Physical Exam  Physical Exam  Vitals and nursing note reviewed  Constitutional:       Appearance: Normal appearance  He is well-developed  HENT:      Head: Normocephalic and atraumatic  Nose: Nose normal       Mouth/Throat:      Mouth: Mucous membranes are moist    Eyes:      Extraocular Movements: Extraocular movements intact        Pupils: Pupils are equal, round, and reactive to light  Cardiovascular:      Rate and Rhythm: Regular rhythm  Tachycardia present  Heart sounds: No murmur heard  No friction rub  Pulmonary:      Effort: No respiratory distress  Breath sounds: Normal breath sounds  No wheezing or rales  Abdominal:      General: Bowel sounds are normal  There is no distension  Palpations: Abdomen is soft  Tenderness: There is no abdominal tenderness  Musculoskeletal:         General: No tenderness  Normal range of motion  Cervical back: Normal range of motion and neck supple  Skin:     General: Skin is warm  Findings: No rash  Neurological:      Mental Status: He is alert and oriented to person, place, and time     Psychiatric:         Mood and Affect: Mood normal          Vital Signs  ED Triage Vitals [05/18/22 1309]   Temperature Pulse Respirations Blood Pressure SpO2   99 °F (37 2 °C) (!) 155 (!) 24 (!) 160/103 98 %      Temp Source Heart Rate Source Patient Position - Orthostatic VS BP Location FiO2 (%)   Oral Monitor Lying Left arm --      Pain Score       7           Vitals:    05/18/22 1309 05/18/22 1400 05/18/22 1510 05/18/22 1552   BP: (!) 160/103 (!) 154/108 130/88 (!) 155/115   Pulse: (!) 155 (!) 153 (!) 150 (!) 151   Patient Position - Orthostatic VS: Lying Lying  Lying         Visual Acuity      ED Medications  Medications   metoprolol (LOPRESSOR) injection 5 mg (5 mg Intravenous Given 5/18/22 1559)   cefepime (MAXIPIME) IVPB (premix in dextrose) 2,000 mg 50 mL (0 mg Intravenous Stopped 5/18/22 1434)   ibuprofen (MOTRIN) tablet 600 mg (600 mg Oral Given 5/18/22 1345)   acetaminophen (TYLENOL) tablet 650 mg (650 mg Oral Given 5/18/22 1345)   metoprolol (LOPRESSOR) injection 5 mg (5 mg Intravenous Given 5/18/22 1405)   diltiazem (CARDIZEM) 125 mg in sodium chloride 0 9 % 125 mL infusion (0 mg/hr Intravenous Stopped 5/18/22 1551)   diltiazem (CARDIZEM) injection 15 mg (15 mg Intravenous Given 5/18/22 1504)   adenosine (ADENOCARD) injection 6 mg (6 mg Intravenous Given 5/18/22 1526)   adenosine (ADENOCARD) injection 12 mg (12 mg Intravenous Given 5/18/22 1537)   adenosine (ADENOCARD) injection 6 mg (6 mg Intravenous Given 5/18/22 1546)   diltiazem (CARDIZEM) 125 mg in sodium chloride 0 9 % 125 mL infusion (15 mg/hr Intravenous New Bag 5/18/22 1553)       Diagnostic Studies  Results Reviewed     Procedure Component Value Units Date/Time    HS Troponin I 4hr [[id number]] Collected: 05/18/22 1557    Lab Status: In process Specimen: Blood from Arm, Right Updated: 05/18/22 1601    COVID/FLU/RSV - 2 hour TAT [[id number]]  (Normal) Collected: 05/18/22 1317    Lab Status: Final result Specimen: Nares from Nose Updated: 05/18/22 1419     SARS-CoV-2 Negative     INFLUENZA A PCR Negative     INFLUENZA B PCR Negative     RSV PCR Negative    Narrative:      FOR PEDIATRIC PATIENTS - copy/paste COVID Guidelines URL to browser: [email or web address]    SARS-CoV-2 assay is a Nucleic Acid Amplification assay intended for the  qualitative detection of nucleic acid from SARS-CoV-2 in nasopharyngeal  swabs  Results are for the presumptive identification of SARS-CoV-2 RNA  Positive results are indicative of infection with SARS-CoV-2, the virus  causing COVID-19, but do not rule out bacterial infection or co-infection  with other viruses  Laboratories within the [place] and its  territories are required to report all positive results to the appropriate  public health authorities  Negative results do not preclude SARS-CoV-2  infection and should not be used as the sole basis for treatment or other  patient management decisions  Negative results must be combined with  clinical observations, patient history, and epidemiological information  This test has not been FDA cleared or approved  This test has been authorized by FDA under an Emergency Use Authorization  (EUA)  This test is only authorized for the duration of time the  declaration that circumstances exist justifying the authorization of the  emergency use of an in vitro diagnostic tests for detection of SARS-CoV-2  virus and/or diagnosis of COVID-19 infection under section 564(b)(1) of  the Act, 21 U  S C  704VFG-9(L)(0), unless the authorization is terminated  or revoked sooner  The test has been validated but independent review by FDA  and CLIA is pending  Test performed using CR2 GeneXpert: This RT-PCR assay targets N2,  a region unique to SARS-CoV-2  A conserved region in the E-gene was chosen  for pan-Sarbecovirus detection which includes SARS-CoV-2  Procalcitonin [[id number]]  (Normal) Collected: 05/18/22 1346    Lab Status: Final result Specimen: Blood from Arm, Right Updated: 05/18/22 1417     Procalcitonin 0 16 ng/ml     Protime-INR [[id number]]  (Normal) Collected: 05/18/22 1327    Lab Status: Final result Specimen: Blood from Arm, Right Updated: 05/18/22 1402     Protime 12 9 seconds      INR 1 01    APTT [[id number]]  (Abnormal) Collected: 05/18/22 1327    Lab Status: Final result Specimen: Blood from Arm, Right Updated: 05/18/22 1402     PTT 21 seconds     HS Troponin 0hr (reflex protocol) [[id number]]  (Normal) Collected: 05/18/22 1327    Lab Status: Final result Specimen: Blood from Hand, Right Updated: 05/18/22 1401     hs TnI 0hr 6 ng/L     HS Troponin I 2hr [[id number]]     Lab Status: No result Specimen: Blood     NT-BNP PRO [[id number]]  (Abnormal) Collected: 05/18/22 1327    Lab Status: Final result Specimen: Blood from Hand, Right Updated: 05/18/22 1359     NT-proBNP 1,750 pg/mL     Lactic acid [[id number]]  (Normal) Collected: 05/18/22 1327    Lab Status: Final result Specimen: Blood from Hand, Right Updated: 05/18/22 1351     LACTIC ACID 1 9 mmol/L     Narrative:      Result may be elevated if tourniquet was used during collection      Comprehensive metabolic panel [[id number]]  (Abnormal) Collected: 05/18/22 1327    Lab Status: Final result Specimen: Blood from Hand, Right Updated: 05/18/22 1351     Sodium 136 mmol/L      Potassium 4 2 mmol/L      Chloride 102 mmol/L      CO2 29 mmol/L      ANION GAP 5 mmol/L      BUN 10 mg/dL      Creatinine 0 67 mg/dL      Glucose 287 mg/dL      Calcium 8 8 mg/dL      AST 71 U/L      ALT 83 U/L      Alkaline Phosphatase 67 U/L      Total Protein 7 1 g/dL      Albumin 3 8 g/dL      Total Bilirubin 0 47 mg/dL      eGFR 108 ml/min/1 73sq m     Narrative:      [facility] guidelines for Chronic Kidney Disease (CKD):     Stage 1 with normal or high GFR (GFR > 90 mL/min/1 73 square meters)    Stage 2 Mild CKD (GFR = 60-89 mL/min/1 73 square meters)    Stage 3A Moderate CKD (GFR = 45-59 mL/min/1 73 square meters)    Stage 3B Moderate CKD (GFR = 30-44 mL/min/1 73 square meters)    Stage 4 Severe CKD (GFR = 15-29 mL/min/1 73 square meters)    Stage 5 End Stage CKD (GFR <15 mL/min/1 73 square meters)  Note: GFR calculation is accurate only with a steady state creatinine    Blood culture #1 [[id number]] Collected: 05/18/22 1346    Lab Status:  In process Specimen: Blood from Arm, Right Updated: 05/18/22 1350    CBC and differential [[id number]]  (Abnormal) Collected: 05/18/22 1317    Lab Status: Final result Specimen: Blood from Arm, Left Updated: 05/18/22 1337     WBC 6 62 Thousand/uL      RBC 4 92 Million/uL      Hemoglobin 13 0 g/dL      Hematocrit 43 1 %      MCV 88 fL      MCH 26 4 pg      MCHC 30 2 g/dL      RDW 13 7 %      MPV 11 7 fL      Platelets 140 Thousands/uL      nRBC 0 /100 WBCs      Neutrophils Relative 72 %      Immat GRANS % 0 %      Lymphocytes Relative 18 %      Monocytes Relative 9 %      Eosinophils Relative 1 %      Basophils Relative 0 %      Neutrophils Absolute 4 71 Thousands/µL      Immature Grans Absolute 0 01 Thousand/uL      Lymphocytes Absolute 1 22 Thousands/µL      Monocytes Absolute 0 57 Thousand/µL      Eosinophils Absolute 0 09 Thousand/µL      Basophils Absolute 0 02 Thousands/µL     Blood culture #2 [[id number]] Collected: 05/18/22 1327    Lab Status: In process Specimen: Blood from Hand, Right Updated: 05/18/22 1334    UA w Reflex to Microscopic w Reflex to Culture [[id number]]     Lab Status: No result Specimen: Urine                  XR chest portable   Final Result by John Horton MD (05/18 9103)      Mild central perihilar pulmonary vascular congestion/edema is seen  No pneumothorax or lobar airspace consolidation                    Workstation performed: [id number]                    Procedures  ECG 12 Lead Documentation Only    Date/Time: 5/18/2022 1:13 PM  Performed by: Timothy Olivera DO  Authorized by: Timothy Olivera DO     ECG reviewed by me, the ED Provider: yes    Patient location:  ED  Previous ECG:     Previous ECG:  Compared to current    Similarity:  No change  Interpretation:     Interpretation: abnormal    Rate:     ECG rate:  154    ECG rate assessment: tachycardic    Rhythm:     Rhythm: sinus tachycardia    Ectopy:     Ectopy: none    QRS:     QRS axis:  Normal    QRS intervals:  Normal  Conduction:     Conduction: abnormal      Abnormal conduction: complete RBBB    ST segments:     ST segments:  Non-specific  T waves:     T waves: normal      CriticalCare Time  Performed by: Timothy Olivera DO  Authorized by: Timothy Olivera DO     Critical care provider statement:     Critical care time (minutes):  120    Critical care time was exclusive of:  Separately billable procedures and treating other patients and teaching time    Critical care was necessary to treat or prevent imminent or life-threatening deterioration of the following conditions:  Cardiac failure and circulatory failure    Critical care was time spent personally by me on the following activities:  Blood draw for specimens, obtaining history from patient or surrogate, development of treatment plan with patient or surrogate, evaluation of patient's response to treatment, examination of patient, ordering and performing treatments and interventions, ordering and review of laboratory studies, ordering and review of radiographic studies, re-evaluation of patient's condition and review of old charts  Comments:      Upon my evaluation, this patient has a high probability of imminent or life-threatening deterioration due to aflutter which required my direct attention, intervention, and personal management      I have personally provided 120 minutes of critical care time, exclusive of procedures, teaching, and any prior time recorded by providers other than myself  Time includes review of laboratory data, radiology results, discussion with consultants, and monitoring for potential decompensation  Cardioversion    Date/Time: 5/18/2022 4:03 PM  Performed by: Annette Mora,   Authorized by: Annette Mora, DO     Verbal consent obtained?: Yes    Risks and benefits: Risks, benefits and alternatives were discussed    Consent given by:  Patient  Patient states understanding of procedure being performed: Yes    Patient's understanding of procedure matches consent: Yes    Procedure consent matches procedure scheduled: Yes    Relevant documents present and verified: Yes    Test results available and properly labeled: Yes    Site marked: Yes    Radiology Images displayed and confirmed   If images not available, report reviewed: Yes    Required items: Required blood products, implants, devices and special equipment available    Patient identity confirmed:  Verbally with patient and arm band  Time out: Immediately prior to the procedure a time out was called    Patient sedated: No    Cardioversion basis:  Emergent  Pre-procedure rhythm:  Atrial flutter  Position: Patient was placed in a supine position    Chest area exposed: Chest area was exposed    Electrodes:  Pads  Patient tolerance:  Patient tolerated the procedure well with no immediate complications Patient given adenosine x3 with no change in tachycardia her a flutter  ED Course  ED Course as of 05/18/22 1605   Wed May 18, 2022   1403 Suspect aflutter on xarelto,- will give dose of lopressor  1455 Sustained heart rate at this point time  Will given dose of Cardizem as well as Cardizem infusion dose of Lopressor given with no improvement tachycardia,   1553 Multiple doses of adenosine given with no improvement or slow down at this point time  Placed on Cardizem and Lopressor per cardiology instructions  Hemodynamically stable  SBIRT 20yo+    Flowsheet Row Most Recent Value   SBIRT (23 yo +)    In order to provide better care to our patients, we are screening all of our patients for alcohol and drug use  Would it be okay to ask you these screening questions? No Filed at: 05/18/2022 1421                    MDM  Number of Diagnoses or Management Options  Atypical atrial flutter ([facility] [address] ): new and requires workup  Volume overload: new and requires workup  Diagnosis management comments: 55 y o  male patient presents with chief complaint of dyspnea    Differential includes but is not limited to: atypical acs, chf, pneumonia, bronchitis, uri, pneumothorax, pleural effusion, less likely neoplasm, PE    Plan: cbc, metabolic panel, ekg, troponin, +/- ddimer/ct scan, chest xray    4:01 PM  Patient in a flutter multiple doses of adenosine as well as Cardizem and Lopressor given with no improvement symptoms  Attempted to cardiovert multiple times using adenosine no improvement  Spoke with Cardiology movement for further management         Amount and/or Complexity of Data Reviewed  Clinical lab tests: ordered and reviewed  Tests in the radiology section of CPT®: ordered and reviewed  Review and summarize past medical records: yes  Independent visualization of images, tracings, or specimens: yes        Disposition  Final diagnoses:   Volume overload   Atypical atrial flutter [facility])     Time reflects when diagnosis was documented in both MDM as applicable and the Disposition within this note     Time User Action Codes Description Comment    5/18/2022  4:01 PM Waldon Bosworth Add [E87 70] Volume overload     5/18/2022  4:02 PM Farzaneh Bahman IBARRA Add [I48 4] Atypical atrial flutter [facility])       ED Disposition     ED Disposition   Admit    Condition   Stable    Date/Time   Wed May 18, 2022  4:01 PM    Comment              Follow-up Information    None         Patient's Medications   Discharge Prescriptions    No medications on file       No discharge procedures on file      PDMP Review     None          ED Provider  Electronically Signed by           Albert Dobbs DO  05/18/22 5539

## 2022-05-18 NOTE — ASSESSMENT & PLAN NOTE
Presented to ED with cough, chest discomfort, SOBOE   ECG displayed Rate >150bpm with narrow complex tachycardia and sawtooth pattern  Adenosine x 24mg given in ED without benefit in ED  Remained in flutter overnight requiring Po Labetolol   ECHO Left ventricular cavity size is normal  Wall thickness is mildly increased  The left ventricular ejection fraction is 38% by biplane measurement  Systolic function is moderately reduced  Wall motion cannot be accurately assessed due to the extreme tachycardic rate  Plan as per cardiology:  - Continue Cardizem drip 15mls/hr   - Lopressor 5mg q 6 hrly  - Telemetry  - Cardiology consulted formally loaded with Digoxin 250mcg  Pt returned to Sinus Rhythym, though felt to be intermittent  In view of events will transfer to [facility] for Transesophageal Echo in AM along with cardioversion

## 2022-05-19 ENCOUNTER — HOSPITAL ENCOUNTER (INPATIENT)
Facility: HOSPITAL | Age: 57
LOS: 1 days | Discharge: HOME | DRG: 201 | End: 2022-05-20
Attending: STUDENT IN AN ORGANIZED HEALTH CARE EDUCATION/TRAINING PROGRAM
Payer: COMMERCIAL

## 2022-05-19 ENCOUNTER — APPOINTMENT (INPATIENT)
Dept: NON INVASIVE DIAGNOSTICS | Facility: HOSPITAL | Age: 57
DRG: 201 | End: 2022-05-19
Payer: COMMERCIAL

## 2022-05-19 VITALS
DIASTOLIC BLOOD PRESSURE: 95 MMHG | TEMPERATURE: 96.9 F | SYSTOLIC BLOOD PRESSURE: 143 MMHG | HEIGHT: 69 IN | RESPIRATION RATE: 18 BRPM | OXYGEN SATURATION: 96 % | HEART RATE: 76 BPM | WEIGHT: 284 LBS | BODY MASS INDEX: 42.06 KG/M2

## 2022-05-19 DIAGNOSIS — H54.8 LEGALLY BLIND IN RIGHT EYE, AS DEFINED IN USA: ICD-10-CM

## 2022-05-19 DIAGNOSIS — I48.92 ATRIAL FLUTTER WITH RAPID VENTRICULAR RESPONSE (HCC): Primary | ICD-10-CM

## 2022-05-19 DIAGNOSIS — I50.23 ACUTE ON CHRONIC SYSTOLIC CONGESTIVE HEART FAILURE (HCC): ICD-10-CM

## 2022-05-19 DIAGNOSIS — J44.1 COPD EXACERBATION (HCC): ICD-10-CM

## 2022-05-19 PROBLEM — I50.40 COMBINED SYSTOLIC AND DIASTOLIC CONGESTIVE HEART FAILURE (HCC): Status: ACTIVE | Noted: 2022-05-19

## 2022-05-19 PROBLEM — I48.0 PAROXYSMAL ATRIAL FIBRILLATION (HCC): Chronic | Status: ACTIVE | Noted: 2022-05-19

## 2022-05-19 PROBLEM — R65.10 SIRS (SYSTEMIC INFLAMMATORY RESPONSE SYNDROME) (HCC): Status: RESOLVED | Noted: 2022-05-18 | Resolved: 2022-05-19

## 2022-05-19 LAB
ALBUMIN SERPL BCP-MCNC: 3.7 G/DL (ref 3–5.2)
ALP SERPL-CCNC: 70 U/L (ref 43–122)
ALT SERPL W P-5'-P-CCNC: 95 U/L
ANION GAP SERPL CALCULATED.3IONS-SCNC: 7 MMOL/L (ref 5–14)
AORTIC ROOT: 3.1 CM
APICAL FOUR CHAMBER EJECTION FRACTION: 25 %
AST SERPL W P-5'-P-CCNC: 68 U/L (ref 17–59)
ATRIAL RATE: 86 BPM
ATRIAL RATE: 97 BPM
BILIRUB SERPL-MCNC: 0.56 MG/DL
BUN SERPL-MCNC: 12 MG/DL (ref 5–25)
CALCIUM SERPL-MCNC: 8.6 MG/DL (ref 8.4–10.2)
CHLORIDE SERPL-SCNC: 100 MMOL/L (ref 97–108)
CO2 SERPL-SCNC: 26 MMOL/L (ref 22–30)
CREAT SERPL-MCNC: 0.63 MG/DL (ref 0.7–1.5)
ERYTHROCYTE [DISTWIDTH] IN BLOOD BY AUTOMATED COUNT: 13.5 % (ref 11.6–15.1)
FRACTIONAL SHORTENING: 15 % (ref 28–44)
GFR SERPL CREATININE-BSD FRML MDRD: 110 ML/MIN/1.73SQ M
GLUCOSE SERPL-MCNC: 253 MG/DL (ref 65–140)
GLUCOSE SERPL-MCNC: 271 MG/DL (ref 65–140)
GLUCOSE SERPL-MCNC: 292 MG/DL (ref 65–140)
GLUCOSE SERPL-MCNC: 317 MG/DL (ref 65–140)
GLUCOSE SERPL-MCNC: 348 MG/DL (ref 70–99)
HCT VFR BLD AUTO: 44.6 % (ref 36.5–49.3)
HGB BLD-MCNC: 13.1 G/DL (ref 12–17)
INTERVENTRICULAR SEPTUM IN DIASTOLE (PARASTERNAL SHORT AXIS VIEW): 1.1 CM
INTERVENTRICULAR SEPTUM: 1.1 CM (ref 0.6–1.1)
IVC: 2.8 MM
LAAS-AP2: 25.9 CM2
LAAS-AP4: 27.7 CM2
LEFT ATRIUM AREA SYSTOLE SINGLE PLANE A4C: 27.8 CM2
LEFT ATRIUM SIZE: 4.3 CM
LEFT INTERNAL DIMENSION IN SYSTOLE: 3.9 CM (ref 2.1–4)
LEFT VENTRICULAR INTERNAL DIMENSION IN DIASTOLE: 4.6 CM (ref 3.5–6)
LEFT VENTRICULAR POSTERIOR WALL IN END DIASTOLE: 1.1 CM
LEFT VENTRICULAR STROKE VOLUME: 29 ML
LVSV (TEICH): 29 ML
MAGNESIUM SERPL-MCNC: 1.8 MG/DL (ref 1.6–2.3)
MCH RBC QN AUTO: 25.8 PG (ref 26.8–34.3)
MCHC RBC AUTO-ENTMCNC: 29.4 G/DL (ref 31.4–37.4)
MCV RBC AUTO: 88 FL (ref 82–98)
PHOSPHATE SERPL-MCNC: 3 MG/DL (ref 2.5–4.8)
PLATELET # BLD AUTO: 197 THOUSANDS/UL (ref 149–390)
PMV BLD AUTO: 10.2 FL (ref 8.9–12.7)
POTASSIUM SERPL-SCNC: 4.9 MMOL/L (ref 3.6–5)
PROT SERPL-MCNC: 7.1 G/DL (ref 5.9–8.4)
QRS AXIS: 38 DEGREES
QRS AXIS: 45 DEGREES
QRSD INTERVAL: 88 MS
QRSD INTERVAL: 92 MS
QT INTERVAL: 332 MS
QT INTERVAL: 384 MS
QTC INTERVAL: 417 MS
QTC INTERVAL: 456 MS
RA PRESSURE ESTIMATED: 15 MMHG
RBC # BLD AUTO: 5.07 MILLION/UL (ref 3.88–5.62)
RIGHT ATRIUM AREA SYSTOLE A4C: 23.6 CM2
RIGHT VENTRICLE ID DIMENSION: 3.5 CM
RV PSP: 28 MMHG
SL CV LEFT ATRIUM LENGTH A2C: 5.7 CM
SL CV LV EF: 38
SL CV PED ECHO LEFT VENTRICLE DIASTOLIC VOLUME (MOD BIPLANE) 2D: 96 ML
SL CV PED ECHO LEFT VENTRICLE SYSTOLIC VOLUME (MOD BIPLANE) 2D: 67 ML
SODIUM SERPL-SCNC: 133 MMOL/L (ref 137–147)
T WAVE AXIS: 33 DEGREES
T WAVE AXIS: 49 DEGREES
TR MAX PG: 13 MMHG
TR PEAK VELOCITY: 1.8 M/S
TRICUSPID ANNULAR PLANE SYSTOLIC EXCURSION: 1.3 CM
TRICUSPID VALVE PEAK REGURGITATION VELOCITY: 1.79 M/S
VENTRICULAR RATE: 85 BPM
VENTRICULAR RATE: 95 BPM
WBC # BLD AUTO: 6.56 THOUSAND/UL (ref 4.31–10.16)

## 2022-05-19 PROCEDURE — G0379 DIRECT REFER HOSPITAL OBSERV: HCPCS

## 2022-05-19 PROCEDURE — 80053 COMPREHEN METABOLIC PANEL: CPT

## 2022-05-19 PROCEDURE — 82948 REAGENT STRIP/BLOOD GLUCOSE: CPT

## 2022-05-19 PROCEDURE — 99223 1ST HOSP IP/OBS HIGH 75: CPT

## 2022-05-19 PROCEDURE — 99239 HOSP IP/OBS DSCHRG MGMT >30: CPT | Performed by: FAMILY MEDICINE

## 2022-05-19 PROCEDURE — 93010 ELECTROCARDIOGRAM REPORT: CPT

## 2022-05-19 PROCEDURE — NC001 PR NO CHARGE: Performed by: FAMILY MEDICINE

## 2022-05-19 PROCEDURE — 93306 TTE W/DOPPLER COMPLETE: CPT

## 2022-05-19 PROCEDURE — 93005 ELECTROCARDIOGRAM TRACING: CPT

## 2022-05-19 PROCEDURE — 83735 ASSAY OF MAGNESIUM: CPT

## 2022-05-19 PROCEDURE — 84100 ASSAY OF PHOSPHORUS: CPT

## 2022-05-19 PROCEDURE — 99223 1ST HOSP IP/OBS HIGH 75: CPT | Performed by: NURSE PRACTITIONER

## 2022-05-19 PROCEDURE — 85027 COMPLETE CBC AUTOMATED: CPT

## 2022-05-19 RX ORDER — DOXYCYCLINE HYCLATE 100 MG/1
100 CAPSULE ORAL DAILY
Status: CANCELLED | OUTPATIENT
Start: 2022-05-20

## 2022-05-19 RX ORDER — DIGOXIN 0.25 MG/ML
250 INJECTION INTRAMUSCULAR; INTRAVENOUS ONCE
Status: COMPLETED | OUTPATIENT
Start: 2022-05-19 | End: 2022-05-19

## 2022-05-19 RX ORDER — INSULIN LISPRO 100 [IU]/ML
5 INJECTION, SOLUTION INTRAVENOUS; SUBCUTANEOUS
Status: DISCONTINUED | OUTPATIENT
Start: 2022-05-19 | End: 2022-05-19 | Stop reason: HOSPADM

## 2022-05-19 RX ORDER — ALBUTEROL SULFATE 90 UG/1
2 AEROSOL, METERED RESPIRATORY (INHALATION) EVERY 4 HOURS PRN
Status: DISCONTINUED | OUTPATIENT
Start: 2022-05-19 | End: 2022-05-20 | Stop reason: HOSPADM

## 2022-05-19 RX ORDER — PREDNISONE 20 MG/1
40 TABLET ORAL DAILY
Status: DISCONTINUED | OUTPATIENT
Start: 2022-05-20 | End: 2022-05-19

## 2022-05-19 RX ORDER — INSULIN LISPRO 100 [IU]/ML
2-12 INJECTION, SOLUTION INTRAVENOUS; SUBCUTANEOUS
Status: CANCELLED | OUTPATIENT
Start: 2022-05-20

## 2022-05-19 RX ORDER — METOPROLOL TARTRATE 5 MG/5ML
5 INJECTION INTRAVENOUS EVERY 6 HOURS
Status: DISCONTINUED | OUTPATIENT
Start: 2022-05-19 | End: 2022-05-19 | Stop reason: HOSPADM

## 2022-05-19 RX ORDER — ATORVASTATIN CALCIUM 40 MG/1
40 TABLET, FILM COATED ORAL DAILY
Status: CANCELLED | OUTPATIENT
Start: 2022-05-20

## 2022-05-19 RX ORDER — ALBUTEROL SULFATE 90 UG/1
2 AEROSOL, METERED RESPIRATORY (INHALATION) EVERY 4 HOURS PRN
Status: CANCELLED | OUTPATIENT
Start: 2022-05-19

## 2022-05-19 RX ORDER — GUAIFENESIN 600 MG
600 TABLET, EXTENDED RELEASE 12 HR ORAL EVERY 12 HOURS SCHEDULED
Status: DISCONTINUED | OUTPATIENT
Start: 2022-05-19 | End: 2022-05-20 | Stop reason: HOSPADM

## 2022-05-19 RX ORDER — GUAIFENESIN/DEXTROMETHORPHAN 100-10MG/5
10 SYRUP ORAL EVERY 4 HOURS PRN
Status: CANCELLED | OUTPATIENT
Start: 2022-05-19

## 2022-05-19 RX ORDER — FLUTICASONE FUROATE AND VILANTEROL 100; 25 UG/1; UG/1
1 POWDER RESPIRATORY (INHALATION) DAILY
Status: DISCONTINUED | OUTPATIENT
Start: 2022-05-20 | End: 2022-05-20 | Stop reason: HOSPADM

## 2022-05-19 RX ORDER — MAGNESIUM HYDROXIDE/ALUMINUM HYDROXICE/SIMETHICONE 120; 1200; 1200 MG/30ML; MG/30ML; MG/30ML
30 SUSPENSION ORAL EVERY 4 HOURS PRN
Status: DISCONTINUED | OUTPATIENT
Start: 2022-05-19 | End: 2022-05-20 | Stop reason: HOSPADM

## 2022-05-19 RX ORDER — DOXYCYCLINE HYCLATE 100 MG/1
100 CAPSULE ORAL EVERY 12 HOURS SCHEDULED
Status: DISCONTINUED | OUTPATIENT
Start: 2022-05-19 | End: 2022-05-20 | Stop reason: HOSPADM

## 2022-05-19 RX ORDER — BENZONATATE 100 MG/1
100 CAPSULE ORAL 3 TIMES DAILY PRN
Status: CANCELLED | OUTPATIENT
Start: 2022-05-19

## 2022-05-19 RX ORDER — SIMETHICONE 80 MG
80 TABLET,CHEWABLE ORAL EVERY 6 HOURS PRN
Status: DISCONTINUED | OUTPATIENT
Start: 2022-05-19 | End: 2022-05-20 | Stop reason: HOSPADM

## 2022-05-19 RX ORDER — GUAIFENESIN/DEXTROMETHORPHAN 100-10MG/5
10 SYRUP ORAL EVERY 4 HOURS PRN
Status: DISCONTINUED | OUTPATIENT
Start: 2022-05-19 | End: 2022-05-20 | Stop reason: HOSPADM

## 2022-05-19 RX ORDER — ATORVASTATIN CALCIUM 40 MG/1
40 TABLET, FILM COATED ORAL EVERY EVENING
Status: DISCONTINUED | OUTPATIENT
Start: 2022-05-20 | End: 2022-05-20 | Stop reason: HOSPADM

## 2022-05-19 RX ORDER — NICOTINE 21 MG/24HR
1 PATCH, TRANSDERMAL 24 HOURS TRANSDERMAL DAILY
Status: CANCELLED | OUTPATIENT
Start: 2022-05-20

## 2022-05-19 RX ORDER — INSULIN LISPRO 100 [IU]/ML
2-12 INJECTION, SOLUTION INTRAVENOUS; SUBCUTANEOUS EVERY 6 HOURS SCHEDULED
Status: DISCONTINUED | OUTPATIENT
Start: 2022-05-20 | End: 2022-05-20 | Stop reason: HOSPADM

## 2022-05-19 RX ORDER — INSULIN LISPRO 100 [IU]/ML
10 INJECTION, SOLUTION INTRAVENOUS; SUBCUTANEOUS
Status: DISCONTINUED | OUTPATIENT
Start: 2022-05-19 | End: 2022-05-19

## 2022-05-19 RX ORDER — INSULIN GLARGINE 100 [IU]/ML
35 INJECTION, SOLUTION SUBCUTANEOUS
Status: DISCONTINUED | OUTPATIENT
Start: 2022-05-19 | End: 2022-05-20 | Stop reason: HOSPADM

## 2022-05-19 RX ORDER — BENZONATATE 100 MG/1
100 CAPSULE ORAL 3 TIMES DAILY PRN
Status: DISCONTINUED | OUTPATIENT
Start: 2022-05-19 | End: 2022-05-19

## 2022-05-19 RX ORDER — ACETAMINOPHEN 325 MG/1
975 TABLET ORAL EVERY 6 HOURS PRN
Status: DISCONTINUED | OUTPATIENT
Start: 2022-05-19 | End: 2022-05-20 | Stop reason: HOSPADM

## 2022-05-19 RX ORDER — INSULIN LISPRO 100 [IU]/ML
2-12 INJECTION, SOLUTION INTRAVENOUS; SUBCUTANEOUS
Status: DISCONTINUED | OUTPATIENT
Start: 2022-05-19 | End: 2022-05-19 | Stop reason: HOSPADM

## 2022-05-19 RX ORDER — PREDNISONE 20 MG/1
40 TABLET ORAL DAILY
Status: DISCONTINUED | OUTPATIENT
Start: 2022-05-20 | End: 2022-05-20 | Stop reason: HOSPADM

## 2022-05-19 RX ORDER — DOXYCYCLINE HYCLATE 100 MG/1
100 CAPSULE ORAL DAILY
Status: DISCONTINUED | OUTPATIENT
Start: 2022-05-20 | End: 2022-05-19

## 2022-05-19 RX ORDER — FLUTICASONE FUROATE AND VILANTEROL 100; 25 UG/1; UG/1
1 POWDER RESPIRATORY (INHALATION) DAILY
Status: CANCELLED | OUTPATIENT
Start: 2022-05-20

## 2022-05-19 RX ORDER — DULOXETIN HYDROCHLORIDE 30 MG/1
30 CAPSULE, DELAYED RELEASE ORAL
Status: DISCONTINUED | OUTPATIENT
Start: 2022-05-19 | End: 2022-05-20 | Stop reason: HOSPADM

## 2022-05-19 RX ORDER — INSULIN GLARGINE 100 [IU]/ML
25 INJECTION, SOLUTION SUBCUTANEOUS
Status: CANCELLED | OUTPATIENT
Start: 2022-05-19

## 2022-05-19 RX ORDER — INSULIN LISPRO 100 [IU]/ML
2-12 INJECTION, SOLUTION INTRAVENOUS; SUBCUTANEOUS EVERY 6 HOURS
Status: DISCONTINUED | OUTPATIENT
Start: 2022-05-19 | End: 2022-05-19

## 2022-05-19 RX ORDER — INSULIN GLARGINE 100 [IU]/ML
25 INJECTION, SOLUTION SUBCUTANEOUS
Status: DISCONTINUED | OUTPATIENT
Start: 2022-05-19 | End: 2022-05-19

## 2022-05-19 RX ORDER — DULOXETIN HYDROCHLORIDE 30 MG/1
30 CAPSULE, DELAYED RELEASE ORAL
Status: CANCELLED | OUTPATIENT
Start: 2022-05-19

## 2022-05-19 RX ORDER — METOPROLOL TARTRATE 5 MG/5ML
5 INJECTION INTRAVENOUS EVERY 6 HOURS
Status: CANCELLED | OUTPATIENT
Start: 2022-05-19

## 2022-05-19 RX ORDER — INSULIN LISPRO 100 [IU]/ML
5 INJECTION, SOLUTION INTRAVENOUS; SUBCUTANEOUS
Status: CANCELLED | OUTPATIENT
Start: 2022-05-20

## 2022-05-19 RX ORDER — FUROSEMIDE 10 MG/ML
40 INJECTION INTRAMUSCULAR; INTRAVENOUS ONCE
Status: COMPLETED | OUTPATIENT
Start: 2022-05-19 | End: 2022-05-19

## 2022-05-19 RX ORDER — INSULIN LISPRO 100 [IU]/ML
5 INJECTION, SOLUTION INTRAVENOUS; SUBCUTANEOUS
Status: DISCONTINUED | OUTPATIENT
Start: 2022-05-20 | End: 2022-05-20 | Stop reason: HOSPADM

## 2022-05-19 RX ORDER — INSULIN LISPRO 100 [IU]/ML
2-12 INJECTION, SOLUTION INTRAVENOUS; SUBCUTANEOUS
Status: DISCONTINUED | OUTPATIENT
Start: 2022-05-20 | End: 2022-05-19

## 2022-05-19 RX ORDER — PREDNISONE 20 MG/1
40 TABLET ORAL DAILY
Status: CANCELLED | OUTPATIENT
Start: 2022-05-20 | End: 2022-05-23

## 2022-05-19 RX ORDER — METOPROLOL SUCCINATE 25 MG/1
25 TABLET, EXTENDED RELEASE ORAL EVERY 6 HOURS PRN
Status: CANCELLED | OUTPATIENT
Start: 2022-05-19

## 2022-05-19 RX ORDER — METOPROLOL TARTRATE 5 MG/5ML
5 INJECTION INTRAVENOUS EVERY 6 HOURS
Status: DISCONTINUED | OUTPATIENT
Start: 2022-05-19 | End: 2022-05-19

## 2022-05-19 RX ORDER — NICOTINE 21 MG/24HR
1 PATCH, TRANSDERMAL 24 HOURS TRANSDERMAL DAILY
Status: DISCONTINUED | OUTPATIENT
Start: 2022-05-20 | End: 2022-05-20 | Stop reason: HOSPADM

## 2022-05-19 RX ORDER — METOPROLOL SUCCINATE 25 MG/1
25 TABLET, EXTENDED RELEASE ORAL EVERY 6 HOURS PRN
Status: DISCONTINUED | OUTPATIENT
Start: 2022-05-19 | End: 2022-05-19

## 2022-05-19 RX ADMIN — DILTIAZEM HYDROCHLORIDE 30 MG: 30 TABLET, FILM COATED ORAL at 23:56

## 2022-05-19 RX ADMIN — DOXYCYCLINE 100 MG: 100 CAPSULE ORAL at 22:00

## 2022-05-19 RX ADMIN — FUROSEMIDE 40 MG: 10 INJECTION, SOLUTION INTRAVENOUS at 22:30

## 2022-05-19 RX ADMIN — NICOTINE 1 PATCH: 14 PATCH, EXTENDED RELEASE TRANSDERMAL at 11:17

## 2022-05-19 RX ADMIN — PREDNISONE 40 MG: 20 TABLET ORAL at 11:17

## 2022-05-19 RX ADMIN — GUAIFENESIN AND DEXTROMETHORPHAN 10 ML: 100; 10 SYRUP ORAL at 06:31

## 2022-05-19 RX ADMIN — DILTIAZEM HYDROCHLORIDE 30 MG: 30 TABLET, FILM COATED ORAL at 17:32

## 2022-05-19 RX ADMIN — INSULIN LISPRO 6 UNITS: 100 INJECTION, SOLUTION INTRAVENOUS; SUBCUTANEOUS at 12:34

## 2022-05-19 RX ADMIN — INSULIN LISPRO 8 UNITS: 100 INJECTION, SOLUTION INTRAVENOUS; SUBCUTANEOUS at 07:49

## 2022-05-19 RX ADMIN — GUAIFENESIN AND DEXTROMETHORPHAN 10 ML: 100; 10 SYRUP ORAL at 17:34

## 2022-05-19 RX ADMIN — INSULIN LISPRO 5 UNITS: 100 INJECTION, SOLUTION INTRAVENOUS; SUBCUTANEOUS at 12:35

## 2022-05-19 RX ADMIN — DILTIAZEM HYDROCHLORIDE 15 MG/HR: 5 INJECTION INTRAVENOUS at 06:38

## 2022-05-19 RX ADMIN — DULOXETINE HYDROCHLORIDE 30 MG: 30 CAPSULE, DELAYED RELEASE ORAL at 23:00

## 2022-05-19 RX ADMIN — METOPROLOL TARTRATE 25 MG: 25 TABLET, FILM COATED ORAL at 02:10

## 2022-05-19 RX ADMIN — DIGOXIN 250 MCG: 0.25 INJECTION INTRAMUSCULAR; INTRAVENOUS at 11:06

## 2022-05-19 RX ADMIN — INSULIN LISPRO 6 UNITS: 100 INJECTION, SOLUTION INTRAVENOUS; SUBCUTANEOUS at 17:32

## 2022-05-19 RX ADMIN — INSULIN LISPRO 5 UNITS: 100 INJECTION, SOLUTION INTRAVENOUS; SUBCUTANEOUS at 07:49

## 2022-05-19 RX ADMIN — GUAIFENESIN 600 MG: 600 TABLET, EXTENDED RELEASE ORAL at 22:00

## 2022-05-19 RX ADMIN — INSULIN LISPRO 5 UNITS: 100 INJECTION, SOLUTION INTRAVENOUS; SUBCUTANEOUS at 17:33

## 2022-05-19 RX ADMIN — FLUTICASONE FUROATE AND VILANTEROL TRIFENATATE 1 PUFF: 100; 25 POWDER RESPIRATORY (INHALATION) at 11:26

## 2022-05-19 RX ADMIN — ATORVASTATIN CALCIUM 40 MG: 40 TABLET, FILM COATED ORAL at 11:17

## 2022-05-19 RX ADMIN — METOPROLOL TARTRATE 25 MG: 25 TABLET, FILM COATED ORAL at 19:28

## 2022-05-19 RX ADMIN — METOPROLOL TARTRATE 25 MG: 25 TABLET, FILM COATED ORAL at 07:49

## 2022-05-19 RX ADMIN — DILTIAZEM HYDROCHLORIDE 30 MG: 30 TABLET, FILM COATED ORAL at 12:34

## 2022-05-19 RX ADMIN — DOXYCYCLINE 100 MG: 100 CAPSULE ORAL at 11:17

## 2022-05-19 RX ADMIN — INSULIN GLARGINE 35 UNITS: 100 INJECTION, SOLUTION SUBCUTANEOUS at 23:00

## 2022-05-19 RX ADMIN — RIVAROXABAN 20 MG: 20 TABLET, FILM COATED ORAL at 07:49

## 2022-05-19 NOTE — PLAN OF CARE
Problem: Potential for Falls  Goal: Patient will remain free of falls  Description: INTERVENTIONS:  - Educate patient/family on patient safety including physical limitations  - Instruct patient to call for assistance with activity   - Consult OT/PT to assist with strengthening/mobility   - Keep Call bell within reach  - Keep bed low and locked with side rails adjusted as appropriate  - Keep care items and personal belongings within reach  - Initiate and maintain comfort rounds  - Make Fall Risk Sign visible to staff  - Offer Toileting  Hours, in advance of need  - Initiate/Maintain alarm  - Obtain necessary fall risk management equipment:   - Apply yellow socks and bracelet for high fall risk patients  - Consider moving patient to room near nurses station  Outcome: Progressing     Problem: CARDIOVASCULAR - ADULT  Goal: Maintains optimal cardiac output and hemodynamic stability  Description: INTERVENTIONS:  - Monitor I/O, vital signs and rhythm  - Monitor for S/S and trends of decreased cardiac output  - Administer and titrate ordered vasoactive medications to optimize hemodynamic stability  - Assess quality of pulses, skin color and temperature  - Assess for signs of decreased coronary artery perfusion  - Instruct patient to report change in severity of symptoms  Outcome: Progressing  Goal: Absence of cardiac dysrhythmias or at baseline rhythm  Description: INTERVENTIONS:  - Continuous cardiac monitoring, vital signs, obtain 12 lead EKG if ordered  - Administer antiarrhythmic and heart rate control medications as ordered  - Monitor electrolytes and administer replacement therapy as ordered  Outcome: Progressing     Problem: RESPIRATORY - ADULT  Goal: Achieves optimal ventilation and oxygenation  Description: INTERVENTIONS:  - Assess for changes in respiratory status  - Assess for changes in mentation and behavior  - Position to facilitate oxygenation and minimize respiratory effort  - Oxygen administered by appropriate delivery if ordered  - Initiate smoking cessation education as indicated  - Encourage broncho-pulmonary hygiene including cough, deep breathe, Incentive Spirometry  - Assess the need for suctioning and aspirate as needed  - Assess and instruct to report SOB or any respiratory difficulty  - Respiratory Therapy support as indicated  Outcome: Progressing     Problem: METABOLIC, FLUID AND ELECTROLYTES - ADULT  Goal: Glucose maintained within target range  Description: INTERVENTIONS:  - Monitor Blood Glucose as ordered  - Assess for signs and symptoms of hyperglycemia and hypoglycemia  - Administer ordered medications to maintain glucose within target range  - Assess nutritional intake and initiate nutrition service referral as needed  Outcome: Progressing     Problem: SKIN/TISSUE INTEGRITY - ADULT  Goal: Incision(s), wounds(s) or drain site(s) healing without S/S of infection  Description: INTERVENTIONS  - Assess and document dressing, incision, wound bed, drain sites and surrounding tissue  - Provide patient and family education  - Perform skin care/dressing changes every   Outcome: Progressing     Problem: PAIN - ADULT  Goal: Verbalizes/displays adequate comfort level or baseline comfort level  Description: Interventions:  - Encourage patient to monitor pain and request assistance  - Assess pain using appropriate pain scale  - Administer analgesics based on type and severity of pain and evaluate response  - Implement non-pharmacological measures as appropriate and evaluate response  - Consider cultural and social influences on pain and pain management  - Notify physician/advanced practitioner if interventions unsuccessful or patient reports new pain  Outcome: Progressing     Problem: INFECTION - ADULT  Goal: Absence or prevention of progression during hospitalization  Description: INTERVENTIONS:  - Assess and monitor for signs and symptoms of infection  - Monitor lab/diagnostic results  - Monitor all insertion sites, i e  indwelling lines, tubes, and drains  - Monitor endotracheal if appropriate and nasal secretions for changes in amount and color  - [place] appropriate cooling/warming therapies per order  - Administer medications as ordered  - Instruct and encourage patient and family to use good hand hygiene technique  - Identify and instruct in appropriate isolation precautions for identified infection/condition  Outcome: Progressing  Goal: Absence of fever/infection during neutropenic period  Description: INTERVENTIONS:  - Monitor WBC    Outcome: Progressing     Problem: DISCHARGE PLANNING  Goal: Discharge to home or other facility with appropriate resources  Description: INTERVENTIONS:  - Identify barriers to discharge w/patient and caregiver  - Arrange for needed discharge resources and transportation as appropriate  - Identify discharge learning needs (meds, wound care, etc )  - Arrange for interpretive services to assist at discharge as needed  - Refer to Case Management Department for coordinating discharge planning if the patient needs post-hospital services based on physician/advanced practitioner order or complex needs related to functional status, cognitive ability, or social support system  Outcome: Progressing     Problem: Knowledge Deficit  Goal: Patient/family/caregiver demonstrates understanding of disease process, treatment plan, medications, and discharge instructions  Description: Complete learning assessment and assess knowledge base    Interventions:  - Provide teaching at level of understanding  - Provide teaching via preferred learning methods  Outcome: Progressing

## 2022-05-19 NOTE — PLAN OF CARE
Problem: CARDIOVASCULAR - ADULT  Goal: Maintains optimal cardiac output and hemodynamic stability  Description: INTERVENTIONS:  - Monitor I/O, vital signs and rhythm  - Monitor for S/S and trends of decreased cardiac output  - Administer and titrate ordered vasoactive medications to optimize hemodynamic stability  - Assess quality of pulses, skin color and temperature  - Assess for signs of decreased coronary artery perfusion  - Instruct patient to report change in severity of symptoms  Outcome: Progressing     Problem: CARDIOVASCULAR - ADULT  Goal: Absence of cardiac dysrhythmias or at baseline rhythm  Description: INTERVENTIONS:  - Continuous cardiac monitoring, vital signs, obtain 12 lead EKG if ordered  - Administer antiarrhythmic and heart rate control medications as ordered  - Monitor electrolytes and administer replacement therapy as ordered  Outcome: Progressing     Problem: RESPIRATORY - ADULT  Goal: Achieves optimal ventilation and oxygenation  Description: INTERVENTIONS:  - Assess for changes in respiratory status  - Assess for changes in mentation and behavior  - Position to facilitate oxygenation and minimize respiratory effort  - Oxygen administered by appropriate delivery if ordered  - Initiate smoking cessation education as indicated  - Encourage broncho-pulmonary hygiene including cough, deep breathe, Incentive Spirometry  - Assess the need for suctioning and aspirate as needed  - Assess and instruct to report SOB or any respiratory difficulty  - Respiratory Therapy support as indicated  Outcome: Progressing     Problem: PAIN - ADULT  Goal: Verbalizes/displays adequate comfort level or baseline comfort level  Description: Interventions:  - Encourage patient to monitor pain and request assistance  - Assess pain using appropriate pain scale  - Administer analgesics based on type and severity of pain and evaluate response  - Implement non-pharmacological measures as appropriate and evaluate response  - Consider cultural and social influences on pain and pain management  - Notify physician/advanced practitioner if interventions unsuccessful or patient reports new pain  Outcome: Progressing

## 2022-05-19 NOTE — UTILIZATION REVIEW
Initial Clinical Review    Admission: Date/Time/Statement:   Admission Orders (From admission, onward)     Ordered        05/18/22 1624  INPATIENT ADMISSION  Once                      Orders Placed This Encounter   Procedures    INPATIENT ADMISSION     Standing Status:   Standing     Number of Occurrences:   1     Order Specific Question:   Level of Care     Answer:   Level 2 Stepdown / HOT [14]     Order Specific Question:   Estimated length of stay     Answer:   More than 2 Midnights     Order Specific Question:   Certification     Answer:   I certify that inpatient services are medically necessary for this patient for a duration of greater than two midnights  See H&P and MD Progress Notes for additional information about the patient's course of treatment  ED Arrival Information     Expected   -    Arrival   5/18/2022 12:51    Acuity   Emergent            Means of arrival   Walk-In    Escorted by   [facility]    Admission type   Emergency            Arrival complaint   shortness of breath           Chief Complaint   Patient presents with    Shortness of Breath       Initial Presentation: 55 y o  male who presented self from home to [address] [place], [place] [address] [facility] ED  Inpatient admission for evaluation and treatment of atrial flutter w/ RVR, SIRS, COPD exacerbation  PMHx: Asthma, Chronic pain, T2DM, HLD, HTN  Presented w/ 4 days of productive cough, [place], chest discomfort  On exam, tachycardic, congestion, wheezing, b/l LE edema  proBNP 1,750  CXR shows pulmonary vascular congestion/edema  EKG rate over 150 bpm w/ narrow complex sawtooth pattern  Given adenosine x 24 mg in ED without change to rhythm  Plan: cardizem gtt, lopressor 5 mg IV q5h, telemetry, trend labs, doxycycline, PO prednisone burst, continue home meds except home diltiazem, reduce lantus, accuchecks w/ SSI  Trend labs, replete electrolytes as needed  Cardiology consulted        Date: 05/19/22   Day 2  Cardiology Consult: Pt w/ atrial flutter w/ RVR  EKG 2:1 atrial flutter  Telemetry atrial flutter w/ RVR  On exam, tachycardic  Plan: continue IV diltiazem gtt, IV metoprolol 5 mg q6h, PO metoprolol 25 mg q6h, IV digoxin 250 mcg once, start PO cardizem 30 mg q6h, continue Xarelto, check echo, NPO after midnight for LAURENCE cardioversion tomorrow  Pt will need to be transferred to [address] [place] [address] for this procedure       ED Triage Vitals [05/18/22 1309]   Temperature Pulse Respirations Blood Pressure SpO2   99 °F (37 2 °C) (!) 155 (!) 24 (!) 160/103 98 %      Temp Source Heart Rate Source Patient Position - Orthostatic VS BP Location FiO2 (%)   Oral Monitor Lying Left arm --      Pain Score       7          Wt Readings from Last 1 Encounters:   05/19/22 129 kg (284 lb)     Additional Vital Signs:  Date/Time Temp Pulse Resp BP MAP (mmHg) SpO2 O2 Device   05/19/22 1230 -- 62 -- 121/85 -- -- --   05/19/22 1047 -- 88 -- 140/80 -- -- --   05/19/22 1000 -- 140 Abnormal  -- 133/84 -- -- --   05/19/22 0738 96 7 °F (35 9 °C) Abnormal  139 Abnormal  19 133/84 89 94 % None (Room air)   05/19/22 0425 96 1 °F (35 6 °C) Abnormal  139 Abnormal  19 105/75 91 94 % None (Room air)   05/19/22 0212 -- 141 Abnormal  -- 118/99 -- -- --   05/18/22 2345 96 7 °F (35 9 °C) Abnormal  142 Abnormal  19 124/86 -- 94 % None (Room air)   05/18/22 2322 -- 142 Abnormal  19 -- -- 92 % --   05/18/22 2200 96 5 °F (35 8 °C) Abnormal  147 Abnormal  20 121/88 103 92 % None (Room air)   05/18/22 1950 96 7 °F (35 9 °C) Abnormal  149 Abnormal  20 130/98 -- 95 % None (Room air)   05/18/22 1745 96 3 °F (35 7 °C) Abnormal  144 Abnormal  20 135/109 Abnormal  116 98 % None (Room air)   05/18/22 1730 -- 149 Abnormal  20 162/86 -- -- --   05/18/22 1715 -- 147 Abnormal  20 123/86 -- 100 % None (Room air)   05/18/22 1645 -- 148 Abnormal  20 135/90 -- 100 % None (Room air)   05/18/22 1630 -- 147 Abnormal  20 129/71 -- 100 % None (Room air)   05/18/22 1615 -- 149 Abnormal  -- 123/97 -- -- --   05/18/22 1600 -- 149 Abnormal  20 135/95 -- -- --   05/18/22 1552 -- 151 Abnormal  20 155/115 Abnormal  -- 100 % None (Room air)   05/18/22 1548 -- 150 Abnormal  -- 155/115 Abnormal  -- -- --   05/18/22 1535 -- 150 Abnormal  -- 130/97 -- -- --   05/18/22 1530 -- 153 Abnormal  20 149/105 Abnormal  -- 100 % None (Room air)   05/18/22 1510 -- 150 Abnormal  25 Abnormal  130/88 -- 96 % None (Room air)   05/18/22 1400 -- 153 Abnormal  22 154/108 Abnormal  -- 98 % None (Room air)       Pertinent Labs/Diagnostic Test Results:   5/18 - EKG  Atrial flutter with 2 to 1 block  Possible Anterior infarct , age undetermined  T wave abnormality, consider lateral ischemia    5/19 - Echo    Left Ventricle: Left ventricular cavity size is normal  Wall thickness is mildly increased  The left ventricular ejection fraction is 38% by biplane measurement  Systolic function is moderately reduced  Wall motion cannot be accurately assessed due to the extreme tachycardic rate    Right Ventricle: Systolic function is mildly reduced  Abnormal tricuspid annular plane systolic excursion (TAPSE) < 1 7 cm    Left Atrium: The atrium is moderately dilated (42-48 mL/m2)    Right Atrium: The atrium is moderately dilated    Atrial Septum: 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, mobile septum primum aneurysm  It is predominately within the right atrial cavity    Mitral Valve: There is mild annular calcification    Tricuspid Valve: The right ventricular systolic pressure is normal  The estimated right ventricular systolic pressure is 65 00 mmHg    Pericardium: There is a trivial pericardial effusion posterior to the heart  XR chest portable   Final Result by Mark Foster MD (05/18 1433)      Mild central perihilar pulmonary vascular congestion/edema is seen  No pneumothorax or lobar airspace consolidation                    Workstation performed: [id number]           Results from last 7 days   Lab Units 05/18/22  1317   SARS-COV-2  Negative Results from last 7 days   Lab Units 05/19/22  0509 05/18/22  1317   WBC Thousand/uL 6 56 6 62   HEMOGLOBIN g/dL 13 1 13 0   HEMATOCRIT % 44 6 43 1   PLATELETS Thousands/uL 197 177   NEUTROS ABS Thousands/µL  --  4 71     Results from last 7 days   Lab Units 05/19/22  0509 05/18/22  1327   SODIUM mmol/L 133* 136*   POTASSIUM mmol/L 4 9 4 2   CHLORIDE mmol/L 100 102   CO2 mmol/L 26 29   ANION GAP mmol/L 7 5   BUN mg/dL 12 10   CREATININE mg/dL 0 63* 0 67*   EGFR ml/min/1 73sq m 110 108   CALCIUM mg/dL 8 6 8 8   MAGNESIUM mg/dL 1 8  --    PHOSPHORUS mg/dL 3 0  --      Results from last 7 days   Lab Units 05/19/22  0509 05/18/22  1327   AST U/L 68* 71*   ALT U/L 95* 83*   ALK PHOS U/L 70 67   TOTAL PROTEIN g/dL 7 1 7 1   ALBUMIN g/dL 3 7 3 8   TOTAL BILIRUBIN mg/dL 0 56 0 47     Results from last 7 days   Lab Units 05/19/22  1118 05/19/22  0612 05/18/22  2041   POC GLUCOSE mg/dl 292* 317* 309*     Results from last 7 days   Lab Units 05/19/22  0509 05/18/22  1327   GLUCOSE RANDOM mg/dL 348* 287*     Results from last 7 days   Lab Units 05/18/22  1557 05/18/22  1327   HS TNI 0HR ng/L  --  6   HS TNI 4HR ng/L 8  --    HSTNI D4 ng/L 2  --          Results from last 7 days   Lab Units 05/18/22  1327   PROTIME seconds 12 9   INR  1 01   PTT seconds 21*         Results from last 7 days   Lab Units 05/18/22  1346   PROCALCITONIN ng/ml 0 16     Results from last 7 days   Lab Units 05/18/22  1327   LACTIC ACID mmol/L 1 9     Results from last 7 days   Lab Units 05/18/22  1327   NT-PRO BNP pg/mL 1,750*     Results from last 7 days   Lab Units 05/18/22  1854   CLARITY UA  Clear   COLOR UA  Yellow   SPEC GRAV UA  1 015   PH UA  6 0   GLUCOSE UA mg/dl >=1000 (1%)*   KETONES UA mg/dl Negative   BLOOD UA  Negative   PROTEIN UA mg/dl 15 (Trace)*   NITRITE UA  Negative   BILIRUBIN UA  Negative   UROBILINOGEN UA mg/dL Negative   LEUKOCYTES UA  Negative   WBC UA /hpf None Seen   RBC UA /hpf None Seen   BACTERIA UA /hpf None Seen EPITHELIAL CELLS WET PREP /hpf None Seen   MUCUS THREADS  Moderate*     Results from last 7 days   Lab Units 05/18/22  1317   INFLUENZA A PCR  Negative   INFLUENZA B PCR  Negative   RSV PCR  Negative     Results from last 7 days   Lab Units 05/18/22  1346 05/18/22  1327   BLOOD CULTURE  Received in Microbiology Lab  Culture in Progress  Received in Microbiology Lab  Culture in Progress         ED Treatment:   Medication Administration from 05/18/2022 1251 to 05/18/2022 1758       Date/Time Order Dose Route Action     05/18/2022 1356 cefepime (MAXIPIME) IVPB (premix in dextrose) 2,000 mg 50 mL 2,000 mg Intravenous New Bag     05/18/2022 1345 ibuprofen (MOTRIN) tablet 600 mg 600 mg Oral Given     05/18/2022 1345 acetaminophen (TYLENOL) tablet 650 mg 650 mg Oral Given     05/18/2022 1405 metoprolol (LOPRESSOR) injection 5 mg 5 mg Intravenous Given     05/18/2022 1510 diltiazem (CARDIZEM) 125 mg in sodium chloride 0 9 % 125 mL infusion 5 mg/hr Intravenous New Bag     05/18/2022 1504 diltiazem (CARDIZEM) injection 15 mg 15 mg Intravenous Given     05/18/2022 1526 adenosine (ADENOCARD) injection 6 mg 6 mg Intravenous Given     05/18/2022 1537 adenosine (ADENOCARD) injection 12 mg 12 mg Intravenous Given     05/18/2022 1546 adenosine (ADENOCARD) injection 6 mg 6 mg Intravenous Given     05/18/2022 1553 diltiazem (CARDIZEM) 125 mg in sodium chloride 0 9 % 125 mL infusion 15 mg/hr Intravenous New Bag     05/18/2022 1559 metoprolol (LOPRESSOR) injection 5 mg 5 mg Intravenous Given        Past Medical History:   Diagnosis Date    Asthma     Carpal tunnel syndrome     Chronic pain     left arm    Diabetes mellitus ([address] )     Erectile dysfunction     Hypertension     Umbilical hernia      Present on Admission:   Essential hypertension   Tobacco dependence   Other hyperlipidemia   Varicose veins of bilateral lower extremities with other complications      Admitting Diagnosis: Shortness of breath [R06 02]  Volume overload [E87 70]  Atypical atrial flutter ([address] ) [I48 4]  Legally blind in right eye, as defined in [place] [H54 8]  Age/Sex: 55 y o  male  Admission Orders:  Cardiac Diet w/ 2 gm sodium restriction  accuchecks ACHS  DW  I&O   SCDs  Scheduled Medications:  atorvastatin, 40 mg, Oral, Daily  diltiazem, 30 mg, Oral, Q6H JOHNNY  doxycycline hyclate, 100 mg, Oral, Daily  DULoxetine, 30 mg, Oral, HS  fluticasone-vilanterol, 1 puff, Inhalation, Daily  insulin glargine, 25 Units, Subcutaneous, HS  insulin lispro, 2-12 Units, Subcutaneous, TID AC  insulin lispro, 5 Units, Subcutaneous, TID With Meals  metoprolol, 5 mg, Intravenous, Q6H  metoprolol tartrate, 25 mg, Oral, Q6H  nicotine, 1 patch, Transdermal, Daily  predniSONE, 40 mg, Oral, Daily  rivaroxaban, 20 mg, Oral, Daily With Breakfast      Continuous IV Infusions:  diltiazem, 15 mg/hr, Intravenous, Continuous      PRN Meds:  albuterol, 2 puff, Inhalation, Q4H PRN  benzonatate, 100 mg, Oral, TID PRN  dextromethorphan-guaiFENesin, 10 mL, Oral, Q4H PRN; 5/18 x1, 5/19 x1  digoxin, 250 mcg, Intravenous; 5/19 x1  metoprolol, 5 mg, Intravenous; 5/18 x1  metoprolol succinate, 25 mg, Oral, Q6H PRN        IP CONSULT TO CARDIOLOGY  IP CONSULT TO CASE MANAGEMENT    Network Utilization Review Department  ATTENTION: Please call with any questions or concerns to [phone] and carefully listen to the prompts so that you are directed to the right person  All voicemails are confidential   Jairo Ng all requests for admission clinical reviews, approved or denied determinations and any other requests to dedicated fax number below belonging to the campus where the patient is receiving treatment   List of dedicated fax numbers for the Facilities:  [address] DENIALS (Administrative/Medical Necessity) [phone]   [address] [place] [address] (Maternity/NICU/Pediatrics) [phone]   [address] [place] [address] [facility] [address] [address] [place] [address] [phone] [place] [id number] [phone]

## 2022-05-19 NOTE — CONSULTS
Cardiology Consultation  MD Dl Rosa MD Charlanne Elms, DO, MD Dagmar Barboza DO, Alok Burns DO, [facility]  ----------------------------------------------------------------  [address] [place] [address] [address]  55 y o  male MRN: [id number]  Unit/Bed#: 7T [facility] 324-01 Encounter: [id number]      22    Referring Physician: Lazaro Anderson MD    Chief Complain/Reason for Referal:  Atrial flutter    IMPRESSION:  1  Atrial flutter with rapid ventricular response  2  History of atrial fibrillation, on Xarelto, with questionable medication compliance  3  COPD with COPD exacerbation  4  Current tobacco use  5  Dyslipidemia  6  Hypertension  7  Type 2 diabetes    DISCUSSION/RECOMMENDATIONS:  Sarah Scott He presents with worsening productive cough as well as atrial flutter with rapid ventricular response  Continue with antibiotics and steroids per primary team   He is given adenosine in the emergency department which did not convert (would not expect it to with atrial flutter)   He is currently on diltiazem at 15 milligrams/hour, IV metoprolol 5 mg every 6 hours and p o  Metoprolol 25 mg every 6 hours   Would give IV digoxin 250 mcg x 1 now, and start p o  Cardizem 30 mg every 6 hours   Continue with anticoagulation with Xarelto   Will check new echocardiogram   Keep NPO after midnight and plan for LAURENCE cardioversion tomorrow  He will need to be transferred to [facility] for this  Spoke to the Cardiology Service at [facility] regarding him    I will be there tomorrow to do the procedure    Aiden Arguello DO, [facility]    ----------------------------------------------------  EK-1 atrial flutter, typical atrial flutter  TELE:  Atrial flutter with RVR      ======================================================    HPI:  I am seeing this patient in cardiology consultation for:  Atrial flutter    Mayra Lozano Jacky Mills  is a 55 y o  male with:   · History of atrial fibrillation, on Xarelto, with questionable medication compliance  · COPD with COPD exacerbation  · Current tobacco use  · Dyslipidemia  · Hypertension  · Type 2 diabetes    He presents with about a 2 week history of productive cough, which is worsening  He states that 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 Medicine Service    He still remains uncontrolled regarding his ventricular rate which is currently 140 beats per minute    Unfortunately he was not kept NPO and ate breakfast today so could not do LAURENCE cardioversion today    Past Medical History:   Diagnosis Date    Asthma     Carpal tunnel syndrome     Chronic pain     left arm    Diabetes mellitus ([address] )     Erectile dysfunction     Hypertension     Umbilical hernia          Scheduled Meds:  Current Facility-Administered Medications   Medication Dose Route Frequency Provider Last Rate    albuterol  2 puff Inhalation Q4H PRN Preet Marie MD      atorvastatin  40 mg Oral Daily Preet Marie MD      benzonatate  100 mg Oral TID PRN Preet Marie MD      dextromethorphan-guaiFENesin  10 mL Oral Q4H PRN Preet Marie MD      digoxin  250 mcg Intravenous Once Selena Mena DO      diltiazem  15 mg/hr Intravenous Continuous Preet Marie MD 15 mg/hr (05/19/22 3906)    diltiazem  30 mg Oral Q6H [facility] Alta Medico, DO      doxycycline hyclate  100 mg Oral Daily Светлана Wilder MD      DULoxetine  30 mg Oral HS Mali Gordon MD      fluticasone-vilanterol  1 puff Inhalation Daily Jessie Ficks, MD Julieann Frankel insulin glargine  25 Units Subcutaneous HS Mali Gordon MD      insulin lispro  2-12 Units Subcutaneous TID AC Светлана Wilder MD      insulin lispro  5 Units Subcutaneous TID With Meals Светлана Wilder MD      metoprolol  5 mg Intravenous Q6H Annette Burger MD      metoprolol succinate  25 mg Oral Q6H PRN Annette Burger MD      metoprolol tartrate  25 mg Oral Q6H Annette Burger MD      nicotine  1 patch Transdermal Daily Mali Gordon MD      predniSONE  40 mg Oral Daily Mali Gordon MD      rivaroxaban  20 mg Oral Daily With Breakfast Mali Gordon MD       Continuous Infusions:diltiazem, 15 mg/hr, Last Rate: 15 mg/hr (05/19/22 4676)      PRN Meds:   albuterol    benzonatate    dextromethorphan-guaiFENesin    metoprolol succinate  No Known Allergies  I reviewed the Home Medication list in the chart       Family History   Problem Relation Age of Onset    Breast cancer additional onset Mother     Diabetes Father     Hyperlipidemia Father     Hypertension Father     Seizures Father     No Known Problems Son     No Known Problems Son     Vitiligo Son        Social History     Socioeconomic History    Marital status:      Spouse name: Not on file    Number of children: Not on file    Years of education: Not on file    Highest education level: Not on file   Occupational History    Not on file   Tobacco Use    Smoking status: Current Every Day Smoker     Packs/day: 0 25     Types: Cigarettes    Smokeless tobacco: Never Used   Vaping Use    Vaping Use: Never used   Substance and Sexual Activity    Alcohol use: Yes     Comment: occassional    Drug use: Never    Sexual activity: Yes     Partners: Female   Other Topics Concern    Not on file   Social History Narrative    Not on file     Social Determinants of Health     Financial Resource Strain: High Risk    Difficulty of Paying Living Expenses: Very hard   Food Insecurity: No Food Insecurity    Worried About Running Out of Food in the Last Year: Never true    Fuentes of Food in the Last Year: Never true   Transportation Needs: No Transportation Needs    Lack of Transportation (Medical): No    Lack of Transportation (Non-Medical): No   Physical Activity: Not on file   Stress: Not on file   Social Connections: Not on file   Intimate Partner Violence: Not on file   Housing Stability: Not on file       Review of Systems   Review of Systems   Constitutional: Negative for chills and fever  HENT: Negative for facial swelling and sore throat  Eyes: Negative for visual disturbance  Respiratory: Positive for cough, chest tightness and shortness of breath  Negative for wheezing  Cardiovascular: Positive for palpitations  Negative for chest pain and leg swelling  Gastrointestinal: Negative for abdominal pain, blood in stool, constipation, diarrhea, nausea and vomiting  Endocrine: Negative for cold intolerance and heat intolerance  Genitourinary: Negative for decreased urine volume, difficulty urinating, dysuria and hematuria  Musculoskeletal: Negative for arthralgias, back pain and myalgias  Skin: Negative for rash  Neurological: Negative for dizziness, syncope, weakness and numbness  Psychiatric/Behavioral: Negative for agitation, behavioral problems and confusion  The patient is not nervous/anxious  Vitals:    05/19/22 1000   BP: 133/84   Pulse: (!) 140   Resp:    Temp:    SpO2:      I/O       05/17 0701  05/18 0700 05/18 0701  05/19 0700 05/19 0701  05/20 0700    P  O   940 240    I V  (mL/kg)  1050 (8 1)     IV Piggyback  100     Total Intake(mL/kg)  2090 (16 2) 240 (1 9)    Urine (mL/kg/hr)  1860 600 (1 2)    Total Output  1860 600    Net  +230 -360 Weight (last 2 days)     Date/Time Weight    05/19/22 1000 129 (284)    05/19/22 0600 129 (284 61)    05/18/22 1745 129 (284 39)    05/18/22 1309 129 (284 17)          Physical Exam  Constitutional: awake, alert and oriented, in no acute distress, no obvious deformities, obese male  Head: Normocephalic, without obvious abnormality, atraumatic  Eyes: conjunctivae clear and moist  Sclera anicteric  No xanthelasmas  Pupils equal bilaterally  Extraocular motions are full  Ear nose mouth and throat: ears are symmetrical bilaterally, hearing appears to be equal bilaterally, no nasal discharge or epistaxis, oropharynx is clear with moist mucous membranes  Neck:  Trachea is midline, neck is supple, no thyromegaly or significant lymphadenopathy, there is full range of motion  Lungs: clear to auscultation bilaterally, no wheezes, no rales, no rhonchi, no accessory muscle use, breathing is nonlabored  Heart:  Regular tachycardic rhythm, S1, S2 normal, no murmur, no click, no rub and no gallop, no lower extremity edema  Abdomen:  Obese, soft, non-tender; bowel sounds normal; no masses,  no organomegaly  Psychiatric:  Patient is oriented to time, place, person, mood/affect is negative for depression, anxiety, agitation, appears to have appropriate insight  Skin: Skin is warm, dry, intact  No obvious rashes or lesions on exposed extremities  Nail beds are pink with no cyanosis or clubbing      Results from last 7 days   Lab Units 05/19/22  0509 05/18/22  1317   WBC Thousand/uL 6 56 6 62   HEMOGLOBIN g/dL 13 1 13 0   HEMATOCRIT % 44 6 43 1   PLATELETS Thousands/uL 197 177   NEUTROS PCT %  --  72   MONOS PCT %  --  9     Results from last 7 days   Lab Units 05/19/22  0509 05/18/22  1327   POTASSIUM mmol/L 4 9 4 2   CHLORIDE mmol/L 100 102   CO2 mmol/L 26 29   BUN mg/dL 12 10   CREATININE mg/dL 0 63* 0 67*   CALCIUM mg/dL 8 6 8 8     Results from last 7 days   Lab Units 05/19/22  0509 05/18/22  1327   POTASSIUM mmol/L 4 9 4  2   CHLORIDE mmol/L 100 102   CO2 mmol/L 26 29   BUN mg/dL 12 10   CREATININE mg/dL 0 63* 0 67*   CALCIUM mg/dL 8 6 8 8   ALK PHOS U/L 70 67   ALT U/L 95* 83*   AST U/L 68* 71*     No results found for: TROPONINT              Results from last 7 days   Lab Units 05/18/22  1327   INR  1 01               I have personally reviewed the EKG, CXR and Telemetry images directly  Patient Active Problem List    Diagnosis Date Noted    Atrial flutter with rapid ventricular response ([facility] ) 05/18/2022    Sepsis ([facility] ) 05/18/2022    Legally blind in right eye, as defined in [place] 05/18/2022    SIRS (systemic inflammatory response syndrome) ([facility] ) 05/18/2022    Ventral hernia without obstruction or gangrene 04/27/2022    Primary insomnia 04/27/2022    Bilateral chronic knee pain 10/12/2021    Chronic midline thoracic back pain 05/20/2021    Atrial fibrillation with RVR ([facility] ) 02/05/2020    Numbness and tingling in left hand 01/16/2020    COPD exacerbation ([facility] ) 01/15/2020    Mild persistent asthma 01/15/2020    Relationship dysfunction 01/13/2020    Blurred vision, right eye 01/13/2020    Ulnar neuropathy at elbow, left     Left carpal tunnel syndrome     Medial epicondylitis of elbow, left 10/31/2019    Snoring 10/31/2019    Other hyperlipidemia 10/29/2019    Nocturia 10/17/2019    Traumatic amputation of finger 09/30/2019    Tobacco dependence 09/30/2019    Numbness of left hand 05/24/2019    Shoulder pain 03/14/2018    Other male erectile dysfunction 03/12/2018    Essential hypertension 01/30/2018    Type 2 diabetes mellitus without complication, without long-term current use of insulin ([facility] ) 07/01/2016    Varicose veins of bilateral lower extremities with other complications 39/47/2063       Portions of the record may have been created with voice recognition software   Occasional wrong word or "sound a like" substitutions may have occurred due to the inherent limitations of voice recognition software  Read the chart carefully and recognize, using context, where substitutions have occurred      Jimmy Valencia DO, [facility]  5/19/2022 10:49 AM

## 2022-05-19 NOTE — NURSING NOTE
Pt was D/C with belongings with the transport team  Pt did not have any home medication in pharmacy  Report was called to [facility]  No distress upon discharge

## 2022-05-19 NOTE — UTILIZATION REVIEW
Inpatient Admission Authorization Request   NOTIFICATION OF INPATIENT ADMISSION/INPATIENT AUTHORIZATION REQUEST   SERVICING FACILITY:   [address] [place] [address]  [address] , [address]  Tax ID: [id number]  NPI: [id number]  Place of Service: Inpatient [address]  Place of Service Code: 24     ATTENDING PROVIDER:  Attending Name and NPI#: Braden John Md [[id number]]  Address: [address] , [address]  Phone: [phone]     UTILIZATION REVIEW CONTACT:  Betnia Reeder, Utilization   Network Utilization Review Department  Phone: [phone]  Fax [phone]  Email: [email or web address]     PHYSICIAN ADVISORY SERVICES:  FOR MBGO-AQ-HNZO REVIEW - MEDICAL NECESSITY DENIAL  Phone: [phone]  Fax: [phone]  Email: [email or web address]     TYPE OF REQUEST:  Inpatient Status     ADMISSION INFORMATION:  ADMISSION DATE/TIME: 5/18/22  4:24 PM  PATIENT DIAGNOSIS CODE/DESCRIPTION:  Shortness of breath [R06 02]  Volume overload [E87 70]  Atypical atrial flutter ([address] ) [I48 4]  Legally blind in right eye, as defined in [place] [H54 8]  DISCHARGE DATE/TIME: No discharge date for patient encounter  IMPORTANT INFORMATION:  Please contact the Betina Reeder directly with any questions or concerns regarding this request  Department voicemails are confidential     Send requests for admission clinical reviews, concurrent reviews, approvals, and administrative denials due to lack of clinical to fax [phone]

## 2022-05-19 NOTE — PROGRESS NOTES
Acceptance/Transfer Note - Jayson Mcmullen    Assessment and plan:    Patient Active Problem List   Diagnosis    Shoulder pain    Type 2 diabetes mellitus without complication, without long-term current use of insulin ([facility])    Essential hypertension    Numbness of left hand    Other male erectile dysfunction    Traumatic amputation of finger    Varicose veins of bilateral lower extremities with other complications    Tobacco dependence    Nocturia    Other hyperlipidemia    Medial epicondylitis of elbow, left    Snoring    Ulnar neuropathy at elbow, left    Left carpal tunnel syndrome    Relationship dysfunction    Blurred vision, right eye    COPD exacerbation ([facility])    Mild persistent asthma    Numbness and tingling in left hand    Atrial fibrillation with RVR ([facility])    Chronic midline thoracic back pain    Bilateral chronic knee pain    Ventral hernia without obstruction or gangrene    Primary insomnia    Atrial flutter with rapid ventricular response ([facility]    Sepsis ([facility]    Legally blind in right eye, as defined in [place]     Type 2 diabetes mellitus without complication, without long-term current use of insulin ([facility] )    Lab Results   Component Value Date    HGBA1C 8 8 (A) 04/25/2022     Home Lantus  Jiardiance, Metformin  Pt unsure of medication regime and does not check sugars at home  - Lantus at reduced rate of 25U Hs  - Diabetic diet  - Acuchecks ACCHS  - Insulin sliding scale for correctional control    Essential hypertension  - Home Diltiazem dosing held in view of current drip  - Monitor pressures   Currently 129/71    Tobacco dependence  - NRT declined    Legally blind in right eye, as defined in [address] coverage for intervention    Other hyperlipidemia  - Home Lipitor 20mg qd    COPD exacerbation ([facility] )  Presented to ED with 4 day productive cough, chest discomfort, SOBOE  In ED ECG displayed Rate >150bpm  Labs unremarkable for inflam rise  CXR -ve for consolidation  Given IV Cefepime in ED (2Gram)    Plan as per cardiology for Atrial Flutter  - Respiratory protocol   - Doxycycline 100 mg PO daily for 7 days DAY 1  - Prednisone burst of 40mg PO for 4 days  - Albuterol PRN  - Breo Ellipta 100-25 1 puff qd  - Monitor for fever, Jarvis Blood cultures     Atrial flutter with rapid ventricular response (HCC)  Presented to ED with cough, chest discomfort, SOBOE   ECG displayed Rate >150bpm with narrow complex tachycardia and sawtooth pattern  Adenosine x 24mg given in ED without benefit in ED  Remained in flutter overnight requiring Po Labetolol   ECHO Left ventricular cavity size is normal  Wall thickness is mildly increased  The left ventricular ejection fraction is 38% by biplane measurement  Systolic function is moderately reduced  Wall motion cannot be accurately assessed due to the extreme tachycardic rate  Plan as per cardiology:  - Continue Cardizem drip 15mls/hr   - Lopressor 5mg q 6 hrly  - Telemetry  - Cardiology consulted formally loaded with Digoxin 250mcg  Pt returned to Sinus Rhythym, though felt to be intermittent  In view of events will transfer to [facility] for Trans[facility] in AM along with cardioversion  SIRS (systemic inflammatory response syndrome) (HCC)  Met SIRS criteria on presentation to ED  Cough productive of yellow-sputum worsened over past 4 days  HR>150  RR>25  Sats 98% on air  WBC 6 62  Procal 0 16 Lactate 1 9  Given Cefepime 2Gram in ED  CXR: Mild central perihilar pulmonary vascular congestion/edema  Nil consolidation  IMP: Could consider COPD exacerbation      - Tessalon Perles and Robitussin   - As per COPD exacerbation    Varicose veins of bilateral lower extremities with other complications  - Continue Xarelto     Atrial flutter with rapid ventricular response ([address] )  Presented to ED with cough, chest discomfort, SOBOE   ECG displayed Rate >150bpm with narrow complex tachycardia and sawtooth pattern   Adenosine x 24mg given in ED without benefit in ED  Commenced Cardizen drip 15mls/hr  Remained in flutter overnight despite b-blocker PRN   ECHO Left ventricular cavity size is normal  Wall thickness is mildly increased  The left ventricular ejection fraction is 38% by biplane measurement  Systolic function is moderately reduced  Wall motion cannot be accurately assessed due to the extreme tachycardic rate  Plan as per cardiology:  - Continue Cardizem drip 15mls/hr   - IV metoprolol 5 mg every 6 hours and   - p o  Metoprolol 25 mg every 6 hours  - Cardizem 30 mg every 6 hours  - Continue with anticoagulation with Xarelto  - Telemetry  - Additionally loaded with Digoxin 250mcg  Pt returned to Sinus Rhythym, though felt to be intermittent  In view of events will transfer to [facility] for Transesophageal Echo in AM along with cardioversion  Physical Exam:    Blood Pressure: 143/95 (05/19/22 1926)  Pulse: 76 (05/19/22 1926)  Temperature: (!) 96 9 °F (36 1 °C) (05/19/22 1926)  Temp Source: Temporal (05/19/22 1926)  Respirations: 18 (05/19/22 1926)  Height: 5' 9" (175 3 cm) (05/19/22 1000)  Weight - Scale: 129 kg (284 lb) (05/19/22 1000)  SpO2: 96 % (05/19/22 1926)    Physical Exam  Constitutional:       General: He is not in acute distress  HENT:      Head: Normocephalic and atraumatic  Right Ear: External ear normal       Left Ear: External ear normal       Nose: No rhinorrhea  Mouth/Throat:      Mouth: Mucous membranes are moist       Pharynx: No oropharyngeal exudate or posterior oropharyngeal erythema  Eyes:      Extraocular Movements: Extraocular movements intact  Pupils: Pupils are equal, round, and reactive to light  Cardiovascular:      Rate and Rhythm: Normal rate and regular rhythm  Pulses: Normal pulses  Heart sounds: Normal heart sounds  No murmur heard  No friction rub  No gallop  Pulmonary:      Effort: Pulmonary effort is normal  No respiratory distress        Breath sounds: Normal breath sounds  No wheezing or rales  Chest:      Chest wall: No tenderness  Abdominal:      General: Bowel sounds are normal  There is distension  Palpations: Abdomen is soft  There is no mass  Tenderness: There is no abdominal tenderness  There is no right CVA tenderness, left CVA tenderness or guarding  Musculoskeletal:         General: No swelling, tenderness or deformity  Cervical back: No tenderness  Right lower leg: No edema  Left lower leg: No edema  Skin:     General: Skin is warm  Capillary Refill: Capillary refill takes less than 2 seconds  Findings: No lesion or rash  Neurological:      General: No focal deficit present  Mental Status: He is alert and oriented to person, place, and time  Psychiatric:         Mood and Affect: Mood normal          Transfer Summary:    Sanjuana Wolfe  is a 55 y o  male with PMHx of A  Fib, COPD, HTN, HLD, Tobacco used disorder, Legally blind in the right eye who present to the ED on 5/18/2022 with worsening SOB, palpitations, cough and intermittent chest discomfort  Patient was found to be in A flutter with rapid rates greater than 150 bpm on EKG analysis in the EK  Patient was cardioverted in the ED with multiple doses of Adenosine without improvement  Cardiology was notified and patient was placed on Cardizem drip at 15 mg/hr  IV Metoprolol 5 mg Q 6hr was added to controlled his rate  Patient was admitted to the family medicine service on Telemetry for further management  He was treated with antibiotics for COPD exacerbation, which had triggered his arrhythmia  Overnight, patient HR was fluctuating between 148-151  Per Cardiology recommendation, PO Metoprolol 25 mg Q 6hrly was added to improved HR  Patient HR slightly improved overnight from 150 bpm to 141 bpm  He remained stable and asymptomatic all through the night   In the morning, Per cardiology recommendation, IV Digoxin was added with a loading dose of 250mcg and Cardizem 30 mg PO Q6hrly included as well  Patient HR improved and he converted to sinus rhythm during the day  Echo was performed, which showed an EF of 38%, normal LVC, mildly increased wall thickness, and moderately reduced systolic function  Plan was to keep patient NPO from midnight and LAURENCE to be performed at [facility] in the morning  Patient was transferred to [address] [place] [address] for procedure in the morning  He remains hemodynamically stable       Carlito Marroquin MD  05/19/22  8:23 PM

## 2022-05-19 NOTE — ASSESSMENT & PLAN NOTE
Presented to ED with cough, chest discomfort, SOBOE   ECG displayed Rate >150bpm with narrow complex tachycardia and sawtooth pattern  Adenosine x 24mg given in ED without benefit in ED  Commenced Cardizen drip 15mls/hr  Remained in flutter overnight despite b-blocker PRN   ECHO Left ventricular cavity size is normal  Wall thickness is mildly increased  The left ventricular ejection fraction is 38% by biplane measurement  Systolic function is moderately reduced  Wall motion cannot be accurately assessed due to the extreme tachycardic rate  Plan as per cardiology:  - Continue Cardizem drip 15mls/hr   - IV metoprolol 5 mg every 6 hours and   - p o  Metoprolol 25 mg every 6 hours  - Cardizem 30 mg every 6 hours  - Continue with anticoagulation with Xarelto  - Telemetry  - Additionally loaded with Digoxin 250mcg  Pt returned to Sinus Rhythym, though felt to be intermittent  In view of events will transfer to [facility] for Trans[facility] Echo in AM along with cardioversion

## 2022-05-19 NOTE — PROGRESS NOTES
Progress Note    Lorenza Castro  55 y o  male MRN: [id number]  Unit/Bed#: 7T [facility] 707-01 Encounter: [id number]  Admitting Physician: Radha Lester MD  PCP: CHARLES Rolon  Date of Admission:  5/18/2022  1:05 PM    Assessment and Plan  * Atrial flutter with rapid ventricular response ([facility] )  Assessment & Plan  Presented to ED with cough, chest discomfort, SOBOE  In ED ECG displayed Rate >150bpm with narrow complex tachycardia and sawtooth pattern  Remained in flutter overnight requiring Po Labetolol   Adenosine x 24mg given in ED without benefit  ECHO pending     Plan as per cardiology:  - Continue Cardizem drip 15mls/hr   - Lopressor 5mg q 6 hrly  - Telemetry  - Cardiology consulted formally  Will review for consideration of cardioversion    - HAS EATEN BREAKFAST*    SIRS (systemic inflammatory response syndrome) (HCC)  Assessment & Plan  Met SIRS criteria on presentation to ED  Cough productive of yellow-sputum worsened over past 4 days  HR>150  RR>25  Sats 98% on air  WBC 6 62  Procal 0 16 Lactate 1 9  Given Cefepime 2Gram in ED  CXR: Mild central perihilar pulmonary vascular congestion/edema  Nil consolidation    IMP: Could consider COPD exacerbation      - Tessalon Perles and Robitussin   - As per COPD exacerbation    COPD exacerbation ([facility] )  Assessment & Plan  Presented to ED with 4 day productive cough, chest discomfort, SOBOE  In ED ECG displayed Rate >150bpm  Labs unremarkable for inflam rise  CXR -ve for consolidation  Given IV Cefepime in ED (2Gram)    Plan as per cardiology for Atrial Flutter  - Respiratory protocol   - Doxycycline 100 mg PO daily for 7 days DAY 1  - Prednisone burst of 40mg PO for 4 days  - Albuterol PRN  - Breo Ellipta 100-25 1 puff qd  - Monitor for fever, Jarvis Blood cultures     Legally blind in right eye, as defined in [address] coverage for intervention    Other hyperlipidemia  Assessment & Plan  - Home Lipitor 20mg qd    Tobacco dependence  Assessment & Plan  - NRT declined    Varicose veins of bilateral lower extremities with other complications  Assessment & Plan  - Continue Xarelto     Essential hypertension  Assessment & Plan  - Home Diltiazem dosing held in view of current drip  - Monitor pressures  Currently 129/71    Type 2 diabetes mellitus without complication, without long-term current use of insulin [facility])  Assessment & Plan    Lab Results   Component Value Date    HGBA1C 8 8 (A) 2022     Home Lantus  Jiardiance, Metformin  Pt unsure of medication regime and does not check sugars at home  - Lantus at reduced rate of 25U Hs  - Diabetic diet  - Acuchecks ACCHS  - Insulin sliding scale for correctional control    VTE Pharmacologic Prophylaxis:   Pharmacologic: Rivaroxaban (Xarelto)  Mechanical VTE Prophylaxis in Place: Yes  Patient Centered Rounds: I have performed bedside rounds with nursing staff today  Discussions with Specialists or Other Care Team Provider: Cardiology   Education and Discussions with Family / Patient: Nil  Time Spent for Care: 20 minutes  More than 50% of total time spent on counseling and coordination of care as described above  Current Length of Stay: 1 day(s)  Current Patient Status: Inpatient   Certification Statement: The patient will continue to require additional inpatient hospital stay due to stabilization  Discharge Plan: Pending  Code Status: Level 1 - Full Code    Subjective:   Reviewed at bedside  Feels well without symptoms of concern today  E&D well  Advised to hold lunch pending cardiology plans  Bladder as normal and bowels yet to open in 2 days     Cough reportedly improved without chest pain, SOB, dizziness, palpations   Objective:   Vitals:   Temp (24hrs), Av 5 °F (35 8 °C), Min:96 1 °F (35 6 °C), Max:96 8 °F (36 °C)    Temp:  [96 1 °F (35 6 °C)-96 8 °F (36 °C)] 96 8 °F (36 °C)  HR:  [] 69  Resp:  [18-20] 18  BP: (105-162)/() 119/81  SpO2:  [92 %-100 %] 95 %  Body mass index is 41 94 kg/m²  Input and Output Summary (last 24 hours): Intake/Output Summary (Last 24 hours) at 5/19/2022 1654  Last data filed at 5/19/2022 1652  Gross per 24 hour   Intake 3900 ml   Output 3060 ml   Net 840 ml     Physical Exam:   Physical Exam  Vitals and nursing note reviewed  Constitutional:       General: He is not in acute distress  Appearance: Normal appearance  He is not ill-appearing, toxic-appearing or diaphoretic  HENT:      Head: Normocephalic and atraumatic  Right Ear: External ear normal       Left Ear: External ear normal       Nose: Nose normal       Mouth/Throat:      Mouth: Mucous membranes are moist       Pharynx: No oropharyngeal exudate or posterior oropharyngeal erythema  Eyes:      General: No scleral icterus  Right eye: No discharge  Left eye: No discharge  Conjunctiva/sclera: Conjunctivae normal    Cardiovascular:      Rate and Rhythm: Regular rhythm  Tachycardia present  Pulses: Normal pulses  Heart sounds: No murmur heard  Comments: Grossly tachycardic   Pulmonary:      Effort: Pulmonary effort is normal  No respiratory distress  Abdominal:      General: Bowel sounds are normal       Palpations: Abdomen is soft  Tenderness: There is no abdominal tenderness  Musculoskeletal:         General: Normal range of motion  Cervical back: Normal range of motion and neck supple  Right lower leg: No edema  Left lower leg: No edema  Skin:     General: Skin is warm  Findings: No rash  Neurological:      General: No focal deficit present  Mental Status: He is alert and oriented to person, place, and time        Gait: Gait normal    Psychiatric:         Mood and Affect: Mood normal      Additional Data:   Labs:  Results from last 7 days   Lab Units 05/19/22  0509 05/18/22  1317   WBC Thousand/uL 6 56 6 62   HEMOGLOBIN g/dL 13 1 13 0   HEMATOCRIT % 44 6 43 1   PLATELETS Thousands/uL 197 177   NEUTROS PCT %  --  72 LYMPHS PCT %  --  18   MONOS PCT %  --  9   EOS PCT %  --  1     Results from last 7 days   Lab Units 05/19/22  0509   POTASSIUM mmol/L 4 9   CHLORIDE mmol/L 100   CO2 mmol/L 26   BUN mg/dL 12   CREATININE mg/dL 0 63*   CALCIUM mg/dL 8 6   ALK PHOS U/L 70   ALT U/L 95*   AST U/L 68*     Results from last 7 days   Lab Units 05/18/22  1327   INR  1 01     Results from last 7 days   Lab Units 05/19/22  1635 05/19/22  1118 05/19/22  0612 05/18/22  2041   POC GLUCOSE mg/dl 271* 292* 317* 309*         * I Have Reviewed All Lab Data Listed Above  * Additional Pertinent Lab Tests Reviewed: All Labs Within Last 24 Hours Reviewed    Imaging:  Imaging Reports Reviewed Today Include: Nil  Imaging Personally Reviewed by Myself Includes:  Nil    Recent Cultures (last 7 days):   Results from last 7 days   Lab Units 05/18/22  1346 05/18/22  1327   BLOOD CULTURE  Received in Microbiology Lab  Culture in Progress  Received in Microbiology Lab  Culture in Progress         Last 24 Hours Medication List:   Current Facility-Administered Medications   Medication Dose Route Frequency Provider Last Rate    albuterol  2 puff Inhalation Q4H PRN Gerhardt Forward, MD      atorvastatin  40 mg Oral Daily Gerhardt Forward, MD      benzonatate  100 mg Oral TID PRN Gerhardt Forward, MD      dextromethorphan-guaiFENesin  10 mL Oral Q4H PRN Gerhardt Forward, MD      diltiazem  15 mg/hr Intravenous Continuous Gerhardt Forward, MD Stopped (05/19/22 1100)    diltiazem  30 mg Oral Q6H [facility] [place] Alexandre Acevedo DO      doxycycline hyclate  100 mg Oral Daily Richie Barens MD      DULoxetine  30 mg Oral HS Gerhardt Forward, MD      fluticasone-vilanterol  1 puff Inhalation Daily Gerhardt Forward, MD      insulin glargine  25 Units Subcutaneous HS Gerhardt Forward, MD      insulin lispro  2-12 Units Subcutaneous TID AC Richie Barnes MD      insulin lispro  5 Units Subcutaneous TID With Meals Richie Barnes MD      metoprolol  5 mg Intravenous Q6H Vera Nails Amy Doll MD      metoprolol succinate  25 mg Oral Q6H PRN America Schaumann, MD      metoprolol tartrate  25 mg Oral Q6H America Schaumann, MD      nicotine  1 patch Transdermal Daily Bernard Selby MD      predniSONE  40 mg Oral Daily Bernard Selby MD      rivaroxaban  20 mg Oral Daily With Breakfast Bernard Selby MD          ** Please Note: Dictation voice to text software may have been used in the creation of this document   Albertina العلي MD  05/19/22  4:54 PM

## 2022-05-19 NOTE — RESPIRATORY THERAPY NOTE
RT Protocol Note  Oscar Smith  55 y o  male MRN: [id number]  Unit/Bed#: 7T [facility] 707-01 Encounter: [id number]    Assessment    Principal Problem:    Atrial flutter with rapid ventricular response ([facility] )  Active Problems:    Essential hypertension    Varicose veins of bilateral lower extremities with other complications    Tobacco dependence    Other hyperlipidemia    COPD exacerbation ([facility] )    Sepsis ([facility] )    Legally blind in right eye, as defined in [place]    SIRS (systemic inflammatory response syndrome) ([place] [address] )      Home Pulmonary Medications:  Albuterol MDi as needed for wheezing/shortness of breath  Pulmicort inhaler       Past Medical History:   Diagnosis Date    Asthma     Carpal tunnel syndrome     Chronic pain     left arm    Diabetes mellitus ([place] [address] )     Erectile dysfunction     Hypertension     Umbilical hernia      Social History     Socioeconomic History    Marital status:      Spouse name: None    Number of children: None    Years of education: None    Highest education level: None   Occupational History    None   Tobacco Use    Smoking status: Current Every Day Smoker     Packs/day: 0 25     Types: Cigarettes    Smokeless tobacco: Never Used   Vaping Use    Vaping Use: Never used   Substance and Sexual Activity    Alcohol use: Yes     Comment: occassional    Drug use: Never    Sexual activity: Yes     Partners: Female   Other Topics Concern    None   Social History Narrative    None     Social Determinants of Health     Financial Resource Strain: High Risk    Difficulty of Paying Living Expenses: Very hard   Food Insecurity: No Food Insecurity    Worried About Running Out of Food in the Last Year: Never true    Fuentes of Food in the Last Year: Never true   Transportation Needs: No Transportation Needs    Lack of Transportation (Medical): No    Lack of Transportation (Non-Medical):  No   Physical Activity: Not on file   Stress: Not on file   Social Connections: Not on file Intimate Partner Violence: Not on file   Housing Stability: Not on file       Subjective     sleeping soundly at time of assessment    Objective    Physical Exam:   Assessment Type: Assess only  General Appearance: Sleeping  Respiratory Pattern: Dyspnea with exertion  Chest Assessment: Chest expansion symmetrical  Bilateral Breath Sounds: Expiratory wheezes  O2 Device: room air    Vitals:  Blood pressure 121/88, pulse (!) 142, temperature (!) 96 5 °F (35 8 °C), temperature source Temporal, resp  rate 19, height 5' 9 5" (1 765 m), weight 129 kg (284 lb 6 3 oz), SpO2 92 %  Imaging and other studies: The lungs are clear without consolidation  No pneumothorax or pleural effusion    O2 Device: room air     Plan    Respiratory Plan: No distress/Pulmonary history        Resp Comments: sleeping soundly

## 2022-05-19 NOTE — PLAN OF CARE
Problem: Potential for Falls  Goal: Patient will remain free of falls  Description: INTERVENTIONS:  - Educate patient/family on patient safety including physical limitations  - Instruct patient to call for assistance with activity   - Consult OT/PT to assist with strengthening/mobility   - Keep Call bell within reach  - Keep bed low and locked with side rails adjusted as appropriate  - Keep care items and personal belongings within reach  - Initiate and maintain comfort rounds  - Make Fall Risk Sign visible to staff  - Apply yellow socks and bracelet for high fall risk patients  - Consider moving patient to room near nurses station  Outcome: Progressing     Problem: CARDIOVASCULAR - ADULT  Goal: Maintains optimal cardiac output and hemodynamic stability  Description: INTERVENTIONS:  - Monitor I/O, vital signs and rhythm  - Monitor for S/S and trends of decreased cardiac output  - Administer and titrate ordered vasoactive medications to optimize hemodynamic stability  - Assess quality of pulses, skin color and temperature  - Assess for signs of decreased coronary artery perfusion  - Instruct patient to report change in severity of symptoms  Outcome: Progressing  Goal: Absence of cardiac dysrhythmias or at baseline rhythm  Description: INTERVENTIONS:  - Continuous cardiac monitoring, vital signs, obtain 12 lead EKG if ordered  - Administer antiarrhythmic and heart rate control medications as ordered  - Monitor electrolytes and administer replacement therapy as ordered  Outcome: Progressing     Problem: RESPIRATORY - ADULT  Goal: Achieves optimal ventilation and oxygenation  Description: INTERVENTIONS:  - Assess for changes in respiratory status  - Assess for changes in mentation and behavior  - Position to facilitate oxygenation and minimize respiratory effort  - Oxygen administered by appropriate delivery if ordered  - Initiate smoking cessation education as indicated  - Encourage broncho-pulmonary hygiene including cough, deep breathe, Incentive Spirometry  - Assess the need for suctioning and aspirate as needed  - Assess and instruct to report SOB or any respiratory difficulty  - Respiratory Therapy support as indicated  Outcome: Progressing     Problem: METABOLIC, FLUID AND ELECTROLYTES - ADULT  Goal: Glucose maintained within target range  Description: INTERVENTIONS:  - Monitor Blood Glucose as ordered  - Assess for signs and symptoms of hyperglycemia and hypoglycemia  - Administer ordered medications to maintain glucose within target range  - Assess nutritional intake and initiate nutrition service referral as needed  Outcome: Progressing     Problem: SKIN/TISSUE INTEGRITY - ADULT  Goal: Incision(s), wounds(s) or drain site(s) healing without S/S of infection  Description: INTERVENTIONS  - Assess and document dressing, incision, wound bed, drain sites and surrounding tissue  - Provide patient and family education  Outcome: Progressing     Problem: PAIN - ADULT  Goal: Verbalizes/displays adequate comfort level or baseline comfort level  Description: Interventions:  - Encourage patient to monitor pain and request assistance  - Assess pain using appropriate pain scale  - Administer analgesics based on type and severity of pain and evaluate response  - Implement non-pharmacological measures as appropriate and evaluate response  - Consider cultural and social influences on pain and pain management  - Notify physician/advanced practitioner if interventions unsuccessful or patient reports new pain  Outcome: Progressing     Problem: INFECTION - ADULT  Goal: Absence or prevention of progression during hospitalization  Description: INTERVENTIONS:  - Assess and monitor for signs and symptoms of infection  - Monitor lab/diagnostic results  - Monitor all insertion sites, i e  indwelling lines, tubes, and drains  - Monitor endotracheal if appropriate and nasal secretions for changes in amount and color  - [place] appropriate cooling/warming therapies per order  - Administer medications as ordered  - Instruct and encourage patient and family to use good hand hygiene technique  - Identify and instruct in appropriate isolation precautions for identified infection/condition  Outcome: Progressing     Problem: INFECTION - ADULT  Goal: Absence of fever/infection during neutropenic period  Description: INTERVENTIONS:  - Monitor WBC    Outcome: Progressing     Problem: DISCHARGE PLANNING  Goal: Discharge to home or other facility with appropriate resources  Description: INTERVENTIONS:  - Identify barriers to discharge w/patient and caregiver  - Arrange for needed discharge resources and transportation as appropriate  - Identify discharge learning needs (meds, wound care, etc )  - Arrange for interpretive services to assist at discharge as needed  - Refer to Case Management Department for coordinating discharge planning if the patient needs post-hospital services based on physician/advanced practitioner order or complex needs related to functional status, cognitive ability, or social support system  Outcome: Progressing     Problem: Knowledge Deficit  Goal: Patient/family/caregiver demonstrates understanding of disease process, treatment plan, medications, and discharge instructions  Description: Complete learning assessment and assess knowledge base    Interventions:  - Provide teaching at level of understanding  - Provide teaching via preferred learning methods  Outcome: Progressing

## 2022-05-19 NOTE — CASE MANAGEMENT
Case Management Discharge Planning Note    Patient name Bo Lockwood    Location 7T [facility] 707/7T [facility] 707-01 MRN [id number]  : 1965 Date 2022       Current Admission Date: 2022  Current Admission Diagnosis:Atrial flutter with rapid ventricular response [facility])   Patient Active Problem List    Diagnosis Date Noted    Atrial flutter with rapid ventricular response ([facility] ) 2022    [facility] 20[facility]    Legally blind in right eye, as defined in [place] 2022    SIRS (systemic inflammatory response syndrome) ([facility] ) 2022    Ventral hernia without obstruction or gangrene 2022    Primary insomnia 2022    Bilateral chronic knee pain 10/12/2021    Chronic midline thoracic back pain 2021    Atrial fibrillation with RVR ([facility] ) 2020    Numbness and tingling in left hand 2020    COPD exacerbation ([facility] ) 01/15/2020    Mild persistent asthma 01/15/2020    Relationship dysfunction 2020    Blurred vision, right eye 2020    Ulnar neuropathy at elbow, left     Left carpal tunnel syndrome     Medial epicondylitis of elbow, left 10/31/2019    Snoring 10/31/2019    Other hyperlipidemia 10/29/2019    Nocturia 10/17/2019    Traumatic amputation of finger 2019    Tobacco dependence 2019    Numbness of left hand 2019    Shoulder pain 2018    Other male erectile dysfunction 2018    Essential hypertension 2018    Type 2 diabetes mellitus without complication, without long-term current use of insulin ([facility] ) 2016    Varicose veins of bilateral lower extremities with other complications       LOS (days): 1  Geometric Mean LOS (GMLOS) (days): 2 40  Days to GMLOS:1 4     OBJECTIVE:  Risk of Unplanned Readmission Score: 14 89         Current admission status: Inpatient   Preferred Pharmacy:   [address] [place] [address] [address] [address]  Phone: [phone] Fax: [phone]    Primary Care Provider: CHARLES Arana    Primary Insurance: [employer]  Secondary Insurance:     DISCHARGE DETAILS:  CM was notified that pt is Transferring to [facility] due to Procedure with cardiology and  further cardiac evaluation and intervention  CM completed the [place] form and place in pt's Folder      CM department will continue to follow through pt's D/C

## 2022-05-20 ENCOUNTER — TRANSITIONAL CARE MANAGEMENT (OUTPATIENT)
Dept: FAMILY MEDICINE CLINIC | Facility: CLINIC | Age: 57
End: 2022-05-20

## 2022-05-20 ENCOUNTER — APPOINTMENT (INPATIENT)
Dept: NON INVASIVE DIAGNOSTICS | Facility: HOSPITAL | Age: 57
DRG: 201 | End: 2022-05-20
Payer: COMMERCIAL

## 2022-05-20 VITALS
BODY MASS INDEX: 39.4 KG/M2 | HEART RATE: 69 BPM | RESPIRATION RATE: 18 BRPM | OXYGEN SATURATION: 97 % | WEIGHT: 266 LBS | TEMPERATURE: 97.8 F | HEIGHT: 69 IN | DIASTOLIC BLOOD PRESSURE: 84 MMHG | SYSTOLIC BLOOD PRESSURE: 159 MMHG

## 2022-05-20 LAB
ANION GAP SERPL CALCULATED.3IONS-SCNC: 7 MMOL/L (ref 4–13)
APICAL FOUR CHAMBER EJECTION FRACTION: 62 %
BUN SERPL-MCNC: 20 MG/DL (ref 5–25)
CALCIUM SERPL-MCNC: 9.2 MG/DL (ref 8.3–10.1)
CHLORIDE SERPL-SCNC: 100 MMOL/L (ref 100–108)
CO2 SERPL-SCNC: 33 MMOL/L (ref 21–32)
CREAT SERPL-MCNC: 0.97 MG/DL (ref 0.6–1.3)
FRACTIONAL SHORTENING: 21 % (ref 28–44)
GFR SERPL CREATININE-BSD FRML MDRD: 87 ML/MIN/1.73SQ M
GLUCOSE SERPL-MCNC: 131 MG/DL (ref 65–140)
GLUCOSE SERPL-MCNC: 144 MG/DL (ref 65–140)
GLUCOSE SERPL-MCNC: 152 MG/DL (ref 65–140)
GLUCOSE SERPL-MCNC: 328 MG/DL (ref 65–140)
GLUCOSE SERPL-MCNC: 356 MG/DL (ref 65–140)
INTERVENTRICULAR SEPTUM IN DIASTOLE (PARASTERNAL SHORT AXIS VIEW): 1.2 CM
INTERVENTRICULAR SEPTUM: 1.2 CM (ref 0.6–1.1)
LEFT INTERNAL DIMENSION IN SYSTOLE: 4.4 CM (ref 2.1–4)
LEFT VENTRICULAR INTERNAL DIMENSION IN DIASTOLE: 5.6 CM (ref 3.5–6)
LEFT VENTRICULAR POSTERIOR WALL IN END DIASTOLE: 1.2 CM
LEFT VENTRICULAR STROKE VOLUME: 66 ML
LVSV (TEICH): 66 ML
POTASSIUM SERPL-SCNC: 3.6 MMOL/L (ref 3.5–5.3)
SL CV LV EF: 55
SL CV PED ECHO LEFT VENTRICLE DIASTOLIC VOLUME (MOD BIPLANE) 2D: 152 ML
SL CV PED ECHO LEFT VENTRICLE SYSTOLIC VOLUME (MOD BIPLANE) 2D: 86 ML
SODIUM SERPL-SCNC: 140 MMOL/L (ref 136–145)

## 2022-05-20 PROCEDURE — NC001 PR NO CHARGE

## 2022-05-20 PROCEDURE — RECHECK: Performed by: INTERNAL MEDICINE

## 2022-05-20 PROCEDURE — 93356 MYOCRD STRAIN IMG SPCKL TRCK: CPT

## 2022-05-20 PROCEDURE — 93325 DOPPLER ECHO COLOR FLOW MAPG: CPT

## 2022-05-20 PROCEDURE — 93321 DOPPLER ECHO F-UP/LMTD STD: CPT

## 2022-05-20 PROCEDURE — 93308 TTE F-UP OR LMTD: CPT

## 2022-05-20 PROCEDURE — 99239 HOSP IP/OBS DSCHRG MGMT >30: CPT | Performed by: INTERNAL MEDICINE

## 2022-05-20 PROCEDURE — 82948 REAGENT STRIP/BLOOD GLUCOSE: CPT

## 2022-05-20 PROCEDURE — 99233 SBSQ HOSP IP/OBS HIGH 50: CPT

## 2022-05-20 PROCEDURE — 80048 BASIC METABOLIC PNL TOTAL CA: CPT | Performed by: PHYSICIAN ASSISTANT

## 2022-05-20 RX ORDER — DILTIAZEM HYDROCHLORIDE 120 MG/1
120 CAPSULE, COATED, EXTENDED RELEASE ORAL DAILY
Status: DISCONTINUED | OUTPATIENT
Start: 2022-05-20 | End: 2022-05-20 | Stop reason: HOSPADM

## 2022-05-20 RX ORDER — METOPROLOL TARTRATE 100 MG/1
100 TABLET ORAL DAILY
Qty: 30 TABLET | Refills: 0 | Status: SHIPPED | OUTPATIENT
Start: 2022-05-20 | End: 2022-07-29 | Stop reason: SDUPTHER

## 2022-05-20 RX ORDER — DILTIAZEM HYDROCHLORIDE 120 MG/1
120 CAPSULE, COATED, EXTENDED RELEASE ORAL DAILY
Qty: 30 CAPSULE | Refills: 0 | Status: SHIPPED | OUTPATIENT
Start: 2022-05-21

## 2022-05-20 RX ORDER — METOPROLOL SUCCINATE 100 MG/1
100 TABLET, EXTENDED RELEASE ORAL DAILY
Status: DISCONTINUED | OUTPATIENT
Start: 2022-05-20 | End: 2022-05-20 | Stop reason: HOSPADM

## 2022-05-20 RX ORDER — PREDNISONE 20 MG/1
40 TABLET ORAL DAILY
Qty: 4 TABLET | Refills: 0 | Status: SHIPPED | OUTPATIENT
Start: 2022-05-21 | End: 2022-05-23

## 2022-05-20 RX ORDER — DOXYCYCLINE HYCLATE 100 MG/1
100 CAPSULE ORAL EVERY 12 HOURS SCHEDULED
Qty: 8 CAPSULE | Refills: 0 | Status: SHIPPED | OUTPATIENT
Start: 2022-05-20 | End: 2022-05-24

## 2022-05-20 RX ORDER — METOPROLOL TARTRATE 50 MG/1
50 TABLET, FILM COATED ORAL EVERY 12 HOURS SCHEDULED
Status: DISCONTINUED | OUTPATIENT
Start: 2022-05-20 | End: 2022-05-20

## 2022-05-20 RX ADMIN — METOPROLOL TARTRATE 25 MG: 25 TABLET, FILM COATED ORAL at 01:31

## 2022-05-20 RX ADMIN — INSULIN LISPRO 2 UNITS: 100 INJECTION, SOLUTION INTRAVENOUS; SUBCUTANEOUS at 12:20

## 2022-05-20 RX ADMIN — ACETAMINOPHEN 325MG 975 MG: 325 TABLET ORAL at 10:51

## 2022-05-20 RX ADMIN — RIVAROXABAN 20 MG: 20 TABLET, FILM COATED ORAL at 08:58

## 2022-05-20 RX ADMIN — Medication 1 PATCH: at 08:43

## 2022-05-20 RX ADMIN — INSULIN LISPRO 8 UNITS: 100 INJECTION, SOLUTION INTRAVENOUS; SUBCUTANEOUS at 01:30

## 2022-05-20 RX ADMIN — FLUTICASONE FUROATE AND VILANTEROL TRIFENATATE 1 PUFF: 100; 25 POWDER RESPIRATORY (INHALATION) at 08:44

## 2022-05-20 RX ADMIN — METOPROLOL SUCCINATE 100 MG: 100 TABLET, EXTENDED RELEASE ORAL at 12:20

## 2022-05-20 RX ADMIN — GUAIFENESIN AND DEXTROMETHORPHAN 10 ML: 100; 10 SYRUP ORAL at 01:34

## 2022-05-20 RX ADMIN — INSULIN LISPRO 5 UNITS: 100 INJECTION, SOLUTION INTRAVENOUS; SUBCUTANEOUS at 12:22

## 2022-05-20 RX ADMIN — METOPROLOL TARTRATE 25 MG: 25 TABLET, FILM COATED ORAL at 08:43

## 2022-05-20 RX ADMIN — ALBUTEROL SULFATE 2 PUFF: 90 AEROSOL, METERED RESPIRATORY (INHALATION) at 08:44

## 2022-05-20 RX ADMIN — DILTIAZEM HYDROCHLORIDE 30 MG: 30 TABLET, FILM COATED ORAL at 06:43

## 2022-05-20 RX ADMIN — DILTIAZEM HYDROCHLORIDE 120 MG: 120 CAPSULE, COATED, EXTENDED RELEASE ORAL at 12:20

## 2022-05-20 RX ADMIN — GUAIFENESIN 600 MG: 600 TABLET, EXTENDED RELEASE ORAL at 08:43

## 2022-05-20 RX ADMIN — DOXYCYCLINE 100 MG: 100 CAPSULE ORAL at 08:43

## 2022-05-20 RX ADMIN — PREDNISONE 40 MG: 20 TABLET ORAL at 08:43

## 2022-05-20 NOTE — ASSESSMENT & PLAN NOTE
· Transferred from [facility] due to atrial flutter with RVR  Recommended transfer to [facility] for cardioversion  · EK-1 atrial flutter, rate 154  · Patient was given adenosine x3 without conversion to sinus rhythm  · Treated with Cardizem gtt at 15mg/h, IV Lopressor 5mg q6h and p o Metoprolol 25mg q6h  · Seen by Cardiology, recommended IV digoxin 250 mcg x 1 and start p o  Cardizem 30 mg q 6h  · Patient returned to sinus rhythm shortly after digoxin    PLAN:  · Currently in sinus rhythm, will discontinue Cardizem drip and IV Lopressor  · Continue p o  Metoprolol and p o   Cardizem  · Continue anticoagulation with Xarelto 20mg qd  · Keep NPO at midnight  · Monitor on telemetry  · Cardiology consult

## 2022-05-20 NOTE — UTILIZATION REVIEW
Notification of Discharge   This is a Notification of Discharge from our facility [address]  Please be advised that this patient has been discharge from our facility  Below you will find the admission and discharge date and time including the patients disposition  UTILIZATION REVIEW CONTACT:  Nita Baker MA  Utilization   Network Utilization Review Department  Phone: [phone] x carefully listen to the prompts  All voicemails are confidential   Email: [email or web address]     PHYSICIAN ADVISORY SERVICES:  FOR [id number] REVIEW - MEDICAL NECESSITY DENIAL  Phone: [phone]  Fax: [phone]  Email: [email or web address]     PRESENTATION DATE: 5/18/2022  1:05 PM  OBERVATION ADMISSION DATE:   INPATIENT ADMISSION DATE: 5/18/22  4:24 PM   DISCHARGE DATE: 5/19/2022  7:55 PM  DISPOSITION: [address] [facility]      IMPORTANT INFORMATION:  Send all requests for admission clinical reviews, approved or denied determinations and any other requests to dedicated fax number below belonging to the campus where the patient is receiving treatment   List of dedicated fax numbers:  [address] DENIALS (Administrative/Medical Necessity) [phone]   1000  [facility] (Maternity/NICU/Pediatrics) [phone]   Critical access hospital [phone]   130 [facility] [phone]   [address] [place] [address] [phone]   [address] [phone]   [facility]  [phone]   [address] [facility] [address]  2401 [facility] 1000 [address] [facility] [phone]

## 2022-05-20 NOTE — ASSESSMENT & PLAN NOTE
Lab Results   Component Value Date    HGBA1C 8 8 (A) 04/25/2022     · Home regimen Lantus 30units qhs and metformin  Hold metformin  · Was started on 25 units of Lantus at [address]    Hyperglycemic, will increase to 35 units daily  · ISS and ADA diet    Recent Labs     05/19/22  0612 05/19/22  1118 05/19/22  1635 05/19/22 2027   POCGLU 317* 292* 271* 253*       Blood Sugar Average: Last 72 hrs:

## 2022-05-20 NOTE — ASSESSMENT & PLAN NOTE
· Transferred from [address] [place] [address] due to atrial flutter with RVR  Recommended transfer to [facility] for cardioversion  · EK-1 atrial flutter, rate 154  · Patient was given adenosine x3 without conversion to sinus rhythm  · Treated with Cardizem gtt at 15mg/h, IV Lopressor 5mg q6h and p o Metoprolol 25mg q6h  · Seen by Cardiology, recommended IV digoxin 250 mcg x 1 and start p o   Cardizem 30 mg q 6h  · Patient returned to sinus rhythm shortly after digoxin  · Cardiology consultation appreciated  · Repeat echocardiogram revealed ejection fraction 55%  · Cardizem 120 mg daily, metoprolol 100 mg daily, continue Xarelto, atorvastatin  · Telemetry monitoring

## 2022-05-20 NOTE — PROGRESS NOTES
[address] [facility]  Progress Note - Maria Guadalupe Jose  1965, 55 y o  male MRN: [id number]  Unit/Bed#: E4 -01 Encounter: [id number]  Primary Care Provider: CHARLES Colon   Date and time admitted to hospital: 2022  8:12 PM    * Atrial flutter with rapid ventricular response [facility])  Assessment & Plan  · Transferred from [facility] due to atrial flutter with RVR  Recommended transfer to [facility] for cardioversion  · EK-1 atrial flutter, rate 154  · Patient was given adenosine x3 without conversion to sinus rhythm  · Treated with Cardizem gtt at 15mg/h, IV Lopressor 5mg q6h and p o Metoprolol 25mg q6h  · Seen by Cardiology, recommended IV digoxin 250 mcg x 1 and start p o  Cardizem 30 mg q 6h  · Patient returned to sinus rhythm shortly after digoxin  · Cardiology consultation appreciated  · Repeat echocardiogram revealed ejection fraction 55%  · Cardizem 120 mg daily, metoprolol 100 mg daily, continue Xarelto, atorvastatin  · Telemetry monitoring    Paroxysmal atrial fibrillation (HCC)  Assessment & Plan  · PAF on rate control outpatient with carvedilol, now on metoprolol and Cardizem  · Anticoagulated with Xarelto  Per Cardiology: questionable medication compliance    Acute on chronic systolic congestive heart failure [facility])  Assessment & Plan  Wt Readings from Last 3 Encounters:   22 121 kg (266 lb)   22 129 kg (284 lb)   22 124 kg (272 lb 9 6 oz)     · Echo completed prior to transfer, LVEF 38%, moderately reduced systolic function    Diastolic function could not be assessed due to atrial flutter  · Repeat echocardiogram with ejection fraction 55%  · CXR shows mild perihilar pulmonary vascular congestion/edema  · BNP >1700  · Continue beta-blocker  · Monitor daily weight, I&O  · Cardiology consult appreciated    Legally blind in right eye, as defined in [place] [address]  · Legally blind in right eye    COPD exacerbation ([facility] [address] )  Assessment & Plan  · Presented to ED with c/o dyspnea, chest discomfort, cough and sputum worse over 4 days  · Met SIRS criteria on admission  Was given cefepime in ED  Normal PCT, no leukocytosis, afebrile  Treated for COPD exacerbation  · CXR neg for consolidation  · Respiratory viral panel negative  · Started on oral doxycycline prior to transfer, received 1 dose at [address]  Continue to complete doxycycline 100 mg b i d x5 days  · Continue prednisone 40 mg   Received 2 doses prior to transfer, continue x 3 more days to complete 5 day tx  · Breo Ellipta  · P r n  Albuterol  · Respiratory protocol    Type 2 diabetes mellitus with hyperglycemia, with long-term current use of insulin [facility])  Assessment & Plan  Lab Results   Component Value Date    HGBA1C 8 8 (A) 2022       Recent Labs     22  0000 22  0612 22  1114   POCGLU 253* 328* 144* 152*     · Home regimen Lantus 30units qhs and metformin  Hold metformin  · Continue Lantus 35 units   · ISS and ADA diet          VTE Pharmacologic Prophylaxis:   Pharmacologic:  Xarelto    Patient Centered Rounds: I have performed bedside rounds with nursing staff today  Education and Discussions with Family / Patient:  Patient, declined family update    Time Spent for Care: 20 minutes  More than 50% of total time spent on counseling and coordination of care as described above      Current Length of Stay: 1 day(s)    Current Patient Status: Inpatient   Certification Statement: The patient will continue to require additional inpatient hospital stay due to AFib with RVR    Discharge Plan / Estimated Discharge Date: 24H    Code Status: Level 1 - Full Code      Subjective:   Patient seen and examined at bedside, currently denies any complaints    Objective:     Vitals:   Temp (24hrs), Av 8 °F (36 6 °C), Min:96 9 °F (36 1 °C), Max:98 4 °F (36 9 °C)    Temp:  [96 9 °F (36 1 °C)-98 4 °F (36 9 °C)] 97 6 °F (36 4 °C)  HR:  [60-76] 71  Resp:  [18] 18  BP: (121-165)/(83-98) 149/98  SpO2:  [94 %-97 %] 97 %  Body mass index is 39 28 kg/m²  Input and Output Summary (last 24 hours):     No intake or output data in the 24 hours ending 05/20/22 1536    Physical Exam:    Constitutional: Patient is oriented to person, place and time, no acute distress  HEENT:  Normocephalic, atraumatic  Cardiovascular: Normal S1S2, RRR, No murmurs/rubs/gallops appreciated  Pulmonary:  Bilateral air entry, No rhonchi/rales/wheezing appreciated  Abdominal: Soft, Bowel sounds present, Non-tender, Non-distended  Extremities:  No cyanosis, clubbing or edema  Neurological: Cranial nerves II-XII grossly intact, sensation intact, otherwise no focal neurological symptoms  Skin:  Warm, dry    Additional Data:     Labs:    Results from last 7 days   Lab Units 05/19/22  0509 05/18/22  1317   WBC Thousand/uL 6 56 6 62   HEMOGLOBIN g/dL 13 1 13 0   HEMATOCRIT % 44 6 43 1   PLATELETS Thousands/uL 197 177   NEUTROS PCT %  --  72   LYMPHS PCT %  --  18   MONOS PCT %  --  9   EOS PCT %  --  1     Results from last 7 days   Lab Units 05/20/22  0841 05/19/22  0509   POTASSIUM mmol/L 3 6 4 9   CHLORIDE mmol/L 100 100   CO2 mmol/L 33* 26   BUN mg/dL 20 12   CREATININE mg/dL 0 97 0 63*   CALCIUM mg/dL 9 2 8 6   ALK PHOS U/L  --  70   ALT U/L  --  95*   AST U/L  --  68*     Results from last 7 days   Lab Units 05/18/22  1327   INR  1 01        I Have Reviewed All Lab Data Listed Above  Recent Cultures (last 7 days):     Results from last 7 days   Lab Units 05/18/22  1346 05/18/22  1327   BLOOD CULTURE  No Growth at 24 hrs  No Growth at 24 hrs         Last 24 Hours Medication List:   Current Facility-Administered Medications   Medication Dose Route Frequency Provider Last Rate    acetaminophen  975 mg Oral Q6H PRN CHARLES Martínez      albuterol  2 puff Inhalation Q4H PRN CHARLES Martínez      aluminum-magnesium hydroxide-simethicone  30 mL Oral Q4H PRN CHARLES Martínez      atorvastatin  40 mg Oral QPM CHARLES Harrell      dextromethorphan-guaiFENesin  10 mL Oral Q4H PRN CHARLES Harrell      diltiazem  120 mg Oral Daily Loise Curtis, DO      doxycycline hyclate  100 mg Oral Q12H [address] [facility] [address]      D[facility]  [facility] HS [facility]      fluticasone-vilanterol  1 puff Inhalation Daily [facility] [address]      guaiFENesin  600 mg Oral Q12H [address] [facility]  [address]      insulin glargine  35 Units Subcutaneous HS [facility] CHARLES Crook      insulin lispro  2-12 Units Subcutaneous Q6H [address]       insulin lispro  5 Units Subcutaneous TID With Meals CHARLES Harrell      metoprolol succinate  100 mg Oral Daily Loise Curtis, DO      nicotine  1 patch Transdermal Daily [facility] CHARLES Crook      predniSONE  40 mg Oral Daily [facility] [address]      rivaroxaban  20 mg Oral Daily With Breakfast CHARLES Harrell      simethicone  80 mg Oral Q6H PRN CHARLES Harrell          Today, Patient Was Seen By: Angus Muñoz MD

## 2022-05-20 NOTE — DISCHARGE SUMMARY
[facility]  Discharge- Yesi   1965, 55 y o  male MRN: [id number]  Unit/Bed#: E4 -01 Encounter: [id number]  Primary Care Provider: CHARLES Marcano   Date and time admitted to hospital: 2022  8:12 PM    * Atrial flutter with rapid ventricular response [facility])  Assessment & Plan  · Transferred from [facility] due to atrial flutter with RVR  Recommended transfer to [facility] for cardioversion  · EK-1 atrial flutter, rate 154  · Patient was given adenosine x3 without conversion to sinus rhythm  · Treated with Cardizem gtt at 15mg/h, IV Lopressor 5mg q6h and p o Metoprolol 25mg q6h  · Seen by Cardiology, recommended IV digoxin 250 mcg x 1 and start p o  Cardizem 30 mg q 6h  · Patient returned to sinus rhythm shortly after digoxin  · Cardiology consultation appreciated  · Repeat echocardiogram revealed ejection fraction 55%  · Cardizem 120 mg daily, metoprolol 100 mg daily, continue Xarelto, atorvastatin  · Okay to discharge home from cardiology standpoint    Paroxysmal atrial fibrillation ([facility] )  Assessment & Plan  · PAF on rate control outpatient with carvedilol, now on metoprolol and Cardizem  · Anticoagulated with Xarelto  Per Cardiology: questionable medication compliance    Acute on chronic systolic congestive heart failure [facility])  Assessment & Plan  Wt Readings from Last 3 Encounters:   22 121 kg (266 lb)   22 129 kg (284 lb)   22 124 kg (272 lb 9 6 oz)     · Echo completed prior to transfer, LVEF 38%, moderately reduced systolic function    Diastolic function could not be assessed due to atrial flutter  · Repeat echocardiogram with ejection fraction 55%  · CXR shows mild perihilar pulmonary vascular congestion/edema  · BNP >1700  · Continue beta-blocker, lisinopril  ·   Legally blind in right eye, as defined in [place] [address]  · Legally blind in right eye    COP[facility] exacerbation ([facility] )  Assessment & Plan  · Presented to ED with c/o dyspnea, chest discomfort, cough and sputum worse over 4 days  · Met SIRS criteria on admission  Was given cefepime in ED  Normal PCT, no leukocytosis, afebrile  Treated for COPD exacerbation  · CXR neg for consolidation  · Respiratory viral panel negative  · Started on oral doxycycline prior to transfer, received 1 dose at [address]  Continue to complete doxycycline 100 mg b i d x5 days  · Continue prednisone 40 mg  Type 2 diabetes mellitus with hyperglycemia, with long-term current use of insulin [facility])  Assessment & Plan  Lab Results   Component Value Date    HGBA1C 8 8 (A) 04/25/2022       Recent Labs     05/20/22  0000 05/20/22  0612 05/20/22  1114 05/20/22  1623   POCGLU 328* 144* 152* 356*     · Home regimen Lantus 30units qhs and metformin  Discharge Summary - [place] [address] [facility]    Patient Information: Esther Winn  55 y o  male MRN: [id number]  Unit/Bed#: E4 -01 Encounter: [id number]    Discharging Physician / Practitioner: Whitney Ayon MD  PCP: Damien Crockett  Admission Date: 5/19/2022  Discharge Date: 05/20/22    Disposition:     Home     Reason for Admission:  AFib with RVR    Discharge Diagnoses:     Principal Problem:    Atrial flutter with rapid ventricular response ([address] )  Active Problems:    Type 2 diabetes mellitus with hyperglycemia, with long-term current use of insulin (HCC)    COPD exacerbation ([address] )    Legally blind in right eye, as defined in [place]    Acute on chronic systolic congestive heart failure (HCC)    Paroxysmal atrial fibrillation ([address] )  Resolved Problems:    * No resolved hospital problems  *      Consultations During Hospital Stay:  · cardiology    Procedures Performed:     · none    Significant Findings / Test Results:     · No results found  Hospital Course:     Esther Winn  is a 55 y o  male patient who originally presented to the hospital on 5/19/2022 due to atrial flutter with RVR    Patient initially admitted to [facility] for a flutter patient was treated with adenosine, Cardizem drip, Lopressor and metoprolol  Was seen by Cardiology and given digoxin recommended transfer to [facility] for possible LAURENCE and cardioversion  Patient returned to sinus rhythm, cardiology adjusted patient's medications to Cardizem 120 mg daily, metoprolol 100 mg daily, will continue with Xarelto for anticoagulation, continue lisinopril, statin  Repeat echocardiogram revealed ejection fraction 55%  Patient is otherwise stable from Cardiology standpoint for discharge home today  Condition at Discharge: good     Discharge Day Visit / Exam:     * Please refer to separate progress note for these details *    Discussion with Family: yes      Discharge instructions/Information to patient and family:   See after visit summary for information provided to patient and family  Provisions for Follow-Up Care:  See after visit summary for information related to follow-up care and any pertinent home health orders  Planned Readmission: no    Discharge Statement:  I spent 35 minutes discharging the patient  This time was spent on the day of discharge  I had direct contact with the patient on the day of discharge  Greater than 50% of the total time was spent examining patient, answering all patient questions, arranging and discussing plan of care with patient as well as directly providing post-discharge instructions  Additional time then spent on discharge activities  Discharge Medications:  See after visit summary for reconciled discharge medications provided to patient and family  ** Please Note: This note has been constructed using a voice recognition system   **

## 2022-05-20 NOTE — ASSESSMENT & PLAN NOTE
· Presented to ED with c/o dyspnea, chest discomfort, cough and sputum worse over 4 days  · Met SIRS criteria on admission  Was given cefepime in ED  Normal PCT, no leukocytosis, afebrile  Treated for COPD exacerbation  · CXR neg for consolidation  · Respiratory viral panel negative  · Started on oral doxycycline prior to transfer, received 1 dose at [facility]  Continue to complete doxycycline 100 mg b i d x5 days  · Continue prednisone 40 mg   Received 2 doses prior to transfer, continue x 3 more days to complete 5 day tx  · Breo Ellipta  · P r n   Albuterol  · Respiratory protocol

## 2022-05-20 NOTE — ASSESSMENT & PLAN NOTE
· Presented to ED with c/o dyspnea, chest discomfort, cough and sputum worse over 4 days  · Met SIRS criteria on admission  Was given cefepime in ED  Normal PCT, no leukocytosis, afebrile  Treated for COPD exacerbation  · CXR neg for consolidation  · Respiratory viral panel negative  · Started on oral doxycycline prior to transfer, received 1 dose at [facility]  Continue to complete doxycycline 100 mg b i d x5 days  · Continue prednisone 40 mg   Received 2 doses prior to transfer, continue x 3 more days to complete 5 day tx  · Breo Ellipta  · P r n   Albuterol  · Respiratory protocol

## 2022-05-20 NOTE — ASSESSMENT & PLAN NOTE
· PAF on rate control outpatient with carvedilol, now on metoprolol and Cardizem  · Anticoagulated with Xarelto    Per Cardiology: questionable medication compliance

## 2022-05-20 NOTE — ASSESSMENT & PLAN NOTE
Wt Readings from Last 3 Encounters:   05/20/22 121 kg (266 lb)   05/19/22 129 kg (284 lb)   04/25/22 124 kg (272 lb 9 6 oz)     · Echo completed prior to transfer, LVEF 38%, moderately reduced systolic function    Diastolic function could not be assessed due to atrial flutter  · Repeat echocardiogram with ejection fraction 55%  · CXR shows mild perihilar pulmonary vascular congestion/edema  · BNP >1700  · Continue beta-blocker  · Monitor daily weight, I&O  · Cardiology consult appreciated

## 2022-05-20 NOTE — UTILIZATION REVIEW
Initial Clinical Review    Admission: Date/Time/Statement:   Admission Orders (From admission, onward)     Ordered        05/19/22 2023  Inpatient Admission  Once                      Orders Placed This Encounter   Procedures    Inpatient Admission     Standing Status:   Standing     Number of Occurrences:   1     Order Specific Question:   Level of Care     Answer:   Med Surg [16]     Order Specific Question:   Estimated length of stay     Answer:   More than 2 Midnights     Order Specific Question:   Certification     Answer:   I certify that inpatient services are medically necessary for this patient for a duration of greater than two midnights  See H&P and MD Progress Notes for additional information about the patient's course of treatment  Initial Presentation: 55 y o  male presents as a transfer from [facility] to [facility] for cardioversion for Aflutter with RVR > 150  He initially went to [facility] on 5/18 with dyspnea and cough and found to be in A flutter w/ RVR  Was treated with IV Adenosine and Cardizem drip with failure to convert to NSR  Also treated for COPD  He had improvement in symptoms but no rhythm  PMH: PAF, COPD, chronic sCHF, IDDM, He is admitted to INPATIENT status with A fib w/ RVR - Cardizem drip, then started on PO Cardizem and IV Digoxin with transition to NSR  continue Metoprolol and Cardizem, Xarelto  NPO p MN, Tele, Cardio consult  Acute on chronic sCHF - IV Lasix 40 mg x 2, fluid restriction  COPD exac - Doxycycline x 5 days, Prednisone 5 days, Breo, PRN Albuterol  Date: 5/20   Day 2: In NSR this morning  No need for cardioversion  Repeat Echo completed  Pt is d/c to home  5/20 Cardio Consult - in with A flutter with RVR  Converted on his own  Feels improved  Will repeat echo, change cardizem to long acting 120 mg daily  Metoprolol 100 mg daily, continue Xarelto, Lipitor        Wt Readings from Last 1 Encounters:   05/20/22 121 kg (266 lb 15 6 oz) Additional Vital Signs:   05/20/22 0746 97 6 °F (36 4 °C) 66 18 162/92 122 97 % None (Room air) Lying   05/20/22 0305 98 1 °F (36 7 °C) 70 18 164/93 123 97 % None (Room air) Sitting   05/19/22 2343 98 4 °F (36 9 °C) 73 18 146/87 112 96 % None (Room air) Lying   05/19/22 2014 98 °F (36 7 °C) 71 18 147/86 110 94 % None (Room air) Lying     Pertinent Labs/Diagnostic Test Results:     5/19 ECG - A fib    5/19 ECG - Atrial fibrillation with premature ventricular or aberrantly conducted complexes     5/19 Echo -   Left Ventricle: Left ventricular cavity size is normal  Wall thickness is mildly increased  The left ventricular ejection fraction is 38% by biplane measurement  Systolic function is moderately reduced  Wall motion cannot be accurately assessed due to the extreme tachycardic rate    Right Ventricle: Systolic function is mildly reduced  Abnormal tricuspid annular plane systolic excursion (TAPSE) < 1 7 cm    Left Atrium: The atrium is moderately dilated (42-48 mL/m2)    Right Atrium: The atrium is moderately dilated    Atrial Septum: 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, mobile septum primum aneurysm  It is predominately within the right atrial cavity    Mitral Valve: There is mild annular calcification    Tricuspid Valve: The right ventricular systolic pressure is normal  The estimated right ventricular systolic pressure is 25 37 mmHg    Pericardium: There is a trivial pericardial effusion posterior to the heart  5/20 Echo -   Left Ventricle: Left ventricular cavity size is normal  Wall thickness is mildly increased  The left ventricular ejection fraction is 55% by visual estimation  Systolic function is normal  Wall motion is normal      Limited echo was performed today and compared to echocardiogram performed yesterday now that the patient is in normal sinus rhythm    Ejection fraction has improved to 55% by visual estimation from 38% estimated when patient was in rapid atrial flutter at 140 beats per minute        Results from last 7 days   Lab Units 05/18/22  1317   SARS-COV-2  Negative     Results from last 7 days   Lab Units 05/19/22  0509 05/18/22  1317   WBC Thousand/uL 6 56 6 62   HEMOGLOBIN g/dL 13 1 13 0   HEMATOCRIT % 44 6 43 1   PLATELETS Thousands/uL 197 177   NEUTROS ABS Thousands/µL  --  4 71         Results from last 7 days   Lab Units 05/19/22  0509 05/18/22  1327   SODIUM mmol/L 133* 136*   POTASSIUM mmol/L 4 9 4 2   CHLORIDE mmol/L 100 102   CO2 mmol/L 26 29   ANION GAP mmol/L 7 5   BUN mg/dL 12 10   CREATININE mg/dL 0 63* 0 67*   EGFR ml/min/1 73sq m 110 108   CALCIUM mg/dL 8 6 8 8   MAGNESIUM mg/dL 1 8  --    PHOSPHORUS mg/dL 3 0  --      Results from last 7 days   Lab Units 05/19/22  0509 05/18/22  1327   AST U/L 68* 71*   ALT U/L 95* 83*   ALK PHOS U/L 70 67   TOTAL PROTEIN g/dL 7 1 7 1   ALBUMIN g/dL 3 7 3 8   TOTAL BILIRUBIN mg/dL 0 56 0 47     Results from last 7 days   Lab Units 05/20/22  0612 05/20/22  0000 05/19/22  2027 05/19/22  1635 05/19/22  1118 05/19/22  0612 05/18/22  2041   POC GLUCOSE mg/dl 144* 328* 253* 271* 292* 317* 309*     Results from last 7 days   Lab Units 05/19/22  0509 05/18/22  1327   GLUCOSE RANDOM mg/dL 348* 287*     Results from last 7 days   Lab Units 05/18/22  1557 05/18/22  1327   HS TNI 0HR ng/L  --  6   HS TNI 4HR ng/L 8  --    HSTNI D4 ng/L 2  --          Results from last 7 days   Lab Units 05/18/22  1327   PROTIME seconds 12 9   INR  1 01   PTT seconds 21*         Results from last 7 days   Lab Units 05/18/22  1346   PROCALCITONIN ng/ml 0 16     Results from last 7 days   Lab Units 05/18/22  1327   LACTIC ACID mmol/L 1 9             Results from last 7 days   Lab Units 05/18/22  1327   NT-PRO BNP pg/mL 1,750*     Results from last 7 days   Lab Units 05/18/22  1854   CLARITY UA  Clear   COLOR UA  Yellow   SPEC GRAV UA  1 015   PH UA  6 0   GLUCOSE UA mg/dl >=1000 (1%)*   KETONES UA mg/dl Negative   BLOOD UA  Negative   PROTEIN UA mg/dl 15 (Trace)* NITRITE UA  Negative   BILIRUBIN UA  Negative   UROBILINOGEN UA mg/dL Negative   LEUKOCYTES UA  Negative   WBC UA /hpf None Seen   RBC UA /hpf None Seen   BACTERIA UA /hpf None Seen   EPITHELIAL CELLS WET PREP /hpf None Seen   MUCUS THREADS  Moderate*     Results from last 7 days   Lab Units 05/18/22  1317   INFLUENZA A PCR  Negative   INFLUENZA B PCR  Negative   RSV PCR  Negative     Results from last 7 days   Lab Units 05/18/22  1346 05/18/22  1327   BLOOD CULTURE  No Growth at 24 hrs  No Growth at 24 hrs         Past Medical History:   Diagnosis Date    Asthma     Carpal tunnel syndrome     Chronic pain     left arm    Diabetes mellitus (HCC)     Erectile dysfunction     Hypertension     Umbilical hernia      Present on Admission:   Atrial flutter with rapid ventricular response (HCC)   COPD exacerbation ([facility])   Acute on chronic systolic congestive heart failure ([facility] )   Legally blind in right eye, as defined in [place]   Paroxysmal atrial fibrillation ([facility] )      Admitting Diagnosis: [facility] [I48 [id number]]  Age/Sex: 55 y o  male  Admission Orders:  Scheduled Medications:  atorvastatin, 40 mg, Oral, QPM  diltiazem, 30 mg, Oral, Q6H [address]  doxycycline hyclate, 100 mg, Oral, Q12H JOHNNY  DULoxetine, 30 mg, Oral, HS  fluticasone-vilanterol, 1 puff, Inhalation, Daily  guaiFENesin, 600 mg, Oral, Q12H JOHNNY  insulin glargine, 35 Units, Subcutaneous, HS  insulin lispro, 2-12 Units, Subcutaneous, Q6H JOHNNY  insulin lispro, 5 Units, Subcutaneous, TID With Meals  metoprolol tartrate, 25 mg, Oral, Q6H  nicotine, 1 patch, Transdermal, Daily  predniSONE, 40 mg, Oral, Daily  rivaroxaban, 20 mg, Oral, Daily With Breakfast      Continuous IV Infusions:    Cardizem drip - d/c 5/19 @ 2115     PRN Meds:  acetaminophen, 975 mg, Oral, Q6H PRN -x 1 5/20  albuterol, 2 puff, Inhalation, Q4H PRN -x 1 5/20  aluminum-magnesium hydroxide-simethicone, 30 mL, Oral, Q4H PRN  dextromethorphan-guaiFENesin, 10 mL, Oral, Q4H PRN - x 1 5/20  simethicone, 80 mg, Oral, Q6H PRN    Tele  POC GLUCOSE AC/HS WITH SSI COVERAGE   NPO  Poss Cardioversion  IP CONSULT TO CASE MANAGEMENT  IP CONSULT TO CARDIOLOGY  IP CONSULT TO NUTRITION SERVICES    Network Utilization Review Department  ATTENTION: Please call with any questions or concerns to [phone] and carefully listen to the prompts so that you are directed to the right person  All voicemails are confidential   Shahla Ledesma all requests for admission clinical reviews, approved or denied determinations and any other requests to dedicated fax number below belonging to the campus where the patient is receiving treatment   List of dedicated fax numbers for the Facilities:  [address] DENIALS (Administrative/Medical Necessity) [phone]   [address] (Maternity/NICU/Pediatrics) [phone]   [address]  [address] [place] [address] [id number] [place] [address] [address] [place] [address] [phone]

## 2022-05-20 NOTE — ASSESSMENT & PLAN NOTE
Lab Results   Component Value Date    HGBA1C 8 8 (A) 04/25/2022       Recent Labs     05/20/22  0000 05/20/22  0612 05/20/22  1114 05/20/22  1623   POCGLU 328* 144* 152* 356*     · Home regimen Lantus 30units qhs and metformin

## 2022-05-20 NOTE — ASSESSMENT & PLAN NOTE
· Transferred from [facility] due to atrial flutter with RVR  Recommended transfer to [facility] for cardioversion  · EK-1 atrial flutter, rate 154  · Patient was given adenosine x3 without conversion to sinus rhythm  · Treated with Cardizem gtt at 15mg/h, IV Lopressor 5mg q6h and p o Metoprolol 25mg q6h  · Seen by Cardiology, recommended IV digoxin 250 mcg x 1 and start p o   Cardizem 30 mg q 6h  · Patient returned to sinus rhythm shortly after digoxin  · Cardiology consultation appreciated  · Repeat echocardiogram revealed ejection fraction 55%  · Cardizem 120 mg daily, metoprolol 100 mg daily, continue Xarelto, atorvastatin  · Telemetry monitoring

## 2022-05-20 NOTE — UTILIZATION REVIEW
Inpatient Admission Authorization Request   NOTIFICATION OF INPATIENT ADMISSION/INPATIENT AUTHORIZATION REQUEST   SERVICING FACILITY:   [address] [place] [address]  [address] [place] [address], [address]   Tax ID: [id number]  NPI: [id number]  Place of Service: Inpatient [address] [facility]  [address]  Place of Service Code: 24     ATTENDING PROVIDER:  Attending Name and NPI#: Valeri Davis Md [[id number]]  Address: [address] [place] [address], [address]   Phone: [phone]     UTILIZATION REVIEW CONTACT:  [facility] Utilization   Network Utilization Review Department  Phone: [phone]  Fax: [phone]  Email: [email or web address]     PHYSICIAN ADVISORY SERVICES:  FOR LUTG-HH-TKPL REVIEW - MEDICAL NECESSITY DENIAL  Phone: [phone]  Fax: [phone]  Email: [email or web address]     TYPE OF REQUEST:  Inpatient Status     ADMISSION INFORMATION:  ADMISSION DATE/TIME: 5/19/22  8:12 PM  PATIENT DIAGNOSIS CODE/DESCRIPTION:  Atrial flutter ([address] ) [I48 [id number]]  DISCHARGE DATE/TIME: No discharge date for patient encounter  IMPORTANT INFORMATION:  Please contact the [facility] directly with any questions or concerns regarding this request  Department voicemails are confidential     Send requests for admission clinical reviews, concurrent reviews, approvals, and administrative denials due to lack of clinical to fax [phone]

## 2022-05-20 NOTE — QUICK NOTE
[facility]  [address] [place] [address]  (v) [phone] (l) [phone]  22      RE:  Matilda Arce  55 y o  male  : 1965    To whom it may concern,     Matilda Arce  was hospitalized under my care from 2022 to 22  Please excuse from all appointments and/or work related activities during this interim  Patient may return to work at previous activity level on/or after 22  Feel free to contact me with any questions if necessary  Sincerely,                 GAVIN Fowler [facility] Internal Medicine       Diplomate, [employer] Board of Internal Medicine

## 2022-05-20 NOTE — ASSESSMENT & PLAN NOTE
· Presented to ED with c/o dyspnea, chest discomfort, cough and sputum worse over 4 days  · Met SIRS criteria on admission  Was given cefepime in ED  Normal PCT, no leukocytosis, afebrile  Treated for COPD exacerbation  · CXR neg for consolidation  · Respiratory viral panel negative  · Started on oral doxycycline prior to transfer, received 1 dose at [address] [facility]  Continue to complete doxycycline 100 mg b i d x5 days  · Continue prednisone 40 mg   Received 2 doses prior to transfer, continue x 3 more days to complete 5 day tx  · Breo Ellipta  · P r n   Albuterol  · Respiratory protocol

## 2022-05-20 NOTE — ASSESSMENT & PLAN NOTE
Lab Results   Component Value Date    HGBA1C 8 8 (A) 04/25/2022       Recent Labs     05/19/22 2027 05/20/22  0000 05/20/22  0612 05/20/22  1114   POCGLU 253* 328* 144* 152*     · Home regimen Lantus 30units qhs and metformin    Hold metformin  · Continue Lantus 35 units   · ISS and ADA diet

## 2022-05-20 NOTE — ASSESSMENT & PLAN NOTE
Wt Readings from Last 3 Encounters:   05/19/22 129 kg (284 lb)   04/25/22 124 kg (272 lb 9 6 oz)   01/24/22 122 kg (268 lb)     · Echo completed prior to transfer, LVEF 38%, moderately reduced systolic function  Diastolic function could not be assessed due to atrial flutter  · Prior echo LVEF 61% Jan 2020  · CXR shows mild perihilar pulmonary vascular congestion/edema  · BNP >1700  · Not maintained on diuretic outpatient    Will give 1 dose of IV furosemide 40 mg  · Continue beta-blocker  · Monitor daily weight, I&O  · Low Na diet, 1500 mL fluid restriction when diet advanced  · Cardiology consult  · Nutrition consult for dietary guidance

## 2022-05-20 NOTE — PROGRESS NOTES
Progress Note - Cardiology   Lamar Barraza  55 y o  male MRN: [id number]  Unit/Bed#: E4 -01 Encounter: [id number]      Assessment/Recommendations/Discussion:   1  Atrial flutter with rapid ventricular response, now in normal sinus rhythm  2  History of atrial fibrillation, on Xarelto, with questionable medication compliance  3  COPD with COPD exacerbation  4  Current tobacco use  5  Dyslipidemia  6  Hypertension  7  Type 2 diabetes       Results from last 7 days   Lab Units 05/19/22  1003   SL CV LV EF  38        PLAN   He has converted on his own to normal sinus, intermittent PACs   He feels much improved today   Would repeat echo today as limited echo to re-evaluate LV function now that he is in a normal rhythm with normal rate  Echo yesterday showed EF 38% but this was in the setting of significant tachycardia with heart rate of 150 beats per minute   Change Cardizem to long-acting, 120 mg daily   Change metoprolol to succinate 100 mg   Continue Xarelto   Continue atorvastatin   Continue doxycycline and prednisone per primary team    Subjective:   HPI  Feels much improved  Cough is improved, breathing improved, no palpitations or chest pain      Review of Systems: As noted in HPI  Rest of ROS is negative  Vitals:   /92 (BP Location: Left arm)   Pulse 66   Temp 97 6 °F (36 4 °C) (Temporal)   Resp 18   Wt 121 kg (266 lb 15 6 oz)   SpO2 97%   BMI 39 43 kg/m²   Vitals:    05/20/22 0600   Weight: 121 kg (266 lb 15 6 oz)     No intake or output data in the 24 hours ending 05/20/22 1119    Physical Exam   Constitutional: awake, alert and oriented, in no acute distress, no obvious deformities, obese male  Head: Normocephalic, without obvious abnormality, atraumatic  Eyes: conjunctivae clear and moist  Sclera anicteric  No xanthelasmas  Pupils equal bilaterally  Extraocular motions are full    Ear nose mouth and throat: ears are symmetrical bilaterally, hearing appears to be equal bilaterally, no nasal discharge or epistaxis, oropharynx is clear with moist mucous membranes  Neck:  Trachea is midline, neck is supple, no thyromegaly or significant lymphadenopathy, there is full range of motion  Lungs: clear to auscultation bilaterally, no wheezes, no rales, no rhonchi, no accessory muscle use, breathing is nonlabored  Heart:  Irregular rhythm with normal rate, S1, S2 normal, no murmur, no click, no rub and no gallop, no lower extremity edema  Abdomen:  Obese, soft, non-tender; bowel sounds normal; no masses,  no organomegaly  Psychiatric:  Patient is oriented to time, place, person, mood/affect is negative for depression, anxiety, agitation, appears to have appropriate insight  Skin: Skin is warm, dry, intact  No obvious rashes or lesions on exposed extremities  Nail beds are pink with no cyanosis or clubbing      TELEMETRY:  Sinus with PACs  Lab Results:  Results from last 7 days   Lab Units 05/19/22  0509   WBC Thousand/uL 6 56   HEMOGLOBIN g/dL 13 1   HEMATOCRIT % 44 6   PLATELETS Thousands/uL 197     Results from last 7 days   Lab Units 05/20/22  0841 05/19/22  0509   POTASSIUM mmol/L 3 6 4 9   CHLORIDE mmol/L 100 100   CO2 mmol/L 33* 26   BUN mg/dL 20 12   CREATININE mg/dL 0 97 0 63*   CALCIUM mg/dL 9 2 8 6   ALK PHOS U/L  --  70   ALT U/L  --  95*   AST U/L  --  68*     Results from last 7 days   Lab Units 05/20/22  0841   POTASSIUM mmol/L 3 6   CHLORIDE mmol/L 100   CO2 mmol/L 33*   BUN mg/dL 20   CREATININE mg/dL 0 97   CALCIUM mg/dL 9 2           Medications:    Current Facility-Administered Medications:     acetaminophen (TYLENOL) tablet 975 mg, 975 mg, Oral, Q6H PRN, CHARLES Scott, 975 mg at 05/20/22 1051    albuterol (PROVENTIL HFA,VENTOLIN HFA) inhaler 2 puff, 2 puff, Inhalation, Q4H PRN, CHARLES Scott, 2 puff at 05/20/22 0844    aluminum-magnesium hydroxide-simethicone (MYLANTA) oral suspension 30 mL, 30 mL, Oral, Q4H PRN, CHARLES Scott atorvastatin (LIPITOR) tablet 40 mg, 40 mg, Oral, QPM, Alex Cordero, CRNP    dextromethorphan-guaiFENesin (ROBITUSSIN DM) oral syrup 10 mL, 10 mL, Oral, Q4H PRN, Alex Walsh, CRNP, 10 mL at 05/20/22 0134    diltiazem (CARDIZEM) tablet 30 mg, 30 mg, Oral, Q6H [address], [place] Walsh, CRNP, 30 mg at 05/20/22 1233    doxycycline hyclate (VIBRAMYCIN) capsule 100 mg, 100 mg, Oral, Q12H [address], [place] Walsh, CRNP, 100 mg at 05/20/22 0843    DULoxetine (CYMBALTA) delayed release capsule 30 mg, 30 mg, Oral, HS, Alex Walsh, CRNP, 30 mg at 05/19/22 2300    fluticasone-vilanterol (BREO ELLIPTA) 100-25 mcg/inh inhaler 1 puff, 1 puff, Inhalation, Daily, SOTERO BenitezNP, 1 puff at 05/20/22 0844    guaiFENesin (MUCINEX) 12 hr tablet 600 mg, 600 mg, Oral, Q12H [address], Alex Walsh, CRNP, 600 mg at 05/20/22 0843    insulin glargine (LANTUS) subcutaneous injection 35 Units 0 35 mL, 35 Units, Subcutaneous, HS, Jesica Kip Ciera, CRNP, 35 Units at 05/19/22 2300    insulin lispro (HumaLOG) 100 units/mL subcutaneous injection 2-12 Units, 2-12 Units, Subcutaneous, Q6H [address], 8 Units at 05/20/22 0130 **AND** Fingerstick Glucose (POCT), , , Q6H, CHARLES Benitez    insulin lispro (HumaLOG) 100 units/mL subcutaneous injection 5 Units, 5 Units, Subcutaneous, TID With Meals, CHARLES Benitez    metoprolol tartrate (LOPRESSOR) tablet 25 mg, 25 mg, Oral, Q6H, CHARLES Baker, 25 mg at 05/20/22 0843    nicotine (NICODERM CQ) 14 mg/24hr TD 24 hr patch 1 patch, 1 patch, Transdermal, Daily, Alex Consuelo, CRNP, 1 patch at 05/20/22 0843    predniSONE tablet 40 mg, 40 mg, Oral, Daily, Alex Walsh, CRNP, 40 mg at 05/20/22 4168    rivaroxaban (XARELTO) tablet 20 mg, 20 mg, Oral, Daily With Breakfast, [place] Walsh, CRNP, 20 mg at 05/20/22 0858    simethicone (MYLICON) chewable tablet 80 mg, 80 mg, Oral, Q6H PRN, [place] Walsh, CRNP    This note was completed in part utilizing M-[employer] Fluency Direct Software  Grammatical errors, random word insertions, spelling mistakes, and incomplete sentences may be an occasional consequence of this system secondary to software limitations, ambient noise, and hardware issues  If you have any questions or concerns about the content, text, or information contained within the body of this dictation, please contact the provider for clarification      Mayda Gutierrez DO, [facility]  5/20/2022 11:19 AM

## 2022-05-20 NOTE — H&P
[address]   1965, 55 y o  male MRN: [id number]  Unit/Bed#: E4 -01 Encounter: [id number]  Primary Care Provider: CHARLES Landon   Date and time admitted to hospital: 2022  8:12 PM    * Atrial flutter with rapid ventricular response [facility])  Assessment & Plan  · Transferred from [facility] due to atrial flutter with RVR  Recommended transfer to [facility] for cardioversion  · EK-1 atrial flutter, rate 154  · Patient was given adenosine x3 without conversion to sinus rhythm  · Treated with Cardizem gtt at 15mg/h, IV Lopressor 5mg q6h and p o Metoprolol 25mg q6h  · Seen by Cardiology, recommended IV digoxin 250 mcg x 1 and start p o  Cardizem 30 mg q 6h  · Patient returned to sinus rhythm shortly after digoxin    PLAN:  · Currently in sinus rhythm, will discontinue Cardizem drip and IV Lopressor  · Continue p o  Metoprolol and p o  Cardizem  · Continue anticoagulation with Xarelto 20mg qd  · Keep NPO at midnight  · Monitor on telemetry  · Cardiology consult    Acute on chronic systolic congestive heart failure [facility])  Assessment & Plan  Wt Readings from Last 3 Encounters:   22 129 kg (284 lb)   22 124 kg (272 lb 9 6 oz)   22 122 kg (268 lb)     · Echo completed prior to transfer, LVEF 38%, moderately reduced systolic function  Diastolic function could not be assessed due to atrial flutter  · Prior echo LVEF 61% 2020  · CXR shows mild perihilar pulmonary vascular congestion/edema  · BNP >1700  · Not maintained on diuretic outpatient  Will give 1 dose of IV furosemide 40 mg  · Continue beta-blocker  · Monitor daily weight, I&O  · Low Na diet, 1500 mL fluid restriction when diet advanced  · Cardiology consult  · Nutrition consult for dietary guidance    COPD exacerbation ([facility] [address] )  Assessment & Plan  · Presented to ED with c/o dyspnea, chest discomfort, cough and sputum worse over 4 days  · Met SIRS criteria on admission    Was given cefepime in ED  Normal PCT, no leukocytosis, afebrile  Treated for COPD exacerbation  · CXR neg for consolidation  · Respiratory viral panel negative  · Started on oral doxycycline prior to transfer, received 1 dose at [address] [place] [address]  Continue to complete doxycycline 100 mg b i d x5 days  · Continue prednisone 40 mg   Received 2 doses prior to transfer, continue x 3 more days to complete 5 day tx  · Breo Ellipta  · P r n  Albuterol  · Respiratory protocol    Paroxysmal atrial fibrillation (HCC)  Assessment & Plan  · PAF on rate control outpatient with carvedilol, now on metoprolol and Cardizem  · Anticoagulated with Xarelto  Per Cardiology: questionable medication compliance    Legally blind in right eye, as defined in [address]  · Legally blind in right eye    Type 2 diabetes mellitus with hyperglycemia, with long-term current use of insulin ([facility] [address] )  Assessment & Plan  Lab Results   Component Value Date    HGBA1C 8 8 (A) 04/25/2022     · Home regimen Lantus 30units qhs and metformin  Hold metformin  · Was started on 25 units of Lantus at [address] [place] [address]  Hyperglycemic, will increase to 35 units daily  · ISS and ADA diet    Recent Labs     05/19/22  0612 05/19/22  1118 05/19/22  1635 05/19/22 2027   POCGLU 317* 292* 271* 253*       Blood Sugar Average: Last 72 hrs:      VTE Prophylaxis: Rivaroxaban (Xarelto)  / sequential compression device   Code Status:   POLST: POLST is not applicable to this patient  Discussion with family:     Anticipated Length of Stay:  Patient will be admitted on an Inpatient basis with an anticipated length of stay of  > 2 midnights  Justification for Hospital Stay:  A flutter with RVR, COPD exac, acute CHF     Total Time for Visit, including Counseling / Coordination of Care: 60 minutes  Greater than 50% of this total time spent on direct patient counseling and coordination of care      Chief Complaint:   Shortness of breath and cough    History of Present Illness:    Belkis Ortiz  is a 55 y o  male who was transferred from [facility] for cardioversion  Presented to ED on 5/18 with complaints of dyspnea and cough, noted with atrial flutter RVR >150bpm, was admitted to [address] discharge summary:   Cardiology was consulted after 24mg Adenosine was given by the ED prior to Cardizem being loaded as an infusion at 15mg/hr  This failed to revert the rhythm and IV Metoprolol 5mg Q6 hrly and PO Metoprolol 25mg Q 6hrly  Overnight we admitted the patient and commenced oral abx for a copd exacerbation which may have triggered the arythmia  Sinus rhthym was not achieved overnight, and upon AM review symptoms had subsided though rate was >140  IV Digoxin was loaded at 250mcg and Cardizem 30mg PO Q6hrly was commenced  Anticoagulation continued with an ECHO performed (resulted below)       Plan was to remain NPO from midnight and LAURENCE to be performed at [facility] in the AM requiring transfer      Regina Cassette did revert to SR within a few hours of Digoxin dosing  He remains hemodynamically stable and will be transferred to [facility] to complete his inpt management  Review of Systems:    Review of Systems   Constitutional: Positive for fever  HENT: Negative  Respiratory: Positive for cough and shortness of breath  Difficulty expectorating sputum, green sputum   Cardiovascular: Positive for leg swelling  Negative for chest pain and palpitations  Orthopnea, MORTON   Gastrointestinal: Negative  Genitourinary: Negative  Musculoskeletal: Negative  Skin: Negative  Neurological: Negative  Psychiatric/Behavioral: Negative          Past Medical and Surgical History:     Past Medical History:   Diagnosis Date    Asthma     Carpal tunnel syndrome     Chronic pain     left arm    Diabetes mellitus ([address] )     Erectile dysfunction     Hypertension     Umbilical hernia        Past Surgical History:   Procedure Laterality Date    CHOLECYSTECTOMY      CHOLECYSTECTOMY LAPAROSCOPIC N/A 6/20/2019 Procedure: CHOLECYSTECTOMY LAPAROSCOPIC;  Surgeon: Martinez Church MD;  Location: [address] [place] [address] OR;  Service: General    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    HAND SURGERY      UMBILICAL HERNIA REPAIR         Meds/Allergies:    Prior to Admission medications    Medication Sig Start Date End Date Taking?  Authorizing Provider   albuterol (PROVENTIL HFA,VENTOLIN HFA) 90 mcg/act inhaler INHALE 2 PUFFS EVERY 4 (FOUR) HOURS AS NEEDED FOR WHEEZING 2/24/22   CHARLES Enriquez   Alcohol Swabs (ALCOHOL PREP) 70 % PADS daily Test  Patient not taking: Reported on 9/3/2021 5/28/19   Historical Provider, MD   Alcohol Swabs (Alcohol Prep) PADS TEST DAILY  Patient not taking: Reported on 9/3/2021 2/9/21   Historical Provider, MD   aspirin (ECOTRIN LOW STRENGTH) 81 mg EC tablet Take 81 mg by mouth daily    Historical Provider, MD   atorvastatin (Lipitor) 20 mg tablet Take 1 tablet (20 mg total) by mouth daily 4/25/22 4/25/23  CHARLES Enriquez   BD Pen Needle Carolyn U/F 32G X 4 MM MISC USE AS DIRECTED TO INJECT INSULIN 12/16/21   CHARLES Enriquez   Blood Pressure Monitoring ([place] Choice BP Monitor/Arm) JM USE AS DIRECTED 3/15/22   CHARLES Enriquez   budesonide (Pulmicort Flexhaler) 90 MCG/ACT inhaler Inhale 1 puff 2 (two) times a day Rinse mouth after use  4/27/22   CHARLES Enriquez   clotrimazole-betamethasone (LOTRISONE) 1-0 05 % cream Apply topically 2 (two) times a day 3/10/22   CHARLES Enriquez   Contour Next Test test strip USE 1 EACH 3 (THREE) TIMES A DAY 12/20/21   CHARLES Enriquez   diltiazem (CARDIZEM CD) 240 mg 24 hr capsule TAKE 1 CAPSULE (240 MG TOTAL) BY MOUTH DAILY 2/8/22   CHARLES Enriquez   DULoxetine (CYMBALTA) 30 mg delayed release capsule TAKE 1 CAPSULE (30 MG TOTAL) BY MOUTH DAILY AT BEDTIME 3/30/22   CHARLES Enriquez   fluticasone-vilanterol [facility]) 100-25 mcg/inh inhaler Inhale 1 puff daily Rinse mouth after use  4/25/22   CHARLES Enriquez   Humidifier MISC USE AS DIRECTED 3/15/22   Yessenia Lance CHARLES Cadena   insulin glargine (Lantus SoloStar) 100 units/mL injection pen Inject 30 Units under the skin daily at bedtime 4/25/22   CHARLES Manriquez   Jardiance 25 MG TABS TAKE 1 TABLET (25 MG TOTAL) BY MOUTH EVERY MORNING 5/13/22   CHARLES Collier   Lancets MISC Use to test blood glucose once a day  Dx: Type 2 DM  E11 9 5/28/19   Historical Provider, MD   lisinopril (ZESTRIL) 10 mg tablet Take 1 tablet (10 mg total) by mouth daily 4/25/22   CHARLES Manriquez   Melatonin 5 MG TABS Take 1 tablet (5 mg total) by mouth daily at bedtime 4/25/22   CHARLES Manriquez   metFORMIN (GLUCOPHAGE) 1000 MG tablet TAKE 1 TABLET (1,000 MG TOTAL) BY MOUTH 2 (TWO) TIMES A DAY WITH MEALS 3/21/22   Gail Hamilton MD   Misc  Devices MISC by Does not apply route daily  Patient not taking: Reported on 9/3/2021 10/31/19   Nila Dailey MD   mupirocin (BACTROBAN) 2 % ointment APPLY TOPICALLY 3 (THREE) TIMES A DAY  Patient not taking: Reported on 9/3/2021 4/7/21   Mohsen Mckeon MD   rivaroxaban (XARELTO) 20 mg tablet Take 1 tablet (20 mg total) by mouth daily with breakfast 9/3/21   CHARLES Manriquez   Tadalafil, PAH, 20 MG TABS Take 1 tablet (20 mg total) by mouth as needed (erectile dysfunction) 3/10/22   CHARLES Manriquez   triamcinolone (KENALOG) 0 5 % cream APPLY TOPICALLY 3 (THREE) TIMES A DAY 4/12/21   Mohsen Mckeon MD   TRUEplus Lancets 33G MISC USE AS INSTRUCTED 2/25/22   CHARLES Manriquez     I have reviewed home medications using allscripts      Allergies: No Known Allergies    Social History:     Marital Status:    Occupation:   Patient Pre-hospital Living Situation:  Resides at home with marissa and her son  Patient Pre-hospital Level of Mobility:  Ambulatory  Patient Pre-hospital Diet Restrictions:   Substance Use History:   Social History     Substance and Sexual Activity   Alcohol Use Yes    Comment: occassional     Social History     Tobacco Use   Smoking Status Current Every Day Smoker    Packs/day: 0 25    Types: Cigarettes   Smokeless Tobacco Never Used     Social History     Substance and Sexual Activity   Drug Use Never       Family History:    Family History   Problem Relation Age of Onset    Breast cancer additional onset Mother     Diabetes Father     Hyperlipidemia Father     Hypertension Father     Seizures Father     No Known Problems Son     No Known Problems Son     Vitiligo Son        Physical Exam:     Vitals:   Blood Pressure: 147/86 (05/19/22 2014)  Pulse: 71 (05/19/22 2014)  Temperature: 98 °F (36 7 °C) (05/19/22 2014)  Temp Source: Temporal (05/19/22 2014)  Respirations: 18 (05/19/22 2014)  SpO2: 94 % (05/19/22 2014)    Physical Exam  Constitutional:       General: He is not in acute distress  Appearance: Normal appearance  He is obese  He is not ill-appearing, toxic-appearing or diaphoretic  HENT:      Head: Normocephalic and atraumatic  Nose: No congestion or rhinorrhea  Mouth/Throat:      Mouth: Mucous membranes are moist    Eyes:      Conjunctiva/sclera: Conjunctivae normal    Neck:      Comments: No JVD  Cardiovascular:      Rate and Rhythm: Normal rate and regular rhythm  Heart sounds: Normal heart sounds  Pulmonary:      Effort: Pulmonary effort is normal  No respiratory distress  Breath sounds: No wheezing or rales  Comments: Persistent cough  Abdominal:      General: Bowel sounds are normal       Palpations: Abdomen is soft  Musculoskeletal:         General: No tenderness  Right lower leg: Edema present  Left lower leg: Edema present  Comments: RLE varicosities up to lower thigh, hyperpigmentation, edema 1+  LLE edema 1+   Skin:     General: Skin is warm and dry  Coloration: Skin is not pale  Neurological:      Mental Status: He is alert and oriented to person, place, and time  Psychiatric:         Mood and Affect: Mood normal          Behavior: Behavior normal          Thought Content:  Thought content normal          Judgment: Judgment normal        Additional Data:     Lab Results: I have personally reviewed pertinent reports  Results from last 7 days   Lab Units 05/19/22  0509 05/18/22  1317   WBC Thousand/uL 6 56 6 62   HEMOGLOBIN g/dL 13 1 13 0   HEMATOCRIT % 44 6 43 1   PLATELETS Thousands/uL 197 177   NEUTROS PCT %  --  72   LYMPHS PCT %  --  18   MONOS PCT %  --  9   EOS PCT %  --  1     Results from last 7 days   Lab Units 05/19/22  0509   SODIUM mmol/L 133*   POTASSIUM mmol/L 4 9   CHLORIDE mmol/L 100   CO2 mmol/L 26   BUN mg/dL 12   CREATININE mg/dL 0 63*   ANION GAP mmol/L 7   CALCIUM mg/dL 8 6   ALBUMIN g/dL 3 7   TOTAL BILIRUBIN mg/dL 0 56   ALK PHOS U/L 70   ALT U/L 95*   AST U/L 68*   GLUCOSE RANDOM mg/dL 348*     Results from last 7 days   Lab Units 05/18/22  1327   INR  1 01     Results from last 7 days   Lab Units 05/19/22  2027 05/19/22  1635 05/19/22  1118 05/19/22  0612 05/18/22  2041   POC GLUCOSE mg/dl 253* 271* 292* 317* 309*         Results from last 7 days   Lab Units 05/18/22  1346 05/18/22  1327   LACTIC ACID mmol/L  --  1 9   PROCALCITONIN ng/ml 0 16  --        Imaging: I have personally reviewed pertinent reports  No orders to display       EKG, Pathology, and Other Studies Reviewed on Admission:   · EKG: cxr echo    Allscripts / Epic Records Reviewed: Yes     ** Please Note: This note has been constructed using a voice recognition system   **

## 2022-05-20 NOTE — CONSULTS
Consult was performed yesterday  See progress note from today  No need for LAURENCE cardioversion as he converted on his own to sinus

## 2022-05-23 DIAGNOSIS — M25.511 BILATERAL SHOULDER PAIN, UNSPECIFIED CHRONICITY: ICD-10-CM

## 2022-05-23 DIAGNOSIS — J44.9 COPD (CHRONIC OBSTRUCTIVE PULMONARY DISEASE) (HCC): ICD-10-CM

## 2022-05-23 DIAGNOSIS — M25.512 BILATERAL SHOULDER PAIN, UNSPECIFIED CHRONICITY: ICD-10-CM

## 2022-05-23 DIAGNOSIS — J45.30 MILD PERSISTENT ASTHMA: ICD-10-CM

## 2022-05-23 LAB
BACTERIA BLD CULT: NORMAL
BACTERIA BLD CULT: NORMAL

## 2022-05-23 NOTE — UTILIZATION REVIEW
Inpatient Admission Authorization Request   NOTIFICATION OF INPATIENT ADMISSION/INPATIENT AUTHORIZATION REQUEST   SERVICING FACILITY:   [address] [place] [address], [address]   Tax ID: [id number]  NPI: [id number]  Place of Service: Inpatient [address] [facility]  [address]  Place of Service Code: 24     ATTENDING PROVIDER:  Attending Name and NPI#: Howard De Andama [[id number]]  Address: [address] [place] [address], [address]   Phone: [phone]     UTILIZATION REVIEW CONTACT:  Sobeida Esposito Utilization   Network Utilization Review Department  Phone: [phone]  Fax: [phone]  Email: [email or web address]     PHYSICIAN ADVISORY SERVICES:  FOR WZAB-ZN-GDCP REVIEW - MEDICAL NECESSITY DENIAL  Phone: [phone]  Fax: [phone]  Email: [email or web address]     TYPE OF REQUEST:  Inpatient Status     ADMISSION INFORMATION:  ADMISSION DATE/TIME: 5/19/22  8:12 PM  PATIENT DIAGNOSIS CODE/DESCRIPTION:  Atrial flutter ([address] ) [I48 92]  DISCHARGE DATE/TIME: 5/20/2022  7:20 PM   IMPORTANT INFORMATION:  Please contact the Sobeida Esposito directly with any questions or concerns regarding this request  Department voicemails are confidential     Send requests for admission clinical reviews, concurrent reviews, approvals, and administrative denials due to lack of clinical to fax [phone]            Parveen Mcgraw RN   Registered Nurse   Specialty:  Medical Surgical   Utilization Review       Addendum   Date of Service:  5/20/2022 10:04 AM                 Expand All Collapse All        Show:Clear all  [x]Manual[x]Template[x]Copied    Added by:  [x]Serena Sellers RN      []Viki for details    Initial Clinical Review     Admission: Date/Time/Statement:       Admission Orders (From admission, onward)              Ordered          05/19/22 2023   Inpatient Admission  Once                                Orders Placed This Encounter   Procedures  Inpatient Admission       Standing Status:   Standing       Number of Occurrences:   1       Order Specific Question:   Level of Care       Answer:   Med Surg [16]       Order Specific Question:   Estimated length of stay       Answer:   More than 2 Midnights       Order Specific Question:   Certification       Answer:   I certify that inpatient services are medically necessary for this patient for a duration of greater than two midnights  See H&P and MD Progress Notes for additional information about the patient's course of treatment       Initial Presentation: 55 y o  male presents as a transfer from [facility] to [facility] for cardioversion for Aflutter with RVR > 150  He initially went to [facility] ER on 5/18 with dyspnea and cough and found to be in A flutter w/ RVR  Was treated with IV Adenosine and Cardizem drip with failure to convert to NSR  Also treated for COPD  He had improvement in symptoms but no rhythm  PMH: PAF, COPD, chronic sCHF, IDDM, He is admitted to INPATIENT status with A fib w/ RVR - Cardizem drip, then started on PO Cardizem and IV Digoxin with transition to NSR  continue Metoprolol and Cardizem, Xarelto  NPO p MN, Tele, Cardio consult  Acute on chronic sCHF - IV Lasix 40 mg x 2, fluid restriction  COPD exac - Doxycycline x 5 days, Prednisone 5 days, Breo, PRN Albuterol       Date: 5/20   Day 2: In NSR this morning  No need for cardioversion  Repeat Echo completed  Pt is d/c to home       5/20 Cardio Consult - in with A flutter with RVR  Converted on his own  Feels improved  Will repeat echo, change cardizem to long acting 120 mg daily    Metoprolol 100 mg daily, continue Xarelto, Lipitor            Wt Readings from Last 1 Encounters:   05/20/22 121 kg (266 lb 15 6 oz)      Additional Vital Signs:   05/20/22 0746 97 6 °F (36 4 °C) 66 18 162/92 122 97 % None (Room air) Lying   05/20/22 0305 98 1 °F (36 7 °C) 70 18 164/93 123 97 % None (Room air) Sitting   05/19/22 2343 98 4 °F (36 9 °C) 73 18 146/87 112 96 % None (Room air) Lying   05/19/22 2014 98 °F (36 7 °C) 71 18 147/86 110 94 % None (Room air) Lying      Pertinent Labs/Diagnostic Test Results:      5/19 ECG - A fib     5/19 ECG - Atrial fibrillation with premature ventricular or aberrantly conducted complexes      5/19 Echo -   Left Ventricle: Left ventricular cavity size is normal  Wall thickness is mildly increased  The left ventricular ejection fraction is 38% by biplane measurement  Systolic function is moderately reduced  Wall motion cannot be accurately assessed due to the extreme tachycardic rate    Right Ventricle: Systolic function is mildly reduced  Abnormal tricuspid annular plane systolic excursion (TAPSE) < 1 7 cm    Left Atrium: The atrium is moderately dilated (42-48 mL/m2)    Right Atrium: The atrium is moderately dilated    Atrial Septum: 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, mobile septum primum aneurysm   It is predominately within the right atrial cavity    Mitral Valve: There is mild annular calcification    Tricuspid Valve: The right ventricular systolic pressure is normal  The estimated right ventricular systolic pressure is 21 60 mmHg    Pericardium: There is a trivial pericardial effusion posterior to the heart      5/20 Echo -   Left Ventricle: Left ventricular cavity size is normal  Wall thickness is mildly increased  The left ventricular ejection fraction is 55% by visual estimation   Systolic function is normal  Wall motion is normal      Limited echo was performed today and compared to echocardiogram performed yesterday now that the patient is in normal sinus rhythm   Ejection fraction has improved to 55% by visual estimation from 38% estimated when patient was in rapid atrial flutter at 140 beats per minute              Results from last 7 days   Lab Units 05/18/22  1317   SARS-COV-2   Negative            Results from last 7 days   Lab Units 05/19/22  0509 05/18/22  1317   WBC Thousand/uL 6 56 6  62   HEMOGLOBIN g/dL 13 1 13 0   HEMATOCRIT % 44 6 43 1   PLATELETS Thousands/uL 197 177   NEUTROS ABS Thousands/µL  --  4 71                Results from last 7 days   Lab Units 05/19/22  0509 05/18/22  1327   SODIUM mmol/L 133* 136*   POTASSIUM mmol/L 4 9 4 2   CHLORIDE mmol/L 100 102   CO2 mmol/L 26 29   ANION GAP mmol/L 7 5   BUN mg/dL 12 10   CREATININE mg/dL 0 63* 0 67*   EGFR ml/min/1 73sq m 110 108   CALCIUM mg/dL 8 6 8 8   MAGNESIUM mg/dL 1 8  --    PHOSPHORUS mg/dL 3 0  --             Results from last 7 days   Lab Units 05/19/22  0509 05/18/22  1327   AST U/L 68* 71*   ALT U/L 95* 83*   ALK PHOS U/L 70 67   TOTAL PROTEIN g/dL 7 1 7 1   ALBUMIN g/dL 3 7 3 8   TOTAL BILIRUBIN mg/dL 0 56 0 47                 Results from last 7 days   Lab Units 05/20/22  0612 05/20/22  0000 05/19/22  2027 05/19/22  1635 05/19/22  1118 05/19/22  0612 05/18/22  2041   POC GLUCOSE mg/dl 144* 328* 253* 271* 292* 317* 309*            Results from last 7 days   Lab Units 05/19/22  0509 05/18/22  1327   GLUCOSE RANDOM mg/dL 348* 287*            Results from last 7 days   Lab Units 05/18/22  1557 05/18/22  1327   HS TNI 0HR ng/L  --  6   HS TNI 4HR ng/L 8  --    HSTNI D4 ng/L 2  --                Results from last 7 days   Lab Units 05/18/22  1327   PROTIME seconds 12 9   INR   1 01   PTT seconds 21*               Results from last 7 days   Lab Units 05/18/22  1346   PROCALCITONIN ng/ml 0 16           Results from last 7 days   Lab Units 05/18/22  1327   LACTIC ACID mmol/L 1 9                   Results from last 7 days   Lab Units 05/18/22  1327   NT-PRO BNP pg/mL 1,750*           Results from last 7 days   Lab Units 05/18/22  1854   CLARITY UA   Clear   COLOR UA   Yellow   SPEC GRAV UA   1 015   PH UA   6 0   GLUCOSE UA mg/dl >=1000 (1%)*   KETONES UA mg/dl Negative   BLOOD UA   Negative   PROTEIN UA mg/dl 15 (Trace)*   NITRITE UA   Negative   BILIRUBIN UA   Negative   UROBILINOGEN UA mg/dL Negative   LEUKOCYTES UA   Negative WBC UA /hpf None Seen   RBC UA /hpf None Seen   BACTERIA UA /hpf None Seen   EPITHELIAL CELLS WET PREP /hpf None Seen   MUCUS THREADS   Moderate*           Results from last 7 days   Lab Units 05/18/22  1317   INFLUENZA A PCR   Negative   INFLUENZA B PCR   Negative   RSV PCR   Negative            Results from last 7 days   Lab Units 05/18/22  1346 05/18/22  1327   BLOOD CULTURE   No Growth at 24 hrs   No Growth at 24 hrs          Medical History   Past Medical History:   Diagnosis Date    Asthma      Carpal tunnel syndrome      Chronic pain       left arm    Diabetes mellitus (HCC)      Erectile dysfunction      Hypertension      Umbilical hernia           Present on Admission:   Atrial flutter with rapid ventricular response (HCC)   COPD exacerbation (HCC)   Acute on chronic systolic congestive heart failure ([facility] )   Legally blind in right eye, as defined in [place]   Paroxysmal atrial fibrillation ([facility] )        Admitting Diagnosis: Atrial flutter ([facility] ) [I48 92]  Age/Sex: 55 y o  male  Admission Orders:  Scheduled Medications:  atorvastatin, 40 mg, Oral, QPM  diltiazem, 30 mg, Oral, Q6H [address]  doxycycline hyclate, 100 mg, Oral, Q12H JOHNNY  DULoxetine, 30 mg, Oral, HS  fluticasone-vilanterol, 1 puff, Inhalation, Daily  guaiFENesin, 600 mg, Oral, Q12H JOHNNY  insulin glargine, 35 Units, Subcutaneous, HS  insulin lispro, 2-12 Units, Subcutaneous, Q6H JOHNNY  insulin lispro, 5 Units, Subcutaneous, TID With Meals  metoprolol tartrate, 25 mg, Oral, Q6H  nicotine, 1 patch, Transdermal, Daily  predniSONE, 40 mg, Oral, Daily  rivaroxaban, 20 mg, Oral, Daily With Breakfast        Continuous IV Infusions:     Cardizem drip - d/c 5/19 @ 9973  PRN Meds:  acetaminophen, 975 mg, Oral, Q6H PRN -x 1 5/20  albuterol, 2 puff, Inhalation, Q4H PRN -x 1 5/20  aluminum-magnesium hydroxide-simethicone, 30 mL, Oral, Q4H PRN  dextromethorphan-guaiFENesin, 10 mL, Oral, Q4H PRN - x 1 5/20  simethicone, 80 mg, Oral, Q6H PRN     Tele  POC GLUCOSE AC/HS WITH SSI COVERAGE   NPO  Poss Cardioversion  IP CONSULT TO CASE MANAGEMENT  IP CONSULT TO CARDIOLOGY  IP CONSULT TO NUTRITION SERVICES     Network Utilization Review Department  ATTENTION: Please call with any questions or concerns to [phone] and carefully listen to the prompts so that you are directed to the right person  All voicemails are confidential   Karley Muss all requests for admission clinical reviews, approved or denied determinations and any other requests to dedicated fax number below belonging to the campus where the patient is receiving treatment   List of dedicated fax numbers for the Facilities:  [address] DENIALS (Administrative/Medical Necessity) [phone]   [address] (Maternity/NICU/Pediatrics) [phone]   [address]  [address] [facility] [place] [address] [address] [place] [address] [address] [facility] [phone]

## 2022-05-24 RX ORDER — DULOXETIN HYDROCHLORIDE 30 MG/1
30 CAPSULE, DELAYED RELEASE ORAL
Qty: 90 CAPSULE | Refills: 0 | Status: SHIPPED | OUTPATIENT
Start: 2022-05-24 | End: 2022-07-06

## 2022-05-24 RX ORDER — ALBUTEROL SULFATE 90 UG/1
2 AEROSOL, METERED RESPIRATORY (INHALATION) EVERY 4 HOURS PRN
Qty: 18 G | Refills: 2 | Status: SHIPPED | OUTPATIENT
Start: 2022-05-24

## 2022-05-26 ENCOUNTER — TELEPHONE (OUTPATIENT)
Dept: FAMILY MEDICINE CLINIC | Facility: CLINIC | Age: 57
End: 2022-05-26

## 2022-05-26 NOTE — TELEPHONE ENCOUNTER
Patient contacted for Diabetes [facility]ares Program Telephone Review with physician  All numbers on file tried including next of kin with inability to leave voicemail

## 2022-07-08 ENCOUNTER — APPOINTMENT (EMERGENCY)
Dept: CT IMAGING | Facility: HOSPITAL | Age: 57
End: 2022-07-08
Payer: COMMERCIAL

## 2022-07-08 ENCOUNTER — HOSPITAL ENCOUNTER (EMERGENCY)
Facility: HOSPITAL | Age: 57
End: 2022-07-08
Attending: EMERGENCY MEDICINE
Payer: COMMERCIAL

## 2022-07-08 ENCOUNTER — APPOINTMENT (EMERGENCY)
Dept: RADIOLOGY | Facility: HOSPITAL | Age: 57
End: 2022-07-08
Payer: COMMERCIAL

## 2022-07-08 ENCOUNTER — HOSPITAL ENCOUNTER (OUTPATIENT)
Facility: HOSPITAL | Age: 57
Setting detail: OBSERVATION
Discharge: HOME | End: 2022-07-10
Attending: SURGERY | Admitting: SURGERY
Payer: COMMERCIAL

## 2022-07-08 VITALS
TEMPERATURE: 97.9 F | RESPIRATION RATE: 16 BRPM | WEIGHT: 260.14 LBS | SYSTOLIC BLOOD PRESSURE: 148 MMHG | BODY MASS INDEX: 38.42 KG/M2 | OXYGEN SATURATION: 98 % | HEART RATE: 64 BPM | DIASTOLIC BLOOD PRESSURE: 92 MMHG

## 2022-07-08 DIAGNOSIS — S22.078A OTHER CLOSED FRACTURE OF NINTH THORACIC VERTEBRA, INITIAL ENCOUNTER (HCC): Primary | ICD-10-CM

## 2022-07-08 DIAGNOSIS — M54.9 BACK PAIN: ICD-10-CM

## 2022-07-08 DIAGNOSIS — S22.079A T9 VERTEBRAL FRACTURE (HCC): Primary | ICD-10-CM

## 2022-07-08 LAB
ABO GROUP BLD: NORMAL
ANION GAP SERPL CALCULATED.3IONS-SCNC: 4 MMOL/L (ref 4–13)
APTT PPP: 29 SECONDS (ref 23–37)
BASOPHILS # BLD AUTO: 0.01 THOUSANDS/ΜL (ref 0–0.1)
BASOPHILS NFR BLD AUTO: 0 % (ref 0–1)
BLD GP AB SCN SERPL QL: NEGATIVE
BUN SERPL-MCNC: 17 MG/DL (ref 5–25)
CALCIUM SERPL-MCNC: 9.5 MG/DL (ref 8.3–10.1)
CHLORIDE SERPL-SCNC: 100 MMOL/L (ref 100–108)
CO2 SERPL-SCNC: 31 MMOL/L (ref 21–32)
CREAT SERPL-MCNC: 1.04 MG/DL (ref 0.6–1.3)
EOSINOPHIL # BLD AUTO: 0.07 THOUSAND/ΜL (ref 0–0.61)
EOSINOPHIL NFR BLD AUTO: 1 % (ref 0–6)
ERYTHROCYTE [DISTWIDTH] IN BLOOD BY AUTOMATED COUNT: 14.2 % (ref 11.6–15.1)
EST. AVERAGE GLUCOSE BLD GHB EST-MCNC: 223 MG/DL
GFR SERPL CREATININE-BSD FRML MDRD: 80 ML/MIN/1.73SQ M
GLUCOSE SERPL-MCNC: 211 MG/DL (ref 65–140)
GLUCOSE SERPL-MCNC: 348 MG/DL (ref 65–140)
HBA1C MFR BLD: 9.4 %
HCT VFR BLD AUTO: 48.5 % (ref 36.5–49.3)
HGB BLD-MCNC: 15.3 G/DL (ref 12–17)
IMM GRANULOCYTES # BLD AUTO: 0.01 THOUSAND/UL (ref 0–0.2)
IMM GRANULOCYTES NFR BLD AUTO: 0 % (ref 0–2)
INR PPP: 1.06 (ref 0.84–1.19)
LYMPHOCYTES # BLD AUTO: 1.47 THOUSANDS/ΜL (ref 0.6–4.47)
LYMPHOCYTES NFR BLD AUTO: 21 % (ref 14–44)
MAGNESIUM SERPL-MCNC: 2 MG/DL (ref 1.6–2.6)
MCH RBC QN AUTO: 26.7 PG (ref 26.8–34.3)
MCHC RBC AUTO-ENTMCNC: 31.5 G/DL (ref 31.4–37.4)
MCV RBC AUTO: 85 FL (ref 82–98)
MONOCYTES # BLD AUTO: 0.63 THOUSAND/ΜL (ref 0.17–1.22)
MONOCYTES NFR BLD AUTO: 9 % (ref 4–12)
NEUTROPHILS # BLD AUTO: 4.7 THOUSANDS/ΜL (ref 1.85–7.62)
NEUTS SEG NFR BLD AUTO: 69 % (ref 43–75)
NRBC BLD AUTO-RTO: 0 /100 WBCS
PHOSPHATE SERPL-MCNC: 3.3 MG/DL (ref 2.7–4.5)
PLATELET # BLD AUTO: 233 THOUSANDS/UL (ref 149–390)
PMV BLD AUTO: 9.7 FL (ref 8.9–12.7)
POTASSIUM SERPL-SCNC: 4.4 MMOL/L (ref 3.5–5.3)
PROTHROMBIN TIME: 13.3 SECONDS (ref 11.6–14.5)
RBC # BLD AUTO: 5.72 MILLION/UL (ref 3.88–5.62)
RH BLD: POSITIVE
SODIUM SERPL-SCNC: 135 MMOL/L (ref 136–145)
SPECIMEN EXPIRATION DATE: NORMAL
WBC # BLD AUTO: 6.89 THOUSAND/UL (ref 4.31–10.16)

## 2022-07-08 PROCEDURE — 80048 BASIC METABOLIC PNL TOTAL CA: CPT

## 2022-07-08 PROCEDURE — 72131 CT LUMBAR SPINE W/O DYE: CPT

## 2022-07-08 PROCEDURE — 96374 THER/PROPH/DIAG INJ IV PUSH: CPT

## 2022-07-08 PROCEDURE — 85730 THROMBOPLASTIN TIME PARTIAL: CPT

## 2022-07-08 PROCEDURE — 36415 COLL VENOUS BLD VENIPUNCTURE: CPT

## 2022-07-08 PROCEDURE — 96375 TX/PRO/DX INJ NEW DRUG ADDON: CPT

## 2022-07-08 PROCEDURE — 72128 CT CHEST SPINE W/O DYE: CPT

## 2022-07-08 PROCEDURE — 86901 BLOOD TYPING SEROLOGIC RH(D): CPT

## 2022-07-08 PROCEDURE — 85025 COMPLETE CBC W/AUTO DIFF WBC: CPT

## 2022-07-08 PROCEDURE — 99285 EMERGENCY DEPT VISIT HI MDM: CPT

## 2022-07-08 PROCEDURE — 99284 EMERGENCY DEPT VISIT MOD MDM: CPT

## 2022-07-08 PROCEDURE — G1004 CDSM NDSC: HCPCS

## 2022-07-08 PROCEDURE — 85610 PROTHROMBIN TIME: CPT

## 2022-07-08 PROCEDURE — 96376 TX/PRO/DX INJ SAME DRUG ADON: CPT

## 2022-07-08 PROCEDURE — 99220 PR INITIAL OBSERVATION CARE/DAY 70 MINUTES: CPT | Performed by: SURGERY

## 2022-07-08 PROCEDURE — NC001 PR NO CHARGE: Performed by: PHYSICIAN ASSISTANT

## 2022-07-08 PROCEDURE — 86850 RBC ANTIBODY SCREEN: CPT

## 2022-07-08 PROCEDURE — 82948 REAGENT STRIP/BLOOD GLUCOSE: CPT

## 2022-07-08 PROCEDURE — 86900 BLOOD TYPING SEROLOGIC ABO: CPT

## 2022-07-08 PROCEDURE — 83735 ASSAY OF MAGNESIUM: CPT

## 2022-07-08 PROCEDURE — 83036 HEMOGLOBIN GLYCOSYLATED A1C: CPT

## 2022-07-08 PROCEDURE — 84100 ASSAY OF PHOSPHORUS: CPT

## 2022-07-08 PROCEDURE — 99285 EMERGENCY DEPT VISIT HI MDM: CPT | Performed by: EMERGENCY MEDICINE

## 2022-07-08 RX ORDER — HYDROMORPHONE HCL/PF 1 MG/ML
0.5 SYRINGE (ML) INJECTION ONCE
Status: COMPLETED | OUTPATIENT
Start: 2022-07-08 | End: 2022-07-08

## 2022-07-08 RX ORDER — MORPHINE SULFATE 4 MG/ML
6 INJECTION, SOLUTION INTRAMUSCULAR; INTRAVENOUS ONCE
Status: COMPLETED | OUTPATIENT
Start: 2022-07-08 | End: 2022-07-08

## 2022-07-08 RX ORDER — OXYCODONE HYDROCHLORIDE 5 MG/1
5 TABLET ORAL EVERY 4 HOURS PRN
Status: DISCONTINUED | OUTPATIENT
Start: 2022-07-08 | End: 2022-07-10 | Stop reason: HOSPADM

## 2022-07-08 RX ORDER — HYDROMORPHONE HCL/PF 1 MG/ML
0.5 SYRINGE (ML) INJECTION
Status: DISCONTINUED | OUTPATIENT
Start: 2022-07-08 | End: 2022-07-10 | Stop reason: HOSPADM

## 2022-07-08 RX ORDER — ENOXAPARIN SODIUM 100 MG/ML
30 INJECTION SUBCUTANEOUS EVERY 12 HOURS
Status: DISCONTINUED | OUTPATIENT
Start: 2022-07-08 | End: 2022-07-09

## 2022-07-08 RX ORDER — GABAPENTIN 100 MG/1
100 CAPSULE ORAL 3 TIMES DAILY
Status: DISCONTINUED | OUTPATIENT
Start: 2022-07-08 | End: 2022-07-10 | Stop reason: HOSPADM

## 2022-07-08 RX ORDER — INSULIN LISPRO 100 [IU]/ML
1-5 INJECTION, SOLUTION INTRAVENOUS; SUBCUTANEOUS
Status: DISCONTINUED | OUTPATIENT
Start: 2022-07-08 | End: 2022-07-08

## 2022-07-08 RX ORDER — ONDANSETRON 2 MG/ML
4 INJECTION INTRAMUSCULAR; INTRAVENOUS EVERY 4 HOURS PRN
Status: DISCONTINUED | OUTPATIENT
Start: 2022-07-08 | End: 2022-07-10 | Stop reason: HOSPADM

## 2022-07-08 RX ORDER — ASPIRIN 81 MG/1
81 TABLET ORAL DAILY
Status: CANCELLED | OUTPATIENT
Start: 2022-07-09

## 2022-07-08 RX ORDER — DULOXETIN HYDROCHLORIDE 30 MG/1
30 CAPSULE, DELAYED RELEASE ORAL
Status: DISCONTINUED | OUTPATIENT
Start: 2022-07-08 | End: 2022-07-10 | Stop reason: HOSPADM

## 2022-07-08 RX ORDER — LISINOPRIL 10 MG/1
10 TABLET ORAL DAILY
Status: DISCONTINUED | OUTPATIENT
Start: 2022-07-09 | End: 2022-07-10 | Stop reason: HOSPADM

## 2022-07-08 RX ORDER — ATORVASTATIN CALCIUM 20 MG/1
20 TABLET, FILM COATED ORAL DAILY
Status: DISCONTINUED | OUTPATIENT
Start: 2022-07-09 | End: 2022-07-10 | Stop reason: HOSPADM

## 2022-07-08 RX ORDER — ACETAMINOPHEN 325 MG/1
650 TABLET ORAL EVERY 8 HOURS SCHEDULED
Status: DISCONTINUED | OUTPATIENT
Start: 2022-07-08 | End: 2022-07-10 | Stop reason: HOSPADM

## 2022-07-08 RX ORDER — METHOCARBAMOL 500 MG/1
500 TABLET, FILM COATED ORAL EVERY 6 HOURS SCHEDULED
Status: DISCONTINUED | OUTPATIENT
Start: 2022-07-08 | End: 2022-07-10 | Stop reason: HOSPADM

## 2022-07-08 RX ORDER — METOPROLOL TARTRATE 50 MG/1
100 TABLET, FILM COATED ORAL DAILY
Status: CANCELLED | OUTPATIENT
Start: 2022-07-08

## 2022-07-08 RX ORDER — KETOROLAC TROMETHAMINE 30 MG/ML
15 INJECTION, SOLUTION INTRAMUSCULAR; INTRAVENOUS ONCE
Status: COMPLETED | OUTPATIENT
Start: 2022-07-08 | End: 2022-07-08

## 2022-07-08 RX ORDER — LIDOCAINE 50 MG/G
2 PATCH TOPICAL ONCE
Status: DISCONTINUED | OUTPATIENT
Start: 2022-07-08 | End: 2022-07-08 | Stop reason: HOSPADM

## 2022-07-08 RX ORDER — AMOXICILLIN 250 MG
2 CAPSULE ORAL DAILY
Status: DISCONTINUED | OUTPATIENT
Start: 2022-07-09 | End: 2022-07-10 | Stop reason: HOSPADM

## 2022-07-08 RX ORDER — OXYCODONE HYDROCHLORIDE 10 MG/1
10 TABLET ORAL EVERY 4 HOURS PRN
Status: DISCONTINUED | OUTPATIENT
Start: 2022-07-08 | End: 2022-07-10 | Stop reason: HOSPADM

## 2022-07-08 RX ADMIN — METHOCARBAMOL TABLETS 500 MG: 500 TABLET, COATED ORAL at 18:54

## 2022-07-08 RX ADMIN — KETOROLAC TROMETHAMINE 15 MG: 30 INJECTION, SOLUTION INTRAMUSCULAR; INTRAVENOUS at 11:26

## 2022-07-08 RX ADMIN — DULOXETINE 30 MG: 30 CAPSULE, DELAYED RELEASE ORAL at 23:28

## 2022-07-08 RX ADMIN — HYDROMORPHONE HYDROCHLORIDE 0.5 MG: 1 INJECTION, SOLUTION INTRAMUSCULAR; INTRAVENOUS; SUBCUTANEOUS at 14:44

## 2022-07-08 RX ADMIN — GABAPENTIN 100 MG: 100 CAPSULE ORAL at 23:28

## 2022-07-08 RX ADMIN — OXYCODONE HYDROCHLORIDE 10 MG: 10 TABLET ORAL at 23:29

## 2022-07-08 RX ADMIN — ENOXAPARIN SODIUM 30 MG: 30 INJECTION SUBCUTANEOUS at 18:54

## 2022-07-08 RX ADMIN — ACETAMINOPHEN 650 MG: 325 TABLET, FILM COATED ORAL at 23:28

## 2022-07-08 RX ADMIN — HYDROMORPHONE HYDROCHLORIDE 0.5 MG: 1 INJECTION, SOLUTION INTRAMUSCULAR; INTRAVENOUS; SUBCUTANEOUS at 12:25

## 2022-07-08 RX ADMIN — OXYCODONE HYDROCHLORIDE 5 MG: 5 TABLET ORAL at 18:54

## 2022-07-08 RX ADMIN — MORPHINE SULFATE 6 MG: 4 INJECTION INTRAVENOUS at 15:23

## 2022-07-08 RX ADMIN — LIDOCAINE 2 PATCH: 50 PATCH CUTANEOUS at 11:27

## 2022-07-08 NOTE — TELEMEDICINE
On-Call Telephone Note    Contacted by Dr Maldonado Barnettr  55 y o  male MRN: [id number]  Unit/Bed#: ED 25 Encounter: [id number]    Per provider report, patient presents with mechanical fall this morning, found to have T9 oblique fracture without posterior involvement - TLICS 1-2  Available past medical history,social history, surgical history, medication list, drug allergies and review of systems were reviewed  /94 (BP Location: Left arm)   Pulse 68   Temp 97 9 °F (36 6 °C) (Oral)   Resp 16   Wt 118 kg (260 lb 2 3 oz)   SpO2 100%   BMI 38 42 kg/m²      Clinical exam per provider report, neuro intact, just having a lot of pain  Imaging personally reviewed  · CT spine thoracic and lumbar wo contrast 7/8/2022:  Acute nondisplaced oblique fracture through T9 vertebral body  There is no involvement of the posterior elements  Mild multilevel degenerative changes of thoracic and lumbar spine with evidence of diffuse idiopathic skeletal hyperostosis (DISH)  Transitional lumbosacral anatomy with lumbarization of S1 vertebral body with associated right assimilation joint  Chronic bilateral S1 pars defect with grade 1 anterolisthesis S1-S2  Assessment and Plan  · Continue to monitor neurological exam   · Will continue with conservative management at this time   · Should have TLSO brace when out of bed and head of bed greater than 45°   · No bending, twisting or lifting more than 10 lb; no driving  · Brace to be worn for 6-12 weeks pending patient progress and imaging stability  · Will need upright thoracic spine x-rays to be completed while wearing brace with AP and lateral views, neurosurgery to review this imaging once completed   · Primary team was alerted about plan of care, they were instructed to contact the trauma team as they would be the ones to determine if trauma is necessary    Based on imaging results and patient's examination being neuro intact, would not necessarily need to transfer from a neurosurgical standpoint  · Please call with questions or concerns    All questions answered  Provider is in agreement with the course of action  A total of 10 minutes was spent discussing the presentation, physical exam and formulating a management plan with the provider

## 2022-07-08 NOTE — H&P
H&P - Trauma   Alhaji Fletcher  55 y o  male MRN: [id number]  Unit/Bed#: ED 25 Encounter: [id number]    Trauma Alert: Other Trauma Transfer   Model of Arrival: Ambulance    Trauma Team: Attending Dr Lor Burkett and Residents Dr Juan Miguel Rojo  Consultants:     Neurosurgery: routine consult; Epic consult order placed; Assessment/Plan      Active Problems / Assessment:   #Acute Nondisplaced Oblique Fracture of T9   Plan: q4h neuro checks  Continue TLSO brace  Neurosurgery consulted  Admission labs ordered including coags  Regular diet now, NPO at MN  Continue home meds, will hold Xarelto for now  History of Present Illness     Chief Complaint: Back pain  Mechanism:Fall     HPI:    Alhaji Fletcher  is a 55 y o  male who presents with back pain after sustaining a fall onto a chair on the morning of 7/8  He denies any loss of consciousness or feeling light headed  On exam he is neurovascularly intact with GCS 15 and 5/5 strength in all four extremities  The patient currently takes xarelto for his A  Fib  He endorses a remote history of a cholecystectomy  He denies any other symptoms  Imaging performed at the outside hospital was significant for an acute nondisplaced oblique fracture through T9  Review of Systems   Constitutional: Negative  HENT: Negative  Eyes: Negative  Respiratory: Negative  Cardiovascular: Negative  Gastrointestinal: Negative  Endocrine: Negative  Genitourinary: Negative  Musculoskeletal: Positive for back pain  Allergic/Immunologic: Negative  Neurological: Negative  Hematological: Negative  Psychiatric/Behavioral: Negative  12-point, complete review of systems was reviewed and negative except as stated above       Historical Information     Past Medical History:   Diagnosis Date    Asthma     Carpal tunnel syndrome     Chronic pain     left arm    Diabetes mellitus (HCC)     Erectile dysfunction     Hypertension     Umbilical hernia      Past Surgical History:   Procedure Laterality Date    CHOLECYSTECTOMY      CHOLECYSTECTOMY LAPAROSCOPIC N/A 6/20/2019    Procedure: CHOLECYSTECTOMY LAPAROSCOPIC;  Surgeon: Rachelle Luevano MD;  Location: [address] [place] [address] OR;  Service: General    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    HAND SURGERY      UMBILICAL HERNIA REPAIR          Social History     Tobacco Use    Smoking status: Current Every Day Smoker     Packs/day: 0 25     Types: Cigarettes    Smokeless tobacco: Never Used   Vaping Use    Vaping Use: Never used   Substance Use Topics    Alcohol use: Yes     Comment: occassional    Drug use: Never     Immunization History   Administered Date(s) Administered    INFLUENZA 01/30/2018    Influenza, recombinant, quadrivalent,injectable, preservative free 02/05/2020    Pneumococcal Polysaccharide PPV23 01/30/2018    Tdap 09/19/2016, 01/25/2018, 09/16/2019     Family History: Non-contributory     Meds/Allergies   all current active meds have been reviewed   No Known Allergies    Objective   Initial Vitals:   Temperature: 97 6 °F (36 4 °C) (07/08/22 1730)  Pulse: 72 (07/08/22 1730)  Respirations: 18 (07/08/22 1730)  Blood Pressure: 151/98 (07/08/22 1730)    Primary Survey:   Airway:        Status: patent;        Pre-hospital Interventions: none        Hospital Interventions: none  Breathing:        Pre-hospital Interventions: none       Effort: normal       Right breath sounds: normal       Left breath sounds: normal  Circulation:        Rhythm: regular       Rate: regular   Right Pulses Left Pulses    R radial: 1+    R pedal: 1+     L radial: 1+    L pedal: 1+       Disability:        GCS: Eye: 4; Verbal: 5 Motor: 6 Total: 15       Right Pupil: round;  reactive         Left Pupil:  round;  reactive      R Motor Strength L Motor Strength    R : 5/5  R dorsiflex: 5/5  R plantarflex: 5/5           Exposure:       Completed: Yes      Secondary Survey:  Physical Exam  Constitutional:       General: He is not in acute distress  Appearance: Normal appearance  He is not ill-appearing or toxic-appearing  HENT:      Head: Normocephalic  Right Ear: External ear normal       Left Ear: External ear normal       Nose: Nose normal       Mouth/Throat:      Pharynx: Oropharynx is clear  Eyes:      Extraocular Movements: Extraocular movements intact  Pupils: Pupils are equal, round, and reactive to light  Cardiovascular:      Rate and Rhythm: Normal rate  Pulses: Normal pulses  Pulmonary:      Effort: Pulmonary effort is normal  No respiratory distress  Chest:      Chest wall: No tenderness  Abdominal:      General: Abdomen is flat  There is no distension  Palpations: Abdomen is soft  Tenderness: There is no abdominal tenderness  There is no guarding or rebound  Musculoskeletal:         General: No deformity  Normal range of motion  Cervical back: Normal range of motion  Thoracic back: Tenderness and bony tenderness present  Skin:     General: Skin is warm  Capillary Refill: Capillary refill takes less than 2 seconds  Neurological:      General: No focal deficit present  Mental Status: He is alert and oriented to person, place, and time  Cranial Nerves: No cranial nerve deficit  Sensory: No sensory deficit  Motor: No weakness  Psychiatric:         Mood and Affect: Mood normal          Thought Content: Thought content normal          Invasive Devices  Report    Peripheral Intravenous Line  Duration           Peripheral IV 07/08/22 Left Antecubital <1 day              Lab Results: Results: I have personally reviewed all pertinent laboratory/tests results    Imaging Results: I have personally reviewed pertinent reports          Code Status: Level 1 - Full Code  Advance Directive and Living Will:      Power of :    POLST:       Adrienne Thakur MD  Department of Surgery

## 2022-07-08 NOTE — ED PROVIDER NOTES
History  Chief Complaint   Patient presents with    Fall     Patient fell around 3am  Patient went to sit down, missed chair, and fell backwards and struck his back on a metal chair  Patient is on xarelto  55 y o  M p/w back pain s/p fall  Pt states he went to sit down a chair last night in the dark and missed the chair  Hit his back on the chair  Having diffuse back pain since, worse with movement  Denies head strike or LOC  Pt states his girlfriend gave him "something for pain" but it hasn't helped  Denies other injury or neuro complaints  History provided by:  Patient   used: No    Fall  Mechanism of injury: fall    Injury location:  Torso  Torso injury location:  Back  Incident location:  Home  Time since incident:  8 hours  Fall:     Fall occurred:  Standing  Prior to arrival data:     Patient ambulatory at scene: yes      Loss of consciousness: no      Amnesic to event: no    Associated symptoms: back pain    Associated symptoms: no abdominal pain, no chest pain, no headaches and no loss of consciousness    Risk factors: anticoagulation therapy (Xarelto)        Prior to Admission Medications   Prescriptions Last Dose Informant Patient Reported? Taking?    Alcohol Swabs (ALCOHOL PREP) 70 % PADS  Self Yes No   Sig: daily Test   Patient not taking: Reported on 9/3/2021   Alcohol Swabs (Alcohol Prep) PADS   Yes No   Sig: TEST DAILY   Patient not taking: Reported on 9/3/2021   BD Pen Needle Carolyn U/F 32G X 4 MM MISC   No No   Sig: USE AS DIRECTED TO INJECT INSULIN   Blood Pressure Monitoring ([place] Choice BP Monitor/Arm) JM   No No   Sig: USE AS DIRECTED   Contour Next Test test strip   No No   Sig: USE 1 EACH 3 (THREE) TIMES A DAY   DULoxetine (CYMBALTA) 30 mg delayed release capsule   No No   Sig: TAKE 1 CAPSULE (30 MG TOTAL) BY MOUTH DAILY AT BEDTIME   Humidifier MISC   No No   Sig: USE AS DIRECTED   Jardiance 25 MG TABS   No No   Sig: TAKE 1 TABLET (25 MG TOTAL) BY MOUTH EVERY MORNING   Lancets MISC  Self Yes No   Sig: Use to test blood glucose once a day  Dx: Type 2 DM  E11 9   Melatonin 5 MG TABS   No No   Sig: Take 1 tablet (5 mg total) by mouth daily at bedtime   Misc  Devices MISC  Self No No   Sig: by Does not apply route daily   Patient not taking: Reported on 9/3/2021   TRUEplus Lancets 33G MISC   No No   Sig: USE AS INSTRUCTED   Tadalafil, PAH, 20 MG TABS   No No   Sig: Take 1 tablet (20 mg total) by mouth as needed (erectile dysfunction)   albuterol (PROVENTIL HFA,VENTOLIN HFA) 90 mcg/act inhaler   No No   Sig: INHALE 2 PUFFS EVERY 4 (FOUR) HOURS AS NEEDED FOR WHEEZING   aspirin (ECOTRIN LOW STRENGTH) 81 mg EC tablet  Self Yes No   Sig: Take 81 mg by mouth daily   atorvastatin (Lipitor) 20 mg tablet   No No   Sig: Take 1 tablet (20 mg total) by mouth daily   budesonide (Pulmicort Flexhaler) 90 MCG/ACT inhaler   No No   Sig: Inhale 1 puff 2 (two) times a day Rinse mouth after use  clotrimazole-betamethasone (LOTRISONE) 1-0 05 % cream   No No   Sig: Apply topically 2 (two) times a day   diltiazem (CARDIZEM CD) 120 mg 24 hr capsule   No No   Sig: Take 1 capsule (120 mg total) by mouth in the morning  fluticasone-vilanterol (Breo Ellipta) 100-25 mcg/inh inhaler   No No   Sig: Inhale 1 puff daily Rinse mouth after use     insulin glargine (Lantus SoloStar) 100 units/mL injection pen   No No   Sig: Inject 30 Units under the skin daily at bedtime   lisinopril (ZESTRIL) 10 mg tablet   No No   Sig: Take 1 tablet (10 mg total) by mouth daily   metFORMIN (GLUCOPHAGE) 1000 MG tablet   No No   Sig: TAKE 1 TABLET (1,000 MG TOTAL) BY MOUTH 2 (TWO) TIMES A DAY WITH MEALS   metoprolol tartrate (LOPRESSOR) 100 mg tablet   No No   Sig: Take 1 tablet (100 mg total) by mouth in the morning   mupirocin (BACTROBAN) 2 % ointment   No No   Sig: APPLY TOPICALLY 3 (THREE) TIMES A DAY   Patient not taking: Reported on 9/3/2021   rivaroxaban (XARELTO) 20 mg tablet   No No   Sig: Take 1 tablet (20 mg total) by mouth daily with breakfast   triamcinolone (KENALOG) 0 5 % cream   No No   Sig: APPLY TOPICALLY 3 (THREE) TIMES A DAY      Facility-Administered Medications: None       Past Medical History:   Diagnosis Date    Asthma     Carpal tunnel syndrome     Chronic pain     left arm    Diabetes mellitus ([address] )     Erectile dysfunction     Hypertension     Umbilical hernia        Past Surgical History:   Procedure Laterality Date    CHOLECYSTECTOMY      CHOLECYSTECTOMY LAPAROSCOPIC N/A 6/20/2019    Procedure: CHOLECYSTECTOMY LAPAROSCOPIC;  Surgeon: Devora Suazo MD;  Location: [facility] MAIN OR;  Service: General    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    HAND SURGERY      UMBILICAL HERNIA REPAIR         Family History   Problem Relation Age of Onset    Breast cancer additional onset Mother     Diabetes Father     Hyperlipidemia Father     Hypertension Father     Seizures Father     No Known Problems Son     No Known Problems Son     Vitiligo Son      I have reviewed and agree with the history as documented  E-Cigarette/Vaping    E-Cigarette Use Never User      E-Cigarette/Vaping Substances     Social History     Tobacco Use    Smoking status: Current Every Day Smoker     Packs/day: 0 25     Types: Cigarettes    Smokeless tobacco: Never Used   Vaping Use    Vaping Use: Never used   Substance Use Topics    Alcohol use: Yes     Comment: occassional    Drug use: Never       Review of Systems   Respiratory: Negative for shortness of breath  Cardiovascular: Negative for chest pain  Gastrointestinal: Negative for abdominal pain  Musculoskeletal: Positive for back pain  Neurological: Negative for loss of consciousness, weakness, numbness and headaches  All other systems reviewed and are negative  Physical Exam  Physical Exam  Vitals and nursing note reviewed  Constitutional:       General: He is not in acute distress  Appearance: He is well-developed   He is not ill-appearing, toxic-appearing or diaphoretic  HENT:      Head: Normocephalic and atraumatic  Eyes:      General: No scleral icterus  Conjunctiva/sclera:      Right eye: Right conjunctiva is not injected  Left eye: Left conjunctiva is not injected  Neck:      Vascular: No JVD  Trachea: Trachea normal    Cardiovascular:      Rate and Rhythm: Normal rate and regular rhythm  Pulses: Normal pulses  Heart sounds: Normal heart sounds  No murmur heard  No friction rub  Pulmonary:      Effort: Pulmonary effort is normal  No accessory muscle usage or respiratory distress  Breath sounds: Normal breath sounds  No stridor  No wheezing, rhonchi or rales  Chest:      Chest wall: No tenderness  Abdominal:      General: There is no distension  Palpations: Abdomen is soft  Tenderness: There is no abdominal tenderness  There is no guarding or rebound  Musculoskeletal:      Cervical back: Normal and normal range of motion  Thoracic back: Tenderness and bony tenderness present  Lumbar back: Tenderness present  No bony tenderness  Comments: Full ROM in all extremities  Skin:     General: Skin is warm and dry  Coloration: Skin is not pale  Findings: No bruising, ecchymosis or rash  Neurological:      Mental Status: He is alert  GCS: GCS eye subscore is 4  GCS verbal subscore is 5  GCS motor subscore is 6  Sensory: Sensation is intact  Motor: Motor function is intact     Psychiatric:         Behavior: Behavior normal          Vital Signs  ED Triage Vitals   Temperature Pulse Respirations Blood Pressure SpO2   07/08/22 1112 07/08/22 1112 07/08/22 1112 07/08/22 1112 07/08/22 1112   97 9 °F (36 6 °C) 84 16 (!) 177/100 99 %      Temp Source Heart Rate Source Patient Position - Orthostatic VS BP Location FiO2 (%)   07/08/22 1112 07/08/22 1228 07/08/22 1228 07/08/22 1228 --   Oral Monitor Sitting Left arm       Pain Score       07/08/22 1112       10 - Worst Possible Pain           Vitals:    07/08/22 1112 07/08/22 1228 07/08/22 1430 07/08/22 1530   BP: (!) 177/100 161/94 (!) 166/117 (!) 180/112   Pulse: 84 68 74 78   Patient Position - Orthostatic VS:  Sitting  Lying         Visual Acuity      ED Medications  Medications   lidocaine (LIDODERM) 5 % patch 2 patch (2 patches Topical Medication Applied 7/8/22 1127)   ketorolac (TORADOL) injection 15 mg (15 mg Intravenous Given 7/8/22 1126)   HYDROmorphone (DILAUDID) injection 0 5 mg (0 5 mg Intravenous Given 7/8/22 1225)   HYDROmorphone (DILAUDID) injection 0 5 mg (0 5 mg Intravenous Given 7/8/22 1444)   morphine injection 6 mg (6 mg Intravenous Given 7/8/22 1523)       Diagnostic Studies  Results Reviewed     None                 CT spine thoracic & lumbar wo contrast   Final Result by Minesh Valdivia MD (07/08 1317)      Acute nondisplaced oblique fracture through T9 vertebral body  There is no involvement of the posterior elements  Mild multilevel degenerative changes of thoracic and lumbar spine with evidence of diffuse idiopathic skeletal hyperostosis (DISH)  Transitional lumbosacral anatomy with lumbarization of S1 vertebral body with associated right assimilation joint  Chronic bilateral S1 pars defect with grade 1 anterolisthesis S1-S2  The study was marked in [facility] for immediate notification  Workstation performed: [id number]         XR spine thoracic 3 vw    (Results Pending)              Procedures  Procedures         ED Course  ED Course as of 07/08/22 1551   Fri Jul 08, 2022   1126 Pt given Toradol/Lidoderm patch  1204 Pt just returned from CT scan  States still having some pain  Dilaudid ordered  1225 Pt given Dilaudid  [address] [place] [address]  sent to neurosurgery  1330 Discussed case with Hawa Westfall (neurosurgery) will review CT scan  1400 NSG recommends TLSO brace when OOB and HOB >45 degrees    Order upright thoracic XR with brace and then neurosurgery will review once done  [address] Darren Currie made aware of need for TLSO brace   1509 Discussed case with Dr Ced Burleson (trauma)  Pt still having pain despite multiple doses of pain meds  Pt agreeable for transfer  MDM    Disposition  Final diagnoses:   T9 vertebral fracture (HCC)   Back pain     Time reflects when diagnosis was documented in both MDM as applicable and the Disposition within this note     Time User Action Codes Description Comment    2022  1:30 PM Jaquelin Ar [A34 236S] T9 vertebral fracture ([address] )     2022  3:12 PM Vipul Chavez 48 [M54 9] Back pain       ED Disposition     ED Disposition   Transfer to Another Facility-In Network    Condition   --    Date/Time     3:21 PM    Comment   Ramirez Mickey  should be transferred out to [facility]             MD Documentation    Toñito  Most Recent Value   Patient Condition The patient has been stabilized such that within reasonable medical probability, no material deterioration of the patient condition or the condition of the unborn child(wily) is likely to result from the transfer   Reason for Transfer Level of Care needed not available at this facility   Benefits of Transfer Specialized equipment and/or services available at the receiving facility (Include comment)________________________   Risks of Transfer Potential for delay in receiving treatment, Potential deterioration of medical condition, Loss of IV, Increased discomfort during transfer   Provider Certification General risk, such as traffic hazards, adverse weather conditions, rough terrain or turbulence, possible failure of equipment (including vehicle or aircraft), or consequences of actions of persons outside the control of the transport personnel      Follow-up Information     Follow up With Specialties Details Why Contact Info Additional [address] Neurosurgery Schedule an appointment as soon as possible for a visit   [address] [facility] [address] [facility] [address]  Maria Antonia [address], [address], [address] [facility], [address] [phone]          Patient's Medications   Discharge Prescriptions    No medications on file       No discharge procedures on file      PDMP Review     None          ED Provider  Electronically Signed by           Jessy Velasco 24, DO  07/08/22 8696

## 2022-07-08 NOTE — EMTALA/ACUTE CARE TRANSFER
[facility] [address]  [address] [facility] [address]  Dept: [place] TRANSFER CONSENT    NAME Ramirez Arevalo   1965                              MRN [id number]    I have been informed of my rights regarding examination, treatment, and transfer   by Dr Brayan Hamilton DO    Benefits: Specialized equipment and/or services available at the receiving facility (Include comment)________________________    Risks: Potential for delay in receiving treatment, Potential deterioration of medical condition, Loss of IV, Increased discomfort during transfer      Consent for Transfer:  I acknowledge that my medical condition has been evaluated and explained to me by the emergency department physician or other qualified medical person and/or my attending physician, who has recommended that I be transferred to the service of    at    The above potential benefits of such transfer, the potential risks associated with such transfer, and the probable risks of not being transferred have been explained to me, and I fully understand them  The doctor has explained that, in my case, the benefits of transfer outweigh the risks  I agree to be transferred  I authorize the performance of emergency medical procedures and treatments upon me in both transit and upon arrival at the receiving facility  Additionally, I authorize the release of any and all medical records to the receiving facility and request they be transported with me, if possible  I understand that the safest mode of transportation during a medical emergency is an ambulance and that the Hospital advocates the use of this mode of transport  Risks of traveling to the receiving facility by car, including absence of medical control, life sustaining equipment, such as oxygen, and medical personnel has been explained to me and I fully understand them      ([place] BELOW)  [  ]  I consent to the stated transfer and to be transported by ambulance/helicopter  [  ]  I consent to the stated transfer, but refuse transportation by ambulance and accept full responsibility for my transportation by car  I understand the risks of non-ambulance transfers and I exonerate the Hospital and its staff from any deterioration in my condition that results from this refusal     X___________________________________________    DATE  22  TIME________  Signature of patient or legally responsible individual signing on patient behalf           RELATIONSHIP TO PATIENT_________________________          Provider Certification    NAME Cesario Okeefe   1965                              MRN [id number]    A medical screening exam was performed on the above named patient  Based on the examination:    Condition Necessitating Transfer The primary encounter diagnosis was T9 vertebral fracture ([address] )  A diagnosis of Back pain was also pertinent to this visit  Patient Condition: The patient has been stabilized such that within reasonable medical probability, no material deterioration of the patient condition or the condition of the unborn child(wily) is likely to result from the transfer    Reason for Transfer: Level of Care needed not available at this facility    Transfer Requirements: Facility     · Space available and qualified personnel available for treatment as acknowledged by    · Agreed to accept transfer and to provide appropriate medical treatment as acknowledged by          · Appropriate medical records of the examination and treatment of the patient are provided at the time of transfer   [address] _______  · Transfer will be performed by qualified personnel from    and appropriate transfer equipment as required, including the use of necessary and appropriate life support measures      Provider Certification: I have examined the patient and explained the following risks and benefits of being transferred/refusing transfer to the patient/family:  General risk, such as traffic hazards, adverse weather conditions, rough terrain or turbulence, possible failure of equipment (including vehicle or aircraft), or consequences of actions of persons outside the control of the transport personnel      Based on these reasonable risks and benefits to the patient and/or the unborn child(wily), and based upon the information available at the time of the patients examination, I certify that the medical benefits reasonably to be expected from the provision of appropriate medical treatments at another medical facility outweigh the increasing risks, if any, to the individuals medical condition, and in the case of labor to the unborn child, from effecting the transfer      X____________________________________________ DATE 07/08/22        TIME_______      ORIGINAL - SEND TO MEDICAL RECORDS   COPY - SEND WITH PATIENT DURING TRANSFER

## 2022-07-09 ENCOUNTER — APPOINTMENT (OUTPATIENT)
Dept: RADIOLOGY | Facility: HOSPITAL | Age: 57
End: 2022-07-09
Payer: COMMERCIAL
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
## 2022-07-09 LAB
GLUCOSE SERPL-MCNC: 245 MG/DL (ref 65–140)
GLUCOSE SERPL-MCNC: 274 MG/DL (ref 65–140)
GLUCOSE SERPL-MCNC: 292 MG/DL (ref 65–140)
GLUCOSE SERPL-MCNC: 294 MG/DL (ref 65–140)
GLUCOSE SERPL-MCNC: 354 MG/DL (ref 65–140)

## 2022-07-09 PROCEDURE — 99225 PR SBSQ OBSERVATION CARE/DAY 25 MINUTES: CPT | Performed by: SURGERY

## 2022-07-09 PROCEDURE — 82948 REAGENT STRIP/BLOOD GLUCOSE: CPT

## 2022-07-09 PROCEDURE — 99244 OFF/OP CNSLTJ NEW/EST MOD 40: CPT | Performed by: NURSE PRACTITIONER

## 2022-07-09 PROCEDURE — 72072 X-RAY EXAM THORAC SPINE 3VWS: CPT

## 2022-07-09 RX ORDER — ASPIRIN 81 MG/1
81 TABLET ORAL DAILY
Status: DISCONTINUED | OUTPATIENT
Start: 2022-07-09 | End: 2022-07-09

## 2022-07-09 RX ORDER — METOPROLOL TARTRATE 50 MG/1
100 TABLET, FILM COATED ORAL DAILY
Status: DISCONTINUED | OUTPATIENT
Start: 2022-07-09 | End: 2022-07-10 | Stop reason: HOSPADM

## 2022-07-09 RX ORDER — FLUTICASONE PROPIONATE 44 UG/1
1 AEROSOL, METERED RESPIRATORY (INHALATION)
Refills: 5 | Status: DISCONTINUED | OUTPATIENT
Start: 2022-07-09 | End: 2022-07-10 | Stop reason: HOSPADM

## 2022-07-09 RX ORDER — INSULIN LISPRO 100 [IU]/ML
1-6 INJECTION, SOLUTION INTRAVENOUS; SUBCUTANEOUS EVERY 6 HOURS SCHEDULED
Status: DISCONTINUED | OUTPATIENT
Start: 2022-07-09 | End: 2022-07-09

## 2022-07-09 RX ORDER — INSULIN LISPRO 100 [IU]/ML
2-12 INJECTION, SOLUTION INTRAVENOUS; SUBCUTANEOUS
Status: DISCONTINUED | OUTPATIENT
Start: 2022-07-09 | End: 2022-07-10 | Stop reason: HOSPADM

## 2022-07-09 RX ORDER — INSULIN GLARGINE 100 [IU]/ML
30 INJECTION, SOLUTION SUBCUTANEOUS
Refills: 5 | Status: DISCONTINUED | OUTPATIENT
Start: 2022-07-09 | End: 2022-07-10 | Stop reason: HOSPADM

## 2022-07-09 RX ADMIN — METHOCARBAMOL TABLETS 500 MG: 500 TABLET, COATED ORAL at 00:47

## 2022-07-09 RX ADMIN — FLUTICASONE PROPIONATE 1 PUFF: 44 AEROSOL, METERED RESPIRATORY (INHALATION) at 12:18

## 2022-07-09 RX ADMIN — ACETAMINOPHEN 650 MG: 325 TABLET, FILM COATED ORAL at 22:10

## 2022-07-09 RX ADMIN — ACETAMINOPHEN 650 MG: 325 TABLET, FILM COATED ORAL at 14:29

## 2022-07-09 RX ADMIN — SENNOSIDES AND DOCUSATE SODIUM 2 TABLET: 8.6; 5 TABLET ORAL at 08:14

## 2022-07-09 RX ADMIN — ATORVASTATIN CALCIUM 20 MG: 20 TABLET, FILM COATED ORAL at 08:14

## 2022-07-09 RX ADMIN — FLUTICASONE PROPIONATE 1 PUFF: 44 AEROSOL, METERED RESPIRATORY (INHALATION) at 22:11

## 2022-07-09 RX ADMIN — HYDROMORPHONE HYDROCHLORIDE 0.5 MG: 1 INJECTION, SOLUTION INTRAMUSCULAR; INTRAVENOUS; SUBCUTANEOUS at 09:56

## 2022-07-09 RX ADMIN — INSULIN GLARGINE 30 UNITS: 100 INJECTION, SOLUTION SUBCUTANEOUS at 22:11

## 2022-07-09 RX ADMIN — INSULIN LISPRO 6 UNITS: 100 INJECTION, SOLUTION INTRAVENOUS; SUBCUTANEOUS at 16:51

## 2022-07-09 RX ADMIN — ENOXAPARIN SODIUM 30 MG: 30 INJECTION SUBCUTANEOUS at 05:43

## 2022-07-09 RX ADMIN — OXYCODONE HYDROCHLORIDE 10 MG: 10 TABLET ORAL at 22:10

## 2022-07-09 RX ADMIN — LISINOPRIL 10 MG: 10 TABLET ORAL at 08:14

## 2022-07-09 RX ADMIN — GABAPENTIN 100 MG: 100 CAPSULE ORAL at 16:51

## 2022-07-09 RX ADMIN — METHOCARBAMOL TABLETS 500 MG: 500 TABLET, COATED ORAL at 16:52

## 2022-07-09 RX ADMIN — METHOCARBAMOL TABLETS 500 MG: 500 TABLET, COATED ORAL at 05:42

## 2022-07-09 RX ADMIN — INSULIN LISPRO 10 UNITS: 100 INJECTION, SOLUTION INTRAVENOUS; SUBCUTANEOUS at 12:18

## 2022-07-09 RX ADMIN — GABAPENTIN 100 MG: 100 CAPSULE ORAL at 08:14

## 2022-07-09 RX ADMIN — INSULIN LISPRO 3 UNITS: 100 INJECTION, SOLUTION INTRAVENOUS; SUBCUTANEOUS at 05:42

## 2022-07-09 RX ADMIN — METHOCARBAMOL TABLETS 500 MG: 500 TABLET, COATED ORAL at 12:18

## 2022-07-09 RX ADMIN — OXYCODONE HYDROCHLORIDE 10 MG: 10 TABLET ORAL at 16:51

## 2022-07-09 RX ADMIN — ACETAMINOPHEN 650 MG: 325 TABLET, FILM COATED ORAL at 08:14

## 2022-07-09 RX ADMIN — OXYCODONE HYDROCHLORIDE 10 MG: 10 TABLET ORAL at 12:18

## 2022-07-09 RX ADMIN — DULOXETINE 30 MG: 30 CAPSULE, DELAYED RELEASE ORAL at 22:10

## 2022-07-09 RX ADMIN — OXYCODONE HYDROCHLORIDE 10 MG: 10 TABLET ORAL at 08:15

## 2022-07-09 RX ADMIN — GABAPENTIN 100 MG: 100 CAPSULE ORAL at 22:10

## 2022-07-09 RX ADMIN — METOPROLOL TARTRATE 100 MG: 50 TABLET, FILM COATED ORAL at 12:18

## 2022-07-09 RX ADMIN — INSULIN LISPRO 4 UNITS: 100 INJECTION, SOLUTION INTRAVENOUS; SUBCUTANEOUS at 00:47

## 2022-07-09 NOTE — PROGRESS NOTES
Patient arrived to 619-01 from ED via stretcher around 2220  Patient slid himself from stretcher to bed  TLSO brace in place  Current /102 and patient complaining of 9/10 back pain  Oxycodone not due at this time  Trauma resident, MANDEEP  [address] [place] [address] notified  Awaiting new orders  Patient oriented to room/unit and call bell at bedside  No current complaints

## 2022-07-09 NOTE — RESTORATIVE TECHNICIAN NOTE
Restorative Technician Note      Patient Name: Liliana Antony  Restorative Tech Visit Date: 7/9/2022  Note Type: Bracing, Follow-up fitting  Patient Position Upon Consult: Supine  Nurse Communication: Nurse aware of consult, application of brace        Pt transferred from [facility] with an [employer] in place  The brace is too small in the waist for the patient therefore an extension panel was added to the waist belt  Please call Mobility Coordinator at ext  [phone] or on [employer] text " [facility]-PT-Restorative Tech" role in regards to bracing instruction and/or adjustment      General iGo,

## 2022-07-09 NOTE — PROGRESS NOTES
[address] [facility]  Progress Note - Robert Abbott  1965, 55 y o  male MRN: [id number]  Unit/Bed#: [facility] 252-96 Encounter: [id number]  Primary Care Provider: Wen Kerr MD   Date and time admitted to hospital: 7/8/2022  5:26 PM    Jerome Nicole 29 fx, see that section for plan  PT/OT    Atrial fibrillation [facility])  Assessment & Plan  Will plan to restart Xarelto    COPD exacerbation ([facility] )  Assessment & Plan  Continue on Fluticasone  Patient takes budesonide as an outpatient    * Closed fracture of ninth thoracic vertebra ([facility] )  Assessment & Plan  -Nsgy Consulted, XRs ordered by them      TERTIARY TRAUMA SURVEY NOTE    Prophylaxis: Xarelto    Disposition: Continue med/surg    Code status:  Level 1 - Full Code    Consultants: NEEL      SUBJECTIVE:     Transfer from: [facility] of Injury:Fall    Chief Complaint: Back pain    HPI/Last 24 hour events: No acute events overnight  Seen by NSGY today, upright XRs ordered, PT/OT Still to see patient        Active medications:           Current Facility-Administered Medications:     acetaminophen (TYLENOL) tablet 650 mg, 650 mg, Oral, Q8H JOHNNY, 650 mg at 07/09/22 0814    atorvastatin (LIPITOR) tablet 20 mg, 20 mg, Oral, Daily, 20 mg at 07/09/22 0814    DULoxetine (CYMBALTA) delayed release capsule 30 mg, 30 mg, Oral, HS, 30 mg at 07/08/22 2328    fluticasone (FLOVENT HFA) 44 mcg/act inhaler 1 puff, 1 puff, Inhalation, BID    gabapentin (NEURONTIN) capsule 100 mg, 100 mg, Oral, TID, 100 mg at 07/09/22 0814    HYDROmorphone (DILAUDID) injection 0 5 mg, 0 5 mg, Intravenous, Q1H PRN, 0 5 mg at 07/09/22 0956    insulin glargine (LANTUS) subcutaneous injection 30 Units 0 3 mL, 30 Units, Subcutaneous, HS    insulin lispro (HumaLOG) 100 units/mL subcutaneous injection 2-12 Units, 2-12 Units, Subcutaneous, TID AC **AND** Fingerstick Glucose (POCT), , , TID AC    lisinopril (ZESTRIL) tablet 10 mg, 10 mg, Oral, Daily, 10 mg at 07/09/22 0814    methocarbamol (ROBAXIN) tablet 500 mg, 500 mg, Oral, Q6H JOHNNY, 500 mg at 07/09/22 0542    metoprolol tartrate (LOPRESSOR) tablet 100 mg, 100 mg, Oral, Daily    naloxone (NARCAN) 0 04 mg/mL syringe 0 04 mg, 0 04 mg, Intravenous, Q1MIN PRN    ondansetron (ZOFRAN) injection 4 mg, 4 mg, Intravenous, Q4H PRN    oxyCODONE (ROXICODONE) immediate release tablet 10 mg, 10 mg, Oral, Q4H PRN, 10 mg at 07/09/22 0815    oxyCODONE (ROXICODONE) IR tablet 5 mg, 5 mg, Oral, Q4H PRN, 5 mg at 07/08/22 1854    [START ON 7/10/2022] rivaroxaban (XARELTO) tablet 20 mg, 20 mg, Oral, Daily With Breakfast    senna-docusate sodium (SENOKOT S) 8 6-50 mg per tablet 2 tablet, 2 tablet, Oral, Daily, 2 tablet at 07/09/22 0814      OBJECTIVE:     Vitals:   Vitals:    07/09/22 0812   BP: 144/97   Pulse: (!) 49   Resp:    Temp: 98 5 °F (36 9 °C)   SpO2: 94%       Physical Exam:   GENERAL APPEARANCE: No acute distress  NEURO:  Nonfocal neuro exam   GCS 15, alert oriented x4  HEENT:  Normocephalic atraumatic  CV:  Regular rate and rhythm, no murmurs rubs or gallops  LUNGS:  Clear to auscultation bilaterally  GI:  Soft nontender  :  Deferred  MSK:  Range of motion within normal limits  Patient has tenderness to the T-spine  TLSO brace in place  SKIN:  Warm and dry                  I/O:   I/O     None          Invasive Devices: Invasive Devices  Report    Peripheral Intravenous Line  Duration           Peripheral IV 07/08/22 Left Antecubital <1 day                  Imaging:   CT spine thoracic & lumbar wo contrast    Result Date: 7/8/2022  Impression: Acute nondisplaced oblique fracture through T9 vertebral body  There is no involvement of the posterior elements  Mild multilevel degenerative changes of thoracic and lumbar spine with evidence of diffuse idiopathic skeletal hyperostosis (DISH)   Transitional lumbosacral anatomy with lumbarization of S1 vertebral body with associated right assimilation joint  Chronic bilateral S1 pars defect with grade 1 anterolisthesis S1-S2  The study was marked in [facility] for immediate notification   Workstation performed: [id number]       Labs:   CBC:   Lab Results   Component Value Date    WBC 6 89 07/08/2022    HGB 15 3 07/08/2022    HCT 48 5 07/08/2022    MCV 85 07/08/2022     07/08/2022    MCH 26 7 (L) 07/08/2022    MCHC 31 5 07/08/2022    RDW 14 2 07/08/2022    MPV 9 7 07/08/2022    NRBC 0 07/08/2022     CMP:   Lab Results   Component Value Date     07/08/2022    CO2 31 07/08/2022    BUN 17 07/08/2022    CREATININE 1 04 07/08/2022    CALCIUM 9 5 07/08/2022    EGFR 80 07/08/2022

## 2022-07-09 NOTE — ASSESSMENT & PLAN NOTE
T9 oblique fracture s/p fall (TLICS 2)  · Presented to [facility] after missing a chair while attempting to sit, landing on buttocks  · In setting of extensive DISH  · Current exam non-focal  Point tenderness to T9 area  Imaging:  · CT thoracic and lumbar spine wo, 7/8/2022: Acute nondisplaced oblique fracture through T9 vertebral body  There is no involvement of the posterior elements  Mild multilevel degenerative changes of thoracic and lumbar spine with evidence of diffuse idiopathic skeletal hyperostosis (DISH)  Transitional lumbosacral anatomy with lumbarization of S1 vertebral body with associated right assimilation joint  Chronic bilateral S1 pars defect with grade 1 anterolisthesis S1-S2  Plan:  · Continue to monitor neuro exam closely  · Thoracic upright x-rays pending  · TLSO brace when upright > 45 degrees and OOB  · Pain control per primary team   · Do not anticipate any neurosurgical intervention  · Mobilize with PT/OT  · DVT ppx: SCDs, Lovenox  Neurosurgery will review imaging once completed  Please call with any questions or concerns

## 2022-07-09 NOTE — CONSULTS
[facility]  1965, 55 y o  male MRN: [id number]  Unit/Bed#: [facility] 521-44 Encounter: [id number]  Primary Care Provider: Lj Burrell MD   Date and time admitted to hospital: 7/8/2022  5:26 PM    Inpatient consult to Neurosurgery  Consult performed by: CHARLES Hilliard  Consult ordered by: Franny Lino MD          * Closed fracture of ninth thoracic vertebra [facility])  Assessment & Plan  T9 oblique fracture s/p fall (TLICS 2)  · Presented [facility] [place] missing a chair while attempting to sit, landing on buttocks  · In setting of extensive DISH  · Current exam non-focal  Point tenderness to T9 area  Imaging:  · CT thoracic and lumbar spine wo, 7/8/2022: Acute nondisplaced oblique fracture through T9 vertebral body  There is no involvement of the posterior elements  Mild multilevel degenerative changes of thoracic and lumbar spine with evidence of diffuse idiopathic skeletal hyperostosis (DISH)  Transitional lumbosacral anatomy with lumbarization of S1 vertebral body with associated right assimilation joint  Chronic bilateral S1 pars defect with grade 1 anterolisthesis S1-S2  Plan:  · Continue to monitor neuro exam closely  · Thoracic upright x-rays pending  · TLSO brace when upright > 45 degrees and OOB  · Pain control per primary team   · Do not anticipate any neurosurgical intervention  · Mobilize with PT/OT  · DVT ppx: SCDs, Lovenox  Neurosurgery will review imaging once completed  Please call with any questions or concerns  History of Present Illness     HPI: Brian Perera  is a 55 y o  male with PMH including asthma, carpal tunnel syndrome, diabetes, hypertension, atrial fibrillation on Xarelto, who presented to [facility] after attempting to sit on a chair and missing return falling landing on his buttocks  He immediately experienced back pain    He was noted on imaging with in acute nondisplaced oblique T9 fracture in the setting of DISH  Currently he is complaining of some mild back pain  He denies any radiation of pain down his arms or legs  He denies any numbness or tingling  He has had some difficulty voiding and has not voided in about 24 hours  He has been able to ambulate  He denies any bowel dysfunction or incontinence  Review of Systems   Constitutional: Negative  Negative for activity change, appetite change and fatigue  HENT: Negative for ear pain, hearing loss, nosebleeds, postnasal drip, tinnitus, trouble swallowing and voice change  Eyes: Negative for pain and visual disturbance  Respiratory: Negative for chest tightness and shortness of breath  Cardiovascular: Negative for chest pain, palpitations and leg swelling  Gastrointestinal: Negative for abdominal pain, diarrhea, nausea and vomiting  Genitourinary: Positive for difficulty urinating  Musculoskeletal: Positive for back pain  Negative for neck pain and neck stiffness  Skin: Negative for color change and pallor  Neurological: Negative for dizziness, tremors, seizures, syncope, facial asymmetry, speech difficulty, weakness, light-headedness, numbness and headaches  Psychiatric/Behavioral: Negative for agitation, behavioral problems and confusion         Historical Information   Past Medical History:   Diagnosis Date    Asthma     Carpal tunnel syndrome     Chronic pain     left arm    Diabetes mellitus ([address] )     Erectile dysfunction     Hypertension     Umbilical hernia      Past Surgical History:   Procedure Laterality Date    CHOLECYSTECTOMY      CHOLECYSTECTOMY LAPAROSCOPIC N/A 6/20/2019    Procedure: CHOLECYSTECTOMY LAPAROSCOPIC;  Surgeon: Adalid Santos MD;  Location: [facility] MAIN OR;  Service: General    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    HAND SURGERY      UMBILICAL HERNIA REPAIR       Social History     Substance and Sexual Activity Alcohol Use Yes    Comment: occassional     Social History     Substance and Sexual Activity   Drug Use Never     Social History     Tobacco Use   Smoking Status Current Every Day Smoker    Packs/day: 0 25    Types: Cigarettes   Smokeless Tobacco Never Used     Family History   Problem Relation Age of Onset    Breast cancer additional onset Mother     Diabetes Father     Hyperlipidemia Father     Hypertension Father     Seizures Father     No Known Problems Son     No Known Problems Son     Vitiligo Son        Meds/Allergies   all current active meds have been reviewed and current meds:   Current Facility-Administered Medications   Medication Dose Route Frequency    acetaminophen (TYLENOL) tablet 650 mg  650 mg Oral Q8H [address]    atorvastatin (LIPITOR) tablet 20 mg  20 mg Oral Daily    DULoxetine (CYMBALTA) delayed release capsule 30 mg  30 mg Oral HS    enoxaparin (LOVENOX) subcutaneous injection 30 mg  30 mg Subcutaneous Q12H    gabapentin (NEURONTIN) capsule 100 mg  100 mg Oral TID    HYDROmorphone (DILAUDID) injection 0 5 mg  0 5 mg Intravenous Q1H PRN    insulin lispro (HumaLOG) 100 units/mL subcutaneous injection 2-12 Units  2-12 Units Subcutaneous TID AC    lisinopril (ZESTRIL) tablet 10 mg  10 mg Oral Daily    methocarbamol (ROBAXIN) tablet 500 mg  500 mg Oral Q6H [address]    naloxone (NARCAN) 0 04 mg/mL syringe 0 04 mg  0 04 mg Intravenous Q1MIN PRN    ondansetron (ZOFRAN) injection 4 mg  4 mg Intravenous Q4H PRN    oxyCODONE (ROXICODONE) immediate release tablet 10 mg  10 mg Oral Q4H PRN    oxyCODONE (ROXICODONE) IR tablet 5 mg  5 mg Oral Q4H PRN    senna-docusate sodium (SENOKOT S) 8 6-50 mg per tablet 2 tablet  2 tablet Oral Daily     No Known Allergies    Objective   I/O     None          Physical Exam  Constitutional:       General: He is not in acute distress  Appearance: He is well-developed  He is obese  He is not diaphoretic     Eyes:      General:         Right eye: No discharge  Left eye: No discharge  Extraocular Movements: EOM normal       Conjunctiva/sclera: Conjunctivae normal       Pupils: Pupils are equal, round, and reactive to light  Pulmonary:      Effort: Pulmonary effort is normal  No respiratory distress  Abdominal:      General: Bowel sounds are normal  There is no distension  Palpations: Abdomen is soft  Tenderness: There is no abdominal tenderness  Musculoskeletal:         General: Normal range of motion  Cervical back: Normal range of motion and neck supple  Skin:     General: Skin is warm and dry  Neurological:      Mental Status: He is alert and oriented to person, place, and time  Cranial Nerves: No cranial nerve deficit  Sensory: No sensory deficit  Motor: No weakness  Coordination: Coordination normal  Finger-Nose-Finger Test normal       Deep Tendon Reflexes: Reflexes normal       Reflex Scores:       Tricep reflexes are 2+ on the right side and 2+ on the left side  Bicep reflexes are 2+ on the right side and 2+ on the left side  Brachioradialis reflexes are 2+ on the right side and 2+ on the left side  Patellar reflexes are 2+ on the right side and 2+ on the left side  Achilles reflexes are 2+ on the right side and 2+ on the left side  Psychiatric:         Speech: Speech normal          Behavior: Behavior normal          Thought Content: Thought content normal          Judgment: Judgment normal        Neurologic Exam     Mental Status   Oriented to person, place, and time  Oriented to person  Oriented to place  Oriented to time  Oriented to year, month and date  Registration: recalls 3 of 3 objects  Attention: normal  Concentration: normal    Speech: speech is normal   Level of consciousness: alert  Knowledge: good and consistent with education  Able to name object       Cranial Nerves     CN III, IV, VI   Pupils are equal, round, and reactive to light   Extraocular motions are normal    Right pupil: Size: 3 mm  Shape: regular  Reactivity: brisk  Consensual response: intact  Accommodation: intact  Left pupil: Size: 3 mm  Shape: regular  Reactivity: brisk  Consensual response: intact  Accommodation: intact  Nystagmus: none   Diplopia: none  Conjugate gaze: present    CN V   Right facial sensation deficit: none  Left facial sensation deficit: none    CN VII   Facial expression full, symmetric       CN VIII   Hearing: intact    CN IX, X   Palate: symmetric    CN XI   Right sternocleidomastoid strength: normal  Left sternocleidomastoid strength: normal  Right trapezius strength: normal  Left trapezius strength: normal    CN XII   Tongue: not atrophic  Fasciculations: absent  Tongue deviation: none    Motor Exam   Muscle bulk: normal  Overall muscle tone: normal  Right arm pronator drift: absent  Left arm pronator drift: absent    Strength   Right deltoid: 5/5  Left deltoid: 5/5  Right biceps: 5/5  Left biceps: 5/5  Right triceps: 5/5  Left triceps: 5/5  Right quadriceps: 5/5  Left quadriceps: 5/5  Right hamstrin/5  Left hamstrin/5  Right anterior tibial: 5/5  Left anterior tibial: 5/5  Right posterior tibial: 5/5  Left posterior tibial: 5/5  Right peroneal: 5/5  Left peroneal: 5/5  Right gastroc: 5/5  Left gastroc: 5/5    Sensory Exam   Light touch normal    Proprioception normal      Gait, Coordination, and Reflexes     Coordination   Finger to nose coordination: normal    Tremor   Resting tremor: absent  Intention tremor: absent  Action tremor: absent    Reflexes   Right brachioradialis: 2+  Left brachioradialis: 2+  Right biceps: 2+  Left biceps: 2+  Right triceps: 2+  Left triceps: 2+  Right patellar: 2+  Left patellar: 2+  Right achilles: 2+  Left achilles: 2+  Right : 2+  Left : 2+  Right Granado: absent  Left Granado: absent  Right ankle clonus: absent  Left ankle clonus: absent      Vitals:Blood pressure 144/97, pulse (!) 49, temperature 98 5 °F (36 9 °C), resp  rate 18, height 6' (1 829 m), weight 118 kg (260 lb), SpO2 94 %  ,Body mass index is 35 26 kg/m²  Lab Results:   Results from last 7 days   Lab Units 07/08/22  1902   WBC Thousand/uL 6 89   HEMOGLOBIN g/dL 15 3   HEMATOCRIT % 48 5   PLATELETS Thousands/uL 233   NEUTROS PCT % 69   MONOS PCT % 9     Results from last 7 days   Lab Units 07/08/22  1902   POTASSIUM mmol/L 4 4   CHLORIDE mmol/L 100   CO2 mmol/L 31   BUN mg/dL 17   CREATININE mg/dL 1 04   CALCIUM mg/dL 9 5     Results from last 7 days   Lab Units 07/08/22  1902   MAGNESIUM mg/dL 2 0     Results from last 7 days   Lab Units 07/08/22  1902   PHOSPHORUS mg/dL 3 3     Results from last 7 days   Lab Units 07/08/22  1902   INR  1 06   PTT seconds 29     No results found for: TROPONINT  ABG:No results found for: PHART, WJL7FXS, PO2ART, OPG1YPE, E0TNAJVW, BEART, SOURCE    Imaging Studies: I have personally reviewed pertinent reports  and I have personally reviewed pertinent films in PACS    CT spine thoracic & lumbar wo contrast    Result Date: 7/8/2022  Impression: Acute nondisplaced oblique fracture through T9 vertebral body  There is no involvement of the posterior elements  Mild multilevel degenerative changes of thoracic and lumbar spine with evidence of diffuse idiopathic skeletal hyperostosis (DISH)  Transitional lumbosacral anatomy with lumbarization of S1 vertebral body with associated right assimilation joint  Chronic bilateral S1 pars defect with grade 1 anterolisthesis S1-S2  The study was marked in [facility] for immediate notification  Workstation performed: [id number]       EKG, Pathology, and Other Studies: I have personally reviewed pertinent reports     and I have personally reviewed pertinent films in PACS    VTE Prophylaxis: Sequential compression device (Venodyne)  and Enoxaparin (Lovenox)    Code Status: Level 1 - Full Code  Advance Directive and Living Will:      Power of :    POLST: Counseling / Coordination of Care  I spent 20 minutes with the patient

## 2022-07-09 NOTE — UTILIZATION REVIEW
Initial Clinical Review    Admission: Date/Time/Statement:   Admission Orders (From admission, onward)     Ordered        07/08/22 1817  Place in Observation  Once                      Orders Placed This Encounter   Procedures    Place in Observation     Standing Status:   Standing     Number of Occurrences:   1     Order Specific Question:   Level of Care     Answer:   Med Surg [16]     ED Arrival Information     Expected   7/8/2022     Arrival   7/8/2022 17:26    Acuity   Emergent            Means of arrival   Ambulance    Escorted by   HIPOLITO Ramirez    Service   Trauma    Admission type   Urgent            Arrival complaint   T9 Vertebral Fracture         Chief Complaint   Patient presents with   Eboni Clunes Fall     Initial Presentation:  56 y/o male with PMHx HTN, Atrial Fib on Xarelto, Asthma, DM, erectile dysfunction - transferred from [facility] ED to [facility] ED 7/8/22 2nd T9 oblique fracture s/p fall backwards + striking head after missing a chair while attempting to sit and landing on buttocks  Initially presented to [facility] ED 2nd 8 hour history diffuse back pain  Exam: neurovascularly intact with GCS 15 and 5/5 strength in all four extremities  No focal neuro deficit  Point tenderness to T9 area  CT showed Acute nondisplaced oblique fracture through T9 vertebral body  Labs: elevated , HA1C 9 4  Placed in Observation 7/8/22 at 1817 for Trauma + NeuroSurgical evaluations     7/9 NEUROSURGERY:  Closed fracture of ninth thoracic vertebra   T9 oblique fracture s/p fall (TLICS 2)  · Presented to [address] after missing a chair while attempting to sit, landing on buttocks  · In setting of extensive DISH  · Current exam non-focal  Point tenderness to T9 area      Imaging:  · CT thoracic and lumbar spine wo, 7/8/2022: Acute nondisplaced oblique fracture through T9 vertebral body  There is no involvement of the posterior elements   Mild multilevel degenerative changes of thoracic and lumbar spine with evidence of diffuse idiopathic skeletal hyperostosis (DISH)  Transitional lumbosacral anatomy with lumbarization of S1 vertebral body with associated right assimilation joint  Chronic bilateral S1 pars defect with grade 1 anterolisthesis S1-S2      Plan:  · Continue to monitor neuro exam closely  · Thoracic upright x-rays pending  · TLSO brace when upright > 45 degrees and OOB  · Pain control per primary team   · Do not anticipate any neurosurgical intervention  · Mobilize with PT/OT  · DVT ppx: SCDs, Lovenox  7/10/2 NEUROSURGERY:  Upright x-rays reviewed  Per NS review, stable appearance of oblique T9 fracture with stable alignment to thoracic spine    Maintain in TLSO brace as previously instructed    Follow up outpatient in 2 weeks with upright x-rays  7/9 TRAUMA:  HPI/Last 24 hour events: No acute events overnight   Seen by [address] [place] [address] today, upright XRs ordered, PT/OT stillneed  to see patient      Initial  Vitals   Temperature Pulse Respirations Blood Pressure SpO2   07/08/22 1730 07/08/22 1730 07/08/22 1730 07/08/22 1730 07/08/22 1730   97 6 °F (36 4 °C) 72 18 151/98 97 %      Temp Source Heart Rate Source Patient Position - Orthostatic VS BP Location FiO2 (%)   07/08/22 1730 07/08/22 1730 07/08/22 1730 07/08/22 2223 --   Oral Monitor Lying Left arm       Pain Score       07/08/22 1730       8          Wt Readings from Last 1 Encounters:   07/08/22 118 kg (260 lb)     Additional Vital Signs:    Date/Time Temp Pulse Resp BP MAP (mmHg) SpO2 O2 Device Patient Position - Orthostatic VS   07/10/22 10:39:14 98 3 °F (36 8 °C) 74 -- 126/79 95 92 % -- --   07/10/22 09:11:05 -- 74 -- 128/91 103 93 % -- --   07/10/22 06:46:30 98 6 °F (37 °C) 74 19 124/90 101 91 % -- --   07/10/22 06:43:56 98 6 °F (37 °C) 77 19 158/103 Abnormal  121 92 % -- --   07/10/22 03:45:18 98 2 °F (36 8 °C) 72 16 121/80 94 92 % None (Room air) Lying   07/09/22 21:31:55 98 4 °F (36 9 °C) 65 16 118/75 89 92 % -- --   07/09/22 19:02:12 98 7 °F (37 1 °C) 77 18 155/101 Abnormal  119 93 % -- --   07/09/22 15:36:12 98 5 °F (36 9 °C) 63 16 133/96 108 93 % -- --   07/09/22 12:16:19 98 6 °F (37 °C) 47 Abnormal  -- 142/95 111 93 % -- --   07/09/22 08:12:45 98 5 °F (36 9 °C) 49 Abnormal  -- 144/97 113 94 % -- --   07/09/22 02:40:44 98 5 °F (36 9 °C) 84 18 154/98 117 94 % -- --   07/08/22 22:23:19 97 4 °F (36 3 °C) Abnormal  71 20 157/102 Abnormal  120 94 % -- Lying   07/08/22 1830 -- 72 18 162/93 -- 97 % -- --   07/08/22 1730 97 6 °F (36 4 °C) 72 18 151/98 -- 97 % None (Room air) Lying     NO RECORDED I+O    Pertinent Labs/Diagnostic Test Results:   7/8/22 CT THORACIC AND LUMBAR SPINE   Acute nondisplaced oblique fracture through T9 vertebral body  There is no involvement of the posterior elements  Mild multilevel degenerative changes of thoracic and lumbar spine with evidence of diffuse idiopathic skeletal hyperostosis (DISH)  Transitional lumbosacral anatomy with lumbarization of S1 vertebral body with associated right assimilation joint  Chronic bilateral S1 pars defect with grade 1 anterolisthesis S1-S2       Results from last 7 days   Lab Units 07/08/22  1902   WBC Thousand/uL 6 89   HEMOGLOBIN g/dL 15 3   HEMATOCRIT % 48 5   PLATELETS Thousands/uL 233   NEUTROS ABS Thousands/µL 4 70     Results from last 7 days   Lab Units 07/08/22  1902   SODIUM mmol/L 135*   POTASSIUM mmol/L 4 4   CHLORIDE mmol/L 100   CO2 mmol/L 31   ANION GAP mmol/L 4   BUN mg/dL 17   CREATININE mg/dL 1 04   EGFR ml/min/1 73sq m 80   CALCIUM mg/dL 9 5   MAGNESIUM mg/dL 2 0   PHOSPHORUS mg/dL 3 3     Results from last 7 days   Lab Units 07/09/22  0452 07/09/22  0009 07/08/22  2225   POC GLUCOSE mg/dl 245* 292* 348*     Results from last 7 days   Lab Units 07/08/22  1902   GLUCOSE RANDOM mg/dL 211*     Results from last 7 days   Lab Units 07/08/22  1902   HEMOGLOBIN A1C % 9 4*   EAG mg/dl 223     Results from last 7 days   Lab Units 07/08/22  1902 PROTIME seconds 13 3   INR  1 06   PTT seconds 29     ED Treatment:   Medication Administration from 07/08/2022 1524 to 07/08/2022 2212       Date/Time Order Dose Route Action     07/08/2022 1854 enoxaparin (LOVENOX) subcutaneous injection 30 mg 30 mg Subcutaneous Given     07/08/2022 1854 methocarbamol (ROBAXIN) tablet 500 mg 500 mg Oral Given     07/08/2022 1854 oxyCODONE (ROXICODONE) IR tablet 5 mg 5 mg Oral Given        Past Medical History:   Diagnosis Date    Asthma     Carpal tunnel syndrome     Chronic pain     left arm    Diabetes mellitus ([facility] )     Erectile dysfunction     Hypertension     Umbilical hernia      Present on Admission:   Closed fracture of ninth thoracic vertebra ([facility] )   COPD exacerbation ([facility] )    Admitting Diagnosis: Other closed fracture of ninth thoracic vertebra, initial encounter ([place] [address] ) [S22 078A]  Unspecified multiple injuries, initial encounter [T07  XXXA]    Age/Sex: 55 y o  male    Admission Orders:  VS + Neuro checks q4hrs  NPO - 7/9 Diet Carlos/CHO Controlled; Consistent Carbohydrate Diet Level 2 (5 carb servings/75 grams CHO/meal)   BBG qid before meals + BT   Bedrest with HOB elevated  TLSO Brace   PT + OT Evals    Scheduled Medications:  acetaminophen, 650 mg, Oral, Q8H JOHNNY  atorvastatin, 20 mg, Oral, Daily  DULoxetine, 30 mg, Oral, HS  fluticasone, 1 puff, Inhalation, BID  gabapentin, 100 mg, Oral, TID  insulin glargine, 30 Units, Subcutaneous, HS  insulin lispro, 2-12 Units, Subcutaneous, TID AC  lisinopril, 10 mg, Oral, Daily  methocarbamol, 500 mg, Oral, Q6H JOHNNY  metoprolol tartrate, 100 mg, Oral, Daily  [START ON 7/10/2022] rivaroxaban, 20 mg, Oral, Daily With Breakfast  senna-docusate sodium, 2 tablet, Oral, Daily    Continuous IV Infusions:  IVF insulin regular (HumuLIN R,NovoLIN R) 1 Units/mL in sodium chloride 0 9 % 100 mL infusion  Ordered Dose: 0 3-21 Units/hr Route: Intravenous Frequency: Titrated @ 0 3-21 mL/hr   Scheduled Start Date/Time: 07/08/22 2315 End Date/Time: 07/09/22 0022      PRN Meds:  IV HYDROmorphone, 0 5 mg, Intravenous, Q1H PRN - 7/9 X 1  naloxone, 0 04 mg, Intravenous, Q1MIN PRN  ondansetron, 4 mg, Intravenous, Q4H PRN  oxyCODONE, 10 mg, Oral, Q4H PRN - 7/8 X 1, 7/9 X 4, 7/10 X 2  oxyCODONE, 5 mg, Oral, Q4H PRN - 7/8 X 1    IP CONSULT TO NEUROSURGERY    Network Utilization Review Department  ATTENTION: Please call with any questions or concerns to [phone] and carefully listen to the prompts so that you are directed to the right person  All voicemails are confidential   Clif Marco all requests for admission clinical reviews, approved or denied determinations and any other requests to dedicated fax number below belonging to the campus where the patient is receiving treatment   List of dedicated fax numbers for the Facilities:  [address] DENIALS (Administrative/Medical Necessity) [phone]   [address] (Maternity/NICU/Pediatrics) [phone]   [address] [place] [address] [address] [place] [address]  [address] [facility] [place] [address] [address] [place] [address] [address] [facility] [place] [address] [phone]

## 2022-07-09 NOTE — PLAN OF CARE
Problem: MOBILITY - ADULT  Goal: Maintain or return to baseline ADL function  Description: INTERVENTIONS:  -  Assess patient's ability to carry out ADLs; assess patient's baseline for ADL function and identify physical deficits which impact ability to perform ADLs (bathing, care of mouth/teeth, toileting, grooming, dressing, etc )  - Assess/evaluate cause of self-care deficits   - Assess range of motion  - Assess patient's mobility; develop plan if impaired  - Assess patient's need for assistive devices and provide as appropriate  - Encourage maximum independence but intervene and supervise when necessary  - Involve family in performance of ADLs  - Assess for home care needs following discharge   - Consider OT consult to assist with ADL evaluation and planning for discharge  - Provide patient education as appropriate  Outcome: Progressing  Goal: Maintains/Returns to pre admission functional level  Description: INTERVENTIONS:  - Perform BMAT or MOVE assessment daily    - Set and communicate daily mobility goal to care team and patient/family/caregiver     - Collaborate with rehabilitation services on mobility goals if consulted  - Out of bed for toileting  - Record patient progress and toleration of activity level   Outcome: Progressing     Problem: Potential for Falls  Goal: Patient will remain free of falls  Description: INTERVENTIONS:  - Educate patient/family on patient safety including physical limitations  - Instruct patient to call for assistance with activity   - Consult OT/PT to assist with strengthening/mobility   - Keep Call bell within reach  - Keep bed low and locked with side rails adjusted as appropriate  - Keep care items and personal belongings within reach  - Initiate and maintain comfort rounds  - Make Fall Risk Sign visible to staff  - Apply yellow socks and bracelet for high fall risk patients  - Consider moving patient to room near nurses station  Outcome: Progressing     Problem: Prexisting or High Potential for Compromised Skin Integrity  Goal: Skin integrity is maintained or improved  Description: INTERVENTIONS:  - Identify patients at risk for skin breakdown  - Assess and monitor skin integrity  - Assess and monitor nutrition and hydration status  - Monitor labs   - Assess for incontinence   - Turn and reposition patient  - Assist with mobility/ambulation  - Relieve pressure over bony prominences  - Avoid friction and shearing  - Provide appropriate hygiene as needed including keeping skin clean and dry  - Evaluate need for skin moisturizer/barrier cream  - Collaborate with interdisciplinary team   - Patient/family teaching  - Consider wound care consult   Outcome: Progressing     Problem: PAIN - ADULT  Goal: Verbalizes/displays adequate comfort level or baseline comfort level  Description: Interventions:  - Encourage patient to monitor pain and request assistance  - Assess pain using appropriate pain scale  - Administer analgesics based on type and severity of pain and evaluate response  - Implement non-pharmacological measures as appropriate and evaluate response  - Consider cultural and social influences on pain and pain management  - Notify physician/advanced practitioner if interventions unsuccessful or patient reports new pain  Outcome: Progressing     Problem: INFECTION - ADULT  Goal: Absence or prevention of progression during hospitalization  Description: INTERVENTIONS:  - Assess and monitor for signs and symptoms of infection  - Monitor lab/diagnostic results  - Monitor all insertion sites, i e  indwelling lines, tubes, and drains  - Monitor endotracheal if appropriate and nasal secretions for changes in amount and color  - [place] appropriate cooling/warming therapies per order  - Administer medications as ordered  - Instruct and encourage patient and family to use good hand hygiene technique  - Identify and instruct in appropriate isolation precautions for identified infection/condition  Outcome: Progressing     Problem: SAFETY ADULT  Goal: Maintain or return to baseline ADL function  Description: INTERVENTIONS:  -  Assess patient's ability to carry out ADLs; assess patient's baseline for ADL function and identify physical deficits which impact ability to perform ADLs (bathing, care of mouth/teeth, toileting, grooming, dressing, etc )  - Assess/evaluate cause of self-care deficits   - Assess range of motion  - Assess patient's mobility; develop plan if impaired  - Assess patient's need for assistive devices and provide as appropriate  - Encourage maximum independence but intervene and supervise when necessary  - Involve family in performance of ADLs  - Assess for home care needs following discharge   - Consider OT consult to assist with ADL evaluation and planning for discharge  - Provide patient education as appropriate  Outcome: Progressing  Goal: Maintains/Returns to pre admission functional level  Description: INTERVENTIONS:  - Perform BMAT or MOVE assessment daily    - Set and communicate daily mobility goal to care team and patient/family/caregiver     - Collaborate with rehabilitation services on mobility goals if consulted  - Out of bed for toileting  - Record patient progress and toleration of activity level   Outcome: Progressing  Goal: Patient will remain free of falls  Description: INTERVENTIONS:  - Educate patient/family on patient safety including physical limitations  - Instruct patient to call for assistance with activity   - Consult OT/PT to assist with strengthening/mobility   - Keep Call bell within reach  - Keep bed low and locked with side rails adjusted as appropriate  - Keep care items and personal belongings within reach  - Initiate and maintain comfort rounds  - Make Fall Risk Sign visible to staff  - Apply yellow socks and bracelet for high fall risk patients  - Consider moving patient to room near nurses station  Outcome: Progressing     Problem: DISCHARGE PLANNING  Goal: Discharge to home or other facility with appropriate resources  Description: INTERVENTIONS:  - Identify barriers to discharge w/patient and caregiver  - Arrange for needed discharge resources and transportation as appropriate  - Identify discharge learning needs (meds, wound care, etc )  - Arrange for interpretive services to assist at discharge as needed  - Refer to Case Management Department for coordinating discharge planning if the patient needs post-hospital services based on physician/advanced practitioner order or complex needs related to functional status, cognitive ability, or social support system  Outcome: Progressing     Problem: Knowledge Deficit  Goal: Patient/family/caregiver demonstrates understanding of disease process, treatment plan, medications, and discharge instructions  Description: Complete learning assessment and assess knowledge base    Interventions:  - Provide teaching at level of understanding  - Provide teaching via preferred learning methods  Outcome: Progressing

## 2022-07-10 VITALS
TEMPERATURE: 98.3 F | RESPIRATION RATE: 19 BRPM | HEIGHT: 72 IN | WEIGHT: 260 LBS | BODY MASS INDEX: 35.21 KG/M2 | SYSTOLIC BLOOD PRESSURE: 126 MMHG | HEART RATE: 74 BPM | OXYGEN SATURATION: 98 % | DIASTOLIC BLOOD PRESSURE: 79 MMHG

## 2022-07-10 LAB
GLUCOSE SERPL-MCNC: 242 MG/DL (ref 65–140)
GLUCOSE SERPL-MCNC: 272 MG/DL (ref 65–140)

## 2022-07-10 PROCEDURE — 99217 PR OBSERVATION CARE DISCHARGE MANAGEMENT: CPT | Performed by: NURSE PRACTITIONER

## 2022-07-10 PROCEDURE — 97163 PT EVAL HIGH COMPLEX 45 MIN: CPT

## 2022-07-10 PROCEDURE — NC001 PR NO CHARGE: Performed by: SURGERY

## 2022-07-10 PROCEDURE — 97167 OT EVAL HIGH COMPLEX 60 MIN: CPT

## 2022-07-10 PROCEDURE — 82948 REAGENT STRIP/BLOOD GLUCOSE: CPT

## 2022-07-10 RX ORDER — GABAPENTIN 100 MG/1
100 CAPSULE ORAL 3 TIMES DAILY
Qty: 21 CAPSULE | Refills: 0 | Status: SHIPPED | OUTPATIENT
Start: 2022-07-10 | End: 2022-07-10 | Stop reason: SDUPTHER

## 2022-07-10 RX ORDER — AMOXICILLIN 250 MG
2 CAPSULE ORAL DAILY
Qty: 28 TABLET | Refills: 0 | Status: SHIPPED | OUTPATIENT
Start: 2022-07-11

## 2022-07-10 RX ORDER — OXYCODONE HYDROCHLORIDE 10 MG/1
TABLET ORAL
Qty: 30 TABLET | Refills: 0 | Status: SHIPPED | OUTPATIENT
Start: 2022-07-10

## 2022-07-10 RX ORDER — METHOCARBAMOL 500 MG/1
500 TABLET, FILM COATED ORAL EVERY 6 HOURS PRN
Qty: 42 TABLET | Refills: 0 | Status: SHIPPED | OUTPATIENT
Start: 2022-07-10

## 2022-07-10 RX ORDER — OXYCODONE HYDROCHLORIDE 10 MG/1
TABLET ORAL
Qty: 30 TABLET | Refills: 0 | Status: SHIPPED | OUTPATIENT
Start: 2022-07-10 | End: 2022-07-10 | Stop reason: SDUPTHER

## 2022-07-10 RX ORDER — ACETAMINOPHEN 325 MG/1
650 TABLET ORAL EVERY 6 HOURS PRN
Refills: 0
Start: 2022-07-10 | End: 2022-07-10 | Stop reason: SDUPTHER

## 2022-07-10 RX ORDER — METHOCARBAMOL 500 MG/1
500 TABLET, FILM COATED ORAL EVERY 6 HOURS PRN
Qty: 42 TABLET | Refills: 0 | Status: SHIPPED | OUTPATIENT
Start: 2022-07-10 | End: 2022-07-10 | Stop reason: SDUPTHER

## 2022-07-10 RX ORDER — GABAPENTIN 100 MG/1
100 CAPSULE ORAL 3 TIMES DAILY
Qty: 21 CAPSULE | Refills: 0 | Status: SHIPPED | OUTPATIENT
Start: 2022-07-10 | End: 2022-07-29 | Stop reason: SDUPTHER

## 2022-07-10 RX ORDER — ACETAMINOPHEN 325 MG/1
650 TABLET ORAL EVERY 6 HOURS PRN
Refills: 0
Start: 2022-07-10

## 2022-07-10 RX ORDER — AMOXICILLIN 250 MG
2 CAPSULE ORAL DAILY
Qty: 28 TABLET | Refills: 0 | Status: SHIPPED | OUTPATIENT
Start: 2022-07-11 | End: 2022-07-10 | Stop reason: SDUPTHER

## 2022-07-10 RX ADMIN — LISINOPRIL 10 MG: 10 TABLET ORAL at 08:42

## 2022-07-10 RX ADMIN — METHOCARBAMOL TABLETS 500 MG: 500 TABLET, COATED ORAL at 13:33

## 2022-07-10 RX ADMIN — INSULIN LISPRO 4 UNITS: 100 INJECTION, SOLUTION INTRAVENOUS; SUBCUTANEOUS at 08:43

## 2022-07-10 RX ADMIN — FLUTICASONE PROPIONATE 1 PUFF: 44 AEROSOL, METERED RESPIRATORY (INHALATION) at 08:43

## 2022-07-10 RX ADMIN — METOPROLOL TARTRATE 100 MG: 50 TABLET, FILM COATED ORAL at 08:43

## 2022-07-10 RX ADMIN — ACETAMINOPHEN 650 MG: 325 TABLET, FILM COATED ORAL at 13:34

## 2022-07-10 RX ADMIN — OXYCODONE HYDROCHLORIDE 10 MG: 10 TABLET ORAL at 04:11

## 2022-07-10 RX ADMIN — METHOCARBAMOL TABLETS 500 MG: 500 TABLET, COATED ORAL at 06:14

## 2022-07-10 RX ADMIN — INSULIN LISPRO 6 UNITS: 100 INJECTION, SOLUTION INTRAVENOUS; SUBCUTANEOUS at 12:00

## 2022-07-10 RX ADMIN — RIVAROXABAN 20 MG: 20 TABLET, FILM COATED ORAL at 08:45

## 2022-07-10 RX ADMIN — SENNOSIDES AND DOCUSATE SODIUM 2 TABLET: 8.6; 5 TABLET ORAL at 08:43

## 2022-07-10 RX ADMIN — OXYCODONE HYDROCHLORIDE 10 MG: 10 TABLET ORAL at 08:44

## 2022-07-10 RX ADMIN — ACETAMINOPHEN 650 MG: 325 TABLET, FILM COATED ORAL at 06:14

## 2022-07-10 RX ADMIN — GABAPENTIN 100 MG: 100 CAPSULE ORAL at 08:42

## 2022-07-10 RX ADMIN — ATORVASTATIN CALCIUM 20 MG: 20 TABLET, FILM COATED ORAL at 08:43

## 2022-07-10 RX ADMIN — METHOCARBAMOL TABLETS 500 MG: 500 TABLET, COATED ORAL at 00:25

## 2022-07-10 NOTE — PHYSICAL THERAPY NOTE
Physical Therapy Evaluation    Patient Name: Fani Kennedy  Today's Date: 7/10/2022     Problem List  Principal Problem:    Closed fracture of ninth thoracic vertebra [facility])  Active Problems:    COPD exacerbation (HCC)    Atrial fibrillation ([facility] [address] )    Fall       Past Medical History  Past Medical History:   Diagnosis Date    Asthma     Carpal tunnel syndrome     Chronic pain     left arm    Diabetes mellitus ([facility] [address] )     Erectile dysfunction     Hypertension     Umbilical hernia         Past Surgical History  Past Surgical History:   Procedure Laterality Date    CHOLECYSTECTOMY      CHOLECYSTECTOMY LAPAROSCOPIC N/A 6/20/2019    Procedure: CHOLECYSTECTOMY LAPAROSCOPIC;  Surgeon: Gisselle Pruett MD;  Location: [address] [place] [address] OR;  Service: General    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    HAND SURGERY      UMBILICAL HERNIA REPAIR             07/10/22 0913   PT Last Visit   PT Visit Date 07/10/22   Note Type   Note type Evaluation   Pain Assessment   Pain Assessment Tool 0-10   Pain Score 8   Pain Location/Orientation Location: Back   Patient's Stated Pain Goal No pain   Hospital Pain Intervention(s) Repositioned; Ambulation/increased activity; Emotional support;Relaxation technique   Restrictions/Precautions   Weight Bearing Precautions Per Order No   Braces or Orthoses (S)  TLSO   Other Precautions Chair Alarm; Bed Alarm; Fall Risk;Pain;Spinal precautions   Home Living   Type of [address] [place] [address] Multi-level;Stairs to enter with rails  (3SH, 2-3 COTY, full flight to 2nd floor bed/bath)   Bathroom Shower/Tub Tub only  (c removable shower head)   Bathroom Toilet Standard   Additional Comments denies any DME or AD use PTA   Prior Function   Level of [place] Independent with ADLs and functional mobility   Lives With Significant other;Family; Other (Comment)  (step son and daugther and "some other people")   Receives Help From Family; [address] [place] [address] Independent   IADLs Independent   Falls in the last 6 months 1 to 4  (1- leading to admission)   Comments pt reports fiance is able to assist as needed  Pt reports being independent prior to injury and needing assistance since T9 fx    General   Family/Caregiver Present No   Cognition   Overall Cognitive Status WFL   Arousal/Participation Cooperative   Attention Within functional limits   Orientation Level Oriented X4   Memory Within functional limits   Following Commands Follows all commands and directions without difficulty   Comments pleasant to work with   RLE Assessment   RLE Assessment WFL   LLE Assessment   LLE Assessment WFL   Bed Mobility   Rolling L 5  Supervision   Additional items Increased time required;Verbal cues   Supine to Sit 4  Minimal assistance   Additional items Assist x 1;HOB elevated; Increased time required;Verbal cues;LE management   Sit to Supine 4  Minimal assistance   Additional items Assist x 1; Increased time required;Verbal cues;LE management   Additional Comments log rolling technique utilized   Transfers   Sit to Stand 4  Minimal assistance   Additional items Assist x 1; Increased time required;Verbal cues   Stand to Sit 4  Minimal assistance   Additional items Assist x 1; Increased time required;Verbal cues   Stand pivot 4  Minimal assistance   Additional items Assist x 1; Increased time required;Verbal cues   Additional Comments c RW   Ambulation/Elevation   Gait pattern Improper Weight shift;Decreased foot clearance; Short stride; Excessively slow   Gait Assistance 4  Minimal assist   Additional items Assist x 1;Verbal cues  (CGA)   Assistive Device Rolling walker   Distance 15'x2  (to<>from bathroom, limited by pain)   Ambulation/Elevation Additional Comments after ambulating bathroom>bedroom SPO2 84% on RA   however, pt appeared to be holding breath 2* pain and SPO2 quickly resolved to 90+% with seated rest and pursed lipped breathing   Balance   Static Sitting Fair +   Dynamic Sitting Fair   Static Standing Fair -   Dynamic Standing Fair -   Ambulatory Poor +   Endurance Deficit   Endurance Deficit Yes   Endurance Deficit Description pain   Activity Tolerance   Activity Tolerance Patient limited by pain   Medical Staff Made Aware co-eval with OT, Ángela Dean and OT student Jeremias Sheppard 2* medical complexity and decreased activity tolerance   Nurse Made Aware pt cleared to see and mobilize per nursing   Assessment   Prognosis Good   Problem List Decreased endurance; Impaired balance;Decreased mobility;Pain;Orthopedic restrictions   Assessment Pt is a 55 y o  male transferred from [facility] and admitted for observation to \[facility]\"" on 7/8/2022 s/p fall onto a chair  Pt received a primary medical dx of closed fx of T9 vertebra  Pt has the following comorbidities which affect their treatment:h/o asthma, COPD, carpal tunnel syndrome, chronic pain, diabetes mellitus, HTN, umbilical hernia as well as personal factors including stairs to access home  Pt has a high complexity clinical presentation due to Ongoing medical management for primary dx, Increased reliance on more restrictive AD compared to baseline, Decreased activity tolerance compared to baseline, Fall risk, Increased assistance needed from caregiver at current time, Trending lab values, Spinal precautions at current time, Continuous pulse oximetry monitoring  , and PMH  PT was consulted to evaluate pt's functional mobility and discharge needs  Upon evaluation, patient required minAx1 for bed mobility, minAx1 for STS transfers, minAx1 for ambulation  Pt's functional impairments include: decreased balance, endurance, activity tolerance, and mobility 2* pain  At conclusion of eval, pt remained supine in bed with bed alarm, phone, call bell, and all other personal needs within reach  Pt would benefit from skilled PT to address their functional mobility limitations  The patient's AM-PAC Basic Mobility Inpatient Short Form Raw Score is 18   A Raw score of greater than 16 suggests the patient may benefit from discharge to home  Please also refer to the recommendation of the Physical Therapist for safe discharge planning  D/C recommendations are home with increased support and HHPT  Goals   Patient Goals less pain   STG Expiration Date 07/24/22   Short Term Goal #1 In 10-14 days, pt will: 1) perform bed mobility with S to promote functional independence and decrease caregiver burden  2) perform transfers to<>from all surfaces with S to promote safety and independence  3) ambulate 120' with S and least restrictive device to promote safe return to home  4) negotiate 13 stairs with S to promote safe return to home  5) improve balance grades by 1/2 to promote safety with functional mobility  PT Treatment Day 0   Plan   Treatment/Interventions Functional transfer training;LE strengthening/ROM; Elevations; Therapeutic exercise; Endurance training;Patient/family training;Equipment eval/education; Bed mobility;Gait training;Spoke to case management;Spoke to nursing;OT   PT Frequency 3-5x/wk   Recommendation   PT Discharge Recommendation Home with home health rehabilitation  (and support of significant other)   Equipment Recommended [facility] Recommended Wheeled walker   Additional Comments pt denies any concerns regarding being able to return home safely at his current mobility level  Will continue to benefit from skilled acute care PT during hospital stay as pt is functioning below his baseline     AM-PAC Basic Mobility Inpatient   Turning in Bed Without Bedrails 3   Lying on Back to Sitting on Edge of Flat Bed 3   Moving Bed to Chair 3   Standing Up From Chair 3   Walk in Room 3   Climb 3-5 Stairs 3   Basic Mobility Inpatient Raw Score 18   Basic Mobility Standardized Score 41 05   Highest Level Of Mobility   JH-HLM Goal 6: Walk 10 steps or more   JH-HLM Achieved 6: Walk 10 steps or more   Modified Major Scale   Modified Judah Scale 4   Isidro Hernandez, PT, DPT

## 2022-07-10 NOTE — DISCHARGE SUMMARY
Discharge Summary - Mitzi Ramos  55 y o  male MRN: [id number]    [facility] Encounter: [id number]    Admission Date:   Admission Orders (From admission, onward)     Ordered        07/08/22 1817  Place in Observation  Once                        Admitting Diagnosis: Other closed fracture of ninth thoracic [facility] [S22 078A]  Unspecified multiple injuries, initial encounter [T07  XXXA]    HPI: "Mitzi Ramos  is a 55 y o  male who presents with back pain after sustaining a fall onto a chair on the morning of 7/8  He denies any loss of consciousness or feeling light headed  On exam he is neurovascularly intact with GCS 15 and 5/5 strength in all four extremities  The patient currently takes xarelto for his A  Fib  He endorses a remote history of a cholecystectomy  He denies any other symptoms  Imaging performed at the outside hospital was significant for an acute nondisplaced oblique fracture through T9 " -Per Dr Gerald José H&P on 7/8/2022    Procedures Performed: No orders of the defined types were placed in this encounter  Summary of Hospital Course: This is a 60-year-old male who suffered a fall the morning of 7/8  He was [facility] at [facility] and found to have a T9 fracture  Patient was transferred to [facility] for trauma/neurosurgical care  He was admitted to the trauma service with neurosurgical consultation  Multimodal pain regimen was initiated  Patient was brace with TLSO for support  Upright images were obtained that shows spinal alignment  Patient was educated on use of TLSO when he is upright greater than 45°  Was evaluated by PT/OT and cleared for discharge home with home health care/PT/OT  Patient has remained neurologically intact  His vital signs and laboratory findings have been stable  He has been tolerating oral pain regimen for the past 24 hours  Case management was consulted for assist with disposition    He was given a walker for ambulatory aid-per PT's recommendations  Patient is to follow up with Neurosurgery as an outpatient in 2 weeks with repeat upright images of his thoracic spine  Prior to discharge I reviewed with patient hospitalization, diagnosis, diagnostic studies, laboratory findings, discharge medications, required follow-up, and return precautions which she verbally confirmed understanding  Patient denied having any questions at time of discharge  Significant Findings, Care, Treatment and Services Provided:   CT spine thoracic & lumbar wo contrast    Result Date: 7/8/2022  Impression: Acute nondisplaced oblique fracture through T9 vertebral body  There is no involvement of the posterior elements  Mild multilevel degenerative changes of thoracic and lumbar spine with evidence of diffuse idiopathic skeletal hyperostosis (DISH)  Transitional lumbosacral anatomy with lumbarization of S1 vertebral body with associated right assimilation joint  Chronic bilateral S1 pars defect with grade 1 anterolisthesis S1-S2  The study was marked in [facility] for immediate notification  Workstation performed: [id number]       Complications: none    Discharge Diagnosis:  T9 fracture, fall    Medical Problems             Resolved Problems  Date Reviewed: 7/10/2022   None                 Condition at Discharge: good         Discharge instructions/Information to patient and family:   See after visit summary for information provided to patient and family  Provisions for Follow-Up Care:  See after visit summary for information related to follow-up care and any pertinent home health orders  PCP: Amelia Stoddard MD    Disposition: Home    Planned Readmission: No      Discharge Statement   I spent 26 minutes discharging the patient  This time was spent on the day of discharge  I had direct contact with the patient on the day of discharge  Additional documentation is required if more than 30 minutes were spent on discharge       Discharge Medications:  See after visit summary for reconciled discharge medications provided to patient and family

## 2022-07-10 NOTE — PLAN OF CARE
Problem: MOBILITY - ADULT  Goal: Maintain or return to baseline ADL function  Description: INTERVENTIONS:  -  Assess patient's ability to carry out ADLs; assess patient's baseline for ADL function and identify physical deficits which impact ability to perform ADLs (bathing, care of mouth/teeth, toileting, grooming, dressing, etc )  - Assess/evaluate cause of self-care deficits   - Assess range of motion  - Assess patient's mobility; develop plan if impaired  - Assess patient's need for assistive devices and provide as appropriate  - Encourage maximum independence but intervene and supervise when necessary  - Involve family in performance of ADLs  - Assess for home care needs following discharge   - Consider OT consult to assist with ADL evaluation and planning for discharge  - Provide patient education as appropriate  7/10/2022 0648 by Evette Ramsey RN  Outcome: Progressing     Problem: MOBILITY - ADULT  Goal: Maintains/Returns to pre admission functional level  Description: INTERVENTIONS:  - Perform BMAT or MOVE assessment daily    - Set and communicate daily mobility goal to care team and patient/family/caregiver     - Collaborate with rehabilitation services on mobility goals if consulted  - Out of bed for toileting  - Record patient progress and toleration of activity level   7/10/2022 0648 by Evette Ramsey RN  Outcome: Progressing     Problem: Potential for Falls  Goal: Patient will remain free of falls  Description: INTERVENTIONS:  - Educate patient/family on patient safety including physical limitations  - Instruct patient to call for assistance with activity   - Consult OT/PT to assist with strengthening/mobility   - Keep Call bell within reach  - Keep bed low and locked with side rails adjusted as appropriate  - Keep care items and personal belongings within reach  - Initiate and maintain comfort rounds  - Make Fall Risk Sign visible to staff  - Apply yellow socks and bracelet for high fall risk patients  - Consider moving patient to room near nurses station  7/10/2022 0648 by Felipa Pereira RN  Outcome: Progressing     Problem: Prexisting or High Potential for Compromised Skin Integrity  Goal: Skin integrity is maintained or improved  Description: INTERVENTIONS:  - Identify patients at risk for skin breakdown  - Assess and monitor skin integrity  - Assess and monitor nutrition and hydration status  - Monitor labs   - Assess for incontinence   - Turn and reposition patient  - Assist with mobility/ambulation  - Relieve pressure over bony prominences  - Avoid friction and shearing  - Provide appropriate hygiene as needed including keeping skin clean and dry  - Evaluate need for skin moisturizer/barrier cream  - Collaborate with interdisciplinary team   - Patient/family teaching  - Consider wound care consult   7/10/2022 0648 by Felipa Pereira RN  Outcome: Progressing     Problem: PAIN - ADULT  Goal: Verbalizes/displays adequate comfort level or baseline comfort level  Description: Interventions:  - Encourage patient to monitor pain and request assistance  - Assess pain using appropriate pain scale  - Administer analgesics based on type and severity of pain and evaluate response  - Implement non-pharmacological measures as appropriate and evaluate response  - Consider cultural and social influences on pain and pain management  - Notify physician/advanced practitioner if interventions unsuccessful or patient reports new pain  7/10/2022 0648 by Felipa Pereira RN  Outcome: Progressing     Problem: INFECTION - ADULT  Goal: Absence or prevention of progression during hospitalization  Description: INTERVENTIONS:  - Assess and monitor for signs and symptoms of infection  - Monitor lab/diagnostic results  - Monitor all insertion sites, i e  indwelling lines, tubes, and drains  - Monitor endotracheal if appropriate and nasal secretions for changes in amount and color  - [place] appropriate cooling/warming therapies per order  - Administer medications as ordered  - Instruct and encourage patient and family to use good hand hygiene technique  - Identify and instruct in appropriate isolation precautions for identified infection/condition  7/10/2022 0648 by Cherylyn Goodpasture, RN  Outcome: Progressing     Problem: SAFETY ADULT  Goal: Maintain or return to baseline ADL function  Description: INTERVENTIONS:  -  Assess patient's ability to carry out ADLs; assess patient's baseline for ADL function and identify physical deficits which impact ability to perform ADLs (bathing, care of mouth/teeth, toileting, grooming, dressing, etc )  - Assess/evaluate cause of self-care deficits   - Assess range of motion  - Assess patient's mobility; develop plan if impaired  - Assess patient's need for assistive devices and provide as appropriate  - Encourage maximum independence but intervene and supervise when necessary  - Involve family in performance of ADLs  - Assess for home care needs following discharge   - Consider OT consult to assist with ADL evaluation and planning for discharge  - Provide patient education as appropriate  7/10/2022 0648 by Cherylyn Goodpasture, RN  Outcome: Progressing     Problem: Knowledge Deficit  Goal: Patient/family/caregiver demonstrates understanding of disease process, treatment plan, medications, and discharge instructions  Description: Complete learning assessment and assess knowledge base    Interventions:  - Provide teaching at level of understanding  - Provide teaching via preferred learning methods  7/10/2022 0648 by Cherylyn Goodpasture, RN  Outcome: Progressing

## 2022-07-10 NOTE — PLAN OF CARE
Problem: MOBILITY - ADULT  Goal: Maintain or return to baseline ADL function  Description: INTERVENTIONS:  -  Assess patient's ability to carry out ADLs; assess patient's baseline for ADL function and identify physical deficits which impact ability to perform ADLs (bathing, care of mouth/teeth, toileting, grooming, dressing, etc )  - Assess/evaluate cause of self-care deficits   - Assess range of motion  - Assess patient's mobility; develop plan if impaired  - Assess patient's need for assistive devices and provide as appropriate  - Encourage maximum independence but intervene and supervise when necessary  - Involve family in performance of ADLs  - Assess for home care needs following discharge   - Consider OT consult to assist with ADL evaluation and planning for discharge  - Provide patient education as appropriate  Outcome: Progressing  Goal: Maintains/Returns to pre admission functional level  Description: INTERVENTIONS:  - Perform BMAT or MOVE assessment daily    - Set and communicate daily mobility goal to care team and patient/family/caregiver     - Collaborate with rehabilitation services on mobility goals if consulted  - Out of bed for toileting  - Record patient progress and toleration of activity level   Outcome: Progressing     Problem: Potential for Falls  Goal: Patient will remain free of falls  Description: INTERVENTIONS:  - Educate patient/family on patient safety including physical limitations  - Instruct patient to call for assistance with activity   - Consult OT/PT to assist with strengthening/mobility   - Keep Call bell within reach  - Keep bed low and locked with side rails adjusted as appropriate  - Keep care items and personal belongings within reach  - Initiate and maintain comfort rounds  - Make Fall Risk Sign visible to staff  - Apply yellow socks and bracelet for high fall risk patients  - Consider moving patient to room near nurses station  Outcome: Progressing     Problem: Prexisting or High Potential for Compromised Skin Integrity  Goal: Skin integrity is maintained or improved  Description: INTERVENTIONS:  - Identify patients at risk for skin breakdown  - Assess and monitor skin integrity  - Assess and monitor nutrition and hydration status  - Monitor labs   - Assess for incontinence   - Turn and reposition patient  - Assist with mobility/ambulation  - Relieve pressure over bony prominences  - Avoid friction and shearing  - Provide appropriate hygiene as needed including keeping skin clean and dry  - Evaluate need for skin moisturizer/barrier cream  - Collaborate with interdisciplinary team   - Patient/family teaching  - Consider wound care consult   Outcome: Progressing     Problem: PAIN - ADULT  Goal: Verbalizes/displays adequate comfort level or baseline comfort level  Description: Interventions:  - Encourage patient to monitor pain and request assistance  - Assess pain using appropriate pain scale  - Administer analgesics based on type and severity of pain and evaluate response  - Implement non-pharmacological measures as appropriate and evaluate response  - Consider cultural and social influences on pain and pain management  - Notify physician/advanced practitioner if interventions unsuccessful or patient reports new pain  Outcome: Progressing     Problem: INFECTION - ADULT  Goal: Absence or prevention of progression during hospitalization  Description: INTERVENTIONS:  - Assess and monitor for signs and symptoms of infection  - Monitor lab/diagnostic results  - Monitor all insertion sites, i e  indwelling lines, tubes, and drains  - Monitor endotracheal if appropriate and nasal secretions for changes in amount and color  - [place] appropriate cooling/warming therapies per order  - Administer medications as ordered  - Instruct and encourage patient and family to use good hand hygiene technique  - Identify and instruct in appropriate isolation precautions for identified infection/condition  Outcome: Progressing     Problem: SAFETY ADULT  Goal: Maintain or return to baseline ADL function  Description: INTERVENTIONS:  -  Assess patient's ability to carry out ADLs; assess patient's baseline for ADL function and identify physical deficits which impact ability to perform ADLs (bathing, care of mouth/teeth, toileting, grooming, dressing, etc )  - Assess/evaluate cause of self-care deficits   - Assess range of motion  - Assess patient's mobility; develop plan if impaired  - Assess patient's need for assistive devices and provide as appropriate  - Encourage maximum independence but intervene and supervise when necessary  - Involve family in performance of ADLs  - Assess for home care needs following discharge   - Consider OT consult to assist with ADL evaluation and planning for discharge  - Provide patient education as appropriate  Outcome: Progressing  Goal: Maintains/Returns to pre admission functional level  Description: INTERVENTIONS:  - Perform BMAT or MOVE assessment daily    - Set and communicate daily mobility goal to care team and patient/family/caregiver     - Collaborate with rehabilitation services on mobility goals if consulted  - Out of bed for toileting  - Record patient progress and toleration of activity level   Outcome: Progressing  Goal: Patient will remain free of falls  Description: INTERVENTIONS:  - Educate patient/family on patient safety including physical limitations  - Instruct patient to call for assistance with activity   - Consult OT/PT to assist with strengthening/mobility   - Keep Call bell within reach  - Keep bed low and locked with side rails adjusted as appropriate  - Keep care items and personal belongings within reach  - Initiate and maintain comfort rounds  - Make Fall Risk Sign visible to staff  - Apply yellow socks and bracelet for high fall risk patients  - Consider moving patient to room near nurses station  Outcome: Progressing     Problem: DISCHARGE PLANNING  Goal: Discharge to home or other facility with appropriate resources  Description: INTERVENTIONS:  - Identify barriers to discharge w/patient and caregiver  - Arrange for needed discharge resources and transportation as appropriate  - Identify discharge learning needs (meds, wound care, etc )  - Arrange for interpretive services to assist at discharge as needed  - Refer to Case Management Department for coordinating discharge planning if the patient needs post-hospital services based on physician/advanced practitioner order or complex needs related to functional status, cognitive ability, or social support system  Outcome: Progressing     Problem: Knowledge Deficit  Goal: Patient/family/caregiver demonstrates understanding of disease process, treatment plan, medications, and discharge instructions  Description: Complete learning assessment and assess knowledge base    Interventions:  - Provide teaching at level of understanding  - Provide teaching via preferred learning methods  Outcome: Progressing

## 2022-07-10 NOTE — PLAN OF CARE
Problem: MOBILITY - ADULT  Goal: Maintain or return to baseline ADL function  Description: INTERVENTIONS:  -  Assess patient's ability to carry out ADLs; assess patient's baseline for ADL function and identify physical deficits which impact ability to perform ADLs (bathing, care of mouth/teeth, toileting, grooming, dressing, etc )  - Assess/evaluate cause of self-care deficits   - Assess range of motion  - Assess patient's mobility; develop plan if impaired  - Assess patient's need for assistive devices and provide as appropriate  - Encourage maximum independence but intervene and supervise when necessary  - Involve family in performance of ADLs  - Assess for home care needs following discharge   - Consider OT consult to assist with ADL evaluation and planning for discharge  - Provide patient education as appropriate  Outcome: Adequate for Discharge  Goal: Maintains/Returns to pre admission functional level  Description: INTERVENTIONS:  - Perform BMAT or MOVE assessment daily    - Set and communicate daily mobility goal to care team and patient/family/caregiver     - Collaborate with rehabilitation services on mobility goals if consulted  - Out of bed for toileting  - Record patient progress and toleration of activity level   Outcome: Adequate for Discharge     Problem: Potential for Falls  Goal: Patient will remain free of falls  Description: INTERVENTIONS:  - Educate patient/family on patient safety including physical limitations  - Instruct patient to call for assistance with activity   - Consult OT/PT to assist with strengthening/mobility   - Keep Call bell within reach  - Keep bed low and locked with side rails adjusted as appropriate  - Keep care items and personal belongings within reach  - Initiate and maintain comfort rounds  - Make Fall Risk Sign visible to staff  - Apply yellow socks and bracelet for high fall risk patients  - Consider moving patient to room near nurses station  Outcome: Adequate for Discharge     Problem: Prexisting or High Potential for Compromised Skin Integrity  Goal: Skin integrity is maintained or improved  Description: INTERVENTIONS:  - Identify patients at risk for skin breakdown  - Assess and monitor skin integrity  - Assess and monitor nutrition and hydration status  - Monitor labs   - Assess for incontinence   - Turn and reposition patient  - Assist with mobility/ambulation  - Relieve pressure over bony prominences  - Avoid friction and shearing  - Provide appropriate hygiene as needed including keeping skin clean and dry  - Evaluate need for skin moisturizer/barrier cream  - Collaborate with interdisciplinary team   - Patient/family teaching  - Consider wound care consult   Outcome: Adequate for Discharge     Problem: PAIN - ADULT  Goal: Verbalizes/displays adequate comfort level or baseline comfort level  Description: Interventions:  - Encourage patient to monitor pain and request assistance  - Assess pain using appropriate pain scale  - Administer analgesics based on type and severity of pain and evaluate response  - Implement non-pharmacological measures as appropriate and evaluate response  - Consider cultural and social influences on pain and pain management  - Notify physician/advanced practitioner if interventions unsuccessful or patient reports new pain  Outcome: Adequate for Discharge     Problem: INFECTION - ADULT  Goal: Absence or prevention of progression during hospitalization  Description: INTERVENTIONS:  - Assess and monitor for signs and symptoms of infection  - Monitor lab/diagnostic results  - Monitor all insertion sites, i e  indwelling lines, tubes, and drains  - Monitor endotracheal if appropriate and nasal secretions for changes in amount and color  - [place] appropriate cooling/warming therapies per order  - Administer medications as ordered  - Instruct and encourage patient and family to use good hand hygiene technique  - Identify and instruct in appropriate isolation precautions for identified infection/condition  Outcome: Adequate for Discharge     Problem: SAFETY ADULT  Goal: Maintain or return to baseline ADL function  Description: INTERVENTIONS:  -  Assess patient's ability to carry out ADLs; assess patient's baseline for ADL function and identify physical deficits which impact ability to perform ADLs (bathing, care of mouth/teeth, toileting, grooming, dressing, etc )  - Assess/evaluate cause of self-care deficits   - Assess range of motion  - Assess patient's mobility; develop plan if impaired  - Assess patient's need for assistive devices and provide as appropriate  - Encourage maximum independence but intervene and supervise when necessary  - Involve family in performance of ADLs  - Assess for home care needs following discharge   - Consider OT consult to assist with ADL evaluation and planning for discharge  - Provide patient education as appropriate  Outcome: Adequate for Discharge  Goal: Maintains/Returns to pre admission functional level  Description: INTERVENTIONS:  - Perform BMAT or MOVE assessment daily    - Set and communicate daily mobility goal to care team and patient/family/caregiver     - Collaborate with rehabilitation services on mobility goals if consulted  - Out of bed for toileting  - Record patient progress and toleration of activity level   Outcome: Adequate for Discharge  Goal: Patient will remain free of falls  Description: INTERVENTIONS:  - Educate patient/family on patient safety including physical limitations  - Instruct patient to call for assistance with activity   - Consult OT/PT to assist with strengthening/mobility   - Keep Call bell within reach  - Keep bed low and locked with side rails adjusted as appropriate  - Keep care items and personal belongings within reach  - Initiate and maintain comfort rounds  - Make Fall Risk Sign visible to staff  - Apply yellow socks and bracelet for high fall risk patients  - Consider moving patient to room near nurses station  Outcome: Adequate for Discharge     Problem: DISCHARGE PLANNING  Goal: Discharge to home or other facility with appropriate resources  Description: INTERVENTIONS:  - Identify barriers to discharge w/patient and caregiver  - Arrange for needed discharge resources and transportation as appropriate  - Identify discharge learning needs (meds, wound care, etc )  - Arrange for interpretive services to assist at discharge as needed  - Refer to Case Management Department for coordinating discharge planning if the patient needs post-hospital services based on physician/advanced practitioner order or complex needs related to functional status, cognitive ability, or social support system  Outcome: Adequate for Discharge     Problem: Knowledge Deficit  Goal: Patient/family/caregiver demonstrates understanding of disease process, treatment plan, medications, and discharge instructions  Description: Complete learning assessment and assess knowledge base    Interventions:  - Provide teaching at level of understanding  - Provide teaching via preferred learning methods  Outcome: Adequate for Discharge

## 2022-07-10 NOTE — PROGRESS NOTES
[address] [facility]  Progress Note - Brian Perera  1965, 55 y o  male MRN: [id number]  Unit/Bed#: [facility] 481-64 Encounter: [id number]  Primary Care Provider: Lj Burrell MD   Date and time admitted to hospital: 7/8/2022  5:26 PM    Jerome Nicole 30 fx, see that section for plan  PT/OT    Atrial fibrillation ([address] )  Assessment & Plan  On home Xarelto    COPD exacerbation ([facility] [address] )  Assessment & Plan  Continue on Fluticasone  Patient takes budesonide as an outpatient    * Closed fracture of ninth thoracic vertebra ([facility] [address] )  Assessment & Plan  -Nsgy Consulted, XRs ordered by them            Disposition: Pending PT/OT eval but then can likely be discharged today    SUBJECTIVE:  Chief Complaint: no complaints this AM    Subjective: No acute events overnight  OBJECTIVE:   Vitals:   Temp:  [98 2 °F (36 8 °C)-98 7 °F (37 1 °C)] 98 2 °F (36 8 °C)  HR:  [47-77] 72  Resp:  [16-18] 16  BP: (118-155)/() 121/80    Intake/Output:  I/O     None         Nutrition: Diet Carlos/CHO Controlled; Consistent Carbohydrate Diet Level 2 (5 carb servings/75 grams CHO/meal)  GI Proph/Bowel Reg: Senokot  VTE Prophylaxis: Xarelto    Physical Exam:   GENERAL APPEARANCE: no acute distress  NEURO: nonfocal neuro exam, GCS 15, Alert and oriented x4  HEENT: Normocephalic and atraumatic  CV: RRR  LUNGS: CTAB  GI: soft and nontender abdomen  : deferred  MSK: ROM normal  SKIN: Warm and dry  Invasive Devices  Report    Peripheral Intravenous Line  Duration           Peripheral IV 07/08/22 Left Antecubital 1 day                      Lab Results: BMP/CMP: No results found for: SODIUM, K, CL, CO2, ANIONGAP, BUN, CREATININE, GLUCOSE, CALCIUM, AST, ALT, ALKPHOS, PROT, BILITOT, EGFR and CBC: No results found for: WBC, HGB, HCT, MCV, PLT, ADJUSTEDWBC, MCH, MCHC, RDW, MPV, NRBC  Imaging/EKG Studies: I have personally reviewed pertinent reports       Other Studies:

## 2022-07-10 NOTE — OCCUPATIONAL THERAPY NOTE
Occupational Therapy Evaluation     Patient Name: Lelo Moss  Today's Date: 7/10/2022  Problem List  Principal Problem:    Closed fracture of ninth thoracic vertebra [facility])  Active Problems:    COPD exacerbation (HCC)    Atrial fibrillation ([facility] [address] )    Fall    Past Medical History  Past Medical History:   Diagnosis Date    Asthma     Carpal tunnel syndrome     Chronic pain     left arm    Diabetes mellitus ([facility] [address] )     Erectile dysfunction     Hypertension     Umbilical hernia      Past Surgical History  Past Surgical History:   Procedure Laterality Date    CHOLECYSTECTOMY      CHOLECYSTECTOMY LAPAROSCOPIC N/A 6/20/2019    Procedure: CHOLECYSTECTOMY LAPAROSCOPIC;  Surgeon: Timi Olvera MD;  Location: [facility] MAIN OR;  Service: General    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    HAND SURGERY      UMBILICAL HERNIA REPAIR             07/10/22 0914   OT Last Visit   OT Visit Date 07/10/22   Note Type   Note type Evaluation   Restrictions/Precautions   Weight Bearing Precautions Per Order No   Braces or Orthoses (S)  TLSO   Other Precautions Chair Alarm; Bed Alarm;Multiple lines;Telemetry; Fall Risk;Pain;Spinal precautions   Pain Assessment   Pain Assessment Tool 0-10   Pain Score 8  (with movement)   Pain Location/Orientation Location: Back   Hospital Pain Intervention(s) Repositioned; Ambulation/increased activity; Emotional support   Home Living   Type of [address] [place] [address] Multi-level;Performs ADLs on one level  (3 STH, 2 COTY, FF to 2nd floor)   Bathroom Shower/Tub Tub/shower unit  (pt report removable shower head, additional shower in basement)   [address]   (pt reports landload may have rollator)   Prior Function   Level of [place] Needs assistance with ADLs and functional mobility; Needs assistance with IADLs   Lives With Spouse; Family  (step son and step daughterv + roommates)   Receives Help From Family; Neighbor   ADL Assistance Independent   IADLs Needs assistance  (pt reports he cooks, fitrever completes other IADLs)   Falls in the last 6 months 1 to 4  (pt reports 1 leading to admission)   Vocational Unemployed  (reports getting laid off 2 weeks ago)   Lifestyle   Autonomy PTA pt I for ADLs, IADLs, and was I for fxnl mobility; (+) driving   Reciprocal Relationships fiance, step children, friends   Service to Others laid off 2 weeks prior   Intrinsic Gratification walking and camping   Psychosocial   Psychosocial (WDL) WDL   Subjective   Subjective "I help my fitrever when we are out"   ADL   Where Assessed Edge of bed   Eating Assistance 7  Independent   Grooming Assistance 5  Supervision/Setup   Grooming Deficit Setup;Supervision/safety; Increased time to complete   UB Bathing Assistance 4  Minimal Assistance   UB Bathing Deficit Setup;Steadying;Verbal cueing;Supervision/safety; Increased time to complete   LB Bathing Assistance 3  Moderate Assistance   LB Bathing Deficit Setup;Steadying;Verbal cueing;Supervision/safety; Increased time to complete   UB Dressing Assistance 4  Minimal Assistance   UB Dressing Deficit Setup;Steadying;Verbal cueing;Supervision/safety; Increased time to complete   LB Dressing Assistance 3  Moderate Assistance   LB Dressing Deficit Setup;Steadying;Verbal cueing;Supervision/safety; Increased time to complete   Toileting Assistance  4  Minimal Assistance  (Simultaneous filing  User may not have seen previous data )   Toileting Deficit Setup;Steadying;Verbal cueing;Supervison/safety; Increased time to complete   Bed Mobility   Supine to Sit 4  Minimal assistance   Additional items Assist x 1;HOB elevated; Bedrails; Increased time required;Verbal cues;LE management   Sit to Supine 4  Minimal assistance   Additional items Assist x 1;HOB elevated; Bedrails; Increased time required;Verbal cues;LE management   Transfers   Sit to Stand 4  Minimal assistance   Additional items Assist x 1; Increased time required   Stand to Sit 4  Minimal assistance   Additional items Assist x 1;Increased time required   Additional Comments transfers with RW   Functional Mobility   Functional Mobility 4  Minimal assistance   Additional Comments MIN to CGA   Additional items Rolling walker   Balance   Static Sitting Fair +   Dynamic Sitting Fair +   Static Standing Fair   Dynamic Standing Fair   Ambulatory Fair -   Activity Tolerance   Activity Tolerance Patient limited by pain   Medical Staff Made Aware PT Brent Young   Nurse Made Aware RN approved for session   RUE Assessment   RUE Assessment WFL   LUE Assessment   LUE Assessment WFL   Hand Function   Gross Motor Coordination Functional   Fine Motor Coordination Functional   Vision-Basic Assessment   Visual History   (legally blind in R eye)   Vision - Complex Assessment   Ocular Range of Motion WFL   Head Position WDL   Tracking Able to track stimulus in all quads without difficulty   Cognition   Overall Cognitive Status [facility]   Arousal/Participation Alert; Responsive; Cooperative   Attention Within functional limits   Orientation Level Oriented X4   Memory Within functional limits   Following Commands Follows all commands and directions without difficulty   Comments pt pleasant and cooperative t/o session, pt scored 15 on BIMS indicating intact cognition   Assessment   Limitation Decreased ADL status; Decreased UE strength;Decreased UE ROM; Decreased endurance;Decreased self-care trans;Decreased high-level ADLs   Prognosis Good   Assessment Pt is 55 y o  M presented to B with closed T9 fx from fall  Pt  has a past medical history of Asthma, a-fib, COPD, Carpal tunnel syndrome, Chronic pain, Diabetes mellitus, HTN, legally blind in R eye, medial epicondylitis, CHF, and Umbilical hernia  Pt lives with fiance, 2 step children, and roommates in 3 story home, 2 COTY, and FF to main living area  PTA pt I for ADLS, shared IADLs with fiance, and + driving  Pt with active OT orders, per neurosurgery note, okay to mobilize for OT/PT, no sx   Pt seen as co-evaluation with PT due to comorbidities, medical complexity, and current deficits  Pt presents to session supine in bed with TLSO on  Pt performed Min A for bed mobility, STS trx, and CGA for fnxl mobility with RW  Pt currently requires Min A for UB ADLs, Mod A for LB ADLs, is independent for eating, and S for grooming  Pt performed Min A for toileting  Pt limited by pain, decreased endurance, independence with ADLs, fxnl mobility, and community mobility  Occupational performance areas to be addressed include bathing, dressing, grooming, fxnl and community mobility  Pt educated on spinal precautions and demonstrated understanding when don/doffing socks  Pt reports fiance is home all the time and able to assist as needed  Pt to benefit from skilled OT services to address above deficits and improve overall independence, and maximize performance  OT recommendation at time of d/c to home with home OT when medically stable  Pt left sitting OOB in chair with alarm on and with all current needs met  The patient's raw score on the AM-PAC Daily Activity inpatient short form is 17, standardized score is 37 26, less than 39 4  Patients at this level are likely to benefit from discharge to post-acute rehabilitation services  Please refer to the recommendation of the Occupational Therapist for safe discharge planning  Goals   Patient Goals to have less pain   LTG Time Frame 10-14   Plan   Treatment Interventions ADL retraining;Functional transfer training;UE strengthening/ROM; Endurance training;Patient/family training;Equipment evaluation/education; Compensatory technique education;Continued evaluation; Energy conservation; Activityengagement   Goal Expiration Date 07/24/22   OT Frequency 3-5x/wk   Recommendation   OT Discharge Recommendation Home with home health rehabilitation  (and increased social support)   OT - OK to Discharge Yes  (when medically stable)   AM-PAC Daily Activity Inpatient   Lower Body Dressing 2   Bathing 2   Toileting 3 Upper Body Dressing 3   Grooming 3   Eating 4   Daily Activity Raw Score 17   Daily Activity Standardized Score (Calc for Raw Score >=11) 37 26   AM-PAC Applied Cognition Inpatient   Following a Speech/Presentation 4   Understanding Ordinary Conversation 4   Taking Medications 4   Remembering Where Things Are Placed or Put Away 4   Remembering List of 4-5 Errands 4   Taking Care of Complicated Tasks 4   Applied Cognition Raw Score 24   Applied Cognition Standardized Score 62 21   Brief Interview for Mental Status (BIMS)   Repetition of Three Words (First Attempt) 3   Temporial Orientation: Year Correct   Temporal Orientation: Month Accurate within 5 days   Temporal Orientation: Day Correct   Recall: "Sock" Yes, no cue required   Recall: "Blue" Yes, no cue required   Recall: "Bed" Yes, no cue required   BIMS Summary Score 15     OT Goals:    - Pt will perform fnxl trx with Mod I on/off all surfaces using AD/DME as needed  - Pt will maintain spinal precautions during all therapeutic activities with no more than 1 VC     - Pt will perform UB ADLs Mod I using AD/DME as needed  - Pt will perform LB ADLs Mod I using AD/DME as needed  - Pt will perform toileting Mod I with G hygiene and using AD/DME as needed  - Pt will perform fnxl mob during ADL/IADL/meaningful tasks Mod I using AD/DME as needed  - Pt will demonstrate understanding of energy conversation techniques 100% of tx session     - Pt will perform fair+ static and dynamic sitting/standing balance during fnxl trx and ADL tasks  - Pt will engage in ongoing cognitive assessments with good participation to assist with safe d/c planning/recommendations  - Pt will maintain upright sitting position for at least 10 minutes during dynamic sitting activity with fair+ balance to improve ADL performance and reduce risk of falls          Janae Granda, OTS

## 2022-07-10 NOTE — PLAN OF CARE
Problem: OCCUPATIONAL THERAPY ADULT  Goal: Performs self-care activities at highest level of function for planned discharge setting  See evaluation for individualized goals  Description: Treatment Interventions: ADL retraining, Functional transfer training, UE strengthening/ROM, Endurance training, Patient/family training, Equipment evaluation/education, Compensatory technique education, Continued evaluation, Energy conservation, Activityengagement          See flowsheet documentation for full assessment, interventions and recommendations  Outcome: Progressing  Note: Limitation: Decreased ADL status, Decreased UE strength, Decreased UE ROM, Decreased endurance, Decreased self-care trans, Decreased high-level ADLs  Prognosis: Good  Assessment: Pt is 55 y o  M presented to hospitals with closed T9 fx from fall  Pt  has a past medical history of Asthma, a-fib, COPD, Carpal tunnel syndrome, Chronic pain, Diabetes mellitus, HTN, legally blind in R eye, medial epicondylitis, CHF, and Umbilical hernia  Pt lives with fitrever, 2 step children, and roommates in 3 story home, 2 COTY, and FF to main living area  PTA pt I for ADLS, shared IADLs with fiance, and + driving  Pt with active OT orders, per neurosurgery note, okay to mobilize for OT/PT, no sx  Pt seen as co-evaluation with PT due to comorbidities, medical complexity, and current deficits  Pt presents to session supine in bed with TLSO on  Pt performed Min A for bed mobility, STS trx, and CGA for fnxl mobility with RW  Pt currently requires Min A for UB ADLs, Mod A for LB ADLs, is independent for eating, and S for grooming  Pt performed Min A for toileting  Pt limited by pain, decreased endurance, independence with ADLs, fxnl mobility, and community mobility  Occupational performance areas to be addressed include bathing, dressing, grooming, fxnl and community mobility  Pt educated on spinal precautions and demonstrated understanding when don/doffing socks   Pt reports angel is home all the time and able to assist as needed  Pt to benefit from skilled OT services to address above deficits and improve overall independence, and maximize performance  OT recommendation at time of d/c to home with home OT when medically stable  Pt left sitting OOB in chair with alarm on and with all current needs met  The patient's raw score on the AM-PAC Daily Activity inpatient short form is 17, standardized score is 37 26, less than 39 4  Patients at this level are likely to benefit from discharge to post-acute rehabilitation services  Please refer to the recommendation of the Occupational Therapist for safe discharge planning       OT Discharge Recommendation: Home with home health rehabilitation (and increased social support)  OT - OK to Discharge: Yes (when medically stable)

## 2022-07-10 NOTE — QUICK NOTE
Patient status update:  Patient was unable to get to the [facility] to  his medications prior to discharge  I called and left a message at pharmacy to not fill these prescriptions    I have now sent the [facility] to [facility] in [place] on [address], per patient's request

## 2022-07-10 NOTE — PLAN OF CARE
Problem: PHYSICAL THERAPY ADULT  Goal: Performs mobility at highest level of function for planned discharge setting  See evaluation for individualized goals  Description: Treatment/Interventions: Functional transfer training, LE strengthening/ROM, Elevations, Therapeutic exercise, Endurance training, Patient/family training, Equipment eval/education, Bed mobility, Gait training, Spoke to case management, Spoke to nursing, OT  Equipment Recommended: Chicho Boyle       See flowsheet documentation for full assessment, interventions and recommendations  Note: Prognosis: Good  Problem List: Decreased endurance, Impaired balance, Decreased mobility, Pain, Orthopedic restrictions  Assessment: Pt is a 55 y o  male transferred from [facility] and admitted for observation to [facility] on 7/8/2022 s/p fall onto a chair  Pt received a primary medical dx of closed fx of T9 vertebra  Pt has the following comorbidities which affect their treatment:h/o asthma, COPD, carpal tunnel syndrome, chronic pain, diabetes mellitus, HTN, umbilical hernia as well as personal factors including stairs to access home  Pt has a high complexity clinical presentation due to Ongoing medical management for primary dx, Increased reliance on more restrictive AD compared to baseline, Decreased activity tolerance compared to baseline, Fall risk, Increased assistance needed from caregiver at current time, Trending lab values, Spinal precautions at current time, Continuous pulse oximetry monitoring  , and PMH  PT was consulted to evaluate pt's functional mobility and discharge needs  Upon evaluation, patient required minAx1 for bed mobility, minAx1 for STS transfers, minAx1 for ambulation  Pt's functional impairments include: decreased balance, endurance, activity tolerance, and mobility 2* pain  At conclusion of eval, pt remained supine in bed with bed alarm, phone, call bell, and all other personal needs within reach   Pt would benefit from skilled PT to address their functional mobility limitations  The patient's AM-PAC Basic Mobility Inpatient Short Form Raw Score is 18  A Raw score of greater than 16 suggests the patient may benefit from discharge to home  Please also refer to the recommendation of the Physical Therapist for safe discharge planning  D/C recommendations are home with increased support and HHPT  PT Discharge Recommendation: Home with home health rehabilitation (and support of significant other)          See flowsheet documentation for full assessment

## 2022-07-10 NOTE — CASE MANAGEMENT
Case Management Discharge Planning Note    Patient name Ben Lopez    Location [facility] 6[facility]-01 MRN [id number]  : 1965 Date 7/10/2022       Current Admission Date: 2022  Current Admission Diagnosis:Closed fracture of ninth thoracic vertebra [facility])   Patient Active Problem List    Diagnosis Date Noted    Fall 2022    Closed fracture of ninth thoracic vertebra ([address] ) 2022    Acute on chronic systolic congestive heart failure ([address] ) 2022    Paroxysmal atrial fibrillation ([address] ) 2022    Atrial flutter with rapid ventricular response ([address] ) 2022    Sepsis ([facility] [address] ) 2022    Legally blind in right eye, as defined in [place] 2022    Ventral hernia without obstruction or gangrene 2022    Primary insomnia 2022    Bilateral chronic knee pain 10/12/2021    Chronic midline thoracic back pain 2021    Atrial fibrillation ([address] ) 2020    Numbness and tingling in left hand 2020    COPD exacerbation ([address] ) 01/15/2020    Mild persistent asthma 01/15/2020    Relationship dysfunction 2020    Blurred vision, right eye 2020    Ulnar neuropathy at elbow, left     Left carpal tunnel syndrome     Medial epicondylitis of elbow, left 10/31/2019    Snoring 10/31/2019    Other hyperlipidemia 10/29/2019    Nocturia 10/17/2019    Traumatic amputation of finger 2019    Tobacco dependence 2019    Numbness of left hand 2019    Shoulder pain 2018    Other male erectile dysfunction 2018    Essential hypertension 2018    Type 2 diabetes mellitus with hyperglycemia, with long-term current use of insulin ([facility] [address] ) 2016    Varicose veins of bilateral lower extremities with other complications       LOS (days): 0  Geometric Mean LOS (GMLOS) (days):   Days to GMLOS:     OBJECTIVE:            Current admission status: Observation   Preferred Pharmacy:   [facility], [place] [address] [address]  Phone: [phone] Fax: [phone]    Primary Care Provider: Hannah Lomas MD    Primary Insurance: Sangeeta Hernández  Secondary Insurance:     DISCHARGE DETAILS:       Freedom of Choice: Yes                        Requested [address]         Is the patient interested in [facility] at discharge?: Yes  [address] requested[de-identified] Occupational Therapy, Physical [address] Name[de-identified] [facility] Provider[de-identified] PCP  Home Health Services Needed[de-identified] Strengthening/Theraputic Exercises to Improve Function, Gait/ADL Training, Evaluate Functional Status and Safety  Homebound Criteria Met[de-identified] Requires the Assistance of Another Person for Safe Ambulation or to Leave the Home, Uses an Assist Device (i e  cane, walker, etc)  Supporting Clincal Findings[de-identified] Fatigues Easliy in [employer], Limited Endurance    DME Referral Provided  Referral made for DME?: Yes  DME referral completed for the following items[de-identified] Marquita Callejas  DME Supplier Name[de-identified] [facility]        Pt was seen by OT/PT and recommended for a home d/c with VNA and a walker  Pt has no preference with VNA  Referrals were placed  CM provided walker from consignment  Pt will d/c home today  Pt's s/o is attempting to secure transport now but if unsuccessful then pt is agreeable to [employer]  CM reviewed d/c planning process including the following: identifying help at home, patient preference for d/c planning needs, Discharge Lounge, [facility] Meds to Bed program, availability of treatment team to discuss questions or concerns patient and/or family may have regarding understanding medications and recognizing signs and symptoms once discharged  CM also encouraged patient to follow up with all recommended appointments after discharge  Patient advised of importance for patient and family to participate in managing patients medical well being

## 2022-07-10 NOTE — DISCHARGE INSTRUCTIONS
Neurosurgery discharge instructions following spine fracture:     TLSO brace to be worn when out of bed OR head of bed greater than 45 degrees  May place brace on while sitting on edge of bed  May be removed for showering  Do not wear while sleeping or laying flat/reclined  No bending, twisting or heavy lifting  No strenuous activities  No Driving  Follow-up as scheduled in two weeks with repeat spine x-rays to be completed 2-3 days prior to visit  Prescription has been entered electronically  **Please notify MD immediately if you have increased back or leg pain   New numbness, tingling, weakness in your legs and/or bowel/bladder incontinence**

## 2022-07-10 NOTE — TREATMENT PLAN
Upright x-rays reviewed  Read pending  Per my review, stable appearance of oblique T9 fracture with stable alignment to thoracic spine  Maintain in TLSO brace as previously instructed  Follow up outpatient in 2 weeks with upright x-rays  Neurosurgery will sign off  Please call with any questions or concerns

## 2022-07-11 ENCOUNTER — TELEPHONE (OUTPATIENT)
Dept: NEUROSURGERY | Facility: CLINIC | Age: 57
End: 2022-07-11

## 2022-07-11 NOTE — TELEPHONE ENCOUNTER
07/11/2022-CALLED PT, LEFT MESSAGE ON MACHINE CONFIRMING 07/27/2022 APT  IN [place] AND TO HAVE XRAY COMPLETED PRIOR       ----- Message from Henrique Byrd sent at 7/10/2022  8:55 AM EDT -----  Hi,    Patient needs appointment in 2 weeks with AP  I have rodered thoracic x-rays  Thanks!

## 2022-07-21 ENCOUNTER — TELEPHONE (OUTPATIENT)
Dept: SURGERY | Facility: CLINIC | Age: 57
End: 2022-07-21

## 2022-07-21 ENCOUNTER — TELEPHONE (OUTPATIENT)
Dept: NEUROSURGERY | Facility: CLINIC | Age: 57
End: 2022-07-21

## 2022-07-21 DIAGNOSIS — S22.079A T9 VERTEBRAL FRACTURE (HCC): Primary | ICD-10-CM

## 2022-07-21 NOTE — TELEPHONE ENCOUNTER
Received a call from patient's spouse regarding patient's upcoming appt and requesting for a refill of his oxycodone  Returned call and left a detailed message including all of the appt information -- date/time/location  Advised they reach out to his PCP or trauma office as our office does not prescribe medications unless surgery has been performed  Provided office number should they have any further questions or concerns

## 2022-07-21 NOTE — TELEPHONE ENCOUNTER
Pt is s/p T9 fracture 7/8/22  Pt was prescribed  Oxycodone, methocarbamol, gabapentin and tylenol  Pt states the methocarbamol was causing him extreme hiccups, so he stopped taking it  He also finished his Oxycodone on Sat  Since he stopped the robaxin, his pain has gotten worse  He is asking for a refill of Oxycodone  He sees Neurosurgery on 7/27; he is not following with Trauma  He called Neurosurgery but they deferred to Trauma  Pharmacy correct in [address]  Please advise      Thanks, Mo

## 2022-07-22 LAB
DME PARACHUTE DELIVERY DATE ACTUAL: NORMAL
DME PARACHUTE DELIVERY DATE REQUESTED: NORMAL
DME PARACHUTE ITEM DESCRIPTION: NORMAL
DME PARACHUTE ORDER STATUS: NORMAL
DME PARACHUTE SUPPLIER NAME: NORMAL
DME PARACHUTE SUPPLIER PHONE: NORMAL

## 2022-07-22 RX ORDER — OXYCODONE HYDROCHLORIDE 5 MG/1
5 TABLET ORAL EVERY 4 HOURS PRN
Qty: 20 TABLET | Refills: 0 | Status: SHIPPED | OUTPATIENT
Start: 2022-07-22

## 2022-07-22 RX ORDER — CYCLOBENZAPRINE HCL 5 MG
5 TABLET ORAL 3 TIMES DAILY PRN
Qty: 45 TABLET | Refills: 0 | Status: SHIPPED | OUTPATIENT
Start: 2022-07-22

## 2022-07-22 NOTE — TELEPHONE ENCOUNTER
Contacted patient informed him that I sent oxycodone and Flexeril over to his pharmacy  He was in agreement with this and appreciative  He will follow up with Neurosurgery as planned

## 2022-07-29 ENCOUNTER — OFFICE VISIT (OUTPATIENT)
Dept: FAMILY MEDICINE CLINIC | Facility: CLINIC | Age: 57
End: 2022-07-29

## 2022-07-29 VITALS
BODY MASS INDEX: 38.79 KG/M2 | SYSTOLIC BLOOD PRESSURE: 126 MMHG | DIASTOLIC BLOOD PRESSURE: 90 MMHG | HEART RATE: 101 BPM | WEIGHT: 286 LBS | TEMPERATURE: 98.7 F | RESPIRATION RATE: 18 BRPM | OXYGEN SATURATION: 98 %

## 2022-07-29 DIAGNOSIS — S22.078A OTHER CLOSED FRACTURE OF NINTH THORACIC VERTEBRA, INITIAL ENCOUNTER (HCC): ICD-10-CM

## 2022-07-29 DIAGNOSIS — I48.92 ATRIAL FLUTTER WITH RAPID VENTRICULAR RESPONSE (HCC): ICD-10-CM

## 2022-07-29 DIAGNOSIS — Z79.4 TYPE 2 DIABETES MELLITUS WITH HYPERGLYCEMIA, WITH LONG-TERM CURRENT USE OF INSULIN (HCC): ICD-10-CM

## 2022-07-29 DIAGNOSIS — N50.812 PAIN IN BOTH TESTICLES: ICD-10-CM

## 2022-07-29 DIAGNOSIS — E11.9 TYPE 2 DIABETES MELLITUS WITHOUT COMPLICATION, WITHOUT LONG-TERM CURRENT USE OF INSULIN (HCC): Primary | ICD-10-CM

## 2022-07-29 DIAGNOSIS — E11.65 TYPE 2 DIABETES MELLITUS WITH HYPERGLYCEMIA, WITH LONG-TERM CURRENT USE OF INSULIN (HCC): ICD-10-CM

## 2022-07-29 DIAGNOSIS — N50.811 PAIN IN BOTH TESTICLES: ICD-10-CM

## 2022-07-29 PROCEDURE — 3080F DIAST BP >= 90 MM HG: CPT | Performed by: FAMILY MEDICINE

## 2022-07-29 PROCEDURE — 3074F SYST BP LT 130 MM HG: CPT | Performed by: FAMILY MEDICINE

## 2022-07-29 PROCEDURE — 99214 OFFICE O/P EST MOD 30 MIN: CPT | Performed by: FAMILY MEDICINE

## 2022-07-29 RX ORDER — LISINOPRIL 10 MG/1
10 TABLET ORAL DAILY
Qty: 90 TABLET | Refills: 0 | Status: SHIPPED | OUTPATIENT
Start: 2022-07-29 | End: 2022-10-24

## 2022-07-29 RX ORDER — INSULIN GLARGINE 100 [IU]/ML
35 INJECTION, SOLUTION SUBCUTANEOUS
Qty: 15 ML | Refills: 5 | Status: SHIPPED | OUTPATIENT
Start: 2022-07-29

## 2022-07-29 RX ORDER — GABAPENTIN 100 MG/1
100 CAPSULE ORAL 3 TIMES DAILY
Qty: 90 CAPSULE | Refills: 2 | Status: SHIPPED | OUTPATIENT
Start: 2022-07-29 | End: 2022-10-27

## 2022-07-29 RX ORDER — METOPROLOL TARTRATE 100 MG/1
100 TABLET ORAL DAILY
Qty: 30 TABLET | Refills: 0 | Status: ON HOLD | OUTPATIENT
Start: 2022-07-29 | End: 2022-10-10 | Stop reason: SDUPTHER

## 2022-07-29 NOTE — PROGRESS NOTES
Assessment/Plan:    Type 2 diabetes mellitus with hyperglycemia, with long-term current use of insulin ([facility])    Lab Results   Component Value Date    HGBA1C 9 4 (H) 07/08/2022   Increase Lantus to 35 units qHs and continue Metformin and Jardiance 25 mg        Diagnoses and all orders for this visit:    Type 2 diabetes mellitus without complication, without long-term current use of insulin ([facility])  -     Cancel: POCT hemoglobin A1c  -     Cancel: IRIS Diabetic eye exam  -     Insulin Glargine Solostar (Lantus SoloStar) 100 UNIT/ML SOPN; Inject 35 Units under the skin daily at bedtime  -     Empagliflozin (Jardiance) 25 MG TABS; Take 1 tablet (25 mg total) by mouth every morning  -     lisinopril (ZESTRIL) 10 mg tablet; Take 1 tablet (10 mg total) by mouth daily  -     metFORMIN (GLUCOPHAGE) 1000 MG tablet; Take 1 tablet (1,000 mg total) by mouth 2 (two) times a day with meals    Atrial flutter with rapid ventricular response ([facility])  -     metoprolol tartrate (LOPRESSOR) 100 mg tablet; Take 1 tablet (100 mg total) by mouth in the morning    Other closed fracture of ninth thoracic vertebra, initial encounter ([facility])  -     gabapentin (NEURONTIN) 100 mg capsule; Take 1 capsule (100 mg total) by mouth 3 (three) times a day    Pain in both testicles  -     Chlamydia/GC amplified DNA by PCR; Future  -     UA w Reflex to Microscopic w Reflex to Culture -Lab Collect; Future  -     US scrotum and testicles; Future    Type 2 diabetes mellitus with hyperglycemia, with long-term current use of insulin ([facility] )        Follow up with Neurosurgery as scheduled, as per chart notes Cade Gannon PA-C sent script for Oxycodone   Subjective:      Patient ID: Yi Wilkerson  is a 55 y o  male  60-year-old male with known DM, COPD, HTN, Afib, who suffered a fall the morning of 7/8  He was evaluated at [facility] and found to have a T9 fracture  Patient was transferred to [facility] for trauma/neurosurgical care    He was admitted to the trauma service with neurosurgical consultation  Multimodal pain regimen was initiated  Patient was brace with TLSO for support  Upright images were obtained that shows spinal alignment  Patient was educated on use of TLSO when he is upright greater than 45°  Was evaluated by PT/OT and cleared for discharge home with home health care/PT/OT on 7/11/2022  Here today for follow up  Admits he does not check BG at home and has not been following any particular diet  Denies symptoms of hypo or hyperglycemia  Complains of back pain, has follow up appointment with trauma surgery in 10 days  Also complains of testicular swelling accompanied by mild pain  Has urinary urgency, denies dysuria  The following portions of the patient's history were reviewed and updated as appropriate:   He  has a past medical history of Asthma, Carpal tunnel syndrome, Chronic pain, Diabetes mellitus ([address] ), Erectile dysfunction, Hypertension, and Umbilical hernia    He   Patient Active Problem List    Diagnosis Date Noted    Fall 07/09/2022    Closed fracture of ninth thoracic vertebra ([address] ) 07/08/2022    Acute on chronic systolic congestive heart failure ([address] ) 05/19/2022    Paroxysmal atrial fibrillation ([address] ) 05/19/2022    Atrial flutter with rapid ventricular response ([address] ) 05/18/2022    Sepsis ([address] ) 05/18/2022    Legally blind in right eye, as defined in [place] 05/18/2022    Ventral hernia without obstruction or gangrene 04/27/2022    Primary insomnia 04/27/2022    Bilateral chronic knee pain 10/12/2021    Chronic midline thoracic back pain 05/20/2021    Atrial fibrillation ([address] ) 02/05/2020    Numbness and tingling in left hand 01/16/2020    COPD exacerbation ([address] ) 01/15/2020    Mild persistent asthma 01/15/2020    Relationship dysfunction 01/13/2020    Blurred vision, right eye 01/13/2020    Ulnar neuropathy at elbow, left     Left carpal tunnel syndrome     Medial epicondylitis of elbow, left 10/31/2019    Snoring 10/31/2019    Other hyperlipidemia 10/29/2019    Nocturia 10/17/2019    Traumatic amputation of finger 09/30/2019    Tobacco dependence 09/30/2019    Numbness of left hand 05/24/2019    Shoulder pain 03/14/2018    Other male erectile dysfunction 03/12/2018    Essential hypertension 01/30/2018    Type 2 diabetes mellitus with hyperglycemia, with long-term current use of insulin ([facility] ) 07/01/2016    Varicose veins of bilateral lower extremities with other complications 85/97/4369     He  has a past surgical history that includes Hand surgery; Umbilical hernia repair; Finger amputation; CHOLECYSTECTOMY LAPAROSCOPIC (N/A, 6/20/2019); and Cholecystectomy  His family history includes Breast cancer additional onset in his mother; Diabetes in his father; Hyperlipidemia in his father; Hypertension in his father; No Known Problems in his son and son; Seizures in his father; Vitiligo in his son  He  reports that he has been smoking cigarettes  He has been smoking about 0 25 packs per day  He has never used smokeless tobacco  He reports current alcohol use  He reports that he does not use drugs    Current Outpatient Medications   Medication Sig Dispense Refill    Empagliflozin (Jardiance) 25 MG TABS Take 1 tablet (25 mg total) by mouth every morning 90 tablet 0    gabapentin (NEURONTIN) 100 mg capsule Take 1 capsule (100 mg total) by mouth 3 (three) times a day 90 capsule 2    Insulin Glargine Solostar (Lantus SoloStar) 100 UNIT/ML SOPN Inject 35 Units under the skin daily at bedtime 15 mL 5    lisinopril (ZESTRIL) 10 mg tablet Take 1 tablet (10 mg total) by mouth daily 90 tablet 0    metFORMIN (GLUCOPHAGE) 1000 MG tablet Take 1 tablet (1,000 mg total) by mouth 2 (two) times a day with meals 180 tablet 3    metoprolol tartrate (LOPRESSOR) 100 mg tablet Take 1 tablet (100 mg total) by mouth in the morning 30 tablet 0    acetaminophen (TYLENOL) 325 mg tablet Take 2 tablets (650 mg total) by mouth every 6 (six) hours as needed for mild pain  0    albuterol (PROVENTIL HFA,VENTOLIN HFA) 90 mcg/act inhaler INHALE 2 PUFFS EVERY 4 (FOUR) HOURS AS NEEDED FOR WHEEZING 18 g 2    Alcohol Swabs (ALCOHOL PREP) 70 % PADS daily Test (Patient not taking: Reported on 9/3/2021)  5    Alcohol Swabs (Alcohol Prep) PADS TEST DAILY (Patient not taking: Reported on 9/3/2021)      aspirin (ECOTRIN LOW STRENGTH) 81 mg EC tablet Take 81 mg by mouth daily      atorvastatin (Lipitor) 20 mg tablet Take 1 tablet (20 mg total) by mouth daily 90 tablet 3    BD Pen Needle Carolyn U/F 32G X 4 MM MISC USE AS DIRECTED TO INJECT INSULIN 100 each 0    Blood Pressure Monitoring ([place] Choice BP Monitor/Arm) JM USE AS DIRECTED 1 each 0    budesonide (Pulmicort Flexhaler) 90 MCG/ACT inhaler Inhale 1 puff 2 (two) times a day Rinse mouth after use  1 each 5    clotrimazole-betamethasone (LOTRISONE) 1-0 05 % cream Apply topically 2 (two) times a day 30 g 0    Contour Next Test test strip USE 1 EACH 3 (THREE) TIMES A  each 10    cyclobenzaprine (FLEXERIL) 5 mg tablet Take 1 tablet (5 mg total) by mouth 3 (three) times a day as needed for muscle spasms 45 tablet 0    diltiazem (CARDIZEM CD) 120 mg 24 hr capsule Take 1 capsule (120 mg total) by mouth in the morning  30 capsule 0    DULoxetine (CYMBALTA) 30 mg delayed release capsule TAKE 1 CAPSULE (30 MG TOTAL) BY MOUTH DAILY AT BEDTIME 30 capsule 0    fluticasone-vilanterol (Breo Ellipta) 100-25 mcg/inh inhaler Inhale 1 puff daily Rinse mouth after use  60 each 5    Humidifier MISC USE AS DIRECTED 1 each 0    Lancets MISC Use to test blood glucose once a day  Dx: Type 2 DM  E11 9      Melatonin 5 MG TABS Take 1 tablet (5 mg total) by mouth daily at bedtime 30 tablet 5    methocarbamol (ROBAXIN) 500 mg tablet Take 1 tablet (500 mg total) by mouth every 6 (six) hours as needed for muscle spasms 42 tablet 0    Misc   Devices MISC by Does not apply route daily (Patient not taking: Reported on 9/3/2021) 1 each 0    oxyCODONE (ROXICODONE) 10 MG TABS You may take 5 mg (0 5 tab) for moderate pain or 10 mg (1 tab) for severe pain, every 4 hours, as needed  30 tablet 0    oxyCODONE (Roxicodone) 5 immediate release tablet Take 1 tablet (5 mg total) by mouth every 4 (four) hours as needed for moderate pain Ongoing therapy Max Daily Amount: 30 mg 20 tablet 0    rivaroxaban (XARELTO) 20 mg tablet Take 1 tablet (20 mg total) by mouth daily with breakfast 90 tablet 3    senna-docusate sodium (SENOKOT S) 8 6-50 mg per tablet Take 2 tablets by mouth daily 28 tablet 0    Tadalafil, PAH, 20 MG TABS Take 1 tablet (20 mg total) by mouth as needed (erectile dysfunction) 30 tablet 5    triamcinolone (KENALOG) 0 5 % cream APPLY TOPICALLY 3 (THREE) TIMES A DAY 30 g 0    TRUEplus Lancets 33G MISC USE AS INSTRUCTED 100 each 3     No current facility-administered medications for this visit  Current Outpatient Medications on File Prior to Visit   Medication Sig    acetaminophen (TYLENOL) 325 mg tablet Take 2 tablets (650 mg total) by mouth every 6 (six) hours as needed for mild pain    albuterol (PROVENTIL HFA,VENTOLIN HFA) 90 mcg/act inhaler INHALE 2 PUFFS EVERY 4 (FOUR) HOURS AS NEEDED FOR WHEEZING    Alcohol Swabs (ALCOHOL PREP) 70 % PADS daily Test (Patient not taking: Reported on 9/3/2021)    Alcohol Swabs (Alcohol Prep) PADS TEST DAILY (Patient not taking: Reported on 9/3/2021)    aspirin (ECOTRIN LOW STRENGTH) 81 mg EC tablet Take 81 mg by mouth daily    atorvastatin (Lipitor) 20 mg tablet Take 1 tablet (20 mg total) by mouth daily    BD Pen Needle Carolyn U/F 32G X 4 MM MISC USE AS DIRECTED TO INJECT INSULIN    Blood Pressure Monitoring ([place] Choice BP Monitor/Arm) JM USE AS DIRECTED    budesonide (Pulmicort Flexhaler) 90 MCG/ACT inhaler Inhale 1 puff 2 (two) times a day Rinse mouth after use      clotrimazole-betamethasone (LOTRISONE) 1-0 05 % cream Apply topically 2 (two) times a day   [employer] Next Test test strip USE 1 EACH 3 (THREE) TIMES A DAY    cyclobenzaprine (FLEXERIL) 5 mg tablet Take 1 tablet (5 mg total) by mouth 3 (three) times a day as needed for muscle spasms    diltiazem (CARDIZEM CD) 120 mg 24 hr capsule Take 1 capsule (120 mg total) by mouth in the morning   DULoxetine (CYMBALTA) 30 mg delayed release capsule TAKE 1 CAPSULE (30 MG TOTAL) BY MOUTH DAILY AT BEDTIME    fluticasone-vilanterol (Breo Ellipta) 100-25 mcg/inh inhaler Inhale 1 puff daily Rinse mouth after use   Humidifier MISC USE AS DIRECTED    Lancets MISC Use to test blood glucose once a day  Dx: Type 2 DM  E11 9    Melatonin 5 MG TABS Take 1 tablet (5 mg total) by mouth daily at bedtime    methocarbamol (ROBAXIN) 500 mg tablet Take 1 tablet (500 mg total) by mouth every 6 (six) hours as needed for muscle spasms    Misc  Devices MISC by Does not apply route daily (Patient not taking: Reported on 9/3/2021)    oxyCODONE (ROXICODONE) 10 MG TABS You may take 5 mg (0 5 tab) for moderate pain or 10 mg (1 tab) for severe pain, every 4 hours, as needed      oxyCODONE (Roxicodone) 5 immediate release tablet Take 1 tablet (5 mg total) by mouth every 4 (four) hours as needed for moderate pain Ongoing therapy Max Daily Amount: 30 mg    rivaroxaban (XARELTO) 20 mg tablet Take 1 tablet (20 mg total) by mouth daily with breakfast    senna-docusate sodium (SENOKOT S) 8 6-50 mg per tablet Take 2 tablets by mouth daily    Tadalafil, PAH, 20 MG TABS Take 1 tablet (20 mg total) by mouth as needed (erectile dysfunction)    triamcinolone (KENALOG) 0 5 % cream APPLY TOPICALLY 3 (THREE) TIMES A DAY    TRUEplus Lancets 33G MISC USE AS INSTRUCTED    [DISCONTINUED] gabapentin (NEURONTIN) 100 mg capsule Take 1 capsule (100 mg total) by mouth 3 (three) times a day for 7 days    [DISCONTINUED] insulin glargine (Lantus SoloStar) 100 units/mL injection pen Inject 30 Units under the skin daily at bedtime    [DISCONTINUED] Jardiance 25 MG TABS TAKE 1 TABLET (25 MG TOTAL) BY MOUTH EVERY MORNING    [DISCONTINUED] lisinopril (ZESTRIL) 10 mg tablet Take 1 tablet (10 mg total) by mouth daily    [DISCONTINUED] metFORMIN (GLUCOPHAGE) 1000 MG tablet TAKE 1 TABLET (1,000 MG TOTAL) BY MOUTH 2 (TWO) TIMES A DAY WITH MEALS    [DISCONTINUED] metoprolol tartrate (LOPRESSOR) 100 mg tablet Take 1 tablet (100 mg total) by mouth in the morning     No current facility-administered medications on file prior to visit       Review of Systems   Genitourinary: Positive for scrotal swelling, testicular pain and urgency  Musculoskeletal: Positive for arthralgias, back pain and gait problem  All other systems reviewed and are negative  Objective:      /90 (BP Location: Left arm, Patient Position: Sitting, Cuff Size: Adult)   Pulse 101   Temp 98 7 °F (37 1 °C) (Temporal)   Resp 18   Wt 130 kg (286 lb)   SpO2 98%   BMI 38 79 kg/m²          Physical Exam  Vitals and nursing note reviewed  Constitutional:       Appearance: He is well-developed  HENT:      Head: Normocephalic  Right Ear: External ear normal       Left Ear: External ear normal       Nose: Nose normal    Eyes:      Conjunctiva/sclera: Conjunctivae normal       Pupils: Pupils are equal, round, and reactive to light  Neck:      Thyroid: No thyromegaly  Cardiovascular:      Rate and Rhythm: Normal rate and regular rhythm  Heart sounds: Normal heart sounds  Pulmonary:      Effort: Pulmonary effort is normal       Breath sounds: Normal breath sounds  Abdominal:      Palpations: Abdomen is soft  Tenderness: There is no abdominal tenderness  There is no guarding or rebound  Musculoskeletal:         General: Tenderness present  Normal range of motion  Cervical back: Normal range of motion and neck supple  Comments: brace with TLSO    Skin:     General: Skin is dry  Neurological:      Mental Status: He is alert and oriented to person, place, and time  Deep Tendon Reflexes: Reflexes are normal and symmetric

## 2022-07-29 NOTE — ASSESSMENT & PLAN NOTE
Lab Results   Component Value Date    HGBA1C 9 4 (H) 07/08/2022   Increase Lantus to 35 units qHs and continue Metformin and Jardiance 25 mg

## 2022-08-29 ENCOUNTER — HOSPITAL ENCOUNTER (OUTPATIENT)
Dept: RADIOLOGY | Facility: HOSPITAL | Age: 57
Discharge: HOME | End: 2022-08-29
Payer: COMMERCIAL

## 2022-08-29 DIAGNOSIS — S22.078A OTHER CLOSED FRACTURE OF NINTH THORACIC VERTEBRA, INITIAL ENCOUNTER (HCC): ICD-10-CM

## 2022-08-29 PROCEDURE — 72072 X-RAY EXAM THORAC SPINE 3VWS: CPT

## 2022-09-02 ENCOUNTER — TELEMEDICINE (OUTPATIENT)
Dept: NEUROSURGERY | Facility: CLINIC | Age: 57
End: 2022-09-02
Payer: COMMERCIAL

## 2022-09-02 DIAGNOSIS — S22.070A CLOSED WEDGE COMPRESSION FRACTURE OF T9 VERTEBRA, INITIAL ENCOUNTER (HCC): Primary | ICD-10-CM

## 2022-09-02 DIAGNOSIS — M48.10 DISH (DIFFUSE IDIOPATHIC SKELETAL HYPEROSTOSIS): ICD-10-CM

## 2022-09-02 PROCEDURE — 99213 OFFICE O/P EST LOW 20 MIN: CPT | Performed by: NURSE PRACTITIONER

## 2022-09-02 NOTE — PROGRESS NOTES
Virtual Regular Visit    Verification of patient location:    Patient is located in the following state in which I hold an active license [place]      Assessment/Plan:    Problem List Items Addressed This Visit        Musculoskeletal and Integument    Closed fracture of ninth thoracic vertebra ([address] ) - Primary     · As addressed in HPI  · Virtual visit , wearing TLSO correctly  · Non focal walking in home upon request , moving upper extremities without evidence of weakness although has subjective leg weakness  · Reports continues pan in mid /lower thoracic area with distribution left lateral thoracici area during any movement, walking lying in bed supine and intermittent sharp right leg pain  Reports he can only lie in bed for 10 minutes maximum  Has a lounge chair in room where he stays most of the time  · Reports he has nothing to take for pain and no money to buy anything , He will try to borrow money from a roommate  · Symptom severity  9-10/10   · Reports he continues smoking but has cut down to 1-2 per day since discharge  Previously smoked 1/2 PPD for about 10 years  · Reports he is having difficultly urinating, when stands straight has increased back pain therefore he ambulated in the bathtub where he can stand straight with arms pressed against the wall, if he tries this maneuver in the toilet he will wet the floor  · Reports he is receiving  Home therapy, started July 26 2022 , currently it is occupational , PT to start in a week or 2 , [address] care @ [phone],  will fax order  Imagining   Xray thoracic 8/29/22  T9 fracture is not well appreciated radiographically; interim healing  No change T9 otherwise  Thoracic vertebral alignment is within normal limits  Thoracic spondylosis again noted There is no displacement of the paraspinal line  The pedicles appear intact  Plan   · Explained the thoracic spine Xray result      · Explained spinal fracture and his risks, in the setting of DISH and a ground level fall should not have resulted in a fracture  · Explained need to assess skeletal strength, DXA ordered  · Advised to borrow $1 00 and purchase acetaminophen at [facility] , take maximum daily dose og 3000 mg , 325 mg 3 tabs every 8 , or 500 mg extra strength 2 tablets every 8 hours  · Explained TLSO use as per protocol, continue conservative management   · Next visit in 4 weeks , 12 weeks post incident  ---will start Brace wean depending on imagining  · Explained after d/c brace leia order start PT for core strengthening   ·          Relevant Orders    XR spine thoracic 3 vw    DISH (diffuse idiopathic skeletal hyperostosis)     · As addressed in CT thoracic and lumbar 7/8/22  Mild multilevel degenerative changes of thoracic and lumbar spine with evidence of diffuse idiopathic skeletal hyperostosis (DISH)   Transitional lumbosacral anatomy with lumbarization of S1 vertebral body with associated right assimilation joint  Chronic bilateral S1 pars defect with grade 1 anterolisthesis S1-S2  Plan  · Educated on the natural nature of DISH  · Risk for recurrent fracture 2/2 nature of DISH  · Will order DXA         Relevant Orders    DXA bone density spine hip and pelvis               Reason for visit is   Chief Complaint   Patient presents with    Virtual Regular Visit     Hospital follow up with X-rays        Encounter provider CHARLES Carrillo    Provider located at [address]  [address] [place] [id number] [phone]      Recent Visits  No visits were found meeting these conditions  Showing recent visits within past 7 days and meeting all other requirements  Today's Visits  Date Type Provider Dept   09/02/22 Telemedicine Rebeca Kaplan [facility] today's visits and meeting all other requirements  Future Appointments  No visits were found meeting these conditions    Showing future appointments within next 150 days and meeting all other requirements       The patient was identified by name and date of birth  Anel Varela  was informed that this is a telemedicine visit and that the visit is being conducted through [address] [employer] and patient was informed that this is a secure, HIPAA-compliant platform  He agrees to proceed     My office door was closed  No one else was in the room  He acknowledged consent and understanding of privacy and security of the video platform  The patient has agreed to participate and understands they can discontinue the visit at any time  Patient is aware this is a billable service  Subjective- initial  virtual  visit 8 weeks post injury  Anel Varela  is a 55 y o  male PM/SH 2 DM w/ hyperglycemia LT insulin use COPD/asthma , nicotine dependence, chronic systolic HF, atrial fibrillation, HTN, varicose veins b/l lower extremities, HLD, traumatic amputation finger left hand    HPI   7/8/22 presented as trauma transfer for [facility] to [facility], reported fall while standing hitting his back on a metal chair in his room that was aganisit a wall then proceeding to fall to the floor on his back  Has immediate back pain , 911 was called and he was taken to [facility]  CT thoracic and lumbar spine showed an acute nondisplaced oblique fracture through T9 vertebral body  There is no involvement of the posterior elements  Neurosurgery was consulted  Assessed patient on 7/9/22  T9 Oblique fracture s/p fall (TLICS 2)  Extensive DISH on CT imagining  Non focal examination, Point TTT at T9 area  Conservative management with TLSO     Upright imagining ordered in brace, PREFORMED 7 9 22     T9 vertebral body fracture is evident though better seen on CT performed one day prior  Thoracic vertebral alignment is within normal limits  Intervertebral disc spaces are preserved  Anterior bridging osteophytes  There is no displacement of the paraspinal line   The pedicles appear intact  Patient telephoned trauma 8/1/22 requesting refill , ordered Roxicodone and flexeril    8/4/22 trauma ordered Gabapentin 100 mg po TID    Appointments   7/27 scheduled for appointment , patient cancelled  No transportation  8/11/22 appointment , patient did not complete xrays  Presents today for initial virtual/office visit since d/c 7/10/2022           Imagining   VIEWS:  XR SPINE THORACIC 3 VW 8/29/22    FINDINGS:   T9 fracture is not well appreciated radiographically; interim healing  No change T9 otherwise    There is no new fracture or pathologic bone lesion  Thoracic vertebral alignment is within normal limits  Thoracic spondylosis again noted There is no displacement of the paraspinal line  The pedicles appear intact  IMPRESSION:   Healing otherwise stable T9         CT THORACIC AND LUMBAR SPINE 7/8/22  INDICATION:   Low back pain, trauma  back pain s/p fall  COMPARISON:  Chest radiograph May 18, 2022  Sacrum and coccyx radiograph December 3, 2018  Lumbar spine radiograph November 6, 2017    TECHNIQUE:  Contiguous axial images were obtained  Sagittal and coronal reconstructions were performed      Radiation dose length product (DLP) for this visit:  2276 mGy-cm   This examination, like all CT scans performed in the [facility], was performed utilizing techniques to minimize radiation dose exposure, including the use of iterative   reconstruction and automated exposure control       IMAGE QUALITY:  Diagnostic    FINDINGS:   ALIGNMENT: Transitional lumbosacral anatomy with lumbarization of S1 vertebral body with associated right assimilation joint  Mild dextroscoliosis of thoracic spine  Chronic bilateral S1 pars defect with grade 1 anterolisthesis S1-S2    VERTEBRAL BODIES: Acute nondisplaced oblique fracture through T9 vertebral body  There is no involvement of the posterior elements    No other fractures are identified in thoracic lumbar spine    No lytic or blastic osseous lesions    DEGENERATIVE CHANGES: Multilevel degenerative changes consists of anterior flowing osteophytes, intravertebral herniations, degenerative endplate changes, mild disc height loss  Diffuse idiopathic skeletal hyperostosis (DISH) of thoracic spine  No   critical central canal stenosis or high-grade neural foraminal narrowing    PARASPINAL SOFT TISSUES:  Unremarkable    OTHER: Calcified granuloma in right upper lobe  Subsegmental atelectasis in right lower lobe    IMPRESSION:   Acute nondisplaced oblique fracture through T9 vertebral body  There is no involvement of the posterior elements      Mild multilevel degenerative changes of thoracic and lumbar spine with evidence of diffuse idiopathic skeletal hyperostosis (DISH)   Transitional lumbosacral anatomy with lumbarization of S1 vertebral body with associated right assimilation joint  Chronic bilateral S1 pars defect with grade 1 anterolisthesis S1-S2  The study was marked in EPIC for immediate notification        Past Medical History:   Diagnosis Date    Asthma     Carpal tunnel syndrome     Chronic pain     left arm    Diabetes mellitus (HCC)     Erectile dysfunction     Hypertension     Thoracic vertebral fracture (HCC)     Umbilical hernia        Past Surgical History:   Procedure Laterality Date    CHOLECYSTECTOMY      CHOLECYSTECTOMY LAPAROSCOPIC N/A 6/20/2019    Procedure: CHOLECYSTECTOMY LAPAROSCOPIC;  Surgeon: Chelsea Jackson MD;  Location: [address] [place] [address];  Service: General    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    HAND SURGERY      UMBILICAL HERNIA REPAIR         Current Outpatient Medications   Medication Sig Dispense Refill    acetaminophen (TYLENOL) 325 mg tablet Take 2 tablets (650 mg total) by mouth every 6 (six) hours as needed for mild pain  0    albuterol (PROVENTIL HFA,VENTOLIN HFA) 90 mcg/act inhaler INHALE 2 PUFFS EVERY 4 (FOUR) HOURS AS NEEDED FOR WHEEZING 18 g 2    aspirin (ECOTRIN LOW STRENGTH) 81 mg EC tablet Take 81 mg by mouth daily      atorvastatin (Lipitor) 20 mg tablet Take 1 tablet (20 mg total) by mouth daily 90 tablet 3    budesonide (Pulmicort Flexhaler) 90 MCG/ACT inhaler Inhale 1 puff 2 (two) times a day Rinse mouth after use  1 each 5    diltiazem (CARDIZEM CD) 120 mg 24 hr capsule Take 1 capsule (120 mg total) by mouth in the morning   30 capsule 0    DULoxetine (CYMBALTA) 30 mg delayed release capsule TAKE 1 CAPSULE (30 MG TOTAL) BY MOUTH DAILY AT BEDTIME 30 capsule 0    Empagliflozin (Jardiance) 25 MG TABS Take 1 tablet (25 mg total) by mouth every morning 90 tablet 0    Insulin Glargine Solostar (Lantus SoloStar) 100 UNIT/ML SOPN Inject 35 Units under the skin daily at bedtime 15 mL 5    lisinopril (ZESTRIL) 10 mg tablet Take 1 tablet (10 mg total) by mouth daily 90 tablet 0    Melatonin 5 MG TABS Take 1 tablet (5 mg total) by mouth daily at bedtime (Patient taking differently: Take 5 mg by mouth as needed) 30 tablet 5    metFORMIN (GLUCOPHAGE) 1000 MG tablet Take 1 tablet (1,000 mg total) by mouth 2 (two) times a day with meals 180 tablet 3    rivaroxaban (XARELTO) 20 mg tablet Take 1 tablet (20 mg total) by mouth daily with breakfast 90 tablet 3    Tadalafil, PAH, 20 MG TABS Take 1 tablet (20 mg total) by mouth as needed (erectile dysfunction) 30 tablet 5    Alcohol Swabs (ALCOHOL PREP) 70 % PADS daily Test (Patient not taking: Reported on 9/3/2021)  5    Alcohol Swabs (Alcohol Prep) PADS TEST DAILY (Patient not taking: Reported on 9/3/2021)      BD Pen Needle Carolyn U/F 32G X 4 MM MISC USE AS DIRECTED TO INJECT INSULIN 100 each 0    Blood Pressure Monitoring ([place] BP Monitor/Arm) JM USE AS DIRECTED 1 each 0    clotrimazole-betamethasone (LOTRISONE) 1-0 05 % cream Apply topically 2 (two) times a day (Patient not taking: Reported on 9/2/2022) 30 g 0    Contour Next Test test strip USE 1 EACH 3 (THREE) TIMES A  each 10    cyclobenzaprine (FLEXERIL) 5 mg tablet Take 1 tablet (5 mg total) by mouth 3 (three) times a day as needed for muscle spasms (Patient not taking: Reported on 9/2/2022) 45 tablet 0    fluticasone-vilanterol (Breo Ellipta) 100-25 mcg/inh inhaler Inhale 1 puff daily Rinse mouth after use  (Patient not taking: Reported on 9/2/2022) 60 each 5    gabapentin (NEURONTIN) 100 mg capsule Take 1 capsule (100 mg total) by mouth 3 (three) times a day (Patient not taking: Reported on 9/2/2022) 90 capsule 2    Humidifier MISC USE AS DIRECTED 1 each 0    Lancets MISC Use to test blood glucose once a day  Dx: Type 2 DM  E11 9      methocarbamol (ROBAXIN) 500 mg tablet Take 1 tablet (500 mg total) by mouth every 6 (six) hours as needed for muscle spasms (Patient not taking: Reported on 9/2/2022) 42 tablet 0    metoprolol tartrate (LOPRESSOR) 100 mg tablet Take 1 tablet (100 mg total) by mouth in the morning (Patient not taking: Reported on 9/2/2022) 30 tablet 0    Misc  Devices MISC by Does not apply route daily (Patient not taking: Reported on 9/3/2021) 1 each 0    oxyCODONE (ROXICODONE) 10 MG TABS You may take 5 mg (0 5 tab) for moderate pain or 10 mg (1 tab) for severe pain, every 4 hours, as needed  (Patient not taking: Reported on 9/2/2022) 30 tablet 0    oxyCODONE (Roxicodone) 5 immediate release tablet Take 1 tablet (5 mg total) by mouth every 4 (four) hours as needed for moderate pain Ongoing therapy Max Daily Amount: 30 mg (Patient not taking: Reported on 9/2/2022) 20 tablet 0    senna-docusate sodium (SENOKOT S) 8 6-50 mg per tablet Take 2 tablets by mouth daily (Patient not taking: Reported on 9/2/2022) 28 tablet 0    triamcinolone (KENALOG) 0 5 % cream APPLY TOPICALLY 3 (THREE) TIMES A DAY 30 g 0    TRUEplus Lancets 33G MISC USE AS INSTRUCTED 100 each 3     No current facility-administered medications for this visit  No Known Allergies    Review of Systems   Constitutional: Negative  HENT: Negative  Eyes: Positive for visual disturbance  Respiratory: Positive for shortness of breath  Cardiovascular:        A-fib   Gastrointestinal: Negative  Endocrine: Negative  Genitourinary: Negative  Musculoskeletal: Positive for back pain (radiates to right leg), gait problem and myalgias  Pain and numbness in left arm for a couple of years   Skin: Negative  Allergic/Immunologic: Negative  Neurological: Positive for dizziness, weakness, light-headedness, numbness (left arm, chronic) and headaches (on and off)  Hematological: Bruises/bleeds easily (bruises)  Psychiatric/Behavioral: Positive for dysphoric mood and sleep disturbance  The patient is nervous/anxious  Video Exam    There were no vitals filed for this visit  Physical Exam  Eyes:      General: No scleral icterus  Right eye: No discharge  Left eye: No discharge  Pulmonary:      Effort: Pulmonary effort is normal  No respiratory distress  Neurological:      Mental Status: He is oriented to person, place, and time  Gait: Gait is intact  Psychiatric:         Mood and Affect: Mood normal          Behavior: Behavior normal         Neurologic Exam     Mental Status   Oriented to person, place, and time  Oriented to person  Oriented to place  Oriented to country, city, area, street and number  Oriented to year, month, date, day and season  Attention: normal    Level of consciousness: alert  Able to perform simple calculations  Gait, Coordination, and Reflexes     Gait  Gait: normal    I spent 30 minutes directly with the patient during this visit today in which greater than 50% of this time was spent in assessment, examination, impressions, reviewing imagining and recommendations for care  All questions were answered to his/her satisfaction, and contact information provided in the event additional questions arise   Patient acknowledged an understanding and agreement with plan

## 2022-09-02 NOTE — ASSESSMENT & PLAN NOTE
· As addressed in CT thoracic and lumbar 7/8/22  Mild multilevel degenerative changes of thoracic and lumbar spine with evidence of diffuse idiopathic skeletal hyperostosis (DISH)   Transitional lumbosacral anatomy with lumbarization of S1 vertebral body with associated right assimilation joint  Chronic bilateral S1 pars defect with grade 1 anterolisthesis S1-S2      Plan  · Educated on the natural nature of DISH  · Risk for recurrent fracture 2/2 nature of DISH  · Will order DXA

## 2022-09-02 NOTE — ASSESSMENT & PLAN NOTE
· As addressed in HPI  · Virtual visit , wearing TLSO correctly  · Non focal walking in home upon request , moving upper extremities without evidence of weakness although has subjective leg weakness  · Reports continues pan in mid /lower thoracic area with distribution left lateral thoracici area during any movement, walking lying in bed supine and intermittent sharp right leg pain  Reports he can only lie in bed for 10 minutes maximum  Has a lounge chair in room where he stays most of the time  · Reports he has nothing to take for pain and no money to buy anything , He will try to borrow money from a roommate  · Symptom severity  9-10/10   · Reports he continues smoking but has cut down to 1-2 per day since discharge  Previously smoked 1/2 PPD for about 10 years  · Reports he is having difficultly urinating, when stands straight has increased back pain therefore he ambulated in the bathtub where he can stand straight with arms pressed against the wall, if he tries this maneuver in the toilet he will wet the floor  · Reports he is receiving  Home therapy, started July 26 2022 , currently it is occupational , PT to start in a week or 2 , [address] care @ [phone],  will fax order  Imagining   Xray thoracic 8/29/22  T9 fracture is not well appreciated radiographically; interim healing  No change T9 otherwise  Thoracic vertebral alignment is within normal limits  Thoracic spondylosis again noted There is no displacement of the paraspinal line  The pedicles appear intact  Plan   · Explained the thoracic spine Xray result    · Explained spinal fracture and his risks, in the setting of DISH and a ground level fall should not have resulted in a fracture  · Explained need to assess skeletal strength, DXA ordered     · Advised to borrow $1 00 and purchase acetaminophen at [facility] , take maximum daily dose og 3000 mg , 325 mg 3 tabs every 8 , or 500 mg extra strength 2 tablets every 8 hours    · Explained TLSO use as per protocol, continue conservative management   · Next visit in 4 weeks , 12 weeks post incident  ---will start Brace wean depending on imagining  · Explained after d/c brace leia order start PT for core strengthening      ·

## 2022-09-09 DIAGNOSIS — M25.511 BILATERAL SHOULDER PAIN, UNSPECIFIED CHRONICITY: ICD-10-CM

## 2022-09-09 DIAGNOSIS — M25.512 BILATERAL SHOULDER PAIN, UNSPECIFIED CHRONICITY: ICD-10-CM

## 2022-09-09 RX ORDER — DULOXETIN HYDROCHLORIDE 30 MG/1
30 CAPSULE, DELAYED RELEASE ORAL
Qty: 90 CAPSULE | Refills: 0 | Status: SHIPPED | OUTPATIENT
Start: 2022-09-09

## 2022-09-14 DIAGNOSIS — E11.9 TYPE 2 DIABETES MELLITUS WITHOUT COMPLICATION, WITHOUT LONG-TERM CURRENT USE OF INSULIN (HCC): ICD-10-CM

## 2022-09-15 RX ORDER — EMPAGLIFLOZIN 25 MG/1
TABLET, FILM COATED ORAL
Qty: 90 TABLET | Refills: 0 | Status: SHIPPED | OUTPATIENT
Start: 2022-09-15

## 2022-09-21 ENCOUNTER — VBI (OUTPATIENT)
Dept: ADMINISTRATIVE | Facility: OTHER | Age: 57
End: 2022-09-21

## 2022-09-22 DIAGNOSIS — N47.1 PHIMOSIS: ICD-10-CM

## 2022-09-23 RX ORDER — TRIAMCINOLONE ACETONIDE 5 MG/G
CREAM TOPICAL 3 TIMES DAILY
Qty: 30 G | Refills: 0 | OUTPATIENT
Start: 2022-09-23

## 2022-10-01 ENCOUNTER — HOSPITAL ENCOUNTER (OUTPATIENT)
Dept: RADIOLOGY | Facility: HOSPITAL | Age: 57
Discharge: HOME | End: 2022-10-01
Payer: COMMERCIAL

## 2022-10-01 DIAGNOSIS — S22.070A CLOSED WEDGE COMPRESSION FRACTURE OF T9 VERTEBRA, INITIAL ENCOUNTER (HCC): ICD-10-CM

## 2022-10-01 PROCEDURE — 72072 X-RAY EXAM THORAC SPINE 3VWS: CPT

## 2022-10-03 ENCOUNTER — OFFICE VISIT (OUTPATIENT)
Dept: NEUROSURGERY | Facility: CLINIC | Age: 57
End: 2022-10-03
Payer: COMMERCIAL

## 2022-10-03 DIAGNOSIS — M48.10 DISH (DIFFUSE IDIOPATHIC SKELETAL HYPEROSTOSIS): ICD-10-CM

## 2022-10-03 DIAGNOSIS — S22.070A CLOSED WEDGE COMPRESSION FRACTURE OF T9 VERTEBRA, INITIAL ENCOUNTER (HCC): Primary | ICD-10-CM

## 2022-10-03 PROCEDURE — 99214 OFFICE O/P EST MOD 30 MIN: CPT | Performed by: NURSE PRACTITIONER

## 2022-10-03 NOTE — ASSESSMENT & PLAN NOTE
· As addressed in CT thoracic and lumbar 7/8/22 Mild multilevel degenerative changes of thoracic and lumbar spine with evidence of diffuse idiopathic skeletal hyperostosis (DISH)   Transitional lumbosacral anatomy with lumbarization of S1 vertebral body with associated right assimilation joint  Chronic bilateral S1 pars defect with grade 1 anterolisthesis S1-S2      Plan  · Reinforces prior education on the natural nature of DISH   · Risk for recurrent fracture 2/2 nature of DISH  · Will order DXA ---patient was a no show   · Advised to reschedule, explained importance of DXA

## 2022-10-03 NOTE — ASSESSMENT & PLAN NOTE
· As addressed in HPI  · Is wearing TLSO brace , standing and walking in his room , moving all extremities with no observed deficits  · Reports continued pain in area of fracture with intermittent episodes of pain from localized area to the left  Pain severity 4/120 on numeric scale  · Reports he did purchase a small bottle of tylenol after the initial visit from the [employer] store, depleted supply and now has nothing  Reports she has no one to borrow $1 00 form , and is 2 months behind in rent  · Denied bowel or bladder dysfunction  Reports he continues to occasionally have to urinate in the tub standing up with hands pressed against the wall but is urinating more in the toilet  Reports continues with balance issues but improved form previously  Reports has dyspnea when ambulating  · Reports is able to sleep in bed now for longer periods but not all night , will then get up secondary to pain and sleep in the rocker  · Reports he has not smoked for a few days, does not have cigarettes  · Reports continues receiving Occupational therapy , physical therapy never started   Reminded patient at last visit 9/2/22  Informed me physical therapy was to start in 2 weeks   [facility] care @ [phone]  (fax #)  · Was a no show for DXA  Imagining   10/1/2021 Xray thoracic upright without TLSO brace Nondisplaced T9 vertebral body fracture is unchanged with mild interval healing  Thoracic vertebral alignment is within normal limits  There is no displacement of the paraspinal line  PLAN  · Reviewed imagining  · Start Brace wean ---reviewed weaning schedule in detail , patient obtained a pencil and paper and wrote the wean schedule   Wean over 4 weeks last wean day 10/24/2022   · Explained on 10/25 can start Physical therapy for core and lumbar paraspinal muscle strengthening , will fax order to [facility] , # above  Advised if the therapy does not start by October 26 call the office and advise  · Advised to complete DXA scan , advised again of the importance and rational for the test     · Advised if he has worsening thoracici pain, pain radiation into ribs, pain in the abdominal area, arms or legs --go immediately to the emergency department for  assessment  · Encouraged if additional questions or concerns please call

## 2022-10-03 NOTE — PROGRESS NOTES
Virtual Regular Visit    Verification of patient location:    Patient is located in the following state in which I hold an active license [place]      Assessment/Plan:    Problem List Items Addressed This Visit        Musculoskeletal and Integument    Closed fracture of ninth thoracic vertebra ([address] ) - Primary     · As addressed in HPI  · Is wearing TLSO brace , standing and walking in his room , moving all extremities with no observed deficits  · Reports continued pain in area of fracture with intermittent episodes of pain from localized area to the left  Pain severity 4/120 on numeric scale  · Reports he did purchase a small bottle of tylenol after the initial visit from the [employer] store, depleted supply and now has nothing  Reports she has no one to borrow $1 00 form , and is 2 months behind in rent  · Denied bowel or bladder dysfunction  Reports he continues to occasionally have to urinate in the tub standing up with hands pressed against the wall but is urinating more in the toilet  Reports continues with balance issues but improved form previously  Reports has dyspnea when ambulating  · Reports is able to sleep in bed now for longer periods but not all night , will then get up secondary to pain and sleep in the rocker  · Reports he has not smoked for a few days, does not have cigarettes  · Reports continues receiving Occupational therapy , physical therapy never started   Reminded patient at last visit 9/2/22  Informed me physical therapy was to start in 2 weeks   [facility] care @ [phone]  (fax #)  · Was a no show for DXA  Imagining   10/1/2021 Xray thoracic upright without TLSO brace Nondisplaced T9 vertebral body fracture is unchanged with mild interval healing  Thoracic vertebral alignment is within normal limits  There is no displacement of the paraspinal line         PLAN  · Reviewed imagining  · Start Brace wean ---reviewed weaning schedule in detail , patient obtained a pencil and paper and wrote the wean schedule   Wean over 4 weeks last wean day 10/24/2022   · Explained on 10/25 can start Physical therapy for core and lumbar paraspinal muscle strengthening , will fax order to [facility] , # above  Advised if the therapy does not start by October 26 call the office and advise  · Advised to complete DXA scan , advised again of the importance and rational for the test     · Advised if he has worsening thoracici pain, pain radiation into ribs, pain in the abdominal area, arms or legs --go immediately to the emergency department for  assessment  · Encouraged if additional questions or concerns please call  DISH (diffuse idiopathic skeletal hyperostosis)     · As addressed in CT thoracic and lumbar 7/8/22 Mild multilevel degenerative changes of thoracic and lumbar spine with evidence of diffuse idiopathic skeletal hyperostosis (DISH)   Transitional lumbosacral anatomy with lumbarization of S1 vertebral body with associated right assimilation joint  Chronic bilateral S1 pars defect with grade 1 anterolisthesis S1-S2      Plan  · Reinforces prior education on the natural nature of DISH   · Risk for recurrent fracture 2/2 nature of DISH  · Will order DXA ---patient was a no show   · Advised to reschedule, explained importance of DXA                             Reason for visit is   Chief Complaint   Patient presents with    Virtual Regular Visit     T9 Fx, Follow up with X-rays        Encounter provider CHARLES Serrano    Provider located at [facility]  marquita  [address] [place], [place] [address]  [phone]      Recent Visits  No visits were found meeting these conditions    Showing recent visits within past 7 days and meeting all other requirements  Today's Visits  Date Type Provider Dept   10/03/22 Office Visit Rebeca Serrano today's visits and meeting all other requirements  Future Appointments  No visits were found meeting these conditions  Showing future appointments within next 150 days and meeting all other requirements       The patient was identified by name and date of birth  Dee Dee Jarvis  was informed that this is a telemedicine visit and that the visit is being conducted through [address] [place] [address] Road [facility] and patient was informed that this is a secure, HIPAA-compliant platform  He agrees to proceed     My office door was closed  No one else was in the room  He acknowledged consent and understanding of privacy and security of the video platform  The patient has agreed to participate and understands they can discontinue the visit at any time  Patient is aware this is a billable service  Subjective  Dee Dee Jarvis  is a 55 y o  male  PMH 2 DM w/ hyperglycemia LT insulin use COPD/asthma , nicotine dependence, chronic systolic HF, atrial fibrillation, HTN, varicose veins b/l lower extremities, HLD, traumatic amputation finger left hand        HPI   7/8/22 presented as trauma transfer for Roblero Noble to [facility]", reported fall while standing hitting his back on a metal chair in his room that was aganisit a wall then proceeding to fall to the floor on his back  Has immediate back pain , 911 was called and he was taken to [facility]      CT thoracic and lumbar spine showed an acute nondisplaced oblique fracture through T9 vertebral body  There is no involvement of the posterior elements      Neurosurgery was consulted  Assessed patient on 7/9/22  T9 Oblique fracture s/p fall (TLICS 2)  Extensive DISH on CT imagining  Non focal examination, Point TTT at T9 area  Conservative management with TLSO      7 9 22   Upright imagining ordered in brace,    T9 vertebral body fracture is evident though better seen on CT performed one day prior  Thoracic vertebral alignment is within normal limits  Intervertebral disc spaces are preserved   Anterior bridging osteophytes   There is no displacement of the paraspinal line  The pedicles appear intact      Patient telephoned trauma 8/1/22 requesting refill , ordered Roxicodone and flexeril    8/4/22 trauma ordered Gabapentin 100 mg po TID     Appointments   7/27 scheduled for appointment , patient cancelled  No transportation  8/11/22 appointment , patient did not complete xrays  9/2/22 INITIAL  virtual/office visit since d/c 7/10/2022       Telephoned today secondary to no show , reports he forgot about visit  And wanted to know if visit could be virtual  [facility] appointment arranged ,  This is  4 week f/u visit now 12 weeks post injury  He has never visited the office  He completed Xray for visit on 10/1/22        IMAGINING   THORACIC SPINE 10/1/2022   INDICATION:   S22 070A: Wedge compression fracture of T9-T10 vertebra, initial encounter for closed fracture    COMPARISON:  8/29/2022     VIEWS:  XR SPINE THORACIC 3 VW  Images: 4     FINDINGS:     Nondisplaced T9 vertebral body fracture is unchanged with mild interval healing     Thoracic vertebral alignment is within normal limits      Mild multilevel degenerative spondylosis again noted     There is no displacement of the paraspinal line       The pedicles appear intact      IMPRESSION:     Stable appearance healing nondisplaced T9 vertebral body fracture     VIEWS:  XR SPINE THORACIC 3 VW 8/29/22    FINDINGS:   T9 fracture is not well appreciated radiographically; interim healing   No change T9 otherwise    There is no new fracture or pathologic bone lesion  Thoracic vertebral alignment is within normal limits  Thoracic spondylosis again noted There is no displacement of the paraspinal line  The pedicles appear intact  IMPRESSION:   Healing otherwise stable T9       CT THORACIC AND LUMBAR SPINE 7/8/22  INDICATION:   Low back pain, trauma  back pain s/p fall  COMPARISON:  Chest radiograph May 18, 2022   Sacrum and coccyx radiograph December 3, 2018   Lumbar spine radiograph November 6, 2017    TECHNIQUE:  Contiguous axial images were obtained  Sagittal and coronal reconstructions were performed      Radiation dose length product (DLP) for this visit:  2276 mGy-cm    This examination, like all CT scans performed in the [facility], was performed utilizing techniques to minimize radiation dose exposure, including the use of iterative   reconstruction and automated exposure control       IMAGE QUALITY:  Diagnostic    FINDINGS:   ALIGNMENT: Transitional lumbosacral anatomy with lumbarization of S1 vertebral body with associated right assimilation joint   Mild dextroscoliosis of thoracic spine   Chronic bilateral S1 pars defect with grade 1 anterolisthesis S1-S2    VERTEBRAL BODIES: Acute nondisplaced oblique fracture through T9 vertebral body   There is no involvement of the posterior elements    No other fractures are identified in thoracic lumbar spine   No lytic or blastic osseous lesions    DEGENERATIVE CHANGES: Multilevel degenerative changes consists of anterior flowing osteophytes, intravertebral herniations, degenerative endplate changes, mild disc height loss   Diffuse idiopathic skeletal hyperostosis (DISH) of thoracic spine   No   critical central canal stenosis or high-grade neural foraminal narrowing    PARASPINAL SOFT TISSUES:  Unremarkable    OTHER: Calcified granuloma in right upper lobe   Subsegmental atelectasis in right lower lobe    IMPRESSION:   Acute nondisplaced oblique fracture through T9 vertebral body  There is no involvement of the posterior elements      Mild multilevel degenerative changes of thoracic and lumbar spine with evidence of diffuse idiopathic skeletal hyperostosis (DISH)   Transitional lumbosacral anatomy with lumbarization of S1 vertebral body with associated right assimilation joint  Chronic bilateral S1 pars defect with grade 1 anterolisthesis S1-S2  The study was marked in EPIC for immediate notification        Past Medical History: Diagnosis Date    Asthma     Carpal tunnel syndrome     Chronic pain     left arm    Diabetes mellitus (HCC)     Erectile dysfunction     Hypertension     Thoracic vertebral fracture (HCC)     Umbilical hernia        Past Surgical History:   Procedure Laterality Date    CHOLECYSTECTOMY      CHOLECYSTECTOMY LAPAROSCOPIC N/A 6/20/2019    Procedure: CHOLECYSTECTOMY LAPAROSCOPIC;  Surgeon: Mery Garcia MD;  Location: [address] [place] [address] OR;  Service: General    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    HAND SURGERY      UMBILICAL HERNIA REPAIR         Current Outpatient Medications   Medication Sig Dispense Refill    albuterol (PROVENTIL HFA,VENTOLIN HFA) 90 mcg/act inhaler INHALE 2 PUFFS EVERY 4 (FOUR) HOURS AS NEEDED FOR WHEEZING 18 g 2    aspirin (ECOTRIN LOW STRENGTH) 81 mg EC tablet Take 81 mg by mouth daily      atorvastatin (Lipitor) 20 mg tablet Take 1 tablet (20 mg total) by mouth daily 90 tablet 3    budesonide (Pulmicort Flexhaler) 90 MCG/ACT inhaler Inhale 1 puff 2 (two) times a day Rinse mouth after use  1 each 5    diltiazem (CARDIZEM CD) 120 mg 24 hr capsule Take 1 capsule (120 mg total) by mouth in the morning   (Patient taking differently: Take 240 mg by mouth daily) 30 capsule 0    DULoxetine (CYMBALTA) 30 mg delayed release capsule TAKE 1 CAPSULE (30 MG TOTAL) BY MOUTH DAILY AT BEDTIME 90 capsule 0    Insulin Glargine Solostar (Lantus SoloStar) 100 UNIT/ML SOPN Inject 35 Units under the skin daily at bedtime 15 mL 5    Jardiance 25 MG TABS TAKE 1 TABLET (25 MG TOTAL) BY MOUTH EVERY MORNING 90 tablet 0    lisinopril (ZESTRIL) 10 mg tablet Take 1 tablet (10 mg total) by mouth daily 90 tablet 0    metFORMIN (GLUCOPHAGE) 1000 MG tablet Take 1 tablet (1,000 mg total) by mouth 2 (two) times a day with meals 180 tablet 3    rivaroxaban (XARELTO) 20 mg tablet Take 1 tablet (20 mg total) by mouth daily with breakfast 90 tablet 3    Tadalafil, PAH, 20 MG TABS Take 1 tablet (20 mg total) by mouth as needed (erectile dysfunction) 30 tablet 5    acetaminophen (TYLENOL) 325 mg tablet Take 2 tablets (650 mg total) by mouth every 6 (six) hours as needed for mild pain (Patient not taking: Reported on 10/3/2022)  0    Alcohol Swabs (ALCOHOL PREP) 70 % PADS daily Test (Patient not taking: Reported on 9/3/2021)  5    Alcohol Swabs (Alcohol Prep) PADS TEST DAILY (Patient not taking: Reported on 9/3/2021)      BD Pen Needle Carolyn U/F 32G X 4 MM MISC USE AS DIRECTED TO INJECT INSULIN 100 each 0    Blood Pressure Monitoring ([place] Choice BP Monitor/Arm) JM USE AS DIRECTED 1 each 0    clotrimazole-betamethasone (LOTRISONE) 1-0 05 % cream Apply topically 2 (two) times a day (Patient not taking: No sig reported) 30 g 0    Contour Next Test test strip USE 1 EACH 3 (THREE) TIMES A  each 10    cyclobenzaprine (FLEXERIL) 5 mg tablet Take 1 tablet (5 mg total) by mouth 3 (three) times a day as needed for muscle spasms (Patient not taking: No sig reported) 45 tablet 0    fluticasone-vilanterol (Breo Ellipta) 100-25 mcg/inh inhaler Inhale 1 puff daily Rinse mouth after use  (Patient not taking: No sig reported) 60 each 5    gabapentin (NEURONTIN) 100 mg capsule Take 1 capsule (100 mg total) by mouth 3 (three) times a day (Patient not taking: No sig reported) 90 capsule 2    Humidifier MISC USE AS DIRECTED 1 each 0    Lancets MISC Use to test blood glucose once a day  Dx: Type 2 DM  E11 9      Melatonin 5 MG TABS Take 1 tablet (5 mg total) by mouth daily at bedtime (Patient not taking: Reported on 10/3/2022) 30 tablet 5    methocarbamol (ROBAXIN) 500 mg tablet Take 1 tablet (500 mg total) by mouth every 6 (six) hours as needed for muscle spasms (Patient not taking: No sig reported) 42 tablet 0    metoprolol tartrate (LOPRESSOR) 100 mg tablet Take 1 tablet (100 mg total) by mouth in the morning (Patient not taking: No sig reported) 30 tablet 0    Misc   Devices MISC by Does not apply route daily (Patient not taking: Reported on 9/3/2021) 1 each 0    oxyCODONE (ROXICODONE) 10 MG TABS You may take 5 mg (0 5 tab) for moderate pain or 10 mg (1 tab) for severe pain, every 4 hours, as needed  (Patient not taking: No sig reported) 30 tablet 0    oxyCODONE (Roxicodone) 5 immediate release tablet Take 1 tablet (5 mg total) by mouth every 4 (four) hours as needed for moderate pain Ongoing therapy Max Daily Amount: 30 mg (Patient not taking: No sig reported) 20 tablet 0    senna-docusate sodium (SENOKOT S) 8 6-50 mg per tablet Take 2 tablets by mouth daily (Patient not taking: No sig reported) 28 tablet 0    triamcinolone (KENALOG) 0 5 % cream APPLY TOPICALLY 3 (THREE) TIMES A DAY 30 g 0    TRUEplus Lancets 33G MISC USE AS INSTRUCTED 100 each 3     No current facility-administered medications for this visit  No Known Allergies    Review of Systems   Constitutional: Negative  HENT: Negative  Eyes: Positive for visual disturbance  Respiratory: Positive for shortness of breath (with exertion)  Cardiovascular:        A-fib   Gastrointestinal: Negative  Endocrine: Negative  Genitourinary: Negative  Musculoskeletal: Positive for back pain (radiates to right leg ), gait problem and myalgias (back)  Pain and numbness in left arm for a couple of years   Skin: Negative  Allergic/Immunologic: Negative  Neurological: Positive for dizziness, weakness, light-headedness and numbness (left arm, chronic )  Negative for headaches  Hematological: Bruises/bleeds easily (bruises)  Psychiatric/Behavioral: Positive for dysphoric mood and sleep disturbance (sleep has been improving some)  The patient is nervous/anxious  Video Exam    There were no vitals filed for this visit  Physical Exam  Pulmonary:      Effort: Pulmonary effort is normal  No respiratory distress  Neurological:      Mental Status: He is alert and oriented to person, place, and time        Gait: Gait normal    Psychiatric: Mood and Affect: Mood normal          Behavior: Behavior normal           Neurologic Exam     Mental Status   Oriented to person, place, and time  Oriented to person  Oriented to place  Oriented to country, city, area, street and number  Oriented to time  Oriented to year, month, date, day and season  Level of consciousness: alert  Knowledge: good  Able to perform simple calculations  I spent 30 minutes directly with the patient during this visit in which greater than 50% of this time was spent in assessment, examination, impressions, reviewing imagining and recommendations for care  All questions were answered to his/her satisfaction, and contact information provided in the event additional questions arise   Patient acknowledged an understanding and agreement with plan

## 2022-10-03 NOTE — PATIENT INSTRUCTIONS
LSO/TLSO Brace Wean      LSO Wean      Week One October 3 Do not wear the BRACE1-2 hours per day for the next week,     Week 2 October 10 Second week do not wear BRACE for 2-4 hours per day      Week 3 October 17- Third week do not wear BRACE 4-8 hours per day the subsequent week,     Week October 24 --Fourth week wean out of the BRACE entirely  Starting October 25 , can start physical therapy for core  and lumbar paraspinal strengthening    These periods without the BRACE should be during more sedentary periods, e g , Reading, watching TV  Continue to wear the BRACE when Sitting greater than than 45 degrees, standing, or walking until the weaning period is complete  Do not need to wear the BRACE while LYING

## 2022-10-06 ENCOUNTER — APPOINTMENT (OUTPATIENT)
Dept: RADIOLOGY | Facility: HOSPITAL | Age: 57
DRG: 201 | End: 2022-10-06
Payer: COMMERCIAL

## 2022-10-06 ENCOUNTER — HOSPITAL ENCOUNTER (INPATIENT)
Facility: HOSPITAL | Age: 57
LOS: 4 days | Discharge: LEFT BEFORE DISCHARGE | DRG: 201 | End: 2022-10-10
Attending: EMERGENCY MEDICINE | Admitting: INTERNAL MEDICINE
Payer: COMMERCIAL

## 2022-10-06 DIAGNOSIS — I48.91 ATRIAL FIBRILLATION (HCC): Primary | ICD-10-CM

## 2022-10-06 DIAGNOSIS — I48.92 ATRIAL FLUTTER WITH RAPID VENTRICULAR RESPONSE (HCC): ICD-10-CM

## 2022-10-06 DIAGNOSIS — E11.9 DIABETES (HCC): ICD-10-CM

## 2022-10-06 PROBLEM — I50.22 CHRONIC SYSTOLIC CONGESTIVE HEART FAILURE (HCC): Status: ACTIVE | Noted: 2022-05-19

## 2022-10-06 PROBLEM — I50.32 CHRONIC DIASTOLIC CONGESTIVE HEART FAILURE (HCC): Status: ACTIVE | Noted: 2022-05-19

## 2022-10-06 LAB
2HR DELTA HS TROPONIN: 1 NG/L
4HR DELTA HS TROPONIN: 1 NG/L
ALBUMIN SERPL BCP-MCNC: 3.6 G/DL (ref 3.5–5)
ALP SERPL-CCNC: 67 U/L (ref 46–116)
ALT SERPL W P-5'-P-CCNC: 23 U/L (ref 12–78)
ANION GAP SERPL CALCULATED.3IONS-SCNC: 5 MMOL/L (ref 4–13)
AST SERPL W P-5'-P-CCNC: 13 U/L (ref 5–45)
ATRIAL RATE: 145 BPM
ATRIAL RATE: 149 BPM
BASOPHILS # BLD AUTO: 0.02 THOUSANDS/ΜL (ref 0–0.1)
BASOPHILS NFR BLD AUTO: 0 % (ref 0–1)
BILIRUB SERPL-MCNC: 0.25 MG/DL (ref 0.2–1)
BUN SERPL-MCNC: 16 MG/DL (ref 5–25)
CALCIUM SERPL-MCNC: 10 MG/DL (ref 8.3–10.1)
CARDIAC TROPONIN I PNL SERPL HS: 8 NG/L
CARDIAC TROPONIN I PNL SERPL HS: 9 NG/L
CARDIAC TROPONIN I PNL SERPL HS: 9 NG/L
CHLORIDE SERPL-SCNC: 103 MMOL/L (ref 96–108)
CO2 SERPL-SCNC: 29 MMOL/L (ref 21–32)
CREAT SERPL-MCNC: 1.09 MG/DL (ref 0.6–1.3)
EOSINOPHIL # BLD AUTO: 0.08 THOUSAND/ΜL (ref 0–0.61)
EOSINOPHIL NFR BLD AUTO: 1 % (ref 0–6)
ERYTHROCYTE [DISTWIDTH] IN BLOOD BY AUTOMATED COUNT: 13.5 % (ref 11.6–15.1)
GFR SERPL CREATININE-BSD FRML MDRD: 76 ML/MIN/1.73SQ M
GLUCOSE SERPL-MCNC: 126 MG/DL (ref 65–140)
GLUCOSE SERPL-MCNC: 239 MG/DL (ref 65–140)
HCT VFR BLD AUTO: 49 % (ref 36.5–49.3)
HGB BLD-MCNC: 15.4 G/DL (ref 12–17)
IMM GRANULOCYTES # BLD AUTO: 0.01 THOUSAND/UL (ref 0–0.2)
IMM GRANULOCYTES NFR BLD AUTO: 0 % (ref 0–2)
LYMPHOCYTES # BLD AUTO: 1.63 THOUSANDS/ΜL (ref 0.6–4.47)
LYMPHOCYTES NFR BLD AUTO: 28 % (ref 14–44)
MAGNESIUM SERPL-MCNC: 2 MG/DL (ref 1.6–2.6)
MCH RBC QN AUTO: 26.6 PG (ref 26.8–34.3)
MCHC RBC AUTO-ENTMCNC: 31.4 G/DL (ref 31.4–37.4)
MCV RBC AUTO: 85 FL (ref 82–98)
MONOCYTES # BLD AUTO: 0.43 THOUSAND/ΜL (ref 0.17–1.22)
MONOCYTES NFR BLD AUTO: 8 % (ref 4–12)
NEUTROPHILS # BLD AUTO: 3.57 THOUSANDS/ΜL (ref 1.85–7.62)
NEUTS SEG NFR BLD AUTO: 63 % (ref 43–75)
NRBC BLD AUTO-RTO: 0 /100 WBCS
NT-PROBNP SERPL-MCNC: 850 PG/ML
P AXIS: 256 DEGREES
P AXIS: 262 DEGREES
PHOSPHATE SERPL-MCNC: 3.5 MG/DL (ref 2.7–4.5)
PLATELET # BLD AUTO: 220 THOUSANDS/UL (ref 149–390)
PMV BLD AUTO: 10 FL (ref 8.9–12.7)
POTASSIUM SERPL-SCNC: 4.3 MMOL/L (ref 3.5–5.3)
PR INTERVAL: 104 MS
PR INTERVAL: 168 MS
PROT SERPL-MCNC: 8.1 G/DL (ref 6.4–8.4)
QRS AXIS: -10 DEGREES
QRS AXIS: 9 DEGREES
QRSD INTERVAL: 170 MS
QRSD INTERVAL: 74 MS
QT INTERVAL: 266 MS
QT INTERVAL: 314 MS
QTC INTERVAL: 413 MS
QTC INTERVAL: 494 MS
RBC # BLD AUTO: 5.8 MILLION/UL (ref 3.88–5.62)
SODIUM SERPL-SCNC: 137 MMOL/L (ref 135–147)
T WAVE AXIS: -48 DEGREES
T WAVE AXIS: -78 DEGREES
TSH SERPL DL<=0.05 MIU/L-ACNC: 1.15 UIU/ML (ref 0.45–4.5)
VENTRICULAR RATE: 145 BPM
VENTRICULAR RATE: 149 BPM
WBC # BLD AUTO: 5.74 THOUSAND/UL (ref 4.31–10.16)

## 2022-10-06 PROCEDURE — 99285 EMERGENCY DEPT VISIT HI MDM: CPT

## 2022-10-06 PROCEDURE — 93010 ELECTROCARDIOGRAM REPORT: CPT | Performed by: INTERNAL MEDICINE

## 2022-10-06 PROCEDURE — 96376 TX/PRO/DX INJ SAME DRUG ADON: CPT

## 2022-10-06 PROCEDURE — 99223 1ST HOSP IP/OBS HIGH 75: CPT | Performed by: INTERNAL MEDICINE

## 2022-10-06 PROCEDURE — 82948 REAGENT STRIP/BLOOD GLUCOSE: CPT

## 2022-10-06 PROCEDURE — 80053 COMPREHEN METABOLIC PANEL: CPT | Performed by: EMERGENCY MEDICINE

## 2022-10-06 PROCEDURE — 99222 1ST HOSP IP/OBS MODERATE 55: CPT | Performed by: INTERNAL MEDICINE

## 2022-10-06 PROCEDURE — B24BZZ4 ULTRASONOGRAPHY OF HEART WITH AORTA, TRANSESOPHAGEAL: ICD-10-PCS | Performed by: INTERNAL MEDICINE

## 2022-10-06 PROCEDURE — 84484 ASSAY OF TROPONIN QUANT: CPT | Performed by: EMERGENCY MEDICINE

## 2022-10-06 PROCEDURE — 5A2204Z RESTORATION OF CARDIAC RHYTHM, SINGLE: ICD-10-PCS | Performed by: INTERNAL MEDICINE

## 2022-10-06 PROCEDURE — 83880 ASSAY OF NATRIURETIC PEPTIDE: CPT | Performed by: EMERGENCY MEDICINE

## 2022-10-06 PROCEDURE — 84100 ASSAY OF PHOSPHORUS: CPT | Performed by: EMERGENCY MEDICINE

## 2022-10-06 PROCEDURE — 93005 ELECTROCARDIOGRAM TRACING: CPT

## 2022-10-06 PROCEDURE — 36415 COLL VENOUS BLD VENIPUNCTURE: CPT | Performed by: EMERGENCY MEDICINE

## 2022-10-06 PROCEDURE — 85025 COMPLETE CBC W/AUTO DIFF WBC: CPT | Performed by: EMERGENCY MEDICINE

## 2022-10-06 PROCEDURE — 83735 ASSAY OF MAGNESIUM: CPT | Performed by: EMERGENCY MEDICINE

## 2022-10-06 PROCEDURE — 99291 CRITICAL CARE FIRST HOUR: CPT | Performed by: EMERGENCY MEDICINE

## 2022-10-06 PROCEDURE — 96365 THER/PROPH/DIAG IV INF INIT: CPT

## 2022-10-06 PROCEDURE — 71045 X-RAY EXAM CHEST 1 VIEW: CPT

## 2022-10-06 PROCEDURE — 84443 ASSAY THYROID STIM HORMONE: CPT | Performed by: EMERGENCY MEDICINE

## 2022-10-06 RX ORDER — METOPROLOL TARTRATE 50 MG/1
100 TABLET, FILM COATED ORAL DAILY
Status: DISCONTINUED | OUTPATIENT
Start: 2022-10-06 | End: 2022-10-06

## 2022-10-06 RX ORDER — ATORVASTATIN CALCIUM 20 MG/1
20 TABLET, FILM COATED ORAL
Status: DISCONTINUED | OUTPATIENT
Start: 2022-10-06 | End: 2022-10-10 | Stop reason: HOSPADM

## 2022-10-06 RX ORDER — GABAPENTIN 100 MG/1
100 CAPSULE ORAL 3 TIMES DAILY
Status: DISCONTINUED | OUTPATIENT
Start: 2022-10-06 | End: 2022-10-10 | Stop reason: HOSPADM

## 2022-10-06 RX ORDER — OXYCODONE HYDROCHLORIDE 10 MG/1
10 TABLET ORAL EVERY 4 HOURS PRN
Status: DISCONTINUED | OUTPATIENT
Start: 2022-10-06 | End: 2022-10-10 | Stop reason: HOSPADM

## 2022-10-06 RX ORDER — DULOXETIN HYDROCHLORIDE 30 MG/1
30 CAPSULE, DELAYED RELEASE ORAL
Status: DISCONTINUED | OUTPATIENT
Start: 2022-10-06 | End: 2022-10-10 | Stop reason: HOSPADM

## 2022-10-06 RX ORDER — METOPROLOL TARTRATE 50 MG/1
50 TABLET, FILM COATED ORAL 2 TIMES DAILY
Status: DISCONTINUED | OUTPATIENT
Start: 2022-10-07 | End: 2022-10-08

## 2022-10-06 RX ORDER — NICOTINE 21 MG/24HR
1 PATCH, TRANSDERMAL 24 HOURS TRANSDERMAL DAILY
Status: DISCONTINUED | OUTPATIENT
Start: 2022-10-07 | End: 2022-10-10 | Stop reason: HOSPADM

## 2022-10-06 RX ORDER — DILTIAZEM HYDROCHLORIDE 120 MG/1
240 CAPSULE, COATED, EXTENDED RELEASE ORAL DAILY
Status: DISCONTINUED | OUTPATIENT
Start: 2022-10-07 | End: 2022-10-10 | Stop reason: HOSPADM

## 2022-10-06 RX ORDER — DIGOXIN 0.25 MG/ML
250 INJECTION INTRAMUSCULAR; INTRAVENOUS ONCE
Status: COMPLETED | OUTPATIENT
Start: 2022-10-06 | End: 2022-10-06

## 2022-10-06 RX ORDER — OXYCODONE HYDROCHLORIDE 5 MG/1
5 TABLET ORAL EVERY 4 HOURS PRN
Status: DISCONTINUED | OUTPATIENT
Start: 2022-10-06 | End: 2022-10-10 | Stop reason: HOSPADM

## 2022-10-06 RX ORDER — SODIUM CHLORIDE 9 MG/ML
100 INJECTION, SOLUTION INTRAVENOUS CONTINUOUS
Status: DISCONTINUED | OUTPATIENT
Start: 2022-10-06 | End: 2022-10-08

## 2022-10-06 RX ORDER — INSULIN LISPRO 100 [IU]/ML
1-5 INJECTION, SOLUTION INTRAVENOUS; SUBCUTANEOUS
Status: DISCONTINUED | OUTPATIENT
Start: 2022-10-06 | End: 2022-10-10 | Stop reason: HOSPADM

## 2022-10-06 RX ORDER — DILTIAZEM HYDROCHLORIDE 5 MG/ML
15 INJECTION INTRAVENOUS ONCE
Status: COMPLETED | OUTPATIENT
Start: 2022-10-06 | End: 2022-10-06

## 2022-10-06 RX ORDER — ASPIRIN 81 MG/1
81 TABLET ORAL DAILY
Status: DISCONTINUED | OUTPATIENT
Start: 2022-10-07 | End: 2022-10-10 | Stop reason: HOSPADM

## 2022-10-06 RX ORDER — INSULIN GLARGINE 100 [IU]/ML
35 INJECTION, SOLUTION SUBCUTANEOUS
Status: DISCONTINUED | OUTPATIENT
Start: 2022-10-06 | End: 2022-10-10 | Stop reason: HOSPADM

## 2022-10-06 RX ORDER — ALBUTEROL SULFATE 90 UG/1
2 AEROSOL, METERED RESPIRATORY (INHALATION) EVERY 4 HOURS PRN
Status: DISCONTINUED | OUTPATIENT
Start: 2022-10-06 | End: 2022-10-10 | Stop reason: HOSPADM

## 2022-10-06 RX ADMIN — DILTIAZEM HYDROCHLORIDE 15 MG: 5 INJECTION INTRAVENOUS at 14:34

## 2022-10-06 RX ADMIN — ATORVASTATIN CALCIUM 20 MG: 20 TABLET, FILM COATED ORAL at 18:46

## 2022-10-06 RX ADMIN — OXYCODONE HYDROCHLORIDE 10 MG: 10 TABLET ORAL at 22:07

## 2022-10-06 RX ADMIN — DILTIAZEM HYDROCHLORIDE 5 MG/HR: 5 INJECTION, SOLUTION INTRAVENOUS at 14:48

## 2022-10-06 RX ADMIN — DILTIAZEM HYDROCHLORIDE 15 MG/HR: 5 INJECTION, SOLUTION INTRAVENOUS at 22:24

## 2022-10-06 RX ADMIN — GABAPENTIN 100 MG: 100 CAPSULE ORAL at 21:54

## 2022-10-06 RX ADMIN — SODIUM CHLORIDE 100 ML/HR: 0.9 INJECTION, SOLUTION INTRAVENOUS at 18:50

## 2022-10-06 RX ADMIN — INSULIN GLARGINE 35 UNITS: 100 INJECTION, SOLUTION SUBCUTANEOUS at 21:54

## 2022-10-06 RX ADMIN — DULOXETINE HYDROCHLORIDE 30 MG: 30 CAPSULE, DELAYED RELEASE ORAL at 21:54

## 2022-10-06 RX ADMIN — SODIUM CHLORIDE 1000 ML: 0.9 INJECTION, SOLUTION INTRAVENOUS at 16:22

## 2022-10-06 RX ADMIN — DIGOXIN 250 MCG: 0.25 INJECTION INTRAMUSCULAR; INTRAVENOUS at 16:34

## 2022-10-06 NOTE — CONSULTS
ENCOUNTER DATE: 10/06/22 6:52 PM  PATIENT NAME: Ravi Cheney    1965    [id number]  Age: 55 y o  Sex: male  [place] PROVIDER & AUTHOR: Radha Ford  INPATIENT ATTENDING PHYSICIAN: Sumaya Patel*; PRIMARYCARE PHYSICIAN: Allie Kramer MD  DATE OF ADMISSION: 10/6/2022  2:22 PM; LENGTH OF STAY: 0 days  *-*-*-*-*-*-*-*-*-*-*-*-*-*-*-*-*-*-*-*-*-*-*-*-*-*-*-*-*-*-*-*-*-*-*-*-*-*-*-*-*-*-*-*-*-*-*-*-*-*-*-*-*-*-   REASON FOR CONSULTATION:   Atrial flutter with Two-to-one conduction with rapid heart rate  *-*-*-*-*-*-*-*-*-*-*-*-*-*-*-*-*-*-*-*-*-*-*-*-*-*-*-*-*-*-*-*-*-*-*-*-*-*-*-*-*-*-*-*-*-*-*-*-*-*-*-*-*-*-  CARDIAC ASSESSMENT:     1  Atrial flutter with two-to-one conduction with rapid ventricular response  2  Primary hypertension  3  History of paroxysmal atrial fibrillation  4  Dyslipidemia  5  Diabetes mellitus with were glycemic control  Hemoglobin A1c is 9 4  6  Obesity  7  Tobacco dependence  8  Suspected obstructive sleep apnea  9   COPD and asthma     Patient Active Problem List   Diagnosis   • Shoulder pain   • Type 2 diabetes mellitus with hyperglycemia, with long-term current use of insulin ([facility])   • Essential hypertension   • Numbness of left hand   • Other male erectile dysfunction   • Traumatic amputation of finger   • Varicose veins of bilateral lower extremities with other complications   • Tobacco dependence   • Nocturia   • Other hyperlipidemia   • Medial epicondylitis of elbow, left   • Snoring   • Ulnar neuropathy at elbow, left   • Left carpal tunnel syndrome   • Relationship dysfunction   • Blurred vision, right eye   • COPD without exacerbation ([facility])   • Mild persistent asthma   • Numbness and tingling in left hand   • Atrial fibrillation with RVR (HCC)   • Chronic midline thoracic back pain   • Bilateral chronic knee pain   • Ventral hernia without obstruction or gangrene   • Primary insomnia   • Atrial flutter with rapid ventricular response ([address] )   • Sepsis ([facility] [address] )   • Legally blind in right eye, as defined in [place]   • Chronic diastolic congestive heart failure ([facility] )   • Paroxysmal atrial fibrillation ([facility] [address] [facility] )   • Closed fracture of ninth thoracic vertebra (HCC)   • Fall   • DISH (diffuse idiopathic skeletal hyperostosis)        Unclear when patient developed atrial fibrillation as he is asymptomatic from it  During his recent visit with Neurosurgery for his back no vital signs were reported  Last vital signs from July during primary care visit showed heart rate of 101 beats per minute  On clinical examination he does not appear to be in volume overloaded state  His thyroid function is normal   Blood pressure is borderline  He is currently on Cardizem drip  He has received digoxin 250 mcg x 1  He is scheduled to get metoprolol and rivaroxaban  CARDIAC PLAN:     -- agree with current medical regimen except that we should change metoprolol tartrate to 50 mg twice daily with hold parameters for systolic blood pressure 949 mmHg  If he does not need this parameters will change metoprolol tartrate to 25 mg q 6 hours with hold for systolic blood pressure 95 mmHg  -- a the patient NPO after midnight except for medications with sips of water  -- patient will be assessed by cardiology team in the morning to see the feasibility of LAURENCE guided cardioversion tomorrow although it looks unlikely given the scheduled  WILL NEED CLOSE MONITORING AND CONTINUED WEARING OF BACK BRACE DURING LAURENCE & CVN procedure  -- Reinforced with patient importance of medical follow-up  He will benefit with EP study and ablation for his atrial flutter  Will need outpatient appointment set up with our electrophysiology colleagues  -- will also need outpatient evaluation for obstructive sleep apnea although patient denies excessive snoring        *-*-*-*-*-*-*-*-*-*-*-*-*-*-*-*-*-*-*-*-*-*-*-*-*-*-*-*-*-*-*-*-*-*-*-*-*-*-*-*-*-*-*-*-*-*-*-*-*-*-*-*-*-*-  HISTORY OF PRESENT ILLNESS     Patient is a 71-year-old gentleman with medical history significant for:   1  Paroxysmal atrial fibrillation and atrial flutter with prior history of spontaneous conversion,, on antiarrhythmic therapy with Xarelto  2  COPD  3  Diabetes mellitus, on insulin therapy  4  Primary hypertension  5  Chronic tobacco dependence, now smoking intermittently  6  History of work related injury leading to closed fracture of night thoracic vertebra, July 2022 with lumbar radiculopathy, currently in TLSO (thoracolumbar sacral orthosis) brace  7  History of legal blindness in right eye  8  Mild persistent asthma  9  Peripheral venous disease with varicosities  10  History of cholecystectomy  11  History of finger amputation  12  History of umbilical hernia repair, history of ventral hernia     The patient was undergoing home physical therapy/occupational therapy evaluation by visiting nurse today and was noted to be tachycardic with heart rate in 140s to 150s  Patient did not report symptoms  Given the tachycardia patient advised that patient be evaluated in the ER and patient was brought in by EMS  He was noted to have heart rate in 150s upon arrival, his blood pressure was stable  He has ECG was consistent with atrial flutter with rapid ventricular response  Patient denies feeling any palpitations or chest pain  He does have shortness of breath upon activity which is chronic and which he relates to his back injury in July  Denies orthopnea PND or pedal edema  Denies any headaches or visual changes  He has known history of paroxysmal atrial fibrillation and atrial flutter  Most recently he was noted to be in flutter in May 2022  He was supposed to undergo LAURENCE guided cardioversion but converted spontaneously to sinus rhythm overnight  He has been on chronic anticoagulation with Xarelto  Patient reports that he takes all his medications consistently but is not sure of the pills    He was supposed to follow with Cardiology in the outpatient setting but reports that he was unable to do so due to lack of transportation and financial resources  He has been a chronic smoker for several years and was smoking heavily but more recently he reports that he is now smoking intermittently only  He denies alcohol use  He has caffeine about 2 cups a day  Denies any marijuana use or other drug use  He has no previous history of coronary artery disease or congestive heart failure or TIA/CVA  He does not remember his family history  He used to be a  but has not been working since his injury in July  He is still trying to apply for disability  Currently he lives with his fiancee  He does have an estranged wife      *-*-*-*-*-*-*-*-*-*-*-*-*-*-*-*-*-*-*-*-*-*-*-*-*-*-*-*-*-*-*-*-*-*-*-*-*-*-*-*-*-*-*-*-*-*-*-*-*-*-*-*-*-*  PAST MEDICAL HISTORY:     Past Medical History:   Diagnosis Date   • Asthma    • Carpal tunnel syndrome    • Chronic pain     left arm   • Diabetes mellitus (HCC)    • Erectile dysfunction    • Hypertension    • Thoracic vertebral fracture (HCC)    • Umbilical hernia     PAST SURGICAL HISTORY     Past Surgical History:   Procedure Laterality Date   • CHOLECYSTECTOMY     • CHOLECYSTECTOMY LAPAROSCOPIC N/A 6/20/2019    Procedure: CHOLECYSTECTOMY LAPAROSCOPIC;  Surgeon: Phil Vuong MD;  Location: [facility] MAIN OR;  Service: General   • 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   • HAND SURGERY     • UMBILICAL HERNIA REPAIR            FAMILY HISTORY     Family History   Problem Relation Age of Onset   • Breast cancer additional onset Mother    • Diabetes Father    • Hyperlipidemia Father    • Hypertension Father    • Seizures Father    • No Known Problems Son    • No Known Problems Son    • Vitiligo Son      SOCIAL HISTORY     Social History     Tobacco Use   Smoking Status Current Some Day Smoker   • Packs/day: 0 10   • Types: Cigarettes   Smokeless Tobacco Never Used      Social History     Substance and Sexual Activity   Alcohol Use Yes    Comment: occassional, social, rare     Social History     Substance and Sexual Activity   Drug Use Never    [unfilled]     *-*-*-*-*-*-*-*-*-*-*-*-*-*-*-*-*-*-*-*-*-*-*-*-*-*-*-*-*-*-*-*-*-*-*-*-*-*-*-*-*-*-*-*-*-*-*-*-*-*-*-*-*-*  ALLERGIES     No Known Allergies  CURRENT SCHEDULED MEDICATIONS       Current Facility-Administered Medications:   •  albuterol (PROVENTIL HFA,VENTOLIN HFA) inhaler 2 puff, 2 puff, Inhalation, Q4H PRN, Flavia Granda MD  •  [START ON 10/7/2022] aspirin (ECOTRIN LOW STRENGTH) EC tablet 81 mg, 81 mg, Oral, Daily, Flavia Granda MD  •  atorvastatin (LIPITOR) tablet 20 mg, 20 mg, Oral, Daily With Gena Diaz MD, 20 mg at 10/06/22 1846  •  [START ON 10/7/2022] diltiazem (CARDIZEM CD) 24 hr capsule 240 mg, 240 mg, Oral, Daily, Flavia Granda MD  •  diltiazem (CARDIZEM) 125 mg in sodium chloride 0 9 % 125 mL infusion, 1-15 mg/hr, Intravenous, Titrated, Flavia Granda MD  •  DULoxetine (CYMBALTA) delayed release capsule 30 mg, 30 mg, Oral, HS, Flavia Granda MD  •  [START ON 10/7/2022] fluticasone-vilanterol (BREO ELLIPTA) 100-25 mcg/inh inhaler 1 puff, 1 puff, Inhalation, Daily, Flavia Granda MD  •  gabapentin (NEURONTIN) capsule 100 mg, 100 mg, Oral, TID, Flavia Granda MD  •  insulin glargine (LANTUS) subcutaneous injection 35 Units 0 35 mL, 35 Units, Subcutaneous, HS, Flavia Granda MD  •  insulin lispro (HumaLOG) 100 units/mL subcutaneous injection 1-5 Units, 1-5 Units, Subcutaneous, TID AC **AND** [START ON 10/7/2022] Fingerstick Glucose (POCT), , , TID AC, Flavia Granda MD  •  metoprolol tartrate (LOPRESSOR) tablet 100 mg, 100 mg, Oral, Daily, Flavia Granda MD  •  [START ON 10/7/2022] nicotine (NICODERM CQ) 14 mg/24hr TD 24 hr patch 1 patch, 1 patch, Transdermal, Daily, Frida Haney, MD  •  [START ON 10/7/2022] rivaroxaban (XARELTO) tablet 20 mg, 20 mg, Oral, Daily With Breakfast, Gricelda Flores MD  •  sodium chloride 0 9 % infusion, 100 mL/hr, Intravenous, Continuous, Gricelda Flores MD, Last Rate: 100 mL/hr at 10/06/22 1850, 100 mL/hr at 10/06/22 1850    Current Outpatient Medications:   •  acetaminophen (TYLENOL) 325 mg tablet, Take 2 tablets (650 mg total) by mouth every 6 (six) hours as needed for mild pain (Patient not taking: Reported on 10/3/2022), Disp: , Rfl: 0  •  albuterol (PROVENTIL HFA,VENTOLIN HFA) 90 mcg/act inhaler, INHALE 2 PUFFS EVERY 4 (FOUR) HOURS AS NEEDED FOR WHEEZING, Disp: 18 g, Rfl: 2  •  Alcohol Swabs (ALCOHOL PREP) 70 % PADS, daily Test (Patient not taking: Reported on 9/3/2021), Disp: , Rfl: 5  •  Alcohol Swabs (Alcohol Prep) PADS, TEST DAILY (Patient not taking: Reported on 9/3/2021), Disp: , Rfl:   •  aspirin (ECOTRIN LOW STRENGTH) 81 mg EC tablet, Take 81 mg by mouth daily, Disp: , Rfl:   •  atorvastatin (Lipitor) 20 mg tablet, Take 1 tablet (20 mg total) by mouth daily, Disp: 90 tablet, Rfl: 3  •  BD Pen Needle Carolyn U/F 32G X 4 MM MISC, USE AS DIRECTED TO INJECT INSULIN, Disp: 100 each, Rfl: 0  •  Blood Pressure Monitoring ([place] BP Monitor/Arm) JM, USE AS DIRECTED, Disp: 1 each, Rfl: 0  •  budesonide (Pulmicort Flexhaler) 90 MCG/ACT inhaler, Inhale 1 puff 2 (two) times a day Rinse mouth after use , Disp: 1 each, Rfl: 5  •  clotrimazole-betamethasone (LOTRISONE) 1-0 05 % cream, Apply topically 2 (two) times a day (Patient not taking: No sig reported), Disp: 30 g, Rfl: 0  •  Contour Next Test test strip, USE 1 EACH 3 (THREE) TIMES A DAY, Disp: 100 each, Rfl: 10  •  cyclobenzaprine (FLEXERIL) 5 mg tablet, Take 1 tablet (5 mg total) by mouth 3 (three) times a day as needed for muscle spasms (Patient not taking: No sig reported), Disp: 45 tablet, Rfl: 0  •  diltiazem (CARDIZEM CD) 120 mg 24 hr capsule, Take 1 capsule (120 mg total) by mouth in the morning  (Patient taking differently: Take 240 mg by mouth daily), Disp: 30 capsule, Rfl: 0  •  DULoxetine (CYMBALTA) 30 mg delayed release capsule, TAKE 1 CAPSULE (30 MG TOTAL) BY MOUTH DAILY AT BEDTIME, Disp: 90 capsule, Rfl: 0  •  fluticasone-vilanterol (Breo Ellipta) 100-25 mcg/inh inhaler, Inhale 1 puff daily Rinse mouth after use  (Patient not taking: No sig reported), Disp: 60 each, Rfl: 5  •  gabapentin (NEURONTIN) 100 mg capsule, Take 1 capsule (100 mg total) by mouth 3 (three) times a day (Patient not taking: No sig reported), Disp: 90 capsule, Rfl: 2  •  Humidifier MISC, USE AS DIRECTED, Disp: 1 each, Rfl: 0  •  Insulin Glargine Solostar (Lantus SoloStar) 100 UNIT/ML SOPN, Inject 35 Units under the skin daily at bedtime, Disp: 15 mL, Rfl: 5  •  Jardiance 25 MG TABS, TAKE 1 TABLET (25 MG TOTAL) BY MOUTH EVERY MORNING, Disp: 90 tablet, Rfl: 0  •  Lancets MISC, Use to test blood glucose once a day  Dx: Type 2 DM  E11 9, Disp: , Rfl:   •  lisinopril (ZESTRIL) 10 mg tablet, Take 1 tablet (10 mg total) by mouth daily, Disp: 90 tablet, Rfl: 0  •  Melatonin 5 MG TABS, Take 1 tablet (5 mg total) by mouth daily at bedtime (Patient not taking: Reported on 10/3/2022), Disp: 30 tablet, Rfl: 5  •  metFORMIN (GLUCOPHAGE) 1000 MG tablet, Take 1 tablet (1,000 mg total) by mouth 2 (two) times a day with meals, Disp: 180 tablet, Rfl: 3  •  methocarbamol (ROBAXIN) 500 mg tablet, Take 1 tablet (500 mg total) by mouth every 6 (six) hours as needed for muscle spasms (Patient not taking: No sig reported), Disp: 42 tablet, Rfl: 0  •  metoprolol tartrate (LOPRESSOR) 100 mg tablet, Take 1 tablet (100 mg total) by mouth in the morning (Patient not taking: No sig reported), Disp: 30 tablet, Rfl: 0  •  Misc   Devices MISC, by Does not apply route daily (Patient not taking: Reported on 9/3/2021), Disp: 1 each, Rfl: 0  •  oxyCODONE (ROXICODONE) 10 MG TABS, You may take 5 mg (0 5 tab) for moderate pain or 10 mg (1 tab) for severe pain, every 4 hours, as needed  (Patient not taking: No sig reported), Disp: 30 tablet, Rfl: 0  •  oxyCODONE (Roxicodone) 5 immediate release tablet, Take 1 tablet (5 mg total) by mouth every 4 (four) hours as needed for moderate pain Ongoing therapy Max Daily Amount: 30 mg (Patient not taking: No sig reported), Disp: 20 tablet, Rfl: 0  •  rivaroxaban (XARELTO) 20 mg tablet, Take 1 tablet (20 mg total) by mouth daily with breakfast, Disp: 90 tablet, Rfl: 3  •  senna-docusate sodium (SENOKOT S) 8 6-50 mg per tablet, Take 2 tablets by mouth daily (Patient not taking: No sig reported), Disp: 28 tablet, Rfl: 0  •  Tadalafil, PAH, 20 MG TABS, Take 1 tablet (20 mg total) by mouth as needed (erectile dysfunction), Disp: 30 tablet, Rfl: 5  •  triamcinolone (KENALOG) 0 5 % cream, APPLY TOPICALLY 3 (THREE) TIMES A DAY, Disp: 30 g, Rfl: 0  •  TRUEplus Lancets 33G MISC, USE AS INSTRUCTED, Disp: 100 each, Rfl: 3     *-*-*-*-*-*-*-*-*-*-*-*-*-*-*-*-*-*-*-*-*-*-*-*-*-*-*-*-*-*-*-*-*-*-*-*-*-*-*-*-*-*-*-*-*-*-*-*-*-*-*-*-*-*   REVIEW OF SYSTEMS     Positive for:  As noted above in HPI  Negative for: All remaining as reviewed below and in HPI   SYSTEM SYMPTOMS REVIEWED:  General--weight change, fever, night sweats  Respiratoryl-- Wheezing, shortness of breath, cough, URI symptoms, sputum, blood  Cardiovascular--chest pain, syncope, dyspnea on exertion, edema, decline in exercise tolerance, claudication   Gastrointestinal--persistent vomiting, diarrhea, abdominal distention, blood in stool   Muscular or skeletal--joint pain or swelling   Neurologic--headaches, syncope, abnormal movement  Hematologic--history of easy bruising and bleeding   Endocrine--thyroid enlargement, heat or cold intolerance, polyuria   Psychiatric--anxiety, depression      *-*-*-*-*-*-*-*-*-*-*-*-*-*-*-*-*-*-*-*-*-*-*-*-*-*-*-*-*-*-*-*-*-*-*-*-*-*-*-*-*-*-*-*-*-*-*-*-*-*-*-*-*-*-   VITAL SIGNS       Vitals:    10/06/22 1720 10/06/22 1735 10/06/22 1846 10/06/22 185   BP: 92/56 99/55 102/72 103/82   BP Location: Left arm Left arm  Left arm   Pulse: (!) 143 (!) 144 (!) 145 (!) 144   Resp:    Temp:       TempSrc:       SpO2: 95% 95%  96%   Weight:         TEMPERATURE HISTORY 24H: Temp (24hrs), Av 1 °F (36 7 °C), Min:98 1 °F (36 7 °C), Max:98 1 °F (36 7 °C)     BLOOD PRESSURE HISTORY: Systolic (58ZQM), DAY:487 , Min:88 , ZGX:334    Diastolic (92GVL), DFK:87, Min:55, Max:82      Weight    10/06/22 1428   Weight: (!) 172 kg (379 lb 10 1 oz)      Body mass index is 51 49 kg/m²  Wt Readings from Last 10 Encounters:   10/06/22 (!) 172 kg (379 lb 10 1 oz)   22 130 kg (286 lb)   22 118 kg (260 lb)   22 118 kg (260 lb 2 3 oz)   22 121 kg (266 lb)   22 129 kg (284 lb)   22 124 kg (272 lb 9 6 oz)   22 122 kg (268 lb)   10/08/21 123 kg (271 lb)   10/01/21 124 kg (273 lb 2 4 oz)      Intake/Output Summary (Last 24 hours) at 10/6/2022 185  Last data filed at 10/6/2022 1744  Gross per 24 hour   Intake 1000 ml   Output --   Net 1000 ml        *-*-*-*-*-*-*-*-*-*-*-*-*-*-*-*-*-*-*-*-*-*-*-*-*-*-*-*-*-*-*-*-*-*-*-*-*-*-*-*-*-*-*-*-*-*-*-*-*-*-*-*-*-*-   PHYSICAL EXAMINATION:     General Appearance:    Alert, cooperative, no distress, appears stated age , slightly obese   Head, Eyes, ENT:    No obvious abnormality, moist mucous mebranes  Neck:   Supple, no carotid bruit or JVD   Back:     is wearing TLSO brace   Lungs:     Respirations unlabored  unable to examine  Chest wall:    No tenderness or deformity   Heart:     tachycardic, regular rate and rhythm, unable to appreciate murmur   Abdomen:     Soft, non-tender,    Extremities:    trace pedal edema   Skin:   Skin color, texture, turgor normal, no rashes or lesions     *-*-*-*-*-*-*-*-*-*-*-*-*-*-*-*-*-*-*-*-*-*-*-*-*-*-*-*-*-*-*-*-*-*-*-*-*-*-*-*-*-*-*-*-*-*-*-*-*-*-*-*-*-*-   LABORATORY DATA:   I have personally reviewed pertinent labs      CMP:  Results from last 7 days   Lab Units 10/06/22  1436   SODIUM mmol/L 137   POTASSIUM mmol/L 4 3   CHLORIDE mmol/L 103   CO2 mmol/L 29   BUN mg/dL 16   CREATININE mg/dL 1 09   CALCIUM mg/dL 10 0   ALK PHOS U/L 67   ALT U/L 23   AST U/L 13        Results from last 7 days   Lab Units 10/06/22  1436   MAGNESIUM mg/dL 2 0     Results from last 7 days   Lab Units 10/06/22  1436   PHOSPHORUS mg/dL 3 5    Cardiac Profile:   Results from last 7 days   Lab Units 10/06/22  1633 10/06/22  1436   HS TNI 0HR ng/L  --  8   HS TNI 2HR ng/L 9  --    NT-PRO BNP pg/mL  --  850*             Results from last 7 days   Lab Units 10/06/22  1436   TSH 3RD GENERATON uIU/mL 1 148        Blood Gas Analysis:             CBC:   Results from last 7 days   Lab Units 10/06/22  1436   WBC Thousand/uL 5 74   HEMOGLOBIN g/dL 15 4   HEMATOCRIT % 49 0   PLATELETS Thousands/uL 220     PT/INR: No results found for: PT, INR, Microbiology:            *-*-*-*-*-*-*-*-*-*-*-*-*-*-*-*-*-*-*-*-*-*-*-*-*-*-*-*-*-*-*-*-*-*-*-*-*-*-*-*-*-*-*-*-*-*-*-*-*-*-*-*-*-*-  TELEMETRY, LAST ECG:  Telemetry reviewed    Atrial flutter with two-to-one conduction  No ventricular ectopy  Results for orders placed or performed during the hospital encounter of 10/06/22   ECG 12 lead   Result Value    Ventricular Rate 145    Atrial Rate 145    [place] Interval 168    QRSD Interval 74    QT Interval 266    QTC Interval 413    P Axis 256    QRS Axis 9    T Wave Axis -48    Narrative      Atrial flutter with two-to-one conduction, rapid ventricular response  Marked ST abnormality, possible inferior subendocardial injury  Abnormal ECG  When compared with ECG of 06-OCT-2022 14:30,  ST now depressed in Inferior leads  ST now depressed in Anterolateral leads  Confirmed by Radha Ford ([id number]) on 10/6/2022 4:52:49 PM        *-*-*-*-*-*-*-*-*-*-*-*-*-*-*-*-*-*-*-*-*-*-*-*-*-*-*-*-*-*-*-*-*-*-*-*-*-*-*-*-*-*-*-*-*-*-*-*-*-*-*-*-*-*-  IMAGING STUDIES REPORTS: Imaging studies results reviewed      No valid procedures specified  No Chest XR results available for this patient  *-*-*-*-*-*-*-*-*-*-*-*-*-*-*-*-*-*-*-*-*-*-*-*-*-*-*-*-*-*-*-*-*-*-*-*-*-*-*-*-*-*-*-*-*-*-*-*-*-*-*-*-*-*-  AVAILABLE OLD CARDIAC TESTS REPORTS:   Results for orders placed during the hospital encounter of 20    Echo complete with contrast if indicated    Narrative  [id number]  [address]    Transthoracic Echocardiogram  2D, M-mode, Doppler, and Color Doppler    Study date:  15-Grzegorz-2020    Patient: Phoebe Salcido  MR number: [id number]  Account number: [de-identified]  : 1965  Age: 49 years  Gender: Male  Status: Outpatient  [facility]  Height: 70 in  Weight: 265 5 lb  BP: 146/ 78 mmHg    Indications: Dyspnea    Interpreting Physician:  Pritesh Hernandez MD    IMPRESSIONS:  Technical quality: Good  Cardiac rhythm: Normal sinus    1  Normal left ventricular size and systolic and diastolic function, EF around 61%  2  Mild biatrial enlargement  3  Aortic valve sclerosis, no aortic valve stenosis or regurgitation  4  Trace mitral and tricuspid valve regurgitation  5  No obvious pulmonary hypertension  6  No pericardial effusion  No previous echocardiogram is available for comparison  SUMMARY    LEFT VENTRICLE:  Normal left ventricular cavity size, normal wall thickness, normal left ventricle systolic function and wall motion  Ejection fraction is estimated as around 61%  Doppler evaluation suggests normal diastolic function  RIGHT VENTRICLE:  Normal right ventricular size and systolic function  Normal estimated right ventricular systolic pressure, 29 mmHg  LEFT ATRIUM:  Mild left atrial cavity enlargement  RIGHT ATRIUM:  Mild right atrial cavity enlargement  MITRAL VALVE:  Mild mitral valve sclerosis, adequate leaflet mobility  Trace mitral valve regurgitation  AORTIC VALVE:  Tricuspid aortic valve with mild sclerosis   No aortic valve stenosis or regurgitation  TRICUSPID VALVE:  Trace tricuspid valve regurgitation  PULMONIC VALVE:  No significant pulmonic valve regurgitation  AORTA:  Aortic root and proximal ascending aorta are normal in size on 2D imaging  IVC, HEPATIC VEINS:  Inferior vena cava is normal in size and demonstrates appropriate respiratory phasic changes in diameter  PERICARDIUM:  No pericardial effusion  HISTORY: PRIOR HISTORY: Risk factors: hypertension, diabetes, morbid obesity, and a history of current cigarette use (within the last month)  PROCEDURE: The study was performed in the [place] [id number]  This was a routine study  The transthoracic approach was used  The study included complete 2D imaging, M-mode, complete spectral Doppler, and color Doppler  The heart rate was 74  bpm, at the start of the study  Image quality was adequate  LEFT VENTRICLE: Normal left ventricular cavity size, normal wall thickness, normal left ventricle systolic function and wall motion  Ejection fraction is estimated as around 61%  Doppler evaluation suggests normal diastolic function  RIGHT VENTRICLE: Normal right ventricular size and systolic function  Normal estimated right ventricular systolic pressure, 29 mmHg  LEFT ATRIUM: Mild left atrial cavity enlargement  RIGHT ATRIUM: Mild right atrial cavity enlargement  MITRAL VALVE: Mild mitral valve sclerosis, adequate leaflet mobility  Trace mitral valve regurgitation  AORTIC VALVE: Tricuspid aortic valve with mild sclerosis  No aortic valve stenosis or regurgitation  TRICUSPID VALVE: Trace tricuspid valve regurgitation  PULMONIC VALVE: No significant pulmonic valve regurgitation  PERICARDIUM: No pericardial effusion  AORTA: Aortic root and proximal ascending aorta are normal in size on 2D imaging  SYSTEMIC VEINS: IVC: Inferior vena cava is normal in size and demonstrates appropriate respiratory phasic changes in diameter      SYSTEM MEASUREMENT TABLES    2D  %FS: 33 33 %  Ao Diam: 2 58 cm  EDV(Teich): 141 21 ml  EF(Teich): 61 48 %  ESV(Teich): 54 39 ml  IVSd: 0 97 cm  LA Area: 24 07 cm2  LA Diam: 4 35 cm  LVEDV MOD A4C: 151 66 ml  LVEF MOD A4C: 60 89 %  LVESV MOD A4C: 59 32 ml  LVIDd: 5 4 cm  LVIDs: 3 6 cm  LVLd A4C: 9 16 cm  LVLs A4C: 7 22 cm  LVPWd: 1 06 cm  RA Area: 24 95 cm2  RVIDd: 3 33 cm  SV MOD A4C: 92 34 ml  SV(Teich): 86 81 ml    CW  TR Vmax: 2 23 m/s  TR maxP 97 mmHg    MM  TAPSE: 3 04 cm    PW  E': 0 11 m/s  E/E': 8 89  MV A Cristian: 0 92 m/s  MV Dec [place]: 5 1 m/s2  MV DecT: 198 53 ms  MV E Cristian: 1 01 m/s  MV E/A Ratio: 1 1  MV PHT: 57 57 ms  MVA By PHT: 3 82 cm2    [facility] Accredited [facility]    Prepared and electronically signed by    Latrice Springer MD  Signed 15-Grzegorz-2020 12:10:23    No results found for this or any previous visit  No results found for this or any previous visit  No results found for this or any previous visit  *-*-*-*-*-*-*-*-*-*-*-*-*-*-*-*-*-*-*-*-*-*-*-*-*-*-*-*-*-*-*-*-*-*-*-*-*-*-*-*-*-*-*-*-*-*-*-*-*-*-*-*-*-*-            *-*-*-*-*-*-*-*-*-*-*-*-*-*-*-*-*-*-*-*-*-*-*-*-*-*-*-*-*-*-*-*-*-*-*-*-*-*-*-*-*-*-*-*-*-*-*-*-*-*-*-*-*-*-  SIGNATURES:   @UKU@   Radha Ford     CC:   Alex Santiago MD   No ref   provider found

## 2022-10-06 NOTE — ED NOTES
Gadiel texted Lisa Melendez regarding patient status, BP, medication titration       Jose Penn RN  10/06/22 5224

## 2022-10-06 NOTE — ASSESSMENT & PLAN NOTE
Lab Results   Component Value Date    HGBA1C 9 4 (H) 07/08/2022     Hold oral hypoglycemic agents    Continue Lantus 35 units at bedtime  Add sliding scale insulin

## 2022-10-06 NOTE — ASSESSMENT & PLAN NOTE
· Rapid atrial flutter, unclear trigger for acute episode  · Continue Cardizem drip and restart oral Cardizem and metoprolol as long as blood pressure can tolerate  · Stat digoxin 250 mcg x1 now  · Repeat dose if still uncontrolled in 1 hour  · Consult Cardiology  · Fully anticoagulated on Xarelto

## 2022-10-06 NOTE — H&P
[address]   1965, 55 y o  male MRN: [id number]  Unit/Bed#: JHAT23 Encounter: [id number]  Primary Care Provider: Andry Andrade MD   Date and time admitted to hospital: 10/6/2022  2:22 PM    Atrial flutter with rapid ventricular response [facility])  Assessment & Plan  · Rapid atrial flutter, unclear trigger for acute episode  · Continue Cardizem drip and restart oral Cardizem and metoprolol as long as blood pressure can tolerate  · Stat digoxin 250 mcg x1 now  · Repeat dose if still uncontrolled in 1 hour  · Consult Cardiology  · Fully anticoagulated on Xarelto  COPD without exacerbation ([facility] [address] )  Assessment & Plan  · Stable  Continue Breo 1 puff daily with p r n  Albuterol  Essential hypertension  Assessment & Plan  Blood pressure is borderline low  With hold lisinopril for now while he is on a Cardizem drip  Continue IV fluids to maintain normotension    Type 2 diabetes mellitus with hyperglycemia, with long-term current use of insulin [facility])  Assessment & Plan  Lab Results   Component Value Date    HGBA1C 9 4 (H) 07/08/2022     Hold oral hypoglycemic agents  Continue Lantus 35 units at bedtime  Add sliding scale insulin        VTE Prophylaxis: Rivaroxaban (Xarelto)  / sequential compression device   Code Status:  Full code  POLST: There is no POLST form on file for this patient (pre-hospital)    Anticipated Length of Stay:  Patient will be admitted on an Inpatient basis with an anticipated length of stay of  at least 2 midnights  Justification for Hospital Stay:  A flutter with RVR    Total Time for Visit, including Counseling / Coordination of Care: 45 minutes  Greater than 50% of this total time spent on direct patient counseling and coordination of care  Chief Complaint:   Fast heart rate    History of Present Illness:    Rafa Puga  is a 55 y o  male who presents with fast heart rate    He was about to undergo home physical therapy today when he was found to have a very fast heart rate  He denied any symptoms such as chest pain, palpitations, lightheadedness, shortness of breath, abdominal pain, weakness, fever or chills  When he arrived in the emergency department and he was noted to atrial flutter with rapid ventricular rate  With he was placed on Cardizem drip and was still tachycardic  He was referred to Internal Medicine for admission  At the time of my examination he was laying on the gurney and still had no symptoms  He denied any recent stressors  He denied abusing alcohol  He has been undergoing physical therapy at home due to back pain from previous back injury  Review of Systems:    Review of Systems   Constitutional: Negative  HENT: Negative  Eyes: Negative  Respiratory: Negative  Cardiovascular: Negative  Gastrointestinal: Negative  Genitourinary: Negative  Musculoskeletal: Positive for back pain  Skin: Negative  Neurological: Negative  Psychiatric/Behavioral: Negative  All other systems reviewed and are negative  Past Medical and Surgical History:     Past Medical History:   Diagnosis Date   • Asthma    • Carpal tunnel syndrome    • Chronic pain     left arm   • Diabetes mellitus (HCC)    • Erectile dysfunction    • Hypertension    • Thoracic vertebral fracture ([address] )    • Umbilical hernia        Past Surgical History:   Procedure Laterality Date   • CHOLECYSTECTOMY     • CHOLECYSTECTOMY LAPAROSCOPIC N/A 6/20/2019    Procedure: CHOLECYSTECTOMY LAPAROSCOPIC;  Surgeon: Shelly Hickman MD;  Location: [address] [place] [address];  Service: General   • 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   • HAND SURGERY     • UMBILICAL HERNIA REPAIR         Meds/Allergies:    Prior to Admission medications    Medication Sig Start Date End Date Taking?  Authorizing Provider   acetaminophen (TYLENOL) 325 mg tablet Take 2 tablets (650 mg total) by mouth every 6 (six) hours as needed for mild pain  Patient not taking: Reported on 10/3/2022 7/10/22   CHARLES Lanier   albuterol (PROVENTIL HFA,VENTOLIN HFA) 90 mcg/act inhaler INHALE 2 PUFFS EVERY 4 (FOUR) HOURS AS NEEDED FOR WHEEZING 5/24/22   Marcell Soriano MD   Alcohol Swabs (ALCOHOL PREP) 70 % PADS daily Test  Patient not taking: Reported on 9/3/2021 5/28/19   Historical Provider, MD   Alcohol Swabs (Alcohol Prep) PADS TEST DAILY  Patient not taking: Reported on 9/3/2021 2/9/21   Historical Provider, MD   aspirin (ECOTRIN LOW STRENGTH) 81 mg EC tablet Take 81 mg by mouth daily    Historical Provider, MD   atorvastatin (Lipitor) 20 mg tablet Take 1 tablet (20 mg total) by mouth daily 4/25/22 4/25/23  CHARLES Ruth   BD Pen Needle Carolyn U/F 32G X 4 MM MISC USE AS DIRECTED TO INJECT INSULIN 12/16/21   CHARLES Ruth   Blood Pressure Monitoring ([employer] Choice BP Monitor/Arm) JM USE AS DIRECTED 3/15/22   CHARLES Ruth   budesonide (Pulmicort Flexhaler) 90 MCG/ACT inhaler Inhale 1 puff 2 (two) times a day Rinse mouth after use  4/27/22   CHARLES Ruth   clotrimazole-betamethasone (LOTRISONE) 1-0 05 % cream Apply topically 2 (two) times a day  Patient not taking: No sig reported 3/10/22   CHARLES Ruth   Contour Next Test test strip USE 1 EACH 3 (THREE) TIMES A DAY 12/20/21   CHARLES Ruth   cyclobenzaprine (FLEXERIL) 5 mg tablet Take 1 tablet (5 mg total) by mouth 3 (three) times a day as needed for muscle spasms  Patient not taking: No sig reported 7/22/22   Trina Palacios PA-C   diltiazem (CARDIZEM CD) 120 mg 24 hr capsule Take 1 capsule (120 mg total) by mouth in the morning  Patient taking differently: Take 240 mg by mouth daily 5/21/22   Mazin Dahl MD   DULoxetine (CYMBALTA) 30 mg delayed release capsule TAKE 1 CAPSULE (30 MG TOTAL) BY MOUTH DAILY AT BEDTIME 9/9/22   Marcell Soriano MD   fluticasone-vilanterol (Breo Ellipta) 100-25 mcg/inh inhaler Inhale 1 puff daily Rinse mouth after use    Patient not taking: No sig reported 4/25/22   Melanie Loya, CHARLES   gabapentin (NEURONTIN) 100 mg capsule Take 1 capsule (100 mg total) by mouth 3 (three) times a day  Patient not taking: No sig reported 7/29/22 10/27/22  Amelia Stoddard MD   Humidifier MISC USE AS DIRECTED 3/15/22   CHARLES Ramirez   Insulin Glargine Solostar (Lantus SoloStar) 100 UNIT/ML SOPN Inject 35 Units under the skin daily at bedtime 7/29/22   Amelia Stoddard MD   Jardiance 25 MG TABS TAKE 1 TABLET (25 MG TOTAL) BY MOUTH EVERY MORNING 9/15/22   Amelia Stoddard MD   Lancets MISC Use to test blood glucose once a day  Dx: Type 2 DM  E11 9 5/28/19   Historical Provider, MD   lisinopril (ZESTRIL) 10 mg tablet Take 1 tablet (10 mg total) by mouth daily 7/29/22   Amelia Stoddard MD   Melatonin 5 MG TABS Take 1 tablet (5 mg total) by mouth daily at bedtime  Patient not taking: Reported on 10/3/2022 4/25/22   CHARLES Ramirez   metFORMIN (GLUCOPHAGE) 1000 MG tablet Take 1 tablet (1,000 mg total) by mouth 2 (two) times a day with meals 7/29/22   Amelia Stoddard MD   methocarbamol (ROBAXIN) 500 mg tablet Take 1 tablet (500 mg total) by mouth every 6 (six) hours as needed for muscle spasms  Patient not taking: No sig reported 7/10/22   CHARLES Ferrera   metoprolol tartrate (LOPRESSOR) 100 mg tablet Take 1 tablet (100 mg total) by mouth in the morning  Patient not taking: No sig reported 7/29/22   Amelia Stoddard MD   Misc  Devices MISC by Does not apply route daily  Patient not taking: Reported on 9/3/2021 10/31/19   Scott Torres MD   oxyCODONE (ROXICODONE) 10 MG TABS You may take 5 mg (0 5 tab) for moderate pain or 10 mg (1 tab) for severe pain, every 4 hours, as needed    Patient not taking: No sig reported 7/10/22   CHARLES Ferrera   oxyCODONE (Roxicodone) 5 immediate release tablet Take 1 tablet (5 mg total) by mouth every 4 (four) hours as needed for moderate pain Ongoing therapy Max Daily Amount: 30 mg  Patient not taking: No sig reported 7/22/22   Jarad Ignacio PA-C   rivaroxaban (XARELTO) 20 mg tablet Take 1 tablet (20 mg total) by mouth daily with breakfast 9/3/21   CHARLES Burch   senna-docusate sodium (SENOKOT S) 8 6-50 mg per tablet Take 2 tablets by mouth daily  Patient not taking: No sig reported 7/11/22   CHARLES Valentin   Tadalafil, PAH, 20 MG TABS Take 1 tablet (20 mg total) by mouth as needed (erectile dysfunction) 3/10/22   CHARLES Burch   triamcinolone (KENALOG) 0 5 % cream APPLY TOPICALLY 3 (THREE) TIMES A DAY 4/12/21   Viviane Avery MD   TRUEplus Lancets 33G MISC USE AS INSTRUCTED 2/25/22   CHARLES Burch     I have reviewed home medications with a medical source (PCP, Pharmacy, other)  Allergies: No Known Allergies    Social History:     Marital Status:    Occupation:  Currently unemployed  Patient Pre-hospital Living Situation:  Independent  Patient Pre-hospital Level of Mobility:  Independent  Patient Pre-hospital Diet Restrictions:  Diabetic diet  Substance Use History:   Social History     Substance and Sexual Activity   Alcohol Use Yes    Comment: occassional, social, rare     Social History     Tobacco Use   Smoking Status Current Some Day Smoker   • Packs/day: 0 10   • Types: Cigarettes   Smokeless Tobacco Never Used     Social History     Substance and Sexual Activity   Drug Use Never       Family History:    Family History   Problem Relation Age of Onset   • Breast cancer additional onset Mother    • Diabetes Father    • Hyperlipidemia Father    • Hypertension Father    • Seizures Father    • No Known Problems Son    • No Known Problems Son    • Vitiligo Son        Physical Exam:     Vitals:   Blood Pressure: 103/58 (10/06/22 1550)  Pulse: (!) 145 (10/06/22 1550)  Temperature: 98 1 °F (36 7 °C) (10/06/22 1428)  Temp Source: Oral (10/06/22 1428)  Respirations: 18 (10/06/22 1550)  Weight - Scale: (!) 172 kg (379 lb 10 1 oz) (10/06/22 1428)  SpO2: 98 % (10/06/22 1550)    Physical Exam  Vitals reviewed     Constitutional:       Appearance: He is not ill-appearing or diaphoretic  HENT:      Head: Normocephalic and atraumatic  Nose: No congestion or rhinorrhea  Eyes:      General: No scleral icterus  Right eye: No discharge  Left eye: No discharge  Pupils: Pupils are equal, round, and reactive to light  Cardiovascular:      Rate and Rhythm: Regular rhythm  Tachycardia present  Pulses: Normal pulses  Heart sounds: No murmur heard  No gallop  Pulmonary:      Effort: Pulmonary effort is normal  No respiratory distress  Breath sounds: No wheezing or rales  Abdominal:      General: Abdomen is flat  There is no distension  Palpations: Abdomen is soft  Tenderness: There is no abdominal tenderness  Musculoskeletal:      Cervical back: Neck supple  Right lower leg: No edema  Left lower leg: No edema  Comments: Prominent Varicose veins   Skin:     General: Skin is warm and dry  Coloration: Skin is not jaundiced or pale  Neurological:      Mental Status: He is oriented to person, place, and time  Comments: Awake alert follows commands   Psychiatric:         Mood and Affect: Mood normal          Behavior: Behavior normal      Additional Data:     Lab Results: I have personally reviewed pertinent reports        Results from last 7 days   Lab Units 10/06/22  1436   WBC Thousand/uL 5 74   HEMOGLOBIN g/dL 15 4   HEMATOCRIT % 49 0   PLATELETS Thousands/uL 220   NEUTROS PCT % 63   LYMPHS PCT % 28   MONOS PCT % 8   EOS PCT % 1     Results from last 7 days   Lab Units 10/06/22  1436   SODIUM mmol/L 137   POTASSIUM mmol/L 4 3   CHLORIDE mmol/L 103   CO2 mmol/L 29   BUN mg/dL 16   CREATININE mg/dL 1 09   ANION GAP mmol/L 5   CALCIUM mg/dL 10 0   ALBUMIN g/dL 3 6   TOTAL BILIRUBIN mg/dL 0 25   ALK PHOS U/L 67   ALT U/L 23   AST U/L 13   GLUCOSE RANDOM mg/dL 239*                       Imaging:  Chest x-ray with no acute cardiopulmonary findings    XR chest 1 view portable   ED Interpretation by London Hensley MD (10/06 1476)   Primary reviewed: no acute abnormality          EKG, Pathology, and Other Studies Reviewed on Admission:   · EKG:  Atrial flutter with RVR    Allscripts / Epic Records Reviewed: Yes     ** Please Note: This note has been constructed using a voice recognition system   **

## 2022-10-06 NOTE — ASSESSMENT & PLAN NOTE
Blood pressure is borderline low  With hold lisinopril for now while he is on a Cardizem drip    Continue IV fluids to maintain normotension

## 2022-10-06 NOTE — ED PROVIDER NOTES
History  Chief Complaint   Patient presents with   • Atrial Flutter     Per [facility], pt arrives in atrial flutter, has VNA at house who called EMS  Pt denies symptoms at this time  History provided by:  Patient  Palpitations  Palpitations quality:  Unable to specify  Onset quality:  Unable to specify  Duration: unclear, pt was having his vitals checked by vna and found to be in rapid afib  Timing:  Unable to specify  Progression:  Unable to specify  Chronicity:  Recurrent  Relieved by:  Nothing  Worsened by:  Nothing  Associated symptoms: no chest pain, no chest pressure, no cough, no diaphoresis, no dizziness, no hemoptysis, no lower extremity edema, no malaise/fatigue, no nausea, no near-syncope, no numbness, no orthopnea, no PND, no shortness of breath, no syncope, no vomiting and no weakness    Risk factors: hx of atrial fibrillation        Prior to Admission Medications   Prescriptions Last Dose Informant Patient Reported? Taking? Alcohol Swabs (ALCOHOL PREP) 70 % PADS  Self Yes No   Sig: daily Test   Patient not taking: Reported on 9/3/2021   Alcohol Swabs (Alcohol Prep) PADS   Yes No   Sig: TEST DAILY   Patient not taking: Reported on 9/3/2021   BD Pen Needle Carolyn U/F 32G X 4 MM MISC   No No   Sig: USE AS DIRECTED TO INJECT INSULIN   Blood Pressure Monitoring ([place] Choice BP Monitor/Arm) JM   No No   Sig: USE AS DIRECTED   Contour Next Test test strip   No No   Sig: USE 1 EACH 3 (THREE) TIMES A DAY   DULoxetine (CYMBALTA) 30 mg delayed release capsule   No No   Sig: TAKE 1 CAPSULE (30 MG TOTAL) BY MOUTH DAILY AT BEDTIME   Humidifier MISC   No No   Sig: USE AS DIRECTED   Insulin Glargine Solostar (Lantus SoloStar) 100 UNIT/ML SOPN   No No   Sig: Inject 35 Units under the skin daily at bedtime   Jardiance 25 MG TABS   No No   Sig: TAKE 1 TABLET (25 MG TOTAL) BY MOUTH EVERY MORNING   Lancets MISC  Self Yes No   Sig: Use to test blood glucose once a day  Dx: Type 2 DM   E11 9   Melatonin 5 MG TABS   No No   Sig: Take 1 tablet (5 mg total) by mouth daily at bedtime   Patient not taking: Reported on 10/3/2022   Misc  Devices MISC  Self No No   Sig: by Does not apply route daily   Patient not taking: Reported on 9/3/2021   TRUEplus Lancets 33G MISC   No No   Sig: USE AS INSTRUCTED   Tadalafil, PAH, 20 MG TABS   No No   Sig: Take 1 tablet (20 mg total) by mouth as needed (erectile dysfunction)   acetaminophen (TYLENOL) 325 mg tablet   No No   Sig: Take 2 tablets (650 mg total) by mouth every 6 (six) hours as needed for mild pain   Patient not taking: Reported on 10/3/2022   albuterol (PROVENTIL HFA,VENTOLIN HFA) 90 mcg/act inhaler   No No   Sig: INHALE 2 PUFFS EVERY 4 (FOUR) HOURS AS NEEDED FOR WHEEZING   aspirin (ECOTRIN LOW STRENGTH) 81 mg EC tablet  Self Yes No   Sig: Take 81 mg by mouth daily   atorvastatin (Lipitor) 20 mg tablet   No No   Sig: Take 1 tablet (20 mg total) by mouth daily   budesonide (Pulmicort Flexhaler) 90 MCG/ACT inhaler   No No   Sig: Inhale 1 puff 2 (two) times a day Rinse mouth after use  clotrimazole-betamethasone (LOTRISONE) 1-0 05 % cream   No No   Sig: Apply topically 2 (two) times a day   Patient not taking: No sig reported   cyclobenzaprine (FLEXERIL) 5 mg tablet   No No   Sig: Take 1 tablet (5 mg total) by mouth 3 (three) times a day as needed for muscle spasms   Patient not taking: No sig reported   diltiazem (CARDIZEM CD) 120 mg 24 hr capsule   No No   Sig: Take 1 capsule (120 mg total) by mouth in the morning  Patient taking differently: Take 240 mg by mouth daily   fluticasone-vilanterol (Breo Ellipta) 100-25 mcg/inh inhaler   No No   Sig: Inhale 1 puff daily Rinse mouth after use     Patient not taking: No sig reported   gabapentin (NEURONTIN) 100 mg capsule   No No   Sig: Take 1 capsule (100 mg total) by mouth 3 (three) times a day   Patient not taking: No sig reported   lisinopril (ZESTRIL) 10 mg tablet   No No   Sig: Take 1 tablet (10 mg total) by mouth daily   metFORMIN (GLUCOPHAGE) 1000 MG tablet   No No   Sig: Take 1 tablet (1,000 mg total) by mouth 2 (two) times a day with meals   methocarbamol (ROBAXIN) 500 mg tablet   No No   Sig: Take 1 tablet (500 mg total) by mouth every 6 (six) hours as needed for muscle spasms   Patient not taking: No sig reported   metoprolol tartrate (LOPRESSOR) 100 mg tablet   No No   Sig: Take 1 tablet (100 mg total) by mouth in the morning   Patient not taking: No sig reported   oxyCODONE (ROXICODONE) 10 MG TABS   No No   Sig: You may take 5 mg (0 5 tab) for moderate pain or 10 mg (1 tab) for severe pain, every 4 hours, as needed     Patient not taking: No sig reported   oxyCODONE (Roxicodone) 5 immediate release tablet   No No   Sig: Take 1 tablet (5 mg total) by mouth every 4 (four) hours as needed for moderate pain Ongoing therapy Max Daily Amount: 30 mg   Patient not taking: No sig reported   rivaroxaban (XARELTO) 20 mg tablet   No No   Sig: Take 1 tablet (20 mg total) by mouth daily with breakfast   senna-docusate sodium (SENOKOT S) 8 6-50 mg per tablet   No No   Sig: Take 2 tablets by mouth daily   Patient not taking: No sig reported   triamcinolone (KENALOG) 0 5 % cream   No No   Sig: APPLY TOPICALLY 3 (THREE) TIMES A DAY      Facility-Administered Medications: None       Past Medical History:   Diagnosis Date   • Asthma    • Carpal tunnel syndrome    • Chronic pain     left arm   • Diabetes mellitus (HCC)    • Erectile dysfunction    • Hypertension    • Thoracic vertebral fracture (HCC)    • Umbilical hernia        Past Surgical History:   Procedure Laterality Date   • CHOLECYSTECTOMY     • CHOLECYSTECTOMY LAPAROSCOPIC N/A 6/20/2019    Procedure: CHOLECYSTECTOMY LAPAROSCOPIC;  Surgeon: Choco Rojo MD;  Location: [address] [place] [address] OR;  Service: General   • 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   • HAND SURGERY     • UMBILICAL HERNIA REPAIR         Family History   Problem Relation Age of Onset   • Breast cancer additional onset Mother    • Diabetes Father    • Hyperlipidemia Father    • Hypertension Father    • Seizures Father    • No Known Problems Son    • No Known Problems Son    • Vitiligo Son      I have reviewed and agree with the history as documented  E-Cigarette/Vaping   • E-Cigarette Use Never User      E-Cigarette/Vaping Substances     Social History     Tobacco Use   • Smoking status: Current Some Day Smoker     Packs/day: 0 10     Types: Cigarettes   • Smokeless tobacco: Never Used   Vaping Use   • Vaping Use: Never used   Substance Use Topics   • Alcohol use: Yes     Comment: occassional, social, rare   • Drug use: Never       Review of Systems   Constitutional: Negative for chills, diaphoresis, fatigue, fever and malaise/fatigue  HENT: Negative for congestion, ear pain, rhinorrhea, sinus pressure, sneezing, sore throat and trouble swallowing  Eyes: Negative for pain and visual disturbance  Respiratory: Negative for cough, hemoptysis, chest tightness, shortness of breath, wheezing and stridor  Cardiovascular: Negative for chest pain, palpitations, orthopnea, leg swelling, syncope, PND and near-syncope  Gastrointestinal: Negative for abdominal pain, blood in stool, constipation, diarrhea, nausea and vomiting  Endocrine: Negative for polydipsia, polyphagia and polyuria  Genitourinary: Negative for decreased urine volume, dysuria, flank pain, frequency, hematuria and urgency  Musculoskeletal: Negative for arthralgias and myalgias  Skin: Negative for color change and rash  Neurological: Negative for dizziness, seizures, weakness, light-headedness, numbness and headaches  Hematological: Negative for adenopathy  Psychiatric/Behavioral: The patient is not nervous/anxious  Physical Exam  Physical Exam  Constitutional:       Appearance: He is well-developed  HENT:      Head: Normocephalic and atraumatic        Right Ear: External ear normal       Left Ear: External ear normal    Eyes:      General: No scleral icterus  Conjunctiva/sclera: Conjunctivae normal    Neck:      Vascular: No JVD  Trachea: No tracheal deviation  Cardiovascular:      Rate and Rhythm: Tachycardia present  Rhythm irregular  Pulses: Normal pulses  Heart sounds: Normal heart sounds  Pulmonary:      Effort: Pulmonary effort is normal  No respiratory distress  Abdominal:      General: There is no distension  Palpations: There is no mass  Tenderness: There is no abdominal tenderness  Hernia: No hernia is present  Musculoskeletal:         General: No deformity  Normal range of motion  Cervical back: Normal range of motion  No rigidity  Right lower leg: No edema  Left lower leg: No edema  Skin:     Capillary Refill: Capillary refill takes less than 2 seconds  Coloration: Skin is not pale  Findings: No erythema or rash  Neurological:      General: No focal deficit present  Mental Status: He is alert and oriented to person, place, and time     Psychiatric:         Mood and Affect: Mood normal          Behavior: Behavior normal          Vital Signs  ED Triage Vitals [10/06/22 1428]   Temperature Pulse Respirations Blood Pressure SpO2   98 1 °F (36 7 °C) (!) 150 18 135/64 97 %      Temp Source Heart Rate Source Patient Position - Orthostatic VS BP Location FiO2 (%)   Oral Monitor Lying Right arm --      Pain Score       No Pain           Vitals:    10/06/22 1428 10/06/22 1520 10/06/22 1535   BP: 135/64 104/57 96/69   Pulse: (!) 150 (!) 146 (!) 146   Patient Position - Orthostatic VS: Lying Sitting Lying         Visual Acuity      ED Medications  Medications   diltiazem (CARDIZEM) injection 15 mg (15 mg Intravenous Given 10/6/22 1434)   diltiazem (CARDIZEM) 125 mg in sodium chloride 0 9 % 125 mL infusion (12 5 mg/hr Intravenous Rate/Dose Change 10/6/22 1536)       Diagnostic Studies  Results Reviewed     Procedure Component Value Units Date/Time    TSH [[id number]]  (Normal) Collected: 10/06/22 1436    Lab Status: Final result Specimen: Blood from Arm, Right Updated: 10/06/22 1512     TSH 3RD GENERATON 1 148 uIU/mL     Narrative:      Patients undergoing fluorescein dye angiography may retain small amounts of fluorescein in the body for 48-72 hours post procedure  Samples containing fluorescein can produce falsely depressed TSH values  If the patient had this procedure,a specimen should be resubmitted post fluorescein clearance        NT-BNP PRO [[id number]]  (Abnormal) Collected: 10/06/22 1436    Lab Status: Final result Specimen: Blood from Arm, Right Updated: 10/06/22 1512     NT-proBNP 850 pg/mL     Magnesium [[id number]]  (Normal) Collected: 10/06/22 1436    Lab Status: Final result Specimen: Blood from Arm, Right Updated: 10/06/22 1512     Magnesium 2 0 mg/dL     Phosphorus [[id number]]  (Normal) Collected: 10/06/22 1436    Lab Status: Final result Specimen: Blood from Arm, Right Updated: 10/06/22 1512     Phosphorus 3 5 mg/dL     HS Troponin 0hr (reflex protocol) [[id number]]  (Normal) Collected: 10/06/22 1436    Lab Status: Final result Specimen: Blood from Arm, Right Updated: 10/06/22 1504     hs TnI 0hr 8 ng/L     HS Troponin I 2hr [[id number]]     Lab Status: No result Specimen: Blood     Comprehensive metabolic panel [[id number]]  (Abnormal) Collected: 10/06/22 1436    Lab Status: Final result Specimen: Blood from Arm, Right Updated: 10/06/22 1501     Sodium 137 mmol/L      Potassium 4 3 mmol/L      Chloride 103 mmol/L      CO2 29 mmol/L      ANION GAP 5 mmol/L      BUN 16 mg/dL      Creatinine 1 09 mg/dL      Glucose 239 mg/dL      Calcium 10 0 mg/dL      AST 13 U/L      ALT 23 U/L      Alkaline Phosphatase 67 U/L      Total Protein 8 1 g/dL      Albumin 3 6 g/dL      Total Bilirubin 0 25 mg/dL      eGFR 76 ml/min/1 73sq m     Narrative:      Kathi guidelines for Chronic Kidney Disease (CKD):   •  Stage 1 with normal or high GFR (GFR > 90 mL/min/1 73 square meters)  •  Stage 2 Mild CKD (GFR = 60-89 mL/min/1 73 square meters)  •  Stage 3A Moderate CKD (GFR = 45-59 mL/min/1 73 square meters)  •  Stage 3B Moderate CKD (GFR = 30-44 mL/min/1 73 square meters)  •  Stage 4 Severe CKD (GFR = 15-29 mL/min/1 73 square meters)  •  Stage 5 End Stage CKD (GFR <15 mL/min/1 73 square meters)  Note: GFR calculation is accurate only with a steady state creatinine    CBC and differential [[id number]]  (Abnormal) Collected: 10/06/22 1436    Lab Status: Final result Specimen: Blood from Arm, Right Updated: 10/06/22 1442     WBC 5 74 Thousand/uL      RBC 5 80 Million/uL      Hemoglobin 15 4 g/dL      Hematocrit 49 0 %      MCV 85 fL      MCH 26 6 pg      MCHC 31 4 g/dL      RDW 13 5 %      MPV 10 0 fL      Platelets 140 Thousands/uL      nRBC 0 /100 WBCs      Neutrophils Relative 63 %      Immat GRANS % 0 %      Lymphocytes Relative 28 %      Monocytes Relative 8 %      Eosinophils Relative 1 %      Basophils Relative 0 %      Neutrophils Absolute 3 57 Thousands/µL      Immature Grans Absolute 0 01 Thousand/uL      Lymphocytes Absolute 1 63 Thousands/µL      Monocytes Absolute 0 43 Thousand/µL      Eosinophils Absolute 0 08 Thousand/µL      Basophils Absolute 0 02 Thousands/µL                  XR chest 1 view portable   ED Interpretation by Anant Fitzpatrick MD (10/06 9386)   Primary reviewed: no acute abnormality                 Procedures  ECG 12 Lead Documentation Only    Date/Time: 10/6/2022 2:33 PM  Performed by: Anant Fitzpatrikc MD  Authorized by: Anant Fitzpatrick MD     ECG reviewed by me, the ED Provider: yes    Patient location:  ED  Rate:     ECG rate:  149    ECG rate assessment: tachycardic    Rhythm:     Rhythm: atrial fibrillation    Ectopy:     Ectopy: none    QRS:     QRS axis:  Normal    QRS intervals:  Normal  Conduction:     Conduction: normal    ST segments:     ST segments:  Normal  T waves:     T waves: normal    CriticalCare Time  Performed by: Anant Fitzpatrick MD  Authorized by: Radha Early MD     Critical care provider statement:     Critical care time (minutes):  35    Critical care time was exclusive of:  Separately billable procedures and treating other patients and teaching time    Critical care was necessary to treat or prevent imminent or life-threatening deterioration of the following conditions:  Circulatory failure and cardiac failure    Critical care was time spent personally by me on the following activities:  Blood draw for specimens, obtaining history from patient or surrogate, development of treatment plan with patient or surrogate, discussions with consultants, evaluation of patient's response to treatment, examination of patient, interpretation of cardiac output measurements, ordering and performing treatments and interventions, ordering and review of laboratory studies, ordering and review of radiographic studies, re-evaluation of patient's condition and review of old charts             ED Course                                             MDM  Number of Diagnoses or Management Options  Diagnosis management comments: Rapid afib-will do cardiac work up, tsh, rate control, admit      Disposition  Final diagnoses:   Atrial fibrillation ([facility] [address] )   Diabetes ([facility] )     Time reflects when diagnosis was documented in both MDM as applicable and the Disposition within this note     Time User Action Codes Description Comment    10/6/2022  3:17 PM Quintin Marie Add [I48 91] Atrial fibrillation ([facility] [address] )     10/6/2022  3:18 PM Saman Barr Add [E11 9] Diabetes [facility])       ED Disposition     ED Disposition   Admit    Condition   Stable    Date/Time   u Oct 6, 2022  3:17 PM    Comment   Case was discussed with Dr Saúl Aldana and the patient's admission status was agreed to be Admission Status: inpatient status to the service of Dr Saúl Aldana              Follow-up Information    None         Patient's Medications   Discharge Prescriptions    No medications on file       No discharge procedures on file     PDMP Review       Value Time User    PDMP Reviewed  Yes 7/22/2022  3:51 PM Lisa Palacios PA-C          ED Provider  Electronically Signed by           Fili Ponce MD  10/06/22 [id number]

## 2022-10-07 ENCOUNTER — ANESTHESIA (INPATIENT)
Dept: NON INVASIVE DIAGNOSTICS | Facility: HOSPITAL | Age: 57
DRG: 201 | End: 2022-10-07
Payer: COMMERCIAL

## 2022-10-07 ENCOUNTER — APPOINTMENT (OUTPATIENT)
Dept: NON INVASIVE DIAGNOSTICS | Facility: HOSPITAL | Age: 57
DRG: 201 | End: 2022-10-07
Attending: INTERNAL MEDICINE
Payer: COMMERCIAL

## 2022-10-07 LAB
ANION GAP SERPL CALCULATED.3IONS-SCNC: 7 MMOL/L (ref 4–13)
ATRIAL RATE: 70 BPM
BUN SERPL-MCNC: 15 MG/DL (ref 5–25)
CALCIUM SERPL-MCNC: 9 MG/DL (ref 8.3–10.1)
CHLORIDE SERPL-SCNC: 105 MMOL/L (ref 96–108)
CO2 SERPL-SCNC: 24 MMOL/L (ref 21–32)
CREAT SERPL-MCNC: 1.04 MG/DL (ref 0.6–1.3)
ERYTHROCYTE [DISTWIDTH] IN BLOOD BY AUTOMATED COUNT: 13.7 % (ref 11.6–15.1)
GFR SERPL CREATININE-BSD FRML MDRD: 80 ML/MIN/1.73SQ M
GLUCOSE SERPL-MCNC: 128 MG/DL (ref 65–140)
GLUCOSE SERPL-MCNC: 135 MG/DL (ref 65–140)
GLUCOSE SERPL-MCNC: 143 MG/DL (ref 65–140)
GLUCOSE SERPL-MCNC: 151 MG/DL (ref 65–140)
GLUCOSE SERPL-MCNC: 176 MG/DL (ref 65–140)
GLUCOSE SERPL-MCNC: 193 MG/DL (ref 65–140)
HCT VFR BLD AUTO: 44.9 % (ref 36.5–49.3)
HGB BLD-MCNC: 14.2 G/DL (ref 12–17)
MAGNESIUM SERPL-MCNC: 1.8 MG/DL (ref 1.6–2.6)
MCH RBC QN AUTO: 26.3 PG (ref 26.8–34.3)
MCHC RBC AUTO-ENTMCNC: 31.6 G/DL (ref 31.4–37.4)
MCV RBC AUTO: 83 FL (ref 82–98)
P AXIS: 77 DEGREES
PLATELET # BLD AUTO: 219 THOUSANDS/UL (ref 149–390)
PMV BLD AUTO: 10 FL (ref 8.9–12.7)
POTASSIUM SERPL-SCNC: 4.4 MMOL/L (ref 3.5–5.3)
PR INTERVAL: 170 MS
QRS AXIS: 3 DEGREES
QRSD INTERVAL: 98 MS
QT INTERVAL: 430 MS
QTC INTERVAL: 464 MS
RBC # BLD AUTO: 5.39 MILLION/UL (ref 3.88–5.62)
SL CV LV EF: 20
SODIUM SERPL-SCNC: 136 MMOL/L (ref 135–147)
T WAVE AXIS: 54 DEGREES
VENTRICULAR RATE: 70 BPM
WBC # BLD AUTO: 5.89 THOUSAND/UL (ref 4.31–10.16)

## 2022-10-07 PROCEDURE — 93312 ECHO TRANSESOPHAGEAL: CPT | Performed by: INTERNAL MEDICINE

## 2022-10-07 PROCEDURE — 85027 COMPLETE CBC AUTOMATED: CPT | Performed by: INTERNAL MEDICINE

## 2022-10-07 PROCEDURE — 99232 SBSQ HOSP IP/OBS MODERATE 35: CPT | Performed by: HOSPITALIST

## 2022-10-07 PROCEDURE — 93320 DOPPLER ECHO COMPLETE: CPT | Performed by: INTERNAL MEDICINE

## 2022-10-07 PROCEDURE — NC001 PR NO CHARGE: Performed by: INTERNAL MEDICINE

## 2022-10-07 PROCEDURE — 83735 ASSAY OF MAGNESIUM: CPT | Performed by: INTERNAL MEDICINE

## 2022-10-07 PROCEDURE — 92960 CARDIOVERSION ELECTRIC EXT: CPT | Performed by: INTERNAL MEDICINE

## 2022-10-07 PROCEDURE — 92960 CARDIOVERSION ELECTRIC EXT: CPT

## 2022-10-07 PROCEDURE — 93312 ECHO TRANSESOPHAGEAL: CPT

## 2022-10-07 PROCEDURE — 82948 REAGENT STRIP/BLOOD GLUCOSE: CPT

## 2022-10-07 PROCEDURE — 93010 ELECTROCARDIOGRAM REPORT: CPT | Performed by: INTERNAL MEDICINE

## 2022-10-07 PROCEDURE — 93005 ELECTROCARDIOGRAM TRACING: CPT

## 2022-10-07 PROCEDURE — 93325 DOPPLER ECHO COLOR FLOW MAPG: CPT | Performed by: INTERNAL MEDICINE

## 2022-10-07 PROCEDURE — 80048 BASIC METABOLIC PNL TOTAL CA: CPT | Performed by: INTERNAL MEDICINE

## 2022-10-07 RX ORDER — SODIUM CHLORIDE, SODIUM LACTATE, POTASSIUM CHLORIDE, CALCIUM CHLORIDE 600; 310; 30; 20 MG/100ML; MG/100ML; MG/100ML; MG/100ML
125 INJECTION, SOLUTION INTRAVENOUS CONTINUOUS
OUTPATIENT
Start: 2022-10-07

## 2022-10-07 RX ORDER — SODIUM CHLORIDE, SODIUM LACTATE, POTASSIUM CHLORIDE, CALCIUM CHLORIDE 600; 310; 30; 20 MG/100ML; MG/100ML; MG/100ML; MG/100ML
INJECTION, SOLUTION INTRAVENOUS CONTINUOUS PRN
Status: DISCONTINUED | OUTPATIENT
Start: 2022-10-07 | End: 2022-10-07

## 2022-10-07 RX ORDER — LANOLIN ALCOHOL/MO/W.PET/CERES
3 CREAM (GRAM) TOPICAL
Status: DISCONTINUED | OUTPATIENT
Start: 2022-10-07 | End: 2022-10-10 | Stop reason: HOSPADM

## 2022-10-07 RX ORDER — EPHEDRINE SULFATE 50 MG/ML
INJECTION INTRAVENOUS AS NEEDED
Status: DISCONTINUED | OUTPATIENT
Start: 2022-10-07 | End: 2022-10-07

## 2022-10-07 RX ORDER — PROPOFOL 10 MG/ML
INJECTION, EMULSION INTRAVENOUS AS NEEDED
Status: DISCONTINUED | OUTPATIENT
Start: 2022-10-07 | End: 2022-10-07

## 2022-10-07 RX ORDER — PROPOFOL 10 MG/ML
INJECTION, EMULSION INTRAVENOUS CONTINUOUS PRN
Status: DISCONTINUED | OUTPATIENT
Start: 2022-10-07 | End: 2022-10-07

## 2022-10-07 RX ADMIN — ATORVASTATIN CALCIUM 20 MG: 20 TABLET, FILM COATED ORAL at 16:10

## 2022-10-07 RX ADMIN — RIVAROXABAN 20 MG: 20 TABLET, FILM COATED ORAL at 07:42

## 2022-10-07 RX ADMIN — SODIUM CHLORIDE 100 ML/HR: 0.9 INJECTION, SOLUTION INTRAVENOUS at 04:18

## 2022-10-07 RX ADMIN — OXYCODONE HYDROCHLORIDE 10 MG: 10 TABLET ORAL at 14:03

## 2022-10-07 RX ADMIN — DULOXETINE HYDROCHLORIDE 30 MG: 30 CAPSULE, DELAYED RELEASE ORAL at 21:44

## 2022-10-07 RX ADMIN — OXYCODONE HYDROCHLORIDE 10 MG: 10 TABLET ORAL at 07:41

## 2022-10-07 RX ADMIN — GABAPENTIN 100 MG: 100 CAPSULE ORAL at 21:44

## 2022-10-07 RX ADMIN — ASPIRIN 81 MG: 81 TABLET, COATED ORAL at 08:04

## 2022-10-07 RX ADMIN — OXYCODONE HYDROCHLORIDE 10 MG: 10 TABLET ORAL at 22:56

## 2022-10-07 RX ADMIN — SODIUM CHLORIDE, SODIUM LACTATE, POTASSIUM CHLORIDE, AND CALCIUM CHLORIDE: .6; .31; .03; .02 INJECTION, SOLUTION INTRAVENOUS at 12:12

## 2022-10-07 RX ADMIN — PROPOFOL 120 MCG/KG/MIN: 10 INJECTION, EMULSION INTRAVENOUS at 12:18

## 2022-10-07 RX ADMIN — SODIUM CHLORIDE 100 ML/HR: 0.9 INJECTION, SOLUTION INTRAVENOUS at 15:49

## 2022-10-07 RX ADMIN — FLUTICASONE FUROATE AND VILANTEROL TRIFENATATE 1 PUFF: 100; 25 POWDER RESPIRATORY (INHALATION) at 08:04

## 2022-10-07 RX ADMIN — INSULIN LISPRO 1 UNITS: 100 INJECTION, SOLUTION INTRAVENOUS; SUBCUTANEOUS at 16:58

## 2022-10-07 RX ADMIN — SODIUM CHLORIDE, SODIUM LACTATE, POTASSIUM CHLORIDE, AND CALCIUM CHLORIDE: .6; .31; .03; .02 INJECTION, SOLUTION INTRAVENOUS at 13:20

## 2022-10-07 RX ADMIN — DILTIAZEM HYDROCHLORIDE 240 MG: 120 CAPSULE, COATED, EXTENDED RELEASE ORAL at 08:07

## 2022-10-07 RX ADMIN — INSULIN GLARGINE 35 UNITS: 100 INJECTION, SOLUTION SUBCUTANEOUS at 21:44

## 2022-10-07 RX ADMIN — Medication 3 MG: at 21:44

## 2022-10-07 RX ADMIN — DEXTROSE MONOHYDRATE 150 MG: 50 INJECTION, SOLUTION INTRAVENOUS at 16:52

## 2022-10-07 RX ADMIN — DILTIAZEM HYDROCHLORIDE 15 MG/HR: 5 INJECTION, SOLUTION INTRAVENOUS at 07:38

## 2022-10-07 RX ADMIN — EPHEDRINE SULFATE 15 MG: 50 INJECTION, SOLUTION INTRAVENOUS at 12:39

## 2022-10-07 RX ADMIN — GABAPENTIN 100 MG: 100 CAPSULE ORAL at 16:10

## 2022-10-07 RX ADMIN — PROPOFOL 100 MG: 10 INJECTION, EMULSION INTRAVENOUS at 12:20

## 2022-10-07 RX ADMIN — GABAPENTIN 100 MG: 100 CAPSULE ORAL at 08:04

## 2022-10-07 RX ADMIN — AMIODARONE HYDROCHLORIDE 1 MG/MIN: 50 INJECTION, SOLUTION INTRAVENOUS at 17:04

## 2022-10-07 RX ADMIN — METOPROLOL TARTRATE 50 MG: 50 TABLET, FILM COATED ORAL at 08:04

## 2022-10-07 RX ADMIN — METOPROLOL TARTRATE 50 MG: 50 TABLET, FILM COATED ORAL at 19:29

## 2022-10-07 NOTE — UTILIZATION REVIEW
Inpatient Admission Authorization Request   NOTIFICATION OF INPATIENT ADMISSION/INPATIENT AUTHORIZATION REQUEST   SERVICING FACILITY:   [address] [place] [address], [facility], [facility]  Tax ID: [id number]  NPI: [id number]  Place of Service: Inpatient [address] [facility]  [address]  Place of Service Code: 24     ATTENDING PROVIDER:  Attending Name and NPI#: Wellington Kellytuan [[id number]]  Address: [address] [place] [address], [facility], [facility]  Phone: [phone]     UTILIZATION REVIEW CONTACT:  Jt Tripathi, Utilization   Network Utilization Review Department  Phone: [phone]  Fax: [phone]  Email: [email or web address]     PHYSICIAN ADVISORY SERVICES:  FOR [id number] REVIEW - MEDICAL NECESSITY DENIAL  Phone: [phone]  Fax: [phone]  Email: [email or web address]     TYPE OF REQUEST:  Inpatient Status     ADMISSION INFORMATION:  ADMISSION DATE/TIME: 10/6/22  3:30 PM  PATIENT DIAGNOSIS CODE/DESCRIPTION:  Atrial fibrillation ([facility] [address] ) [I48 91]  Diabetes ([facility] [address] ) [E11 9]  Palpitation [R00 2]  DISCHARGE DATE/TIME: No discharge date for patient encounter  IMPORTANT INFORMATION:  Please contact Jt Tripathi directly with any questions or concerns regarding this request  Department voicemails are confidential     Send requests for admission clinical reviews, concurrent reviews, approvals, and administrative denials due to lack of clinical to fax [phone]

## 2022-10-07 NOTE — ASSESSMENT & PLAN NOTE
Lab Results   Component Value Date    HGBA1C 9 4 (H) 07/08/2022     · Hold oral hypoglycemic agents    · Continue Lantus 35 units at bedtime  · Add sliding scale insulin  (P) 127

## 2022-10-07 NOTE — PLAN OF CARE
Problem: Potential for Falls  Goal: Patient will remain free of falls  Description: INTERVENTIONS:  - Educate patient/family on patient safety including physical limitations  - Instruct patient to call for assistance with activity   - Consult OT/PT to assist with strengthening/mobility   - Keep Call bell within reach  - Keep bed low and locked with side rails adjusted as appropriate  - Keep care items and personal belongings within reach  - Initiate and maintain comfort rounds  - Make Fall Risk Sign visible to staff  - Offer Toileting every  Hours, in advance of need  - Initiate/Maintain alarm  - Obtain necessary fall risk management equipment:   - Apply yellow socks and bracelet for high fall risk patients  - Consider moving patient to room near nurses station  Outcome: Progressing     Problem: PAIN - ADULT  Goal: Verbalizes/displays adequate comfort level or baseline comfort level  Description: Interventions:  - Encourage patient to monitor pain and request assistance  - Assess pain using appropriate pain scale  - Administer analgesics based on type and severity of pain and evaluate response  - Implement non-pharmacological measures as appropriate and evaluate response  - Consider cultural and social influences on pain and pain management  - Notify physician/advanced practitioner if interventions unsuccessful or patient reports new pain  Outcome: Progressing     Problem: INFECTION - ADULT  Goal: Absence or prevention of progression during hospitalization  Description: INTERVENTIONS:  - Assess and monitor for signs and symptoms of infection  - Monitor lab/diagnostic results  - Monitor all insertion sites, i e  indwelling lines, tubes, and drains  - Monitor endotracheal if appropriate and nasal secretions for changes in amount and color  - Mobile appropriate cooling/warming therapies per order  - Administer medications as ordered  - Instruct and encourage patient and family to use good hand hygiene technique  - Identify and instruct in appropriate isolation precautions for identified infection/condition  Outcome: Progressing     Problem: SAFETY ADULT  Goal: Maintain or return to baseline ADL function  Description: INTERVENTIONS:  -  Assess patient's ability to carry out ADLs; assess patient's baseline for ADL function and identify physical deficits which impact ability to perform ADLs (bathing, care of mouth/teeth, toileting, grooming, dressing, etc )  - Assess/evaluate cause of self-care deficits   - Assess range of motion  - Assess patient's mobility; develop plan if impaired  - Assess patient's need for assistive devices and provide as appropriate  - Encourage maximum independence but intervene and supervise when necessary  - Involve family in performance of ADLs  - Assess for home care needs following discharge   - Consider OT consult to assist with ADL evaluation and planning for discharge  - Provide patient education as appropriate  Outcome: Progressing  Goal: Maintains/Returns to pre admission functional level  Description: INTERVENTIONS:  - Perform BMAT or MOVE assessment daily    - Set and communicate daily mobility goal to care team and patient/family/caregiver  - Collaborate with rehabilitation services on mobility goals if consulted  - Perform Range of Motion  times a day  - Reposition patient every  hours    - Dangle patient  times a day  - Stand patient  times a day  - Ambulate patient  times a day  - Out of bed to chair  times a day   - Out of bed for meals  times a day  - Out of bed for toileting  - Record patient progress and toleration of activity level   Outcome: Progressing     Problem: DISCHARGE PLANNING  Goal: Discharge to home or other facility with appropriate resources  Description: INTERVENTIONS:  - Identify barriers to discharge w/patient and caregiver  - Arrange for needed discharge resources and transportation as appropriate  - Identify discharge learning needs (meds, wound care, etc )  - Arrange for interpretive services to assist at discharge as needed  - Refer to Case Management Department for coordinating discharge planning if the patient needs post-hospital services based on physician/advanced practitioner order or complex needs related to functional status, cognitive ability, or social support system  Outcome: Progressing     Problem: Knowledge Deficit  Goal: Patient/family/caregiver demonstrates understanding of disease process, treatment plan, medications, and discharge instructions  Description: Complete learning assessment and assess knowledge base    Interventions:  - Provide teaching at level of understanding  - Provide teaching via preferred learning methods  Outcome: Progressing     Problem: CARDIOVASCULAR - ADULT  Goal: Maintains optimal cardiac output and hemodynamic stability  Description: INTERVENTIONS:  - Monitor I/O, vital signs and rhythm  - Monitor for S/S and trends of decreased cardiac output  - Administer and titrate ordered vasoactive medications to optimize hemodynamic stability  - Assess quality of pulses, skin color and temperature  - Assess for signs of decreased coronary artery perfusion  - Instruct patient to report change in severity of symptoms  Outcome: Progressing  Goal: Absence of cardiac dysrhythmias or at baseline rhythm  Description: INTERVENTIONS:  - Continuous cardiac monitoring, vital signs, obtain 12 lead EKG if ordered  - Administer antiarrhythmic and heart rate control medications as ordered  - Monitor electrolytes and administer replacement therapy as ordered  Outcome: Progressing     Problem: METABOLIC, FLUID AND ELECTROLYTES - ADULT  Goal: Glucose maintained within target range  Description: INTERVENTIONS:  - Monitor Blood Glucose as ordered  - Assess for signs and symptoms of hyperglycemia and hypoglycemia  - Administer ordered medications to maintain glucose within target range  - Assess nutritional intake and initiate nutrition service referral as needed  Outcome: Progressing     Problem: MUSCULOSKELETAL - ADULT  Goal: Maintain or return mobility to safest level of function  Description: INTERVENTIONS:  - Assess patient's ability to carry out ADLs; assess patient's baseline for ADL function and identify physical deficits which impact ability to perform ADLs (bathing, care of mouth/teeth, toileting, grooming, dressing, etc )  - Assess/evaluate cause of self-care deficits   - Assess range of motion  - Assess patient's mobility  - Assess patient's need for assistive devices and provide as appropriate  - Encourage maximum independence but intervene and supervise when necessary  - Involve family in performance of ADLs  - Assess for home care needs following discharge   - Consider OT consult to assist with ADL evaluation and planning for discharge  - Provide patient education as appropriate  Outcome: Progressing  Goal: Maintain proper alignment of affected body part  Description: INTERVENTIONS:  - Support, maintain and protect limb and body alignment  - Provide patient/ family with appropriate education  Outcome: Progressing     Problem: MOBILITY - ADULT  Goal: Maintain or return to baseline ADL function  Description: INTERVENTIONS:  -  Assess patient's ability to carry out ADLs; assess patient's baseline for ADL function and identify physical deficits which impact ability to perform ADLs (bathing, care of mouth/teeth, toileting, grooming, dressing, etc )  - Assess/evaluate cause of self-care deficits   - Assess range of motion  - Assess patient's mobility; develop plan if impaired  - Assess patient's need for assistive devices and provide as appropriate  - Encourage maximum independence but intervene and supervise when necessary  - Involve family in performance of ADLs  - Assess for home care needs following discharge   - Consider OT consult to assist with ADL evaluation and planning for discharge  - Provide patient education as appropriate  Outcome: Progressing  Goal: Maintains/Returns to pre admission functional level  Description: INTERVENTIONS:  - Perform BMAT or MOVE assessment daily    - Set and communicate daily mobility goal to care team and patient/family/caregiver  - Collaborate with rehabilitation services on mobility goals if consulted  - Perform Range of Motion  times a day  - Reposition patient every  hours    - Dangle patient  times a day  - Stand patient  times a day  - Ambulate patient  times a day  - Out of bed to chair  times a day   - Out of bed for meals times a day  - Out of bed for toileting  - Record patient progress and toleration of activity level   Outcome: Progressing

## 2022-10-07 NOTE — PROGRESS NOTES
[facility]  Progress Note - Shankar Brunner  1965, 55 y o  male MRN: [id number]  Unit/Bed#: E4 -01 Encounter: [id number]  Primary Care Provider: Allison Bai MD   Date and time admitted to hospital: 10/6/2022  2:22 PM    * Atrial flutter with rapid ventricular response ([facility] )  Assessment & Plan  · Patient was and the hospital after his visiting therapist noticed his heart rate was in the 150s  · Was found to be in AFib with RVR  · Continue IV Cardizem drip  · Received IV digoxin  · Continue p o  Cardizem 240 mg daily and metoprolol 50 mg b i d  · Cardiology following  · Continue anticoagulation with Xarelto  · Plan for LAURENCE cardioversion today    COPD without exacerbation ([place] [address] )  Assessment & Plan  · Does not appear in exacerbation at this time  · Continue Breo and p r n  albuterol    Essential hypertension  Assessment & Plan  · BP stable today, was borderline low last night  · Lisinopril remains on hold  · Continue metoprolol and Cardizem    Type 2 diabetes mellitus with hyperglycemia, with long-term current use of insulin ([place] [address] )  Assessment & Plan  Lab Results   Component Value Date    HGBA1C 9 4 (H) 07/08/2022     · Hold oral hypoglycemic agents  · Continue Lantus 35 units at bedtime  · Add sliding scale insulin  (P) 127        VTE Pharmacologic Prophylaxis: VTE Score: 6 High Risk (Score >/= 5) - Pharmacological DVT Prophylaxis Ordered: rivaroxaban (Xarelto)  Sequential Compression Devices Ordered  Patient Centered Rounds: I performed bedside rounds with nursing staff today  Discussions with Specialists or Other Care Team Provider:  None    Education and Discussions with Family / Patient: Patient declined call to   Time Spent for Care: 30 minutes  More than 50% of total time spent on counseling and coordination of care as described above      Current Length of Stay: 1 day(s)  Current Patient Status: Inpatient   Certification Statement: The patient will continue to require additional inpatient hospital stay due to AFib with RVR pending cardioversion  Discharge Plan: Anticipate discharge in 24-48 hrs to home  Code Status: Level 1 - Full Code    Subjective:   Patient reports he is feeling fine today  Notes he never had any complaints with his AFib  Denies any chest pain or shortness of breath  Agreeable for plan for cardioversion today  Objective:     Vitals:   Temp (24hrs), Av 4 °F (36 3 °C), Min:96 7 °F (35 9 °C), Max:98 1 °F (36 7 °C)    Temp:  [96 7 °F (35 9 °C)-98 1 °F (36 7 °C)] 96 7 °F (35 9 °C)  HR:  [] 99  Resp:  [17-18] 18  BP: ()/(55-82) 116/78  SpO2:  [94 %-98 %] 97 %  Body mass index is 51 28 kg/m²  Input and Output Summary (last 24 hours): Intake/Output Summary (Last 24 hours) at 10/7/2022 0919  Last data filed at 10/6/2022 1744  Gross per 24 hour   Intake 1000 ml   Output --   Net 1000 ml       Physical Exam:   Physical Exam  Vitals reviewed  Constitutional:       General: He is not in acute distress  HENT:      Head: Normocephalic and atraumatic  Eyes:      General: No scleral icterus  Conjunctiva/sclera: Conjunctivae normal    Cardiovascular:      Rate and Rhythm: Tachycardia present  Rhythm irregular  Heart sounds: No murmur heard  Pulmonary:      Effort: Pulmonary effort is normal  No respiratory distress  Breath sounds: Normal breath sounds  Abdominal:      General: Bowel sounds are normal  There is no distension  Palpations: Abdomen is soft  Tenderness: There is no abdominal tenderness  Musculoskeletal:      Cervical back: Neck supple  Right lower leg: No edema  Left lower leg: No edema  Comments: TLSO brace   Skin:     General: Skin is warm and dry  Neurological:      Mental Status: He is alert and oriented to person, place, and time     Psychiatric:         Mood and Affect: Mood normal          Behavior: Behavior normal           Additional Data: Labs:  Results from last 7 days   Lab Units 10/07/22  0643 10/06/22  1436   WBC Thousand/uL 5 89 5 74   HEMOGLOBIN g/dL 14 2 15 4   HEMATOCRIT % 44 9 49 0   PLATELETS Thousands/uL 219 220   NEUTROS PCT %  --  63   LYMPHS PCT %  --  28   MONOS PCT %  --  8   EOS PCT %  --  1     Results from last 7 days   Lab Units 10/07/22  0643 10/06/22  1436   SODIUM mmol/L 136 137   POTASSIUM mmol/L 4 4 4 3   CHLORIDE mmol/L 105 103   CO2 mmol/L 24 29   BUN mg/dL 15 16   CREATININE mg/dL 1 04 1 09   ANION GAP mmol/L 7 5   CALCIUM mg/dL 9 0 10 0   ALBUMIN g/dL  --  3 6   TOTAL BILIRUBIN mg/dL  --  0 25   ALK PHOS U/L  --  67   ALT U/L  --  23   AST U/L  --  13   GLUCOSE RANDOM mg/dL 143* 239*         Results from last 7 days   Lab Units 10/07/22  0738 10/06/22  1952   POC GLUCOSE mg/dl 128 126               Lines/Drains:  Invasive Devices  Report    Peripheral Intravenous Line  Duration           Peripheral IV 10/06/22 Left Antecubital 1 day    Peripheral IV 10/06/22 Right Antecubital <1 day                  Telemetry:  Telemetry Orders (From admission, onward)             48 Hour Telemetry Monitoring  (ED Bridging Orders Panel)  Continuous x 48 hours        References:    Telemetry Guidelines   Question:  Reason for 48 Hour Telemetry  Answer:  Arrhythmias Requiring Medical Therapy (eg  SVT, Vtach/fib, Bradycardia, Uncontrolled A-fib)                 Telemetry Reviewed: Atrial fibrillation  HR averaging 133  Indication for Continued Telemetry Use: Arrthymias requiring medical therapy             Imaging: No pertinent imaging reviewed      Recent Cultures (last 7 days):         Last 24 Hours Medication List:   Current Facility-Administered Medications   Medication Dose Route Frequency Provider Last Rate   • albuterol  2 puff Inhalation Q4H PRN Gricelda Flores MD     • aspirin  81 mg Oral Daily Gricelda Flores MD     • atorvastatin  20 mg Oral Daily With Shemar Perez MD     • diltiazem  240 mg Oral Daily Tish Srivastava MD     • diltiazem  1-15 mg/hr Intravenous Titrated Tish Srivastava MD 15 mg/hr (10/07/22 2353)   • DULoxetine  30 mg Oral HS Tish Srivastava MD     • fluticasone-vilanterol  1 puff Inhalation Daily Tish Srivastava MD     • gabapentin  100 mg Oral TID Tish Srivastava MD     • insulin glargine  35 Units Subcutaneous HS Tish Srivastava MD     • insulin lispro  1-5 Units Subcutaneous TID Yeimy Villafana MD     • metoprolol tartrate  50 mg Oral BID Radha Ford MD     • nicotine  1 patch Transdermal Daily Tish Srivastava MD     • oxyCODONE  10 mg Oral Q4H PRN Dennis Koch PA-C     • oxyCODONE  5 mg Oral Q4H PRN Dennis Koch PA-C     • rivaroxaban  20 mg Oral Daily With Breakfast Tish Srivastava MD     • sodium chloride  100 mL/hr Intravenous Continuous Tish Srivastava  mL/hr (10/07/22 9005)        Today, Patient Was Seen By: Mercedes Kunz    **Please Note: This note may have been constructed using a voice recognition system  **

## 2022-10-07 NOTE — PLAN OF CARE
Problem: Potential for Falls  Goal: Patient will remain free of falls  Description: INTERVENTIONS:  - Educate patient/family on patient safety including physical limitations  - Instruct patient to call for assistance with activity   - Consult OT/PT to assist with strengthening/mobility   - Keep Call bell within reach  - Keep bed low and locked with side rails adjusted as appropriate  - Keep care items and personal belongings within reach  - Initiate and maintain comfort rounds  Outcome: Progressing     Problem: PAIN - ADULT  Goal: Verbalizes/displays adequate comfort level or baseline comfort level  Description: Interventions:  - Encourage patient to monitor pain and request assistance  - Assess pain using appropriate pain scale  - Administer analgesics based on type and severity of pain and evaluate response  - Implement non-pharmacological measures as appropriate and evaluate response  - Consider cultural and social influences on pain and pain management  - Notify physician/advanced practitioner if interventions unsuccessful or patient reports new pain  Outcome: Progressing     Problem: INFECTION - ADULT  Goal: Absence or prevention of progression during hospitalization  Description: INTERVENTIONS:  - Assess and monitor for signs and symptoms of infection  - Monitor lab/diagnostic results  - Monitor all insertion sites, i e  indwelling lines, tubes, and drains  - Monitor endotracheal if appropriate and nasal secretions for changes in amount and color  - [place] appropriate cooling/warming therapies per order  - Administer medications as ordered  - Instruct and encourage patient and family to use good hand hygiene technique  - Identify and instruct in appropriate isolation precautions for identified infection/condition  Outcome: Progressing     Problem: SAFETY ADULT  Goal: Maintain or return to baseline ADL function  Description: INTERVENTIONS:  -  Assess patient's ability to carry out ADLs; assess patient's baseline for ADL function and identify physical deficits which impact ability to perform ADLs (bathing, care of mouth/teeth, toileting, grooming, dressing, etc )  - Assess/evaluate cause of self-care deficits   - Assess range of motion  - Assess patient's mobility; develop plan if impaired  - Assess patient's need for assistive devices and provide as appropriate  - Encourage maximum independence but intervene and supervise when necessary  - Involve family in performance of ADLs  - Assess for home care needs following discharge   - Consider OT consult to assist with ADL evaluation and planning for discharge  - Provide patient education as appropriate  Outcome: Progressing  Goal: Maintains/Returns to pre admission functional level  Description: INTERVENTIONS:  - Perform BMAT or MOVE assessment daily    - Set and communicate daily mobility goal to care team and patient/family/caregiver     - Collaborate with rehabilitation services on mobility goals if consulted  - Out of bed for toileting  - Record patient progress and toleration of activity level   Outcome: Progressing     Problem: DISCHARGE PLANNING  Goal: Discharge to home or other facility with appropriate resources  Description: INTERVENTIONS:  - Identify barriers to discharge w/patient and caregiver  - Arrange for needed discharge resources and transportation as appropriate  - Identify discharge learning needs (meds, wound care, etc )  - Arrange for interpretive services to assist at discharge as needed  - Refer to Case Management Department for coordinating discharge planning if the patient needs post-hospital services based on physician/advanced practitioner order or complex needs related to functional status, cognitive ability, or social support system  Outcome: Progressing     Problem: Knowledge Deficit  Goal: Patient/family/caregiver demonstrates understanding of disease process, treatment plan, medications, and discharge instructions  Description: Complete learning assessment and assess knowledge base    Interventions:  - Provide teaching at level of understanding  - Provide teaching via preferred learning methods  Outcome: Progressing     Problem: CARDIOVASCULAR - ADULT  Goal: Maintains optimal cardiac output and hemodynamic stability  Description: INTERVENTIONS:  - Monitor I/O, vital signs and rhythm  - Monitor for S/S and trends of decreased cardiac output  - Administer and titrate ordered vasoactive medications to optimize hemodynamic stability  - Assess quality of pulses, skin color and temperature  - Assess for signs of decreased coronary artery perfusion  - Instruct patient to report change in severity of symptoms  Outcome: Progressing  Goal: Absence of cardiac dysrhythmias or at baseline rhythm  Description: INTERVENTIONS:  - Continuous cardiac monitoring, vital signs, obtain 12 lead EKG if ordered  - Administer antiarrhythmic and heart rate control medications as ordered  - Monitor electrolytes and administer replacement therapy as ordered  Outcome: Progressing     Problem: METABOLIC, FLUID AND ELECTROLYTES - ADULT  Goal: Glucose maintained within target range  Description: INTERVENTIONS:  - Monitor Blood Glucose as ordered  - Assess for signs and symptoms of hyperglycemia and hypoglycemia  - Administer ordered medications to maintain glucose within target range  - Assess nutritional intake and initiate nutrition service referral as needed  Outcome: Progressing     Problem: MUSCULOSKELETAL - ADULT  Goal: Maintain or return mobility to safest level of function  Description: INTERVENTIONS:  - Assess patient's ability to carry out ADLs; assess patient's baseline for ADL function and identify physical deficits which impact ability to perform ADLs (bathing, care of mouth/teeth, toileting, grooming, dressing, etc )  - Assess/evaluate cause of self-care deficits   - Assess range of motion  - Assess patient's mobility  - Assess patient's need for assistive devices and provide as appropriate  - Encourage maximum independence but intervene and supervise when necessary  - Involve family in performance of ADLs  - Assess for home care needs following discharge   - Consider OT consult to assist with ADL evaluation and planning for discharge  - Provide patient education as appropriate  Outcome: Progressing  Goal: Maintain proper alignment of affected body part  Description: INTERVENTIONS:  - Support, maintain and protect limb and body alignment  - Provide patient/ family with appropriate education  Outcome: Progressing

## 2022-10-07 NOTE — ANESTHESIA PREPROCEDURE EVALUATION
Procedure:  LAURENCE    Relevant Problems   ANESTHESIA (within normal limits)      CARDIO   (+) Atrial fibrillation with RVR (HCC)   (+) Atrial flutter with rapid ventricular response (HCC)   (+) Chronic diastolic congestive heart failure (HCC)   (+) Chronic midline thoracic back pain   (+) Essential hypertension   (+) Other hyperlipidemia   (+) Paroxysmal atrial fibrillation (HCC)      ENDO   (+) Type 2 diabetes mellitus with hyperglycemia, with long-term current use of insulin (HCC)      GI/HEPATIC (within normal limits)      /RENAL (within normal limits)      GYN (within normal limits)      HEMATOLOGY (within normal limits)      MUSCULOSKELETAL   (+) Chronic midline thoracic back pain      NEURO/PSYCH   (+) Chronic midline thoracic back pain   (+) Legally blind in right eye, as defined in [place]   (+) Numbness and tingling in left hand      PULMONARY   (+) COPD without exacerbation (HCC)   (+) Mild persistent asthma        Physical Exam    Airway    Mallampati score: III  TM Distance: >3 FB  Neck ROM: full     Dental       Cardiovascular  Rhythm: regular, Rate: normal, Cardiovascular exam normal    Pulmonary  Pulmonary exam normal Breath sounds clear to auscultation,     Other Findings        Anesthesia Plan  ASA Score- 3     Anesthesia Type- general with ASA Monitors  Additional Monitors:   Airway Plan:           Plan Factors-Exercise tolerance (METS): >4 METS  Chart reviewed  EKG reviewed  Existing labs reviewed  Patient summary reviewed  Patient is a current smoker  Induction- intravenous  Postoperative Plan-     Informed Consent- Anesthetic plan and risks discussed with patient

## 2022-10-07 NOTE — PROCEDURES
Hetal Vidal  is a 55 y o  y o  male presented to the hospital with atrial flutter  Informed consent was obtained after indications, risks, and benefits were thoroughly reviewed  The patient was identified in the cardiac catheterization lab  A time out procedure was performed  Cardioversion leads were placed in an aniyah-apical fashion  Continuous pulse oximetry and ECG were performed with blood pressure measurements at regular intervals  The patient was sedated by the anesthesia service  After adequate sedation, a LAURENCE was performed  Please see separate report for full details  Briefly, the LV function was severely reduced, and there was no left atrial or left atrial appendage thrombus identified  After the LAURENCE, adequate sedation was once again confirmed  The patient was cardioverted to sinus rhythm with a 200 J biphasic shock  There were no immediate complications

## 2022-10-07 NOTE — MALNUTRITION/BMI
This medical record reflects one or more clinical indicators suggestive of malnutrition and/or morbid obesity  Malnutrition Findings:                            360 Statement: Morbid obesity r/t energy intake > output over time as evidenced by BMI 51 28  BMI Findings:  Adult BMI Classifications: Morbid Obesity 50-59 9        Body mass index is 51 28 kg/m²  See Nutrition note dated 10/07/2022 for additional details  Completed nutrition assessment is viewable in the nutrition documentation

## 2022-10-07 NOTE — PLAN OF CARE
Problem: Potential for Falls  Goal: Patient will remain free of falls  Description: INTERVENTIONS:  - Educate patient/family on patient safety including physical limitations  - Instruct patient to call for assistance with activity   - Consult OT/PT to assist with strengthening/mobility   - Keep Call bell within reach  - Keep bed low and locked with side rails adjusted as appropriate  - Keep care items and personal belongings within reach  - Initiate and maintain comfort rounds  10/7/2022 0129 by Keith Romero  Outcome: Progressing       Problem: PAIN - ADULT  Goal: Verbalizes/displays adequate comfort level or baseline comfort level  Description: Interventions:  - Encourage patient to monitor pain and request assistance  - Assess pain using appropriate pain scale  - Administer analgesics based on type and severity of pain and evaluate response  - Implement non-pharmacological measures as appropriate and evaluate response  - Consider cultural and social influences on pain and pain management  - Notify physician/advanced practitioner if interventions unsuccessful or patient reports new pain  10/7/2022 0129 by Keith Romero  Outcome: Progressing    Problem: INFECTION - ADULT  Goal: Absence or prevention of progression during hospitalization  Description: INTERVENTIONS:  - Assess and monitor for signs and symptoms of infection  - Monitor lab/diagnostic results  - Monitor all insertion sites, i e  indwelling lines, tubes, and drains  - Monitor endotracheal if appropriate and nasal secretions for changes in amount and color  - [place] appropriate cooling/warming therapies per order  - Administer medications as ordered  - Instruct and encourage patient and family to use good hand hygiene technique  - Identify and instruct in appropriate isolation precautions for identified infection/condition  10/7/2022 0129 by Keith Romero  Outcome: Progressing       Problem: SAFETY ADULT  Goal: Maintain or return to baseline ADL function  Description: INTERVENTIONS:  -  Assess patient's ability to carry out ADLs; assess patient's baseline for ADL function and identify physical deficits which impact ability to perform ADLs (bathing, care of mouth/teeth, toileting, grooming, dressing, etc )  - Assess/evaluate cause of self-care deficits   - Assess range of motion  - Assess patient's mobility; develop plan if impaired  - Assess patient's need for assistive devices and provide as appropriate  - Encourage maximum independence but intervene and supervise when necessary  - Involve family in performance of ADLs  - Assess for home care needs following discharge   - Consider OT consult to assist with ADL evaluation and planning for discharge  - Provide patient education as appropriate  10/7/2022 0129 by Stella Fleming  Outcome: Progressing    Goal: Maintains/Returns to pre admission functional level  Description: INTERVENTIONS:  - Perform BMAT or MOVE assessment daily    - Set and communicate daily mobility goal to care team and patient/family/caregiver     - Collaborate with rehabilitation services on mobility goals if consulted  - Out of bed for toileting  - Record patient progress and toleration of activity level   10/7/2022 0129 by Stella Fleming  Outcome: Progressing       Problem: DISCHARGE PLANNING  Goal: Discharge to home or other facility with appropriate resources  Description: INTERVENTIONS:  - Identify barriers to discharge w/patient and caregiver  - Arrange for needed discharge resources and transportation as appropriate  - Identify discharge learning needs (meds, wound care, etc )  - Arrange for interpretive services to assist at discharge as needed  - Refer to Case Management Department for coordinating discharge planning if the patient needs post-hospital services based on physician/advanced practitioner order or complex needs related to functional status, cognitive ability, or social support system  10/7/2022 0129 by Jagdeep Herring Lin  Outcome: Progressing       Problem: Knowledge Deficit  Goal: Patient/family/caregiver demonstrates understanding of disease process, treatment plan, medications, and discharge instructions  Description: Complete learning assessment and assess knowledge base    Interventions:  - Provide teaching at level of understanding  - Provide teaching via preferred learning methods  10/7/2022 0129 by Melissa Luke  Outcome: Progressing       Problem: CARDIOVASCULAR - ADULT  Goal: Maintains optimal cardiac output and hemodynamic stability  Description: INTERVENTIONS:  - Monitor I/O, vital signs and rhythm  - Monitor for S/S and trends of decreased cardiac output  - Administer and titrate ordered vasoactive medications to optimize hemodynamic stability  - Assess quality of pulses, skin color and temperature  - Assess for signs of decreased coronary artery perfusion  - Instruct patient to report change in severity of symptoms  10/7/2022 0129 by Melissa Luke  Outcome: Progressing    Goal: Absence of cardiac dysrhythmias or at baseline rhythm  Description: INTERVENTIONS:  - Continuous cardiac monitoring, vital signs, obtain 12 lead EKG if ordered  - Administer antiarrhythmic and heart rate control medications as ordered  - Monitor electrolytes and administer replacement therapy as ordered  10/7/2022 0129 by Melissa Luke  Outcome: Progressing     Problem: METABOLIC, FLUID AND ELECTROLYTES - ADULT  Goal: Glucose maintained within target range  Description: INTERVENTIONS:  - Monitor Blood Glucose as ordered  - Assess for signs and symptoms of hyperglycemia and hypoglycemia  - Administer ordered medications to maintain glucose within target range  - Assess nutritional intake and initiate nutrition service referral as needed  10/7/2022 0129 by Melissa Luke  Outcome: Progressing    Problem: MUSCULOSKELETAL - ADULT  Goal: Maintain or return mobility to safest level of function  Description: INTERVENTIONS:  - Assess patient's ability to carry out ADLs; assess patient's baseline for ADL function and identify physical deficits which impact ability to perform ADLs (bathing, care of mouth/teeth, toileting, grooming, dressing, etc )  - Assess/evaluate cause of self-care deficits   - Assess range of motion  - Assess patient's mobility  - Assess patient's need for assistive devices and provide as appropriate  - Encourage maximum independence but intervene and supervise when necessary  - Involve family in performance of ADLs  - Assess for home care needs following discharge   - Consider OT consult to assist with ADL evaluation and planning for discharge  - Provide patient education as appropriate  10/7/2022 0129 by Winston Vela  Outcome: Progressing    Goal: Maintain proper alignment of affected body part  Description: INTERVENTIONS:  - Support, maintain and protect limb and body alignment  - Provide patient/ family with appropriate education  10/7/2022 0129 by Winston Vela  Outcome: Progressing

## 2022-10-07 NOTE — PROCEDURES
Procedure note:  LAURENCE Preliminary Report    Impression:     LV:  Dilated left ventricle with severely reduced left ventricular systolic function  Left ventricular ejection fraction ~ 15%  LA:  Dilated  JONI:  Mildly dilated  No spontaneous echocontrast ("smoke"), or thrombus  Prominent pectinate muscle was seen  RA:  Normal size  Interatrial septum:  Appears grossly normal   RV:  Normal size and systolic function  MV:  Normal structure  No mitral stenosis  Trace regurgitation  AV:  Normal structure  No aortic stenosis  No regurgitation  TV:  Normal structure  No tricuspid stenosis  Trace regurgitation  PV:  Grossly normal but poorly visualized  No gross pulmonic stenosis or significant regurgitation  AO:  Normal appearing root, descending thoracic aorta, and aortic arch  Informed consent obtained from patient prior to procedure after all indications, risks, and benefits of LAURENCE thoroughly discussed  Propofol was utilized for sedation and administered intravenously by Anesthesia  Blood pressure, EKG, pulse oximetry, respirations, and level of consciousness were monitored throughout the procedure  Pt tolerated procedure well with nurse anesthetist performing sedation and monitoring  LAURENCE probe passed without difficulty with no blood seen on probe upon extubation

## 2022-10-07 NOTE — ASSESSMENT & PLAN NOTE
· Patient was and the hospital after his visiting therapist noticed his heart rate was in the 150s  · Was found to be in AFib with RVR  · Continue IV Cardizem drip  · Received IV digoxin  · Continue p o  Cardizem 240 mg daily and metoprolol 50 mg b i d    · Cardiology following  · Continue anticoagulation with Xarelto  · Plan for LAURENCE cardioversion today

## 2022-10-07 NOTE — UTILIZATION REVIEW
Initial Clinical Review    Admission: Date/Time/Statement:   Admission Orders (From admission, onward)     Ordered        10/06/22 [address] [place]  Once            10/06/22 1530  INPATIENT ADMISSION  Once,   Status:  Canceled                      Orders Placed This Encounter   Procedures   • INPATIENT ADMISSION     Standing Status:   Standing     Number of Occurrences:   1     Order Specific Question:   Level of Care     Answer:   Level 2 Stepdown / HOT [14]     Order Specific Question:   Estimated length of stay     Answer:   More than 2 Midnights     Order Specific Question:   Certification     Answer:   I certify that inpatient services are medically necessary for this patient for a duration of greater than two midnights  See H&P and MD Progress Notes for additional information about the patient's course of treatment  ED Arrival Information     Expected   -    Arrival   10/6/2022 14:22    Acuity   Emergent            Means of arrival   Ambulance    Escorted by   [facility] ([facility])    Service   Hospitalist    Admission type   Emergency            Arrival complaint   palipation           Chief Complaint   Patient presents with   • Atrial Flutter     Per [facility], pt arrives in atrial flutter, has [facility] at house who called EMS  Pt denies symptoms at this time  Initial Presentation: 55 y o  male presents to the ED via EMS at direction of [facility] with c/o fast heart rate  He has no complaints/symptoms  PMH: IDDM, HTN, COPD  In the ED, ECG shows A flutter with 2:1 conduction with RVR  Imaging shows no acute disease  Labs - elevated glucose, proBNP  He was treated with IV and oral Cardizem with drip, IV Dig, IV fluids, Statin  He remains tachycardic on exam   He is admitted to INPATIENT status with A flutter w/ RVR - continue Cardizem Drip, IV Digoxin, Cardio consult, on Xarelto  HTN - borderline hypotension - holding Lisinopril, IV fluids        10/6 Cardio Consult - Atrial flutter with two-to-one conduction with rapid ventricular response, Primary HTN, h/o PAF, Dyslipidemia - asymptomatic and unsure of cause of A flutter  On exam no volume overload  Is on Cardizem drip, had IV Digoxin  Start Metoprolol 50 mg BID with parameters and can titrate, NPO p MN, LAURENCE guided CV - will need to wear back brace during procedure  Will need EPS study and ablation as OP, OP Sleep study  Date: 10/7  Day 2:   Remain on Cardizem drip, continue PO Cardizem and Metoprolol BID  Will have LAURENCE Cardioversion today  BP stable today  On exam feels fine, irregular tachycardia  No CP, SOB  Date: 10/7 Cardioversion: cardioverted to sinus rhythm with a 200 J biphasic shock        ED Triage Vitals [10/06/22 1428]   Temperature Pulse Respirations Blood Pressure SpO2   98 1 °F (36 7 °C) (!) 150 18 135/64 97 %      Temp Source Heart Rate Source Patient Position - Orthostatic VS BP Location FiO2 (%)   Oral Monitor Lying Right arm --      Pain Score       No Pain          Wt Readings from Last 1 Encounters:   10/06/22 (!) 171 kg (378 lb 1 4 oz)     Additional Vital Signs:   10/07/22 1137 96 9 °F (36 1 °C) Abnormal  68 14 103/70 -- 97 % -- --   10/07/22 1005 -- 68 -- 102/70 -- -- -- --   10/07/22 0900 -- 99 -- 116/78 -- -- -- --   10/07/22 0806 96 7 °F (35 9 °C) Abnormal  138 Abnormal  18 -- -- 97 % -- --   10/07/22 0244 96 9 °F (36 1 °C) Abnormal  139 Abnormal  18 114/66 84 94 % None (Room air) Lying   10/06/22 2326 97 5 °F (36 4 °C) 89 18 119/75 -- 95 % None (Room air) Lying   10/06/22 1900 97 6 °F (36 4 °C) 140 Abnormal  17 98/55 -- 96 % None (Room air) Lying   10/06/22 1852 -- 144 Abnormal  18 103/82 -- 96 % None (Room air) Lying   10/06/22 1846 -- 145 Abnormal  -- 102/72 -- -- -- --   10/06/22 1735 -- 144 Abnormal  18 99/55 -- 95 % None (Room air) Lying   10/06/22 1720 -- 143 Abnormal  18 92/56 -- 95 % None (Room air) Lying   10/06/22 1705 -- 142 Abnormal  18 101/74 -- 94 % None (Room air) Lying   10/06/22 1650 -- 144 Abnormal  18 88/55 Abnormal  -- 96 % None (Room air) Lying   10/06/22 1550 -- 145 Abnormal  18 103/58 -- 98 % None (Room air) Lying   10/06/22 1535 -- 146 Abnormal  18 96/69 -- 96 % None (Room air) Lying   10/06/22 1520 -- 146 Abnormal  18 104/57 -- 97 % None (Room air) Sitting     Pertinent Labs/Diagnostic Test Results:     10/6 ECG -  atrial flutter with two-to-one conduction with rapid ventricular response  Non-specific intra-ventricular conduction block  Abnormal ECG    10/6 ECG -  Atrial flutter with two-to-one conduction, rapid ventricular response  Marked ST abnormality, possible inferior subendocardial injury  Abnormal ECG    10/6 Cardioversion  - successful cardioversion  10/7 LAURENCE -•  Left Ventricle: Left ventricular cavity size is dilated  The left ventricular ejection fraction is 20%  Systolic function is severely reduced  There is severe global hypokinesis  •  Left Atrium: The atrium is mildly dilated  •  Right Atrium: The atrium is mildly dilated  •  Left Atrial Appendage: There is reduced function  There is no thrombus  There is no spontaneous echo contrast   •  Tricuspid Valve: There is mild regurgitation          XR chest 1 view portable   No acute cardiopulmonary findings           Results from last 7 days   Lab Units 10/07/22  0643 10/06/22  1436   WBC Thousand/uL 5 89 5 74   HEMOGLOBIN g/dL 14 2 15 4   HEMATOCRIT % 44 9 49 0   PLATELETS Thousands/uL 219 220   NEUTROS ABS Thousands/µL  --  3 57         Results from last 7 days   Lab Units 10/07/22  0643 10/06/22  1436   SODIUM mmol/L 136 137   POTASSIUM mmol/L 4 4 4 3   CHLORIDE mmol/L 105 103   CO2 mmol/L 24 29   ANION GAP mmol/L 7 5   BUN mg/dL 15 16   CREATININE mg/dL 1 04 1 09   EGFR ml/min/1 73sq m 80 76   CALCIUM mg/dL 9 0 10 0   MAGNESIUM mg/dL 1 8 2 0   PHOSPHORUS mg/dL  --  3 5     Results from last 7 days   Lab Units 10/06/22  1436   AST U/L 13   ALT U/L 23   ALK PHOS U/L 67   TOTAL PROTEIN g/dL 8 1   ALBUMIN g/dL 3 6 TOTAL BILIRUBIN mg/dL 0 25     Results from last 7 days   Lab Units 10/07/22  1057 10/07/22  0738 10/06/22  1952   POC GLUCOSE mg/dl 151* 128 126     Results from last 7 days   Lab Units 10/07/22  0643 10/06/22  1436   GLUCOSE RANDOM mg/dL 143* 239*     Results from last 7 days   Lab Units 10/06/22  2106 10/06/22  1633 10/06/22  1436   HS TNI 0HR ng/L  --   --  8   HS TNI 2HR ng/L  --  9  --    HSTNI D2 ng/L  --  1  --    HS TNI 4HR ng/L 9  --   --    HSTNI D4 ng/L 1  --   --              Results from last 7 days   Lab Units 10/06/22  1436   TSH 3RD GENERATON uIU/mL 1 148     Results from last 7 days   Lab Units 10/06/22  1436   NT-PRO BNP pg/mL 850*     ED Treatment:   Medication Administration from 10/06/2022 1422 to 10/06/2022 1917       Date/Time Order Dose Route Action     10/06/2022 1434 diltiazem (CARDIZEM) injection 15 mg 15 mg Intravenous Given     10/06/2022 1550 diltiazem (CARDIZEM) 125 mg in sodium chloride 0 9 % 125 mL infusion 15 mg/hr Intravenous Rate/Dose Change     10/06/2022 1536 diltiazem (CARDIZEM) 125 mg in sodium chloride 0 9 % 125 mL infusion 12 5 mg/hr Intravenous Rate/Dose Change     10/06/2022 1520 diltiazem (CARDIZEM) 125 mg in sodium chloride 0 9 % 125 mL infusion 10 mg/hr Intravenous Rate/Dose Change     10/06/2022 1505 diltiazem (CARDIZEM) 125 mg in sodium chloride 0 9 % 125 mL infusion 7 5 mg/hr Intravenous Rate/Dose Change     10/06/2022 1448 diltiazem (CARDIZEM) 125 mg in sodium chloride 0 9 % 125 mL infusion 5 mg/hr Intravenous New Bag     10/06/2022 1634 digoxin (LANOXIN) injection 250 mcg 250 mcg Intravenous Given     10/06/2022 1846 atorvastatin (LIPITOR) tablet 20 mg 20 mg Oral Given     10/06/2022 1850 sodium chloride 0 9 % infusion 100 mL/hr Intravenous New Bag     10/06/2022 1622 sodium chloride 0 9 % bolus 1,000 mL 1,000 mL Intravenous New Bag        Past Medical History:   Diagnosis Date   • Asthma    • Carpal tunnel syndrome    • Chronic pain     left arm   • Diabetes mellitus ([facility] )    • Erectile dysfunction    • Hypertension    • Thoracic vertebral fracture [facility])    • Umbilical hernia      Present on Admission:  • Essential hypertension  • Atrial flutter with rapid ventricular response (HCC)  • COPD without exacerbation (HCC)      Admitting Diagnosis: Atrial fibrillation (HCC) [I48 91]  Di[facility] ([facility] ) [E11 9]  Palpitation [R00 2]  Age/Sex: 55 y o  male  Admission Orders:  Scheduled Medications:  aspirin, 81 mg, Oral, Daily  atorvastatin, 20 mg, Oral, Daily With Dinner  diltiazem, 240 mg, Oral, Daily  DULoxetine, 30 mg, Oral, HS  fluticasone-vilanterol, 1 puff, Inhalation, Daily  gabapentin, 100 mg, Oral, TID  insulin glargine, 35 Units, Subcutaneous, HS  insulin lispro, 1-5 Units, Subcutaneous, TID AC  metoprolol tartrate, 50 mg, Oral, BID  nicotine, 1 patch, Transdermal, Daily  rivaroxaban, 20 mg, Oral, Daily With Breakfast      Continuous IV Infusions:  diltiazem, 1-15 mg/hr, Intravenous, Titrated  sodium chloride, 100 mL/hr, Intravenous, Continuous      PRN Meds:  albuterol, 2 puff, Inhalation, Q4H PRN  oxyCODONE, 10 mg, Oral, Q4H PRN - x 1 10/6, 10/7  oxyCODONE, 5 mg, Oral, Q4H PRN    Tele  POC GLUCOSE AC/HS WITH SSI COVERAGE   NPO  LAURENCE  Cardioversion  IP CONSULT TO CARDIOLOGY    Network Utilization Review Department  ATTENTION: Please call with any questions or concerns to [phone] and carefully listen to the prompts so that you are directed to the right person  All voicemails are confidential   [place] all requests for admission clinical reviews, approved or denied determinations and any other requests to dedicated fax number below belonging to the campus where the patient is receiving treatment   List of dedicated fax numbers for the Facilities:  FACILITY NAME UR FAX NUMBER   ADMISSION DENIALS (Administrative/Medical Necessity) [phone]   [address] (Maternity/NICU/Pediatrics) Rickjewel Boyers [place] [address] [phone]     Reymundo Chuman [address] [facility] 77 [phone]   [address] [facility] [place] [address] [address] Eugenie PeaceColumb[facility] University [facility] Cent[facility]  [phone] [address] Derek Adams [facility] [place] [address] [phone]

## 2022-10-07 NOTE — ASSESSMENT & PLAN NOTE
· BP stable today, was borderline low last night  · Lisinopril remains on hold  · Continue metoprolol and Cardizem

## 2022-10-07 NOTE — ANESTHESIA POSTPROCEDURE EVALUATION
Post-Op Assessment Note    CV Status:  Stable    Pain management: adequate     Mental Status:  Alert and awake   Hydration Status:  Euvolemic   PONV Controlled:  Controlled   Airway Patency:  Patent      Post Op Vitals Reviewed: Yes      Staff: Anesthesiologist         No complications documented      BP      Temp      Pulse     Resp      SpO2      /79 (BP Location: Left arm)   Pulse 60   Temp 97 8 °F (36 6 °C) (Temporal)   Resp 20   Ht 6' (1 829 m)   Wt (!) 171 kg (378 lb)   SpO2 97%   BMI 51 27 kg/m²

## 2022-10-08 LAB
ANION GAP SERPL CALCULATED.3IONS-SCNC: 4 MMOL/L (ref 4–13)
BUN SERPL-MCNC: 16 MG/DL (ref 5–25)
CALCIUM SERPL-MCNC: 9.3 MG/DL (ref 8.3–10.1)
CHLORIDE SERPL-SCNC: 102 MMOL/L (ref 96–108)
CO2 SERPL-SCNC: 30 MMOL/L (ref 21–32)
CREAT SERPL-MCNC: 1.02 MG/DL (ref 0.6–1.3)
GFR SERPL CREATININE-BSD FRML MDRD: 82 ML/MIN/1.73SQ M
GLUCOSE SERPL-MCNC: 134 MG/DL (ref 65–140)
GLUCOSE SERPL-MCNC: 139 MG/DL (ref 65–140)
GLUCOSE SERPL-MCNC: 143 MG/DL (ref 65–140)
GLUCOSE SERPL-MCNC: 154 MG/DL (ref 65–140)
GLUCOSE SERPL-MCNC: 156 MG/DL (ref 65–140)
MAGNESIUM SERPL-MCNC: 1.9 MG/DL (ref 1.6–2.6)
POTASSIUM SERPL-SCNC: 4.5 MMOL/L (ref 3.5–5.3)
SODIUM SERPL-SCNC: 136 MMOL/L (ref 135–147)

## 2022-10-08 PROCEDURE — 80048 BASIC METABOLIC PNL TOTAL CA: CPT | Performed by: PHYSICIAN ASSISTANT

## 2022-10-08 PROCEDURE — 99232 SBSQ HOSP IP/OBS MODERATE 35: CPT | Performed by: INTERNAL MEDICINE

## 2022-10-08 PROCEDURE — 82948 REAGENT STRIP/BLOOD GLUCOSE: CPT

## 2022-10-08 PROCEDURE — 83735 ASSAY OF MAGNESIUM: CPT | Performed by: PHYSICIAN ASSISTANT

## 2022-10-08 PROCEDURE — 99232 SBSQ HOSP IP/OBS MODERATE 35: CPT | Performed by: HOSPITALIST

## 2022-10-08 RX ORDER — METOPROLOL TARTRATE 50 MG/1
50 TABLET, FILM COATED ORAL 3 TIMES DAILY
Status: DISCONTINUED | OUTPATIENT
Start: 2022-10-08 | End: 2022-10-10 | Stop reason: HOSPADM

## 2022-10-08 RX ADMIN — METOPROLOL TARTRATE 50 MG: 50 TABLET, FILM COATED ORAL at 21:01

## 2022-10-08 RX ADMIN — RIVAROXABAN 20 MG: 20 TABLET, FILM COATED ORAL at 08:00

## 2022-10-08 RX ADMIN — Medication 3 MG: at 21:01

## 2022-10-08 RX ADMIN — GABAPENTIN 100 MG: 100 CAPSULE ORAL at 16:09

## 2022-10-08 RX ADMIN — DILTIAZEM HYDROCHLORIDE 240 MG: 120 CAPSULE, COATED, EXTENDED RELEASE ORAL at 08:00

## 2022-10-08 RX ADMIN — OXYCODONE HYDROCHLORIDE 10 MG: 10 TABLET ORAL at 08:07

## 2022-10-08 RX ADMIN — SODIUM CHLORIDE 100 ML/HR: 0.9 INJECTION, SOLUTION INTRAVENOUS at 02:30

## 2022-10-08 RX ADMIN — INSULIN LISPRO 1 UNITS: 100 INJECTION, SOLUTION INTRAVENOUS; SUBCUTANEOUS at 16:09

## 2022-10-08 RX ADMIN — ASPIRIN 81 MG: 81 TABLET, COATED ORAL at 08:00

## 2022-10-08 RX ADMIN — DULOXETINE HYDROCHLORIDE 30 MG: 30 CAPSULE, DELAYED RELEASE ORAL at 21:01

## 2022-10-08 RX ADMIN — GABAPENTIN 100 MG: 100 CAPSULE ORAL at 21:01

## 2022-10-08 RX ADMIN — METOPROLOL TARTRATE 50 MG: 50 TABLET, FILM COATED ORAL at 08:00

## 2022-10-08 RX ADMIN — ATORVASTATIN CALCIUM 20 MG: 20 TABLET, FILM COATED ORAL at 16:09

## 2022-10-08 RX ADMIN — FLUTICASONE FUROATE AND VILANTEROL TRIFENATATE 1 PUFF: 100; 25 POWDER RESPIRATORY (INHALATION) at 08:00

## 2022-10-08 RX ADMIN — AMIODARONE HYDROCHLORIDE 0.5 MG/MIN: 50 INJECTION, SOLUTION INTRAVENOUS at 22:51

## 2022-10-08 RX ADMIN — INSULIN GLARGINE 35 UNITS: 100 INJECTION, SOLUTION SUBCUTANEOUS at 21:01

## 2022-10-08 RX ADMIN — GABAPENTIN 100 MG: 100 CAPSULE ORAL at 08:00

## 2022-10-08 RX ADMIN — INSULIN LISPRO 1 UNITS: 100 INJECTION, SOLUTION INTRAVENOUS; SUBCUTANEOUS at 12:09

## 2022-10-08 RX ADMIN — METOPROLOL TARTRATE 50 MG: 50 TABLET, FILM COATED ORAL at 16:09

## 2022-10-08 NOTE — ASSESSMENT & PLAN NOTE
Lab Results   Component Value Date    HGBA1C 9 4 (H) 07/08/2022     · Hold oral hypoglycemic agents    · Continue Lantus 35 units at bedtime  · Add sliding scale insulin  (P) 151

## 2022-10-08 NOTE — ASSESSMENT & PLAN NOTE
· Patient was and the hospital after his visiting therapist noticed his heart rate was in the 150s  · Was found to be in AFib with RVR  · Continue p o   Cardizem 240 mg daily and metoprolol 50 mg b i d - increase to TID today   · Cardiology following  · Continue anticoagulation with Xarelto  · Underwent LAURENCE cardioversion yesterday but was found to be in A fib later in the evening, now back in A flutter again this AM   · Placed on Amio drip, continue   · May require transfer to [facility] for cardioversion if he remains in uncontrolled A flutter

## 2022-10-08 NOTE — PLAN OF CARE
Problem: Potential for Falls  Goal: Patient will remain free of falls  Description: INTERVENTIONS:  - Educate patient/family on patient safety including physical limitations  - Instruct patient to call for assistance with activity   - Consult OT/PT to assist with strengthening/mobility   - Keep Call bell within reach  - Keep bed low and locked with side rails adjusted as appropriate  - Keep care items and personal belongings within reach  - Initiate and maintain comfort rounds  - Make Fall Risk Sign visible to staff  Outcome: Progressing     Problem: PAIN - ADULT  Goal: Verbalizes/displays adequate comfort level or baseline comfort level  Description: Interventions:  - Encourage patient to monitor pain and request assistance  - Assess pain using appropriate pain scale  - Administer analgesics based on type and severity of pain and evaluate response  - Implement non-pharmacological measures as appropriate and evaluate response  - Consider cultural and social influences on pain and pain management  - Notify physician/advanced practitioner if interventions unsuccessful or patient reports new pain  Outcome: Progressing     Problem: INFECTION - ADULT  Goal: Absence or prevention of progression during hospitalization  Description: INTERVENTIONS:  - Assess and monitor for signs and symptoms of infection  - Monitor lab/diagnostic results  - Monitor all insertion sites, i e  indwelling lines, tubes, and drains  - Monitor endotracheal if appropriate and nasal secretions for changes in amount and color  - [place] appropriate cooling/warming therapies per order  - Administer medications as ordered  - Instruct and encourage patient and family to use good hand hygiene technique  - Identify and instruct in appropriate isolation precautions for identified infection/condition  Outcome: Progressing     Problem: SAFETY ADULT  Goal: Maintain or return to baseline ADL function  Description: INTERVENTIONS:  -  Assess patient's ability to carry out ADLs; assess patient's baseline for ADL function and identify physical deficits which impact ability to perform ADLs (bathing, care of mouth/teeth, toileting, grooming, dressing, etc )  - Assess/evaluate cause of self-care deficits   - Assess range of motion  - Assess patient's mobility; develop plan if impaired  - Assess patient's need for assistive devices and provide as appropriate  - Encourage maximum independence but intervene and supervise when necessary  - Involve family in performance of ADLs  - Assess for home care needs following discharge   - Consider OT consult to assist with ADL evaluation and planning for discharge  - Provide patient education as appropriate  Outcome: Progressing  Goal: Maintains/Returns to pre admission functional level  Description: INTERVENTIONS:  - Perform BMAT or MOVE assessment daily    - Set and communicate daily mobility goal to care team and patient/family/caregiver     - Collaborate with rehabilitation services on mobility goals if consulted  - Out of bed for toileting  - Record patient progress and toleration of activity level   Outcome: Progressing     Problem: DISCHARGE PLANNING  Goal: Discharge to home or other facility with appropriate resources  Description: INTERVENTIONS:  - Identify barriers to discharge w/patient and caregiver  - Arrange for needed discharge resources and transportation as appropriate  - Identify discharge learning needs (meds, wound care, etc )  - Arrange for interpretive services to assist at discharge as needed  - Refer to Case Management Department for coordinating discharge planning if the patient needs post-hospital services based on physician/advanced practitioner order or complex needs related to functional status, cognitive ability, or social support system  Outcome: Progressing     Problem: Knowledge Deficit  Goal: Patient/family/caregiver demonstrates understanding of disease process, treatment plan, medications, and discharge instructions  Description: Complete learning assessment and assess knowledge base    Interventions:  - Provide teaching at level of understanding  - Provide teaching via preferred learning methods  Outcome: Progressing     Problem: CARDIOVASCULAR - ADULT  Goal: Maintains optimal cardiac output and hemodynamic stability  Description: INTERVENTIONS:  - Monitor I/O, vital signs and rhythm  - Monitor for S/S and trends of decreased cardiac output  - Administer and titrate ordered vasoactive medications to optimize hemodynamic stability  - Assess quality of pulses, skin color and temperature  - Assess for signs of decreased coronary artery perfusion  - Instruct patient to report change in severity of symptoms  Outcome: Progressing  Goal: Absence of cardiac dysrhythmias or at baseline rhythm  Description: INTERVENTIONS:  - Continuous cardiac monitoring, vital signs, obtain 12 lead EKG if ordered  - Administer antiarrhythmic and heart rate control medications as ordered  - Monitor electrolytes and administer replacement therapy as ordered  Outcome: Progressing     Problem: METABOLIC, FLUID AND ELECTROLYTES - ADULT  Goal: Glucose maintained within target range  Description: INTERVENTIONS:  - Monitor Blood Glucose as ordered  - Assess for signs and symptoms of hyperglycemia and hypoglycemia  - Administer ordered medications to maintain glucose within target range  - Assess nutritional intake and initiate nutrition service referral as needed  Outcome: Progressing     Problem: MUSCULOSKELETAL - ADULT  Goal: Maintain or return mobility to safest level of function  Description: INTERVENTIONS:  - Assess patient's ability to carry out ADLs; assess patient's baseline for ADL function and identify physical deficits which impact ability to perform ADLs (bathing, care of mouth/teeth, toileting, grooming, dressing, etc )  - Assess/evaluate cause of self-care deficits   - Assess range of motion  - Assess patient's mobility  - Assess patient's need for assistive devices and provide as appropriate  - Encourage maximum independence but intervene and supervise when necessary  - Involve family in performance of ADLs  - Assess for home care needs following discharge   - Consider OT consult to assist with ADL evaluation and planning for discharge  - Provide patient education as appropriate  Outcome: Progressing  Goal: Maintain proper alignment of affected body part  Description: INTERVENTIONS:  - Support, maintain and protect limb and body alignment  - Provide patient/ family with appropriate education  Outcome: Progressing     Problem: MOBILITY - ADULT  Goal: Maintain or return to baseline ADL function  Description: INTERVENTIONS:  -  Assess patient's ability to carry out ADLs; assess patient's baseline for ADL function and identify physical deficits which impact ability to perform ADLs (bathing, care of mouth/teeth, toileting, grooming, dressing, etc )  - Assess/evaluate cause of self-care deficits   - Assess range of motion  - Assess patient's mobility; develop plan if impaired  - Assess patient's need for assistive devices and provide as appropriate  - Encourage maximum independence but intervene and supervise when necessary  - Involve family in performance of ADLs  - Assess for home care needs following discharge   - Consider OT consult to assist with ADL evaluation and planning for discharge  - Provide patient education as appropriate  Outcome: Progressing  Goal: Maintains/Returns to pre admission functional level  Description: INTERVENTIONS:  - Perform BMAT or MOVE assessment daily    - Set and communicate daily mobility goal to care team and patient/family/caregiver     - Collaborate with rehabilitation services on mobility goals if consulted  - Out of bed for toileting  - Record patient progress and toleration of activity level   Outcome: Progressing     Problem: Nutrition/Hydration-ADULT  Goal: Nutrient/Hydration intake appropriate for improving, restoring or maintaining nutritional needs  Description: Monitor and assess patient's nutrition/hydration status for malnutrition  Collaborate with interdisciplinary team and initiate plan and interventions as ordered  Monitor patient's weight and dietary intake as ordered or per policy  Utilize nutrition screening tool and intervene as necessary  Determine patient's food preferences and provide high-protein, high-caloric foods as appropriate       INTERVENTIONS:  - Monitor oral intake, urinary output, labs, and treatment plans  - Assess nutrition and hydration status and recommend course of action  - Evaluate amount of meals eaten  - Assist patient with eating if necessary   - Allow adequate time for meals  - Recommend/ encourage appropriate diets, oral nutritional supplements, and vitamin/mineral supplements  - Order, calculate, and assess calorie counts as needed  - Recommend, monitor, and adjust tube feedings and TPN/PPN based on assessed needs  - Assess need for intravenous fluids  - Provide specific nutrition/hydration education as appropriate  - Include patient/family/caregiver in decisions related to nutrition  Outcome: Progressing

## 2022-10-08 NOTE — PROGRESS NOTES
Cardiology         Progress Note - Cardiology   Diana Ward  55 y o  male MRN: [id number]  Unit/Bed#: E4 -01 Encounter: [id number]          Assessment/Recommendations/Discussion:   1  Atrial flutter with two-to-one conduction with rapid ventricular response  2  Severe cardiomyopathy, ejection fraction estimated 20%, likely secondary to rapid atrial flutter at the time of [place]UR[place]  3  Primary hypertension  4  History of paroxysmal atrial fibrillation  5  Dyslipidemia  6  Diabetes mellitus with were glycemic control  Hemoglobin A1c is 9 4  7  Obesity  8  Tobacco dependence  9  Suspected obstructive sleep apnea  10  COPD and asthma        · Although successful cardioversion yesterday, he went into atrial fibrillation later in the afternoon yesterday  Now he appears to be back in atrial flutter with 2-1 av block  Continue IV amiodarone infusion  Will increase metoprolol to 50 mg p o  T i d   If remains in atrial flutter by Monday, recommend transfer to [facility] for atrial flutter ablation  Potassium and magnesium within normal limits today    · Avoid diltiazem in light of severe cardiomyopathy  · Volume status seems compensated, but need to observe cautiously for potential CHF volume overload setting of cardiomyopathy and ongoing rapid atrial flutter  · Continue Xarelto anticoagulation          Subjective:  Patient seen and examined, feels well, offers no complaints, denies shortness of breath, chest discomfort, palpitations                Physical Exam:  GEN:  NAD  HEENT:  MMM, NCAT, pink conjunctiva, EOMI, nonicteric sclera  CV:  NO JVD/HJR, RR, tachycardia, NO M/R/G, +S1/S2, NO PARASTERNAL HEAVE/THRILL, NO LE EDEMA, NO HEPATIC SYSTOLIC PULSATION, WARM EXTREMITIES  RESP:  CTAB/L  ABD:  SOFT, NT, NO GROSS ORGANOMEGALY        Vitals:   /97 (BP Location: Left arm)   Pulse (!) 126   Temp (!) 96 8 °F (36 °C) (Temporal)   Resp 18   Ht 6' (1 829 m)   Wt (!) 171 kg (378 lb)   SpO2 98%   BMI 51 27 kg/m²   Vitals:    10/06/22 1900 10/07/22 1215   Weight: (!) 171 kg (378 lb 1 4 oz) (!) 171 kg (378 lb)       Intake/Output Summary (Last 24 hours) at 10/8/2022 1126  Last data filed at 10/8/2022 0601  Gross per 24 hour   Intake 4249 69 ml   Output --   Net 4249 69 ml       TELEMETRY:  Atrial flutter  Lab Results:  Results from last 7 days   Lab Units 10/07/22  0643   WBC Thousand/uL 5 89   HEMOGLOBIN g/dL 14 2   HEMATOCRIT % 44 9   PLATELETS Thousands/uL 219     Results from last 7 days   Lab Units 10/08/22  0442 10/07/22  0643 10/06/22  1436   POTASSIUM mmol/L 4 5   < > 4 3   CHLORIDE mmol/L 102   < > 103   CO2 mmol/L 30   < > 29   BUN mg/dL 16   < > 16   CREATININE mg/dL 1 02   < > 1 09   CALCIUM mg/dL 9 3   < > 10 0   ALK PHOS U/L  --   --  67   ALT U/L  --   --  23   AST U/L  --   --  13    < > = values in this interval not displayed       Results from last 7 days   Lab Units 10/08/22  0442   POTASSIUM mmol/L 4 5   CHLORIDE mmol/L 102   CO2 mmol/L 30   BUN mg/dL 16   CREATININE mg/dL 1 02   CALCIUM mg/dL 9 3           Medications:    Current Facility-Administered Medications:   •  albuterol (PROVENTIL HFA,VENTOLIN HFA) inhaler 2 puff, 2 puff, Inhalation, Q4H PRN, Daniel Centeno MD  •  [] amiodarone (CORDARONE) 900 mg in dextrose 5 % 500 mL infusion, 1 mg/min, Intravenous, Continuous, Stopped at 10/07/22 2304 **FOLLOWED BY** amiodarone (CORDARONE) 900 mg in dextrose 5 % 500 mL infusion, 0 5 mg/min, Intravenous, Continuous, Llai Strauss DO, Last Rate: 16 7 mL/hr at 10/07/22 2304, 0 5 mg/min at 10/07/22 2304  •  aspirin (ECOTRIN LOW STRENGTH) EC tablet 81 mg, 81 mg, Oral, Daily, Daniel Centeno MD, 81 mg at 10/08/22 0800  •  atorvastatin (LIPITOR) tablet 20 mg, 20 mg, Oral, Daily With Franny Disla MD, 20 mg at 10/07/22 1610  •  diltiazem (CARDIZEM CD) 24 hr capsule 240 mg, 240 mg, Oral, Daily, Daniel Centeno MD, 240 mg at 10/08/22 0800  • DULoxetine (CYMBALTA) delayed release capsule 30 mg, 30 mg, Oral, HS, Marya Carrion MD, 30 mg at 10/07/22 2144  •  fluticasone-vilanterol (BREO ELLIPTA) 100-25 mcg/inh inhaler 1 puff, 1 puff, Inhalation, Daily, Marya Carrion MD, 1 puff at 10/08/22 0800  •  gabapentin (NEURONTIN) capsule 100 mg, 100 mg, Oral, TID, Marya Carrion MD, 100 mg at 10/08/22 0800  •  insulin glargine (LANTUS) subcutaneous injection 35 Units 0 35 mL, 35 Units, Subcutaneous, HS, Marya Carrion MD, 35 Units at 10/07/22 2144  •  insulin lispro (HumaLOG) 100 units/mL subcutaneous injection 1-5 Units, 1-5 Units, Subcutaneous, TID AC, 1 Units at 10/07/22 1658 **AND** Fingerstick Glucose (POCT), , , TID AC, Marya Carrion MD  •  melatonin tablet 3 mg, 3 mg, Oral, HS, Earnest S Prechtel, DO, 3 mg at 10/07/22 2144  •  metoprolol tartrate (LOPRESSOR) tablet 50 mg, 50 mg, Oral, BID, Radha Ford MD, 50 mg at 10/08/22 0800  •  nicotine (NICODERM CQ) 14 mg/24hr TD 24 hr patch 1 patch, 1 patch, Transdermal, Daily, Marya Carrion MD  •  oxyCODONE (ROXICODONE) immediate release tablet 10 mg, 10 mg, Oral, Q4H PRN, Becki Cameron PA-C, 10 mg at 10/08/22 9843  •  oxyCODONE (ROXICODONE) IR tablet 5 mg, 5 mg, Oral, Q4H PRN, Jessica Mejia PA-C  •  rivaroxaban (XARELTO) tablet 20 mg, 20 mg, Oral, Daily With Breakfast, Marya Carrion MD, 20 mg at 10/08/22 0800    This note was completed in part utilizing [employer] Direct Software  Grammatical errors, random word insertions, spelling mistakes, and incomplete sentences may be an occasional consequence of this system secondary to software limitations, ambient noise, and hardware issues  If you have any questions or concerns about the content, text, or information contained within the body of this dictation, please contact the provider for clarification

## 2022-10-08 NOTE — PLAN OF CARE
Problem: Potential for Falls  Goal: Patient will remain free of falls  Description: INTERVENTIONS:  - Educate patient/family on patient safety including physical limitations  - Instruct patient to call for assistance with activity   - Consult OT/PT to assist with strengthening/mobility   - Keep Call bell within reach  - Keep bed low and locked with side rails adjusted as appropriate  - Keep care items and personal belongings within reach  - Initiate and maintain comfort rounds  - Make Fall Risk Sign visible to staff  - Offer Toileting every 2 Hours, in advance of need  - Obtain necessary fall risk management equipment: call bell in reach  - Apply yellow socks and bracelet for high fall risk patients  - Consider moving patient to room near nurses station  Outcome: Progressing     Problem: PAIN - ADULT  Goal: Verbalizes/displays adequate comfort level or baseline comfort level  Description: Interventions:  - Encourage patient to monitor pain and request assistance  - Assess pain using appropriate pain scale  - Administer analgesics based on type and severity of pain and evaluate response  - Implement non-pharmacological measures as appropriate and evaluate response  - Consider cultural and social influences on pain and pain management  - Notify physician/advanced practitioner if interventions unsuccessful or patient reports new pain  Outcome: Progressing     Problem: INFECTION - ADULT  Goal: Absence or prevention of progression during hospitalization  Description: INTERVENTIONS:  - Assess and monitor for signs and symptoms of infection  - Monitor lab/diagnostic results  - Monitor all insertion sites, i e  indwelling lines, tubes, and drains  - Monitor endotracheal if appropriate and nasal secretions for changes in amount and color  - [place] appropriate cooling/warming therapies per order  - Administer medications as ordered  - Instruct and encourage patient and family to use good hand hygiene technique  - Identify and instruct in appropriate isolation precautions for identified infection/condition  Outcome: Progressing     Problem: SAFETY ADULT  Goal: Maintain or return to baseline ADL function  Description: INTERVENTIONS:  -  Assess patient's ability to carry out ADLs; assess patient's baseline for ADL function and identify physical deficits which impact ability to perform ADLs (bathing, care of mouth/teeth, toileting, grooming, dressing, etc )  - Assess/evaluate cause of self-care deficits   - Assess range of motion  - Assess patient's mobility; develop plan if impaired  - Assess patient's need for assistive devices and provide as appropriate  - Encourage maximum independence but intervene and supervise when necessary  - Involve family in performance of ADLs  - Assess for home care needs following discharge   - Consider OT consult to assist with ADL evaluation and planning for discharge  - Provide patient education as appropriate  Outcome: Progressing  Goal: Maintains/Returns to pre admission functional level  Description: INTERVENTIONS:  - Perform BMAT or MOVE assessment daily    - Set and communicate daily mobility goal to care team and patient/family/caregiver  - Collaborate with rehabilitation services on mobility goals if consulted  - Perform Range of Motion 3 times a day  - Reposition patient every 2 hours    - Dangle patient 3 times a day  - Stand patient 3 times a day  - Ambulate patient 3 times a day  - Out of bed to chair 3 times a day   - Out of bed for meals 3 times a day  - Out of bed for toileting  - Record patient progress and toleration of activity level   Outcome: Progressing     Problem: DISCHARGE PLANNING  Goal: Discharge to home or other facility with appropriate resources  Description: INTERVENTIONS:  - Identify barriers to discharge w/patient and caregiver  - Arrange for needed discharge resources and transportation as appropriate  - Identify discharge learning needs (meds, wound care, etc )  - Arrange for interpretive services to assist at discharge as needed  - Refer to Case Management Department for coordinating discharge planning if the patient needs post-hospital services based on physician/advanced practitioner order or complex needs related to functional status, cognitive ability, or social support system  Outcome: Progressing     Problem: Knowledge Deficit  Goal: Patient/family/caregiver demonstrates understanding of disease process, treatment plan, medications, and discharge instructions  Description: Complete learning assessment and assess knowledge base    Interventions:  - Provide teaching at level of understanding  - Provide teaching via preferred learning methods  Outcome: Progressing     Problem: CARDIOVASCULAR - ADULT  Goal: Maintains optimal cardiac output and hemodynamic stability  Description: INTERVENTIONS:  - Monitor I/O, vital signs and rhythm  - Monitor for S/S and trends of decreased cardiac output  - Administer and titrate ordered vasoactive medications to optimize hemodynamic stability  - Assess quality of pulses, skin color and temperature  - Assess for signs of decreased coronary artery perfusion  - Instruct patient to report change in severity of symptoms  Outcome: Progressing  Goal: Absence of cardiac dysrhythmias or at baseline rhythm  Description: INTERVENTIONS:  - Continuous cardiac monitoring, vital signs, obtain 12 lead EKG if ordered  - Administer antiarrhythmic and heart rate control medications as ordered  - Monitor electrolytes and administer replacement therapy as ordered  Outcome: Progressing     Problem: METABOLIC, FLUID AND ELECTROLYTES - ADULT  Goal: Glucose maintained within target range  Description: INTERVENTIONS:  - Monitor Blood Glucose as ordered  - Assess for signs and symptoms of hyperglycemia and hypoglycemia  - Administer ordered medications to maintain glucose within target range  - Assess nutritional intake and initiate nutrition service referral as needed  Outcome: Progressing     Problem: MUSCULOSKELETAL - ADULT  Goal: Maintain or return mobility to safest level of function  Description: INTERVENTIONS:  - Assess patient's ability to carry out ADLs; assess patient's baseline for ADL function and identify physical deficits which impact ability to perform ADLs (bathing, care of mouth/teeth, toileting, grooming, dressing, etc )  - Assess/evaluate cause of self-care deficits   - Assess range of motion  - Assess patient's mobility  - Assess patient's need for assistive devices and provide as appropriate  - Encourage maximum independence but intervene and supervise when necessary  - Involve family in performance of ADLs  - Assess for home care needs following discharge   - Consider OT consult to assist with ADL evaluation and planning for discharge  - Provide patient education as appropriate  Outcome: Progressing  Goal: Maintain proper alignment of affected body part  Description: INTERVENTIONS:  - Support, maintain and protect limb and body alignment  - Provide patient/ family with appropriate education  Outcome: Progressing     Problem: MOBILITY - ADULT  Goal: Maintain or return to baseline ADL function  Description: INTERVENTIONS:  -  Assess patient's ability to carry out ADLs; assess patient's baseline for ADL function and identify physical deficits which impact ability to perform ADLs (bathing, care of mouth/teeth, toileting, grooming, dressing, etc )  - Assess/evaluate cause of self-care deficits   - Assess range of motion  - Assess patient's mobility; develop plan if impaired  - Assess patient's need for assistive devices and provide as appropriate  - Encourage maximum independence but intervene and supervise when necessary  - Involve family in performance of ADLs  - Assess for home care needs following discharge   - Consider OT consult to assist with ADL evaluation and planning for discharge  - Provide patient education as appropriate  Outcome: Progressing  Goal: Maintains/Returns to pre admission functional level  Description: INTERVENTIONS:  - Perform BMAT or MOVE assessment daily    - Set and communicate daily mobility goal to care team and patient/family/caregiver  - Collaborate with rehabilitation services on mobility goals if consulted  - Perform Range of Motion 3 times a day  - Reposition patient every 2 hours  - Dangle patient 3 times a day  - Stand patient 3 times a day  - Ambulate patient 3 times a day  - Out of bed to chair 3 times a day   - Out of bed for meals 3 times a day  - Out of bed for toileting  - Record patient progress and toleration of activity level   Outcome: Progressing     Problem: Nutrition/Hydration-ADULT  Goal: Nutrient/Hydration intake appropriate for improving, restoring or maintaining nutritional needs  Description: Monitor and assess patient's nutrition/hydration status for malnutrition  Collaborate with interdisciplinary team and initiate plan and interventions as ordered  Monitor patient's weight and dietary intake as ordered or per policy  Utilize nutrition screening tool and intervene as necessary  Determine patient's food preferences and provide high-protein, high-caloric foods as appropriate       INTERVENTIONS:  - Monitor oral intake, urinary output, labs, and treatment plans  - Assess nutrition and hydration status and recommend course of action  - Evaluate amount of meals eaten  - Assist patient with eating if necessary   - Allow adequate time for meals  - Recommend/ encourage appropriate diets, oral nutritional supplements, and vitamin/mineral supplements  - Order, calculate, and assess calorie counts as needed  - Recommend, monitor, and adjust tube feedings and TPN/PPN based on assessed needs  - Assess need for intravenous fluids  - Provide specific nutrition/hydration education as appropriate  - Include patient/family/caregiver in decisions related to nutrition  Outcome: Progressing

## 2022-10-08 NOTE — PROGRESS NOTES
[facility]  Progress Note - Roxanne Jordan  1965, 55 y o  male MRN: [id number]  Unit/Bed#: E4 -01 Encounter: [id number]  Primary Care Provider: Mulu Crenshaw MD   Date and time admitted to hospital: 10/6/2022  2:22 PM    * Atrial flutter with rapid ventricular response [facility])  Assessment & Plan  · Patient was and the hospital after his visiting therapist noticed his heart rate was in the 150s  · Was found to be in AFib with RVR  · Continue p o  Cardizem 240 mg daily and metoprolol 50 mg b i d - increase to TID today   · Cardiology following  · Continue anticoagulation with Xarelto  · Underwent LAURENCE cardioversion yesterday but was found to be in A fib later in the evening, now back in A flutter again this AM   · Placed on Amio drip, continue   · May require [facility] if he remains in uncontrolled A flutter     COPD without exacerbation ([facility] [address] )  Assessment & Plan  · Does not appear in exacerbation at this time  · Continue Breo and p r n  albuterol    Essential hypertension  Assessment & Plan  · BP stable today   · Lisinopril remains on hold   · Continue metoprolol, increased to TID today     Type 2 diabetes mellitus with hyperglycemia, with long-term current use of insulin (HCC)  Assessment & Plan  Lab Results   Component Value Date    HGBA1C 9 4 (H) 07/08/2022     · Hold oral hypoglycemic agents  · Continue Lantus 35 units at bedtime  · Add sliding scale insulin  (P) 151        VTE Pharmacologic Prophylaxis: VTE Score: 6 High Risk (Score >/= 5) - Pharmacological DVT Prophylaxis Ordered: rivaroxaban (Xarelto)  Sequential Compression Devices Ordered  Patient Centered Rounds: I performed bedside rounds with nursing staff today  Discussions with Specialists or Other Care Team Provider: cardiology     Education and Discussions with Family / Patient: Updated  (significant other) via phone  Time Spent for Care: 30 minutes   More than 50% of total time spent on counseling and coordination of care as described above  Current Length of Stay: 2 day(s)  Current Patient Status: Inpatient   Certification Statement: The patient will continue to require additional inpatient hospital stay due to A flutter requiring amiodarone and close monitoring   Discharge Plan: Anticipate discharge in 48-72 hrs to home  Code Status: Level 1 - Full Code    Subjective:   Patient notes     Objective:     Vitals:   Temp (24hrs), Av °F (36 1 °C), Min:96 7 °F (35 9 °C), Max:97 4 °F (36 3 °C)    Temp:  [96 7 °F (35 9 °C)-97 4 °F (36 3 °C)] 97 °F (36 1 °C)  HR:  [] 128  Resp:  [18-20] 18  BP: ()/(84-98) 112/94  SpO2:  [96 %-98 %] 96 %  Body mass index is 51 27 kg/m²  Input and Output Summary (last 24 hours): Intake/Output Summary (Last 24 hours) at 10/8/2022 1455  Last data filed at 10/8/2022 0601  Gross per 24 hour   Intake 3449 69 ml   Output --   Net 3449 69 ml       Physical Exam:   Physical Exam  Vitals reviewed  Constitutional:       General: He is not in acute distress  HENT:      Head: Normocephalic and atraumatic  Eyes:      General: No scleral icterus  Conjunctiva/sclera: Conjunctivae normal    Cardiovascular:      Rate and Rhythm: Tachycardia present  Rhythm irregular  Heart sounds: No murmur heard  Pulmonary:      Effort: Pulmonary effort is normal  No respiratory distress  Breath sounds: Normal breath sounds  Abdominal:      General: Bowel sounds are normal  There is no distension  Palpations: Abdomen is soft  Tenderness: There is no abdominal tenderness  Musculoskeletal:      Cervical back: Neck supple  Right lower leg: No edema  Left lower leg: No edema  Skin:     General: Skin is warm and dry  Neurological:      Mental Status: He is alert and oriented to person, place, and time     Psychiatric:         Mood and Affect: Mood normal          Behavior: Behavior normal           Additional Data: Labs:  Results from last 7 days   Lab Units 10/07/22  0643 10/06/22  1436   WBC Thousand/uL 5 89 5 74   HEMOGLOBIN g/dL 14 2 15 4   HEMATOCRIT % 44 9 49 0   PLATELETS Thousands/uL 219 220   NEUTROS PCT %  --  63   LYMPHS PCT %  --  28   MONOS PCT %  --  8   EOS PCT %  --  1     Results from last 7 days   Lab Units 10/08/22  0442 10/07/22  0643 10/06/22  1436   SODIUM mmol/L 136   < > 137   POTASSIUM mmol/L 4 5   < > 4 3   CHLORIDE mmol/L 102   < > 103   CO2 mmol/L 30   < > 29   BUN mg/dL 16   < > 16   CREATININE mg/dL 1 02   < > 1 09   ANION GAP mmol/L 4   < > 5   CALCIUM mg/dL 9 3   < > 10 0   ALBUMIN g/dL  --   --  3 6   TOTAL BILIRUBIN mg/dL  --   --  0 25   ALK PHOS U/L  --   --  67   ALT U/L  --   --  23   AST U/L  --   --  13   GLUCOSE RANDOM mg/dL 134   < > 239*    < > = values in this interval not displayed  Results from last 7 days   Lab Units 10/08/22  1153 10/08/22  0745 10/07/22  2048 10/07/22  1559 10/07/22  1307 10/07/22  1057 10/07/22  0738 10/06/22  1952   POC GLUCOSE mg/dl 156* 143* 193* 176* 135 151* 128 126               Lines/Drains:  Invasive Devices  Report    Peripheral Intravenous Line  Duration           Peripheral IV 10/06/22 Right Antecubital 2 days    Peripheral IV 10/08/22 Left;Ventral (anterior) Forearm <1 day                  Telemetry:  Telemetry Orders (From admission, onward)             48 Hour Telemetry Monitoring  (ED Bridging Orders Panel)  Continuous x 48 hours        References:    Telemetry Guidelines   Question:  Reason for 48 Hour Telemetry  Answer:  Arrhythmias Requiring Medical Therapy (eg  SVT, Vtach/fib, Bradycardia, Uncontrolled A-fib)                 Telemetry Reviewed: Atrial flutter  HR averaging 105  Indication for Continued Telemetry Use: Arrthymias requiring medical therapy             Imaging: No pertinent imaging reviewed      Recent Cultures (last 7 days):         Last 24 Hours Medication List:   Current Facility-Administered Medications Medication Dose Route Frequency Provider Last Rate   • albuterol  2 puff Inhalation Q4H PRN Ramirez Nguyen MD     • amiodarone  0 5 mg/min Intravenous Continuous Rujul Strauss, DO 0 5 mg/min (10/07/22 2304)   • aspirin  81 mg Oral Daily Ramirez Nguyen MD     • atorvastatin  20 mg Oral Daily With [address] Dayanna Paris MD     • diltiazem  240 mg Oral Daily Ramirez Nguyen MD     • DULoxetine  30 mg Oral HS Ramirez Nguyen MD     • fluticasone-vilanterol  1 puff Inhalation Daily Ramirez Nguyen MD     • gabapentin  100 mg Oral TID Ramirez Nguyen MD     • insulin glargine  35 Units Subcutaneous HS Ramirez Nguyen MD     • insulin lispro  1-5 Units Subcutaneous TID Delfin Wallace MD     • melatonin  3 mg Oral HS Divya Rodriguez DO     • metoprolol tartrate  50 mg Oral TID Lali Strauss DO     • nicotine  1 patch Transdermal Daily Ramirez Nguyen MD     • oxyCODONE  10 mg Oral Q4H PRN [facility], PA-C     • oxyCODONE  5 mg Oral Q4H PRN [facility], PA-C     • rivaroxaban  20 mg Oral Daily With Breakfast Ramirez Nguyen MD          Today, Patient Was Seen By: Jack Jefferson    **Please Note: This note may have been constructed using a voice recognition system  **

## 2022-10-09 LAB
GLUCOSE SERPL-MCNC: 100 MG/DL (ref 65–140)
GLUCOSE SERPL-MCNC: 146 MG/DL (ref 65–140)
GLUCOSE SERPL-MCNC: 163 MG/DL (ref 65–140)
GLUCOSE SERPL-MCNC: 176 MG/DL (ref 65–140)

## 2022-10-09 PROCEDURE — 99232 SBSQ HOSP IP/OBS MODERATE 35: CPT | Performed by: INTERNAL MEDICINE

## 2022-10-09 PROCEDURE — 82948 REAGENT STRIP/BLOOD GLUCOSE: CPT

## 2022-10-09 PROCEDURE — 99232 SBSQ HOSP IP/OBS MODERATE 35: CPT | Performed by: PHYSICIAN ASSISTANT

## 2022-10-09 RX ADMIN — GABAPENTIN 100 MG: 100 CAPSULE ORAL at 08:21

## 2022-10-09 RX ADMIN — METOPROLOL TARTRATE 50 MG: 50 TABLET, FILM COATED ORAL at 16:22

## 2022-10-09 RX ADMIN — OXYCODONE 5 MG: 5 TABLET ORAL at 21:13

## 2022-10-09 RX ADMIN — METOPROLOL TARTRATE 50 MG: 50 TABLET, FILM COATED ORAL at 08:21

## 2022-10-09 RX ADMIN — METOPROLOL TARTRATE 50 MG: 50 TABLET, FILM COATED ORAL at 21:09

## 2022-10-09 RX ADMIN — ATORVASTATIN CALCIUM 20 MG: 20 TABLET, FILM COATED ORAL at 16:22

## 2022-10-09 RX ADMIN — DULOXETINE HYDROCHLORIDE 30 MG: 30 CAPSULE, DELAYED RELEASE ORAL at 21:09

## 2022-10-09 RX ADMIN — ASPIRIN 81 MG: 81 TABLET, COATED ORAL at 08:21

## 2022-10-09 RX ADMIN — RIVAROXABAN 20 MG: 20 TABLET, FILM COATED ORAL at 08:21

## 2022-10-09 RX ADMIN — DILTIAZEM HYDROCHLORIDE 240 MG: 120 CAPSULE, COATED, EXTENDED RELEASE ORAL at 08:21

## 2022-10-09 RX ADMIN — FLUTICASONE FUROATE AND VILANTEROL TRIFENATATE 1 PUFF: 100; 25 POWDER RESPIRATORY (INHALATION) at 08:22

## 2022-10-09 RX ADMIN — INSULIN GLARGINE 35 UNITS: 100 INJECTION, SOLUTION SUBCUTANEOUS at 21:09

## 2022-10-09 RX ADMIN — OXYCODONE HYDROCHLORIDE 10 MG: 10 TABLET ORAL at 16:23

## 2022-10-09 RX ADMIN — GABAPENTIN 100 MG: 100 CAPSULE ORAL at 16:22

## 2022-10-09 RX ADMIN — OXYCODONE HYDROCHLORIDE 10 MG: 10 TABLET ORAL at 04:40

## 2022-10-09 RX ADMIN — INSULIN LISPRO 1 UNITS: 100 INJECTION, SOLUTION INTRAVENOUS; SUBCUTANEOUS at 12:01

## 2022-10-09 RX ADMIN — GABAPENTIN 100 MG: 100 CAPSULE ORAL at 21:09

## 2022-10-09 RX ADMIN — Medication 3 MG: at 21:09

## 2022-10-09 RX ADMIN — OXYCODONE HYDROCHLORIDE 10 MG: 10 TABLET ORAL at 08:52

## 2022-10-09 NOTE — PLAN OF CARE
Problem: Potential for Falls  Goal: Patient will remain free of falls  Description: INTERVENTIONS:  - Educate patient/family on patient safety including physical limitations  - Instruct patient to call for assistance with activity   - Consult OT/PT to assist with strengthening/mobility   - Keep Call bell within reach  - Keep bed low and locked with side rails adjusted as appropriate  - Keep care items and personal belongings within reach  - Initiate and maintain comfort rounds  Outcome: Progressing     Problem: PAIN - ADULT  Goal: Verbalizes/displays adequate comfort level or baseline comfort level  Description: Interventions:  - Encourage patient to monitor pain and request assistance  - Assess pain using appropriate pain scale  - Administer analgesics based on type and severity of pain and evaluate response  - Implement non-pharmacological measures as appropriate and evaluate response  - Consider cultural and social influences on pain and pain management  - Notify physician/advanced practitioner if interventions unsuccessful or patient reports new pain  Outcome: Progressing     Problem: INFECTION - ADULT  Goal: Absence or prevention of progression during hospitalization  Description: INTERVENTIONS:  - Assess and monitor for signs and symptoms of infection  - Monitor lab/diagnostic results  - Monitor all insertion sites, i e  indwelling lines, tubes, and drains  - Monitor endotracheal if appropriate and nasal secretions for changes in amount and color  - [place] appropriate cooling/warming therapies per order  - Administer medications as ordered  - Instruct and encourage patient and family to use good hand hygiene technique  - Identify and instruct in appropriate isolation precautions for identified infection/condition  Outcome: Progressing     Problem: SAFETY ADULT  Goal: Maintain or return to baseline ADL function  Description: INTERVENTIONS:  -  Assess patient's ability to carry out ADLs; assess patient's baseline for ADL function and identify physical deficits which impact ability to perform ADLs (bathing, care of mouth/teeth, toileting, grooming, dressing, etc )  - Assess/evaluate cause of self-care deficits   - Assess range of motion  - Assess patient's mobility; develop plan if impaired  - Assess patient's need for assistive devices and provide as appropriate  - Encourage maximum independence but intervene and supervise when necessary  - Involve family in performance of ADLs  - Assess for home care needs following discharge   - Consider OT consult to assist with ADL evaluation and planning for discharge  - Provide patient education as appropriate  Outcome: Progressing  Goal: Maintains/Returns to pre admission functional level  Description: INTERVENTIONS:  - Perform BMAT or MOVE assessment daily    - Set and communicate daily mobility goal to care team and patient/family/caregiver     - Collaborate with rehabilitation services on mobility goals if consulted  - Out of bed for toileting  - Record patient progress and toleration of activity level   Outcome: Progressing     Problem: DISCHARGE PLANNING  Goal: Discharge to home or other facility with appropriate resources  Description: INTERVENTIONS:  - Identify barriers to discharge w/patient and caregiver  - Arrange for needed discharge resources and transportation as appropriate  - Identify discharge learning needs (meds, wound care, etc )  - Arrange for interpretive services to assist at discharge as needed  - Refer to Case Management Department for coordinating discharge planning if the patient needs post-hospital services based on physician/advanced practitioner order or complex needs related to functional status, cognitive ability, or social support system  Outcome: Progressing     Problem: Knowledge Deficit  Goal: Patient/family/caregiver demonstrates understanding of disease process, treatment plan, medications, and discharge instructions  Description: Complete learning assessment and assess knowledge base    Interventions:  - Provide teaching at level of understanding  - Provide teaching via preferred learning methods  Outcome: Progressing     Problem: CARDIOVASCULAR - ADULT  Goal: Maintains optimal cardiac output and hemodynamic stability  Description: INTERVENTIONS:  - Monitor I/O, vital signs and rhythm  - Monitor for S/S and trends of decreased cardiac output  - Administer and titrate ordered vasoactive medications to optimize hemodynamic stability  - Assess quality of pulses, skin color and temperature  - Assess for signs of decreased coronary artery perfusion  - Instruct patient to report change in severity of symptoms  Outcome: Progressing  Goal: Absence of cardiac dysrhythmias or at baseline rhythm  Description: INTERVENTIONS:  - Continuous cardiac monitoring, vital signs, obtain 12 lead EKG if ordered  - Administer antiarrhythmic and heart rate control medications as ordered  - Monitor electrolytes and administer replacement therapy as ordered  Outcome: Progressing     Problem: METABOLIC, FLUID AND ELECTROLYTES - ADULT  Goal: Glucose maintained within target range  Description: INTERVENTIONS:  - Monitor Blood Glucose as ordered  - Assess for signs and symptoms of hyperglycemia and hypoglycemia  - Administer ordered medications to maintain glucose within target range  - Assess nutritional intake and initiate nutrition service referral as needed  Outcome: Progressing     Problem: MUSCULOSKELETAL - ADULT  Goal: Maintain or return mobility to safest level of function  Description: INTERVENTIONS:  - Assess patient's ability to carry out ADLs; assess patient's baseline for ADL function and identify physical deficits which impact ability to perform ADLs (bathing, care of mouth/teeth, toileting, grooming, dressing, etc )  - Assess/evaluate cause of self-care deficits   - Assess range of motion  - Assess patient's mobility  - Assess patient's need for assistive devices and provide as appropriate  - Encourage maximum independence but intervene and supervise when necessary  - Involve family in performance of ADLs  - Assess for home care needs following discharge   - Consider OT consult to assist with ADL evaluation and planning for discharge  - Provide patient education as appropriate  Outcome: Progressing  Goal: Maintain proper alignment of affected body part  Description: INTERVENTIONS:  - Support, maintain and protect limb and body alignment  - Provide patient/ family with appropriate education  Outcome: Progressing     Problem: MOBILITY - ADULT  Goal: Maintain or return to baseline ADL function  Description: INTERVENTIONS:  -  Assess patient's ability to carry out ADLs; assess patient's baseline for ADL function and identify physical deficits which impact ability to perform ADLs (bathing, care of mouth/teeth, toileting, grooming, dressing, etc )  - Assess/evaluate cause of self-care deficits   - Assess range of motion  - Assess patient's mobility; develop plan if impaired  - Assess patient's need for assistive devices and provide as appropriate  - Encourage maximum independence but intervene and supervise when necessary  - Involve family in performance of ADLs  - Assess for home care needs following discharge   - Consider OT consult to assist with ADL evaluation and planning for discharge  - Provide patient education as appropriate  Outcome: Progressing  Goal: Maintains/Returns to pre admission functional level  Description: INTERVENTIONS:  - Perform BMAT or MOVE assessment daily    - Set and communicate daily mobility goal to care team and patient/family/caregiver     - Collaborate with rehabilitation services on mobility goals if consulted  - Out of bed for toileting  - Record patient progress and toleration of activity level   Outcome: Progressing     Problem: Nutrition/Hydration-ADULT  Goal: Nutrient/Hydration intake appropriate for improving, restoring or maintaining nutritional needs  Description: Monitor and assess patient's nutrition/hydration status for malnutrition  Collaborate with interdisciplinary team and initiate plan and interventions as ordered  Monitor patient's weight and dietary intake as ordered or per policy  Utilize nutrition screening tool and intervene as necessary  Determine patient's food preferences and provide high-protein, high-caloric foods as appropriate       INTERVENTIONS:  - Monitor oral intake, urinary output, labs, and treatment plans  - Assess nutrition and hydration status and recommend course of action  - Evaluate amount of meals eaten  - Assist patient with eating if necessary   - Allow adequate time for meals  - Recommend/ encourage appropriate diets, oral nutritional supplements, and vitamin/mineral supplements  - Order, calculate, and assess calorie counts as needed  - Recommend, monitor, and adjust tube feedings and TPN/PPN based on assessed needs  - Assess need for intravenous fluids  - Provide specific nutrition/hydration education as appropriate  - Include patient/family/caregiver in decisions related to nutrition  Outcome: Progressing

## 2022-10-09 NOTE — PLAN OF CARE
Problem: Potential for Falls  Goal: Patient will remain free of falls  Description: INTERVENTIONS:  - Educate patient/family on patient safety including physical limitations  - Instruct patient to call for assistance with activity   - Consult OT/PT to assist with strengthening/mobility   - Keep Call bell within reach  - Keep bed low and locked with side rails adjusted as appropriate  - Keep care items and personal belongings within reach  - Initiate and maintain comfort rounds  - Make Fall Risk Sign visible to staff  - Offer Toileting every 2 Hours, in advance of need  - Apply yellow socks and bracelet for high fall risk patients  - Consider moving patient to room near nurses station  Outcome: Progressing     Problem: PAIN - ADULT  Goal: Verbalizes/displays adequate comfort level or baseline comfort level  Description: Interventions:  - Encourage patient to monitor pain and request assistance  - Assess pain using appropriate pain scale  - Administer analgesics based on type and severity of pain and evaluate response  - Implement non-pharmacological measures as appropriate and evaluate response  - Consider cultural and social influences on pain and pain management  - Notify physician/advanced practitioner if interventions unsuccessful or patient reports new pain  Outcome: Progressing     Problem: INFECTION - ADULT  Goal: Absence or prevention of progression during hospitalization  Description: INTERVENTIONS:  - Assess and monitor for signs and symptoms of infection  - Monitor lab/diagnostic results  - Monitor all insertion sites, i e  indwelling lines, tubes, and drains  - Monitor endotracheal if appropriate and nasal secretions for changes in amount and color  - [place] appropriate cooling/warming therapies per order  - Administer medications as ordered  - Instruct and encourage patient and family to use good hand hygiene technique  - Identify and instruct in appropriate isolation precautions for identified infection/condition  Outcome: Progressing     Problem: SAFETY ADULT  Goal: Maintain or return to baseline ADL function  Description: INTERVENTIONS:  -  Assess patient's ability to carry out ADLs; assess patient's baseline for ADL function and identify physical deficits which impact ability to perform ADLs (bathing, care of mouth/teeth, toileting, grooming, dressing, etc )  - Assess/evaluate cause of self-care deficits   - Assess range of motion  - Assess patient's mobility; develop plan if impaired  - Assess patient's need for assistive devices and provide as appropriate  - Encourage maximum independence but intervene and supervise when necessary  - Involve family in performance of ADLs  - Assess for home care needs following discharge   - Consider OT consult to assist with ADL evaluation and planning for discharge  - Provide patient education as appropriate  Outcome: Progressing  Goal: Maintains/Returns to pre admission functional level  Description: INTERVENTIONS:  - Perform BMAT or MOVE assessment daily    - Set and communicate daily mobility goal to care team and patient/family/caregiver  - Collaborate with rehabilitation services on mobility goals if consulted  - Perform Range of Motion 4 times a day  - Reposition patient every 4 hours    - Dangle patient 4 times a day  - Stand patient 4 times a day  - Ambulate patient 4 times a day  - Out of bed to chair 4 times a day   - Out of bed for meals 4 times a day  - Out of bed for toileting  - Record patient progress and toleration of activity level   Outcome: Progressing     Problem: DISCHARGE PLANNING  Goal: Discharge to home or other facility with appropriate resources  Description: INTERVENTIONS:  - Identify barriers to discharge w/patient and caregiver  - Arrange for needed discharge resources and transportation as appropriate  - Identify discharge learning needs (meds, wound care, etc )  - Arrange for interpretive services to assist at discharge as needed  - Refer to Case Management Department for coordinating discharge planning if the patient needs post-hospital services based on physician/advanced practitioner order or complex needs related to functional status, cognitive ability, or social support system  Outcome: Progressing     Problem: Knowledge Deficit  Goal: Patient/family/caregiver demonstrates understanding of disease process, treatment plan, medications, and discharge instructions  Description: Complete learning assessment and assess knowledge base    Interventions:  - Provide teaching at level of understanding  - Provide teaching via preferred learning methods  Outcome: Progressing     Problem: CARDIOVASCULAR - ADULT  Goal: Maintains optimal cardiac output and hemodynamic stability  Description: INTERVENTIONS:  - Monitor I/O, vital signs and rhythm  - Monitor for S/S and trends of decreased cardiac output  - Administer and titrate ordered vasoactive medications to optimize hemodynamic stability  - Assess quality of pulses, skin color and temperature  - Assess for signs of decreased coronary artery perfusion  - Instruct patient to report change in severity of symptoms  Outcome: Progressing  Goal: Absence of cardiac dysrhythmias or at baseline rhythm  Description: INTERVENTIONS:  - Continuous cardiac monitoring, vital signs, obtain 12 lead EKG if ordered  - Administer antiarrhythmic and heart rate control medications as ordered  - Monitor electrolytes and administer replacement therapy as ordered  Outcome: Progressing     Problem: METABOLIC, FLUID AND ELECTROLYTES - ADULT  Goal: Glucose maintained within target range  Description: INTERVENTIONS:  - Monitor Blood Glucose as ordered  - Assess for signs and symptoms of hyperglycemia and hypoglycemia  - Administer ordered medications to maintain glucose within target range  - Assess nutritional intake and initiate nutrition service referral as needed  Outcome: Progressing     Problem: MUSCULOSKELETAL - ADULT  Goal: Maintain or return mobility to safest level of function  Description: INTERVENTIONS:  - Assess patient's ability to carry out ADLs; assess patient's baseline for ADL function and identify physical deficits which impact ability to perform ADLs (bathing, care of mouth/teeth, toileting, grooming, dressing, etc )  - Assess/evaluate cause of self-care deficits   - Assess range of motion  - Assess patient's mobility  - Assess patient's need for assistive devices and provide as appropriate  - Encourage maximum independence but intervene and supervise when necessary  - Involve family in performance of ADLs  - Assess for home care needs following discharge   - Consider OT consult to assist with ADL evaluation and planning for discharge  - Provide patient education as appropriate  Outcome: Progressing  Goal: Maintain proper alignment of affected body part  Description: INTERVENTIONS:  - Support, maintain and protect limb and body alignment  - Provide patient/ family with appropriate education  Outcome: Progressing     Problem: MOBILITY - ADULT  Goal: Maintain or return to baseline ADL function  Description: INTERVENTIONS:  -  Assess patient's ability to carry out ADLs; assess patient's baseline for ADL function and identify physical deficits which impact ability to perform ADLs (bathing, care of mouth/teeth, toileting, grooming, dressing, etc )  - Assess/evaluate cause of self-care deficits   - Assess range of motion  - Assess patient's mobility; develop plan if impaired  - Assess patient's need for assistive devices and provide as appropriate  - Encourage maximum independence but intervene and supervise when necessary  - Involve family in performance of ADLs  - Assess for home care needs following discharge   - Consider OT consult to assist with ADL evaluation and planning for discharge  - Provide patient education as appropriate  Outcome: Progressing  Goal: Maintains/Returns to pre admission functional level  Description: INTERVENTIONS:  - Perform BMAT or MOVE assessment daily    - Set and communicate daily mobility goal to care team and patient/family/caregiver  - Collaborate with rehabilitation services on mobility goals if consulted  - Perform Range of Motion 4 times a day  - Reposition patient every 4 hours  - Dangle patient 4 times a day  - Stand patient 4 times a day  - Ambulate patient 4 times a day  - Out of bed to chair 4 times a day   - Out of bed for meals 4 times a day  - Out of bed for toileting  - Record patient progress and toleration of activity level   Outcome: Progressing     Problem: Nutrition/Hydration-ADULT  Goal: Nutrient/Hydration intake appropriate for improving, restoring or maintaining nutritional needs  Description: Monitor and assess patient's nutrition/hydration status for malnutrition  Collaborate with interdisciplinary team and initiate plan and interventions as ordered  Monitor patient's weight and dietary intake as ordered or per policy  Utilize nutrition screening tool and intervene as necessary  Determine patient's food preferences and provide high-protein, high-caloric foods as appropriate       INTERVENTIONS:  - Monitor oral intake, urinary output, labs, and treatment plans  - Assess nutrition and hydration status and recommend course of action  - Evaluate amount of meals eaten  - Assist patient with eating if necessary   - Allow adequate time for meals  - Recommend/ encourage appropriate diets, oral nutritional supplements, and vitamin/mineral supplements  - Order, calculate, and assess calorie counts as needed  - Recommend, monitor, and adjust tube feedings and TPN/PPN based on assessed needs  - Assess need for intravenous fluids  - Provide specific nutrition/hydration education as appropriate  - Include patient/family/caregiver in decisions related to nutrition  Outcome: Progressing

## 2022-10-09 NOTE — ASSESSMENT & PLAN NOTE
· Patient was and the hospital after his visiting therapist noticed his heart rate was in the 150s  · Was found to be in AFib with RVR  · Continue p o   Cardizem 240 mg daily and metoprolol 50 mg b i d - increase to TID   · Cardiology following  · Continue anticoagulation with Xarelto  · Underwent [facility] cardioversion yesterday but was found to be in A fib later in the evening, now back in A flutter  · Remains on amiodarone drip  · May require transfer to [place] for cardioversion if he remains in uncontrolled A flutter

## 2022-10-09 NOTE — ASSESSMENT & PLAN NOTE
Lab Results   Component Value Date    HGBA1C 9 4 (H) 07/08/2022     · Hold oral hypoglycemic agents    · Continue Lantus 35 units at bedtime  · Add sliding scale insulin  (P) 216 [id number]

## 2022-10-09 NOTE — PROGRESS NOTES
Cardiology         Progress Note - Cardiology   Hetal Vidal  55 y o  male MRN: [id number]  Unit/Bed#: E4 -01 Encounter: [id number]          Assessment/Recommendations/Discussion:     1  Atrial flutter with two-to-one conduction with rapid ventricular response, status post successful cardioversion 10/07/2022 with subsequent recurrence later in the day  2  Severe cardiomyopathy, ejection fraction estimated 20%, likely secondary to rapid atrial flutter at the time of LAURENCE  3  Primary hypertension  4  History of paroxysmal atrial fibrillation  5  Dyslipidemia  6  Diabetes mellitus with were glycemic control   Hemoglobin A1c is 9 4  7  Obesity  8  Tobacco dependence  9  Suspected obstructive sleep apnea  10  COPD and asthma      · Still in rapid atrial flutter  Continue amiodarone infusion  Metoprolol increased to t i d  yesterday, continue the same    Will plan on transferring tomorrow [facility] for atrial flutter ablation, if electrophysiology agreeable  · Will avoid diltiazem in light of severe cardiomyopathy and 1st optimize metoprolol  · Patient remains asymptomatic  · Continue Xarelto          Subjective:  Patient seen and examined, feels well, no symptoms                Physical Exam:  GEN:  NAD  HEENT:  MMM, NCAT, pink conjunctiva, EOMI, nonicteric sclera  CV:  NO JVD/HJR, RR, NO M/R/G, +S1/S2, NO PARASTERNAL HEAVE/THRILL, NO LE EDEMA, NO HEPATIC SYSTOLIC PULSATION, WARM EXTREMITIES  RESP:  CTAB/L  ABD:  SOFT, NT, NO GROSS ORGANOMEGALY        Vitals:   /98 (BP Location: Left arm)   Pulse (!) 127   Temp (!) 97 2 °F (36 2 °C) (Temporal)   Resp 20   Ht 6' (1 829 m)   Wt (!) 171 kg (378 lb)   SpO2 98%   BMI 51 27 kg/m²   Vitals:    10/06/22 1900 10/07/22 1215   Weight: (!) 171 kg (378 lb 1 4 oz) (!) 171 kg (378 lb)       Intake/Output Summary (Last 24 hours) at 10/9/2022 1152  Last data filed at 10/9/2022 0823  Gross per 24 hour   Intake 920 33 ml   Output --   Net 920 33 ml       TELEMETRY: Atrial flutter  Lab Results:  Results from last 7 days   Lab Units 10/07/22  0643   WBC Thousand/uL 5 89   HEMOGLOBIN g/dL 14 2   HEMATOCRIT % 44 9   PLATELETS Thousands/uL 219     Results from last 7 days   Lab Units 10/08/22  0442 10/07/22  0643 10/06/22  1436   POTASSIUM mmol/L 4 5   < > 4 3   CHLORIDE mmol/L 102   < > 103   CO2 mmol/L 30   < > 29   BUN mg/dL 16   < > 16   CREATININE mg/dL 1 02   < > 1 09   CALCIUM mg/dL 9 3   < > 10 0   ALK PHOS U/L  --   --  67   ALT U/L  --   --  23   AST U/L  --   --  13    < > = values in this interval not displayed       Results from last 7 days   Lab Units 10/08/22  0442   POTASSIUM mmol/L 4 5   CHLORIDE mmol/L 102   CO2 mmol/L 30   BUN mg/dL 16   CREATININE mg/dL 1 02   CALCIUM mg/dL 9 3           Medications:    Current Facility-Administered Medications:   •  albuterol (PROVENTIL HFA,VENTOLIN HFA) inhaler 2 puff, 2 puff, Inhalation, Q4H PRN, Ramirez Nguyen MD  •  [] amiodarone (CORDARONE) 900 mg in dextrose 5 % 500 mL infusion, 1 mg/min, Intravenous, Continuous, Stopped at 10/07/22 2304 **FOLLOWED BY** amiodarone (CORDARONE) 900 mg in dextrose 5 % 500 mL infusion, 0 5 mg/min, Intravenous, Continuous, Rujul Strauss, DO, Last Rate: 16 7 mL/hr at 10/09/22 0823, 0 5 mg/min at 10/09/22 4308  •  aspirin (ECOTRIN LOW STRENGTH) EC tablet 81 mg, 81 mg, Oral, Daily, Ramirez Nguyen MD, 81 mg at 10/09/22 5240  •  atorvastatin (LIPITOR) tablet 20 mg, 20 mg, Oral, Daily With Demetrius Valero MD, 20 mg at 10/08/22 1609  •  diltiazem (CARDIZEM CD) 24 hr capsule 240 mg, 240 mg, Oral, Daily, Ramirez Nguyen MD, 240 mg at 10/09/22 2358  •  DULoxetine (CYMBALTA) delayed release capsule 30 mg, 30 mg, Oral, HS, Ramirez Nguyen MD, 30 mg at 10/08/22 2101  •  fluticasone-vilanterol (BREO ELLIPTA) 100-25 mcg/inh inhaler 1 puff, 1 puff, Inhalation, Daily, Ramirez Nguyen MD, 1 puff at 10/09/22 1661  • gabapentin (NEURONTIN) capsule 100 mg, 100 mg, Oral, TID, Robyn Vieyra MD, 100 mg at 10/09/22 5174  •  insulin glargine (LANTUS) subcutaneous injection 35 Units 0 35 mL, 35 Units, Subcutaneous, HS, Robyn Vieyra MD, 35 Units at 10/08/22 2101  •  insulin lispro (HumaLOG) 100 units/mL subcutaneous injection 1-5 Units, 1-5 Units, Subcutaneous, TID AC, 1 Units at 10/08/22 1609 **AND** Fingerstick Glucose (POCT), , , TID AC, Robyn Vieyra MD  •  melatonin tablet 3 mg, 3 mg, Oral, HS, Earnest S Prechtel, DO, 3 mg at 10/08/22 2101  •  metoprolol tartrate (LOPRESSOR) tablet 50 mg, 50 mg, Oral, TID, Rujul Strauss, DO, 50 mg at 10/09/22 6140  •  nicotine (NICODERM CQ) 14 mg/24hr TD 24 hr patch 1 patch, 1 patch, Transdermal, Daily, Robyn Vieyra MD  •  oxyCODONE (ROXICODONE) immediate release tablet 10 mg, 10 mg, Oral, Q4H PRN, Becki Cameron PA-C, 10 mg at 10/09/22 2105  •  oxyCODONE (ROXICODONE) IR tablet 5 mg, 5 mg, Oral, Q4H PRN, Genaro Salter PA-C  •  rivaroxaban (XARELTO) tablet 20 mg, 20 mg, Oral, Daily With Breakfast, Robyn Vieyra MD, 20 mg at 10/09/22 1653    This note was completed in part utilizing [employer] Direct Software  Grammatical errors, random word insertions, spelling mistakes, and incomplete sentences may be an occasional consequence of this system secondary to software limitations, ambient noise, and hardware issues  If you have any questions or concerns about the content, text, or information contained within the body of this dictation, please contact the provider for clarification

## 2022-10-09 NOTE — PROGRESS NOTES
[facility] [facility]  Progress Note - Dimashleigh Huger  1965, 55 y o  male MRN: [id number]  Unit/Bed#: E4 -01 Encounter: [id number]  Primary Care Provider: Adelita Goldberg, MD   Date and time admitted to hospital: 10/6/2022  2:22 PM    * Atrial flutter with rapid ventricular response [facility])  Assessment & Plan  · Patient was and the hospital after his visiting therapist noticed his heart rate was in the 150s  · Was found to be in AFib with RVR  · Continue p o  Cardizem 240 mg daily and metoprolol 50 mg b i d - increase to TID   · Cardiology following  · Continue anticoagulation with Xarelto  · Underwent LAURENCE cardioversion yesterday but was found to be in A fib later in the evening, now back in A flutter  · Remains on amiodarone drip  · May require transfer to [facility] for cardioversion if he remains in uncontrolled A flutter     [facility]ation ([facility] )  Assessment & Plan  · Does not appear in exacerbation at this time  · Continue Breo and p r n  albuterol    Essential hypertension  Assessment & Plan  · BP stable today   · Lisinopril remains on hold   · Continue metoprolol, increased to TID today     Type 2 diabetes mellitus with hyperglycemia, with long-term current use of insulin ([facility] )  Assessment & Plan  Lab Results   Component Value Date    HGBA1C 9 4 (H) 07/08/2022     · Hold oral hypoglycemic agents  · Continue Lantus 35 units at bedtime  · Add sliding scale insulin  (P) 891 [id number]        VTE Pharmacologic Prophylaxis: VTE Score: 6 High Risk (Score >/= 5) - Pharmacological DVT Prophylaxis Ordered: rivaroxaban (Xarelto)  Sequential Compression Devices Ordered  Patient Centered Rounds: I performed bedside rounds with nursing staff today  Discussions with Specialists or Other Care Team Provider: none    Education and Discussions with Family / Patient: Patient declined call to   Time Spent for Care: 30 minutes   More than 50% of total time spent on counseling and coordination of care as described above  Current Length of Stay: 3 day(s)  Current Patient Status: Inpatient   Certification Statement: The patient will continue to require additional inpatient hospital stay due to A flutter  Discharge Plan: Anticipate discharge in 48-72 hrs to home  Code Status: Level 1 - Full Code    Subjective:   Patient reports he is doing okay today  Remains asymptomatic  Still unhappy that his heart rate has been uncontrolled  Objective:     Vitals:   Temp (24hrs), Av 1 °F (36 2 °C), Min:96 8 °F (36 °C), Max:97 5 °F (36 4 °C)    Temp:  [96 8 °F (36 °C)-97 5 °F (36 4 °C)] 97 2 °F (36 2 °C)  HR:  [124-129] 127  Resp:  [18-20] 20  BP: (111-133)/(81-98) 130/98  SpO2:  [91 %-98 %] 98 %  Body mass index is 51 27 kg/m²  Input and Output Summary (last 24 hours): Intake/Output Summary (Last 24 hours) at 10/9/2022 1050  Last data filed at 10/9/2022 0823  Gross per 24 hour   Intake 920 33 ml   Output --   Net 920 33 ml       Physical Exam:   Physical Exam  Vitals reviewed  Constitutional:       General: He is not in acute distress  HENT:      Head: Normocephalic and atraumatic  Eyes:      General: No scleral icterus  Conjunctiva/sclera: Conjunctivae normal    Cardiovascular:      Rate and Rhythm: Tachycardia present  Rhythm irregular  Heart sounds: No murmur heard  Pulmonary:      Effort: Pulmonary effort is normal  No respiratory distress  Breath sounds: Normal breath sounds  Abdominal:      General: Bowel sounds are normal  There is no distension  Palpations: Abdomen is soft  Tenderness: There is no abdominal tenderness  Musculoskeletal:      Cervical back: Neck supple  Right lower leg: No edema  Left lower leg: No edema  Skin:     General: Skin is warm and dry  Neurological:      Mental Status: He is alert and oriented to person, place, and time     Psychiatric:         Mood and Affect: Mood normal          Behavior: Behavior normal           Additional Data:     Labs:  Results from last 7 days   Lab Units 10/07/22  0643 10/06/22  1436   WBC Thousand/uL 5 89 5 74   HEMOGLOBIN g/dL 14 2 15 4   HEMATOCRIT % 44 9 49 0   PLATELETS Thousands/uL 219 220   NEUTROS PCT %  --  63   LYMPHS PCT %  --  28   MONOS PCT %  --  8   EOS PCT %  --  1     Results from last 7 days   Lab Units 10/08/22  0442 10/07/22  0643 10/06/22  1436   SODIUM mmol/L 136   < > 137   POTASSIUM mmol/L 4 5   < > 4 3   CHLORIDE mmol/L 102   < > 103   CO2 mmol/L 30   < > 29   BUN mg/dL 16   < > 16   CREATININE mg/dL 1 02   < > 1 09   ANION GAP mmol/L 4   < > 5   CALCIUM mg/dL 9 3   < > 10 0   ALBUMIN g/dL  --   --  3 6   TOTAL BILIRUBIN mg/dL  --   --  0 25   ALK PHOS U/L  --   --  67   ALT U/L  --   --  23   AST U/L  --   --  13   GLUCOSE RANDOM mg/dL 134   < > 239*    < > = values in this interval not displayed  Results from last 7 days   Lab Units 10/09/22  0736 10/08/22  2059 10/08/22  1544 10/08/22  1153 10/08/22  0745 10/07/22  2048 10/07/22  1559 10/07/22  1307 10/07/22  1057 10/07/22  0738 10/06/22  1952   POC GLUCOSE mg/dl 100 139 154* 156* 143* 193* 176* 135 151* 128 126               Lines/Drains:  Invasive Devices  Report    Peripheral Intravenous Line  Duration           Peripheral IV 10/06/22 Right Antecubital 2 days    Peripheral IV 10/08/22 Left;Ventral (anterior) Forearm 1 day                  Telemetry:  Telemetry Orders (From admission, onward)             48 Hour Telemetry Monitoring  (ED Bridging Orders Panel)  Continuous x 48 hours        References:    Telemetry Guidelines   Question:  Reason for 48 Hour Telemetry  Answer:  Arrhythmias Requiring Medical Therapy (eg  SVT, Vtach/fib, Bradycardia, Uncontrolled A-fib)                 Telemetry Reviewed: Atrial flutter  HR averaging 130  Indication for Continued Telemetry Use: Arrthymias requiring medical therapy             Imaging: No pertinent imaging reviewed      Recent Cultures (last 7 days): Last 24 Hours Medication List:   Current Facility-Administered Medications   Medication Dose Route Frequency Provider Last Rate   • albuterol  2 puff Inhalation Q4H PRN Lynda Taylor MD     • amiodarone  0 5 mg/min Intravenous Continuous Rujul Strauss, DO 0 5 mg/min (10/09/22 3008)   • aspirin  81 mg Oral Daily Lynda Taylor MD     • atorvastatin  20 mg Oral Daily With [facility] Mike Murphy MD     • diltiazem  240 mg Oral Daily Lynda Taylor MD     • DULoxetine  30 mg Oral HS Lynda Taylor MD     • fluticasone-vilanterol  1 puff Inhalation Daily Lynda Taylor MD     • gabapentin  100 mg Oral TID Lynda Taylor MD     • insulin glargine  35 Units Subcutaneous HS Lynda Taylor MD     • insulin lispro  1-5 Units Subcutaneous TID Antonia Gunn MD     • melatonin  3 mg Oral HS Anayeli Rodriguez DO     • metoprolol tartrate  50 mg Oral TID Rickjul Carlos A DO     • nicotine  1 patch Transdermal Daily Lynda Taylor MD     • oxyCODONE  10 mg Oral Q4H PRN Holley Gonsalves PA-C     • oxyCODONE  5 mg Oral Q4H PRN Holley Gonsalves PA-C     • rivaroxaban  20 mg Oral Daily With Breakfast Lynda Taylor MD          Today, Patient Was Seen By: Lilliana Sol    **Please Note: This note may have been constructed using a voice recognition system  **

## 2022-10-10 VITALS
HEART RATE: 83 BPM | TEMPERATURE: 98.2 F | OXYGEN SATURATION: 95 % | SYSTOLIC BLOOD PRESSURE: 96 MMHG | DIASTOLIC BLOOD PRESSURE: 57 MMHG | HEIGHT: 72 IN | RESPIRATION RATE: 18 BRPM | BODY MASS INDEX: 42.66 KG/M2 | WEIGHT: 315 LBS

## 2022-10-10 LAB
GLUCOSE SERPL-MCNC: 142 MG/DL (ref 65–140)
GLUCOSE SERPL-MCNC: 143 MG/DL (ref 65–140)
GLUCOSE SERPL-MCNC: 232 MG/DL (ref 65–140)

## 2022-10-10 PROCEDURE — 99239 HOSP IP/OBS DSCHRG MGMT >30: CPT | Performed by: PHYSICIAN ASSISTANT

## 2022-10-10 PROCEDURE — 99232 SBSQ HOSP IP/OBS MODERATE 35: CPT | Performed by: INTERNAL MEDICINE

## 2022-10-10 PROCEDURE — 82948 REAGENT STRIP/BLOOD GLUCOSE: CPT

## 2022-10-10 PROCEDURE — 99232 SBSQ HOSP IP/OBS MODERATE 35: CPT | Performed by: PHYSICIAN ASSISTANT

## 2022-10-10 RX ORDER — LANOLIN ALCOHOL/MO/W.PET/CERES
3 CREAM (GRAM) TOPICAL
Status: CANCELLED | OUTPATIENT
Start: 2022-10-10

## 2022-10-10 RX ORDER — NICOTINE 21 MG/24HR
1 PATCH, TRANSDERMAL 24 HOURS TRANSDERMAL DAILY
Status: CANCELLED | OUTPATIENT
Start: 2022-10-11

## 2022-10-10 RX ORDER — OXYCODONE HYDROCHLORIDE 5 MG/1
5 TABLET ORAL EVERY 4 HOURS PRN
Status: CANCELLED | OUTPATIENT
Start: 2022-10-10

## 2022-10-10 RX ORDER — DILTIAZEM HYDROCHLORIDE 120 MG/1
240 CAPSULE, COATED, EXTENDED RELEASE ORAL DAILY
Status: CANCELLED | OUTPATIENT
Start: 2022-10-11

## 2022-10-10 RX ORDER — AMIODARONE HYDROCHLORIDE 200 MG/1
200 TABLET ORAL DAILY
Qty: 30 TABLET | Refills: 0 | Status: SHIPPED | OUTPATIENT
Start: 2022-10-10

## 2022-10-10 RX ORDER — ALBUTEROL SULFATE 90 UG/1
2 AEROSOL, METERED RESPIRATORY (INHALATION) EVERY 4 HOURS PRN
Status: CANCELLED | OUTPATIENT
Start: 2022-10-10

## 2022-10-10 RX ORDER — DILTIAZEM HYDROCHLORIDE 120 MG/1
240 CAPSULE, COATED, EXTENDED RELEASE ORAL DAILY
Start: 2022-10-10

## 2022-10-10 RX ORDER — METOPROLOL TARTRATE 50 MG/1
50 TABLET, FILM COATED ORAL 3 TIMES DAILY
Status: CANCELLED | OUTPATIENT
Start: 2022-10-10

## 2022-10-10 RX ORDER — INSULIN LISPRO 100 [IU]/ML
1-5 INJECTION, SOLUTION INTRAVENOUS; SUBCUTANEOUS
Status: CANCELLED | OUTPATIENT
Start: 2022-10-10

## 2022-10-10 RX ORDER — DULOXETIN HYDROCHLORIDE 30 MG/1
30 CAPSULE, DELAYED RELEASE ORAL
Status: CANCELLED | OUTPATIENT
Start: 2022-10-10

## 2022-10-10 RX ORDER — OXYCODONE HYDROCHLORIDE 10 MG/1
10 TABLET ORAL EVERY 4 HOURS PRN
Status: CANCELLED | OUTPATIENT
Start: 2022-10-10

## 2022-10-10 RX ORDER — GABAPENTIN 100 MG/1
100 CAPSULE ORAL 3 TIMES DAILY
Status: CANCELLED | OUTPATIENT
Start: 2022-10-10

## 2022-10-10 RX ORDER — METOPROLOL TARTRATE 50 MG/1
50 TABLET, FILM COATED ORAL 3 TIMES DAILY
Qty: 90 TABLET | Refills: 0 | Status: SHIPPED | OUTPATIENT
Start: 2022-10-10

## 2022-10-10 RX ORDER — ATORVASTATIN CALCIUM 20 MG/1
20 TABLET, FILM COATED ORAL
Status: CANCELLED | OUTPATIENT
Start: 2022-10-10

## 2022-10-10 RX ORDER — INSULIN GLARGINE 100 [IU]/ML
35 INJECTION, SOLUTION SUBCUTANEOUS
Status: CANCELLED | OUTPATIENT
Start: 2022-10-10

## 2022-10-10 RX ORDER — ASPIRIN 81 MG/1
81 TABLET ORAL DAILY
Status: CANCELLED | OUTPATIENT
Start: 2022-10-11

## 2022-10-10 RX ADMIN — DILTIAZEM HYDROCHLORIDE 240 MG: 120 CAPSULE, COATED, EXTENDED RELEASE ORAL at 08:00

## 2022-10-10 RX ADMIN — ASPIRIN 81 MG: 81 TABLET, COATED ORAL at 08:00

## 2022-10-10 RX ADMIN — METOPROLOL TARTRATE 50 MG: 50 TABLET, FILM COATED ORAL at 16:37

## 2022-10-10 RX ADMIN — GABAPENTIN 100 MG: 100 CAPSULE ORAL at 08:00

## 2022-10-10 RX ADMIN — OXYCODONE HYDROCHLORIDE 10 MG: 10 TABLET ORAL at 07:47

## 2022-10-10 RX ADMIN — RIVAROXABAN 20 MG: 20 TABLET, FILM COATED ORAL at 07:39

## 2022-10-10 RX ADMIN — METOPROLOL TARTRATE 50 MG: 50 TABLET, FILM COATED ORAL at 08:00

## 2022-10-10 RX ADMIN — FLUTICASONE FUROATE AND VILANTEROL TRIFENATATE 1 PUFF: 100; 25 POWDER RESPIRATORY (INHALATION) at 08:01

## 2022-10-10 RX ADMIN — AMIODARONE HYDROCHLORIDE 0.5 MG/MIN: 50 INJECTION, SOLUTION INTRAVENOUS at 07:38

## 2022-10-10 RX ADMIN — INSULIN LISPRO 2 UNITS: 100 INJECTION, SOLUTION INTRAVENOUS; SUBCUTANEOUS at 11:23

## 2022-10-10 RX ADMIN — ATORVASTATIN CALCIUM 20 MG: 20 TABLET, FILM COATED ORAL at 16:39

## 2022-10-10 RX ADMIN — GABAPENTIN 100 MG: 100 CAPSULE ORAL at 16:39

## 2022-10-10 NOTE — ASSESSMENT & PLAN NOTE
· Patient was and the hospital after his visiting therapist noticed his heart rate was in the 150s  · Was found to be in AFib with RVR  · Continue p o   Cardizem 240 mg daily and metoprolol 50 mg b i d - increased to TID   · Cardiology following  · Continue anticoagulation with Xarelto  · Underwent LAURENCE cardioversion yesterday but was found to be in A fib later in the evening, now back in A flutter  · Remains on amiodarone drip  · May require transfer to [facility] for cardioversion if he remains in uncontrolled A flutter, await Cardiology recommendations today

## 2022-10-10 NOTE — DISCHARGE SUMMARY
Jossy 49  Discharge- [facility]  1965, 55 y o  male MRN: [id number]  Unit/Bed#: E4 -01 Encounter: [id number]  Primary Care Provider: Greg Julian MD   Date and time admitted to hospital: 10/6/2022  2:22 PM    * Atrial flutter with rapid ventricular response [facility])  Assessment & Plan  · Patient was and the hospital after his visiting therapist noticed his heart rate was in the 150s  · Was found to be in AFib with RVR  · Continue p o  Cardizem 240 mg daily and metoprolol 50 mg b i d - increased to TID while inpatient  · Cardiology following  · Continue anticoagulation with Xarelto  · Underwent LAURENCE cardioversion Friday but was found to be in A fib later in the evening, now back in A flutter  · Remains in a flutter on amiodarone drip  · Plan for transfer to [facility] for EP evaluation, Dr Geneva Garibay aware    Closed fracture of ninth thoracic vertebra [facility])  Assessment & Plan  · Known a T9 oblique fracture after a fall in July  · Following with Neurosurgery outpatient  · Currently weaning TLSO brace  · P r n  Analgesics    COPD without exacerbation ([facility] )  Assessment & Plan  · Does not appear in exacerbation at this time  · Continue Breo and p r n  albuterol    Essential hypertension  Assessment & Plan  · BP stable today   · Lisinopril remains on hold   · Continue metoprolol, increase to t i d  Type 2 diabetes mellitus with hyperglycemia, with long-term current use of insulin [facility])  Assessment & Plan  Lab Results   Component Value Date    HGBA1C 9 4 (H) 07/08/2022     · Hold oral hypoglycemic agents    · Continue Lantus 35 units at bedtime  · Add sliding scale insulin  (P) 155 6        Medical Problems             Resolved Problems  Date Reviewed: 10/10/2022   None               Discharging Physician / Practitioner: Reji Hollingsworth  PCP: Greg Julian MD  Admission Date:   Admission Orders (From admission, onward)     Ordered        10/06/22 1621  INPATIENT ADMISSION  Once 10/06/22 1530  INPATIENT ADMISSION  Once,   Status:  Canceled                      Discharge Date: 10/10/22    Consultations During Hospital Stay:  · Cardiology    Procedures Performed:   XR chest 1 view portable    Result Date: 10/6/2022  Impression: No acute cardiopulmonary findings  Workstation performed: [id number]     LANCE cardioversion    Significant Findings / Test Results:   · See above    Incidental Findings:   · None     Test Results Pending at Discharge (will require follow up): · None     Outpatient Tests Requested:  · Transfer to [place] for     Complications:  None    Reason for Admission:  Tachycardia    Hospital Course:   Shankar Brunner  is a 55 y o  male patient who originally presented to the hospital on 10/6/2022 due to tachycardia  Patient reports he was getting physical therapy when they took routine vital signs and noticed his heart rate was in the 150s  He was brought to the ED and was found to be in atrial flutter with rapid ventricular response  He was seen in consult by Cardiology and taken for lance cardioversion  Initially this was successful but he was found to be back in AFib the evening of his cardioversion  The next morning he had converted back to atrial flutter with rapid ventricular response  He was placed on amiodarone drip  He remained on amiodarone drip through the weekend without improvement in his heart rate or rhythm  Cardiology contacted electrophysiology who recommended transfer for ablation  Patient was pending transfer when he chose to leave the hospital against medical advice  He was given the number to call to make an appointment with electrophysiology to discuss ablation  Please see above list of diagnoses and related plan for additional information  Condition at Discharge: stable      Discharge instructions/Information to patient and family:   See after visit summary for information provided to patient and family        Provisions for Follow-Up Care:  See after visit summary for information related to follow-up care and any pertinent home health orders  Disposition:   Other: Left AMA    Planned Readmission:  Patient likely to be readmitted for ablation in the near future     Discharge Statement:  I spent 35 minutes discharging the patient  This time was spent on the day of discharge  I had direct contact with the patient on the day of discharge  Greater than 50% of the total time was spent examining patient, answering all patient questions, arranging and discussing plan of care with patient as well as directly providing post-discharge instructions  Additional time then spent on discharge activities  Discharge Medications:  See after visit summary for reconciled discharge medications provided to patient and/or family        **Please Note: This note may have been constructed using a voice recognition system**

## 2022-10-10 NOTE — ASSESSMENT & PLAN NOTE
Lab Results   Component Value Date    HGBA1C 9 4 (H) 07/08/2022     · Hold oral hypoglycemic agents    · Continue Lantus 35 units at bedtime  · Add sliding scale insulin  (P) 155 6

## 2022-10-10 NOTE — ASSESSMENT & PLAN NOTE
· Patient was and the hospital after his visiting therapist noticed his heart rate was in the 150s  · Was found to be in AFib with RVR  · Continue p o   Cardizem 240 mg daily and metoprolol 50 mg b i d - increased to TID while inpatient  · Cardiology following  · Continue anticoagulation with Xarelto  · Underwent LAURENCE cardioversion Friday but was found to be in A fib later in the evening, now back in A flutter  · Remains in a flutter on amiodarone drip  · Plan for transfer to [facility] for EP evaluation, Dr Kostas Connors aware  · PATIENT LEFT AMA PRIOR TO TRANSFER   · Discussed return precautions- patient understanding

## 2022-10-10 NOTE — ASSESSMENT & PLAN NOTE
Lab Results   Component Value Date    HGBA1C 9 4 (H) 07/08/2022     · Hold oral hypoglycemic agents    · Continue Lantus 35 units at bedtime  · Add sliding scale insulin  (P) 570 [id number]

## 2022-10-10 NOTE — PLAN OF CARE
Problem: Potential for Falls  Goal: Patient will remain free of falls  Description: INTERVENTIONS:  - Educate patient/family on patient safety including physical limitations  - Instruct patient to call for assistance with activity   - Consult OT/PT to assist with strengthening/mobility   - Keep Call bell within reach  - Keep bed low and locked with side rails adjusted as appropriate  - Keep care items and personal belongings within reach  - Initiate and maintain comfort rounds  - Make Fall Risk Sign visible to staff  - Apply yellow socks and bracelet for high fall risk patients  - Consider moving patient to room near nurses station  Outcome: Progressing     Problem: PAIN - ADULT  Goal: Verbalizes/displays adequate comfort level or baseline comfort level  Description: Interventions:  - Encourage patient to monitor pain and request assistance  - Assess pain using appropriate pain scale  - Administer analgesics based on type and severity of pain and evaluate response  - Implement non-pharmacological measures as appropriate and evaluate response  - Consider cultural and social influences on pain and pain management  - Notify physician/advanced practitioner if interventions unsuccessful or patient reports new pain  Outcome: Progressing     Problem: INFECTION - ADULT  Goal: Absence or prevention of progression during hospitalization  Description: INTERVENTIONS:  - Assess and monitor for signs and symptoms of infection  - Monitor lab/diagnostic results  - Monitor all insertion sites, i e  indwelling lines, tubes, and drains  - Monitor endotracheal if appropriate and nasal secretions for changes in amount and color  - Truman appropriate cooling/warming therapies per order  - Administer medications as ordered  - Instruct and encourage patient and family to use good hand hygiene technique  - Identify and instruct in appropriate isolation precautions for identified infection/condition  Outcome: Progressing     Problem: SAFETY ADULT  Goal: Maintain or return to baseline ADL function  Description: INTERVENTIONS:  -  Assess patient's ability to carry out ADLs; assess patient's baseline for ADL function and identify physical deficits which impact ability to perform ADLs (bathing, care of mouth/teeth, toileting, grooming, dressing, etc )  - Assess/evaluate cause of self-care deficits   - Assess range of motion  - Assess patient's mobility; develop plan if impaired  - Assess patient's need for assistive devices and provide as appropriate  - Encourage maximum independence but intervene and supervise when necessary  - Involve family in performance of ADLs  - Assess for home care needs following discharge   - Consider OT consult to assist with ADL evaluation and planning for discharge  - Provide patient education as appropriate  Outcome: Progressing  Goal: Maintains/Returns to pre admission functional level  Description: INTERVENTIONS:  - Perform BMAT or MOVE assessment daily    - Set and communicate daily mobility goal to care team and patient/family/caregiver     - Collaborate with rehabilitation services on mobility goals if consulted  - Out of bed for toileting  - Record patient progress and toleration of activity level   Outcome: Progressing     Problem: DISCHARGE PLANNING  Goal: Discharge to home or other facility with appropriate resources  Description: INTERVENTIONS:  - Identify barriers to discharge w/patient and caregiver  - Arrange for needed discharge resources and transportation as appropriate  - Identify discharge learning needs (meds, wound care, etc )  - Arrange for interpretive services to assist at discharge as needed  - Refer to Case Management Department for coordinating discharge planning if the patient needs post-hospital services based on physician/advanced practitioner order or complex needs related to functional status, cognitive ability, or social support system  Outcome: Progressing     Problem: Knowledge Deficit  Goal: Patient/family/caregiver demonstrates understanding of disease process, treatment plan, medications, and discharge instructions  Description: Complete learning assessment and assess knowledge base    Interventions:  - Provide teaching at level of understanding  - Provide teaching via preferred learning methods  Outcome: Progressing     Problem: CARDIOVASCULAR - ADULT  Goal: Maintains optimal cardiac output and hemodynamic stability  Description: INTERVENTIONS:  - Monitor I/O, vital signs and rhythm  - Monitor for S/S and trends of decreased cardiac output  - Administer and titrate ordered vasoactive medications to optimize hemodynamic stability  - Assess quality of pulses, skin color and temperature  - Assess for signs of decreased coronary artery perfusion  - Instruct patient to report change in severity of symptoms  Outcome: Progressing  Goal: Absence of cardiac dysrhythmias or at baseline rhythm  Description: INTERVENTIONS:  - Continuous cardiac monitoring, vital signs, obtain 12 lead EKG if ordered  - Administer antiarrhythmic and heart rate control medications as ordered  - Monitor electrolytes and administer replacement therapy as ordered  Outcome: Progressing     Problem: METABOLIC, FLUID AND ELECTROLYTES - ADULT  Goal: Glucose maintained within target range  Description: INTERVENTIONS:  - Monitor Blood Glucose as ordered  - Assess for signs and symptoms of hyperglycemia and hypoglycemia  - Administer ordered medications to maintain glucose within target range  - Assess nutritional intake and initiate nutrition service referral as needed  Outcome: Progressing     Problem: MUSCULOSKELETAL - ADULT  Goal: Maintain or return mobility to safest level of function  Description: INTERVENTIONS:  - Assess patient's ability to carry out ADLs; assess patient's baseline for ADL function and identify physical deficits which impact ability to perform ADLs (bathing, care of mouth/teeth, toileting, grooming, dressing, etc )  - Assess/evaluate cause of self-care deficits   - Assess range of motion  - Assess patient's mobility  - Assess patient's need for assistive devices and provide as appropriate  - Encourage maximum independence but intervene and supervise when necessary  - Involve family in performance of ADLs  - Assess for home care needs following discharge   - Consider OT consult to assist with ADL evaluation and planning for discharge  - Provide patient education as appropriate  Outcome: Progressing  Goal: Maintain proper alignment of affected body part  Description: INTERVENTIONS:  - Support, maintain and protect limb and body alignment  - Provide patient/ family with appropriate education  Outcome: Progressing     Problem: MOBILITY - ADULT  Goal: Maintain or return to baseline ADL function  Description: INTERVENTIONS:  -  Assess patient's ability to carry out ADLs; assess patient's baseline for ADL function and identify physical deficits which impact ability to perform ADLs (bathing, care of mouth/teeth, toileting, grooming, dressing, etc )  - Assess/evaluate cause of self-care deficits   - Assess range of motion  - Assess patient's mobility; develop plan if impaired  - Assess patient's need for assistive devices and provide as appropriate  - Encourage maximum independence but intervene and supervise when necessary  - Involve family in performance of ADLs  - Assess for home care needs following discharge   - Consider OT consult to assist with ADL evaluation and planning for discharge  - Provide patient education as appropriate  Outcome: Progressing  Goal: Maintains/Returns to pre admission functional level  Description: INTERVENTIONS:  - Perform BMAT or MOVE assessment daily    - Set and communicate daily mobility goal to care team and patient/family/caregiver     - Collaborate with rehabilitation services on mobility goals if consulted  - Out of bed for toileting  - Record patient progress and toleration of activity level   Outcome: Progressing     Problem: Nutrition/Hydration-ADULT  Goal: Nutrient/Hydration intake appropriate for improving, restoring or maintaining nutritional needs  Description: Monitor and assess patient's nutrition/hydration status for malnutrition  Collaborate with interdisciplinary team and initiate plan and interventions as ordered  Monitor patient's weight and dietary intake as ordered or per policy  Utilize nutrition screening tool and intervene as necessary  Determine patient's food preferences and provide high-protein, high-caloric foods as appropriate       INTERVENTIONS:  - Monitor oral intake, urinary output, labs, and treatment plans  - Assess nutrition and hydration status and recommend course of action  - Evaluate amount of meals eaten  - Assist patient with eating if necessary   - Allow adequate time for meals  - Recommend/ encourage appropriate diets, oral nutritional supplements, and vitamin/mineral supplements  - Order, calculate, and assess calorie counts as needed  - Recommend, monitor, and adjust tube feedings and TPN/PPN based on assessed needs  - Assess need for intravenous fluids  - Provide specific nutrition/hydration education as appropriate  - Include patient/family/caregiver in decisions related to nutrition  Outcome: Progressing

## 2022-10-10 NOTE — PROGRESS NOTES
Cardiology         Progress Note - Cardiology   Waldemar Morales  55 y o  male MRN: [id number]  Unit/Bed#: E4 -01 Encounter: [id number]          Assessment/Recommendations/Discussion:     1  Rapid atrial flutter, status post successful cardioversion 10/07/2022 with subsequent recurrence later in the day--thereafter started on IV amiodarone  2  Severe cardiomyopathy, ejection fraction estimated 20% on LAURENCE, likely secondary to rapid atrial flutter at the time of LAURENCE  3  Primary hypertension  4  History of paroxysmal atrial fibrillation  5  Dyslipidemia  6  Diabetes mellitus with were glycemic control   Hemoglobin A1c is 9 4  7  Obesity  8  Tobacco dependence  9  Suspected obstructive sleep apnea  10  COPD and asthma        · Persistent atrial despite increasing metoprolol dosing and initiation of amiodarone infusion  Discussed with Dr Evangelista Hansen will review the transferred [facility] for AFib/a flutter ablation  Discussed with internal medicine  · Continue Xarelto anticoagulation  · Patient remains asymptomatic        Subjective:  Patient seen and examined, denies chest pain, shortness of breath, lightheadedness, palpitations                  Physical Exam:  GEN:  NAD  HEENT:  MMM, NCAT, pink conjunctiva, EOMI, nonicteric sclera  CV:  NO JVD/HJR, RR, NO M/R/G, +S1/S2, NO PARASTERNAL HEAVE/THRILL, NO LE EDEMA, NO HEPATIC SYSTOLIC PULSATION, WARM EXTREMITIES  RESP:  CTAB/L  ABD:  SOFT, NT, NO GROSS ORGANOMEGALY        Vitals:   /67 (BP Location: Left arm)   Pulse (!) 122   Temp 97 5 °F (36 4 °C) (Temporal)   Resp 18   Ht 6' (1 829 m)   Wt (!) 171 kg (378 lb)   SpO2 94%   BMI 51 27 kg/m²   Vitals:    10/06/22 1900 10/07/22 1215   Weight: (!) 171 kg (378 lb 1 4 oz) (!) 171 kg (378 lb)       Intake/Output Summary (Last 24 hours) at 10/10/2022 1129  Last data filed at 10/10/2022 0607  Gross per 24 hour   Intake 1322 94 ml   Output --   Net 1322 94 ml       TELEMETRY:  Atrial flutter  Lab Results:  Results from last 7 days   Lab Units 10/07/22  0643   WBC Thousand/uL 5 89   HEMOGLOBIN g/dL 14 2   HEMATOCRIT % 44 9   PLATELETS Thousands/uL 219     Results from last 7 days   Lab Units 10/08/22  0442 10/07/22  0643 10/06/22  1436   POTASSIUM mmol/L 4 5   < > 4 3   CHLORIDE mmol/L 102   < > 103   CO2 mmol/L 30   < > 29   BUN mg/dL 16   < > 16   CREATININE mg/dL 1 02   < > 1 09   CALCIUM mg/dL 9 3   < > 10 0   ALK PHOS U/L  --   --  67   ALT U/L  --   --  23   AST U/L  --   --  13    < > = values in this interval not displayed       Results from last 7 days   Lab Units 10/08/22  0442   POTASSIUM mmol/L 4 5   CHLORIDE mmol/L 102   CO2 mmol/L 30   BUN mg/dL 16   CREATININE mg/dL 1 02   CALCIUM mg/dL 9 3           Medications:    Current Facility-Administered Medications:   •  albuterol (PROVENTIL HFA,VENTOLIN HFA) inhaler 2 puff, 2 puff, Inhalation, Q4H PRN, Karol Kovacs MD  •  [] amiodarone (CORDARONE) 900 mg in dextrose 5 % 500 mL infusion, 1 mg/min, Intravenous, Continuous, Stopped at 10/07/22 2304 **FOLLOWED BY** amiodarone (CORDARONE) 900 mg in dextrose 5 % 500 mL infusion, 0 5 mg/min, Intravenous, Continuous, Rujul Strauss, DO, Last Rate: 16 7 mL/hr at 10/10/22 0738, 0 5 mg/min at 10/10/22 0738  •  aspirin (ECOTRIN LOW STRENGTH) EC tablet 81 mg, 81 mg, Oral, Daily, Karol Kovacs MD, 81 mg at 10/10/22 0800  •  atorvastatin (LIPITOR) tablet 20 mg, 20 mg, Oral, Daily With Linda Perez MD, 20 mg at 10/09/22 1622  •  diltiazem (CARDIZEM CD) 24 hr capsule 240 mg, 240 mg, Oral, Daily, Karol Kovacs MD, 240 mg at 10/10/22 0800  •  DULoxetine (CYMBALTA) delayed release capsule 30 mg, 30 mg, Oral, HS, Karol Kovacs MD, 30 mg at 10/09/22 2109  •  fluticasone-vilanterol (BREO ELLIPTA) 100-25 mcg/inh inhaler 1 puff, 1 puff, Inhalation, Daily, Karol Kovacs MD, 1 puff at 10/10/22 08  •  gabapentin (NEURONTIN) capsule 100 mg, 100 mg, Oral, TID, Alexandre Li MD, 100 mg at 10/10/22 0800  •  insulin glargine (LANTUS) subcutaneous injection 35 Units 0 35 mL, 35 Units, Subcutaneous, HS, Alexandre Li MD, 35 Units at 10/09/22 2109  •  insulin lispro (HumaLOG) 100 units/mL subcutaneous injection 1-5 Units, 1-5 Units, Subcutaneous, TID AC, 2 Units at 10/10/22 1123 **AND** Fingerstick Glucose (POCT), , , TID AC, Alexandre Li MD  •  melatonin tablet 3 mg, 3 mg, Oral, HS, Earnest S Prechtel, DO, 3 mg at 10/09/22 2109  •  metoprolol tartrate (LOPRESSOR) tablet 50 mg, 50 mg, Oral, TID, Rujul Strauss, DO, 50 mg at 10/10/22 0800  •  nicotine (NICODERM CQ) 14 mg/24hr TD 24 hr patch 1 patch, 1 patch, Transdermal, Daily, Alexandre Li MD  •  oxyCODONE (ROXICODONE) immediate release tablet 10 mg, 10 mg, Oral, Q4H PRN, Becki Cameron PA-C, 10 mg at 10/10/22 5204  •  oxyCODONE (ROXICODONE) IR tablet 5 mg, 5 mg, Oral, Q4H PRN, Herlinda Carvajal PA-C, 5 mg at 10/09/22 2113  •  rivaroxaban (XARELTO) tablet 20 mg, 20 mg, Oral, Daily With Breakfast, Alexandre Li MD, 20 mg at 10/10/22 3339    This note was completed in part utilizing M-Modal Fluency Direct Software  Grammatical errors, random word insertions, spelling mistakes, and incomplete sentences may be an occasional consequence of this system secondary to software limitations, ambient noise, and hardware issues  If you have any questions or concerns about the content, text, or information contained within the body of this dictation, please contact the provider for clarification

## 2022-10-10 NOTE — TRANSPORTATION MEDICAL NECESSITY
Section I - General Information    Name of Patient: Galina Chau  : 1965    Medicare #: [id number]  Transport Date: 10/10/22 (PCS is valid for round trips on this date and for all repetitive trips in the 60-day range as noted below )  Origin: [address]                                                         Destination: [address] [place] [address]  Is the pt's stay covered under Medicare Part A (PPS/DRG)   []     Closest appropriate facility? If no, why is transport to more distant facility required? Yes  If hospice pt, is this transport related to pt's terminal illness? NA       Section II - Medical Necessity Questionnaire  Ambulance transportation is medically necessary only if other means of transport are contraindicated or would be potentially harmful to the patient  To meet this requirement, the patient must either be "bed confined" or suffer from a condition such that transport by means other than ambulance is contraindicated by the patient's condition  The following questions must be answered by the medical professional signing below for this form to be valid:    1)  Describe the MEDICAL CONDITION (physical and/or mental) of this patient AT [address] [place] [address] that requires the patient to be transported in an ambulance and why transport by other means is contraindicated by the patient's condition:  Patient needs to be transfer to higher level of care that can not be completed at this hospital for A Fib/a flutter Ablation  Pt will also be on a Cordarone drip, cardiac monitor, HT 61 and  lbs  2) Is the patient "bed confined" as defined below? No  To be "be confined" the patient must satisfy all three of the following conditions: (1) unable to get up from bed without Assistance; AND (2) unable to ambulate; AND (3) unable to sit in a chair or wheelchair      3) Can this patient safely be transported by car or wheelchair van (i e , seated during transport without a medical attendant or monitoring)? No    4) In addition to completing questions 1-3 above, please check any of the following conditions that apply*:   *Note: supporting documentation for any boxes checked must be maintained in the patient's medical records  If hosp-hosp transfer, describe services needed at 2nd facility not available at 1st facility? A Fib/a flutter ablation      IV meds/fluids required   Medical attendant required   Unable to tolerate seated position for time needed to transport   Cardiac monitoring required en route       Section III - Signature of Physician or Healthcare Professional  I certify that the above information is true and correct based on my evaluation of this patient, and represent that the patient requires transport by ambulance and that other forms of transport are contraindicated  I understand that this information will be used by the Centers for Medicare and Medicaid Services (CMS) to support the determination of medical necessity for ambulance services, and I represent that I have personal knowledge of the patient's condition at time of transport  []  If this box is checked, I also certify that the patient is physically or mentally incapable of signing the ambulance service's claim and that the institution with which I am affiliated has furnished care, services, or assistance to the patient  My signature below is made on behalf of the patient pursuant to 42 CFR §424 36(b)(4)  In accordance with 42 CFR §424 37, the specific reason(s) that the patient is physically or mentally incapable of signing the claim form is as follows        Signature of Physician* or [address], MSW,  LSW_____________________________________________________________  Signature Date 10/10/22 (For scheduled repetitive transports, this form is not valid for transports performed more than 60 days after this date)    Printed Name & Credentials of Physician or Healthcare Professional (MD, DO, RN, etc )__Gabriella Gonsales,  MSW, LSW_____________________________  *Form must be signed by patient's attending physician for scheduled, repetitive transports   For non-repetitive, unscheduled ambulance transports, if unable to obtain the signature of the attending physician, any of the following may sign (choose appropriate option below)  [] Physician Assistant []  Clinical Nurse Specialist []  Registered Nurse  []  Nurse Practitioner  [x] Discharge Planner

## 2022-10-10 NOTE — ASSESSMENT & PLAN NOTE
Bedside and Verbal shift change report given to [facility] (oncoming nurse) by Earnest Caldera RN (offgoing nurse). Report included the following information SBAR, Kardex, Recent Results and Med Rec Status. 1932; variable deceleration with harpreet to 70, total decel lasted 1 min, returned to baseline. Patient to left trendelenburg position, IV bolus administered, 500ml bolus @ 999ml/hr. 80; viable baby boy born via spontaneous vaginal delivery, cord cut by father with assistance of Dr. Ace Stringer. 2250, spontaneous delivery of placenta  2300; straight cathed for 300 clear yellow urine. · Does not appear in exacerbation at this time  · Continue Breo and p r n  albuterol

## 2022-10-10 NOTE — PLAN OF CARE
Problem: Potential for Falls  Goal: Patient will remain free of falls  Description: INTERVENTIONS:  - Educate patient/family on patient safety including physical limitations  - Instruct patient to call for assistance with activity   - Consult OT/PT to assist with strengthening/mobility   - Keep Call bell within reach  - Keep bed low and locked with side rails adjusted as appropriate  - Keep care items and personal belongings within reach  - Initiate and maintain comfort rounds  8Outcome: Progressing     Problem: PAIN - ADULT  Goal: Verbalizes/displays adequate comfort level or baseline comfort level  Description: Interventions:  - Encourage patient to monitor pain and request assistance  - Assess pain using appropriate pain scale  - Administer analgesics based on type and severity of pain and evaluate response  - Implement non-pharmacological measures as appropriate and evaluate response  - Consider cultural and social influences on pain and pain management  - Notify physician/advanced practitioner if interventions unsuccessful or patient reports new pain  Outcome: Progressing     Problem: INFECTION - ADULT  Goal: Absence or prevention of progression during hospitalization  Description: INTERVENTIONS:  - Assess and monitor for signs and symptoms of infection  - Monitor lab/diagnostic results  - Monitor all insertion sites, i e  indwelling lines, tubes, and drains  - Administer medications as ordered  - Instruct and encourage patient and family to use good hand hygiene technique  Outcome: Progressing     Problem: SAFETY ADULT  Goal: Maintain or return to baseline ADL function  Description: INTERVENTIONS:  -  Assess patient's ability to carry out ADLs; assess patient's baseline for ADL function and identify physical deficits which impact ability to perform ADLs (bathing, care of mouth/teeth, toileting, grooming, dressing, etc )  - Assess/evaluate cause of self-care deficits   - Assess range of motion  - Assess patient's mobility; develop plan if impaired  - Assess patient's need for assistive devices and provide as appropriate  - Encourage maximum independence but intervene and supervise when necessary  - Involve family in performance of ADLs  - Assess for home care needs following discharge   - Consider OT consult to assist with ADL evaluation and planning for discharge  - Provide patient education as appropriate  Outcome: Progressing  Goal: Maintains/Returns to pre admission functional level  Description: INTERVENTIONS:  - Perform BMAT or MOVE assessment daily    - Set and communicate daily mobility goal to care team and patient/family/caregiver  - Collaborate with rehabilitation services on mobility goals if consulted  - Out of bed for toileting  - Record patient progress and toleration of activity level   Outcome: Progressing     Problem: DISCHARGE PLANNING  Goal: Discharge to home or other facility with appropriate resources  Description: INTERVENTIONS:  - Identify barriers to discharge w/patient and caregiver  - Arrange for needed discharge resources and transportation as appropriate  - Identify discharge learning needs (meds, wound care, etc )  - Arrange for interpretive services to assist at discharge as needed  - Refer to Case Management Department for coordinating discharge planning if the patient needs post-hospital services based on physician/advanced practitioner order or complex needs related to functional status, cognitive ability, or social support system  Outcome: Progressing     Problem: Knowledge Deficit  Goal: Patient/family/caregiver demonstrates understanding of disease process, treatment plan, medications, and discharge instructions  Description: Complete learning assessment and assess knowledge base    Interventions:  - Provide teaching at level of understanding  - Provide teaching via preferred learning methods  Outcome: Progressing     Problem: CARDIOVASCULAR - ADULT  Goal: Maintains optimal cardiac output and hemodynamic stability  Description: INTERVENTIONS:  - Monitor I/O, vital signs and rhythm  - Monitor for S/S and trends of decreased cardiac output  - Administer and titrate ordered vasoactive medications to optimize hemodynamic stability  - Assess quality of pulses, skin color and temperature  - Assess for signs of decreased coronary artery perfusion  - Instruct patient to report change in severity of symptoms  Outcome: Progressing  Goal: Absence of cardiac dysrhythmias or at baseline rhythm  Description: INTERVENTIONS:  - Continuous cardiac monitoring, vital signs, obtain 12 lead EKG if ordered  - Administer antiarrhythmic and heart rate control medications as ordered  - Monitor electrolytes and administer replacement therapy as ordered  Outcome: Progressing     Problem: METABOLIC, FLUID AND ELECTROLYTES - ADULT  Goal: Glucose maintained within target range  Description: INTERVENTIONS:  - Monitor Blood Glucose as ordered  - Assess for signs and symptoms of hyperglycemia and hypoglycemia  - Administer ordered medications to maintain glucose within target range  - Assess nutritional intake and initiate nutrition service referral as needed  Outcome: Progressing     Problem: MUSCULOSKELETAL - ADULT  Goal: Maintain or return mobility to safest level of function  Description: INTERVENTIONS:  - Assess patient's ability to carry out ADLs; assess patient's baseline for ADL function and identify physical deficits which impact ability to perform ADLs (bathing, care of mouth/teeth, toileting, grooming, dressing, etc )  - Assess/evaluate cause of self-care deficits   - Assess range of motion  - Assess patient's mobility  - Assess patient's need for assistive devices and provide as appropriate  - Encourage maximum independence but intervene and supervise when necessary  - Involve family in performance of ADLs  - Assess for home care needs following discharge   - Consider OT consult to assist with ADL evaluation and planning for discharge  - Provide patient education as appropriate  Outcome: Progressing  Goal: Maintain proper alignment of affected body part  Description: INTERVENTIONS:  - Support, maintain and protect limb and body alignment  - Provide patient/ family with appropriate education  Outcome: Progressing     Problem: MOBILITY - ADULT  Goal: Maintain or return to baseline ADL function  Description: INTERVENTIONS:  -  Assess patient's ability to carry out ADLs; assess patient's baseline for ADL function and identify physical deficits which impact ability to perform ADLs (bathing, care of mouth/teeth, toileting, grooming, dressing, etc )  - Assess/evaluate cause of self-care deficits   - Assess range of motion  - Assess patient's mobility; develop plan if impaired  - Assess patient's need for assistive devices and provide as appropriate  - Encourage maximum independence but intervene and supervise when necessary  - Involve family in performance of ADLs  - Assess for home care needs following discharge   - Consider OT consult to assist with ADL evaluation and planning for discharge  - Provide patient education as appropriate  Outcome: Progressing  Goal: Maintains/Returns to pre admission functional level  Description: INTERVENTIONS:  - Perform BMAT or MOVE assessment daily    - Set and communicate daily mobility goal to care team and patient/family/caregiver  - Collaborate with rehabilitation services on mobility goals if consulted  - Out of bed for toileting  - Record patient progress and toleration of activity level   Outcome: Progressing     Problem: Nutrition/Hydration-ADULT  Goal: Nutrient/Hydration intake appropriate for improving, restoring or maintaining nutritional needs  Description: Monitor and assess patient's nutrition/hydration status for malnutrition  Collaborate with interdisciplinary team and initiate plan and interventions as ordered    Monitor patient's weight and dietary intake as ordered or per policy  Utilize nutrition screening tool and intervene as necessary  Determine patient's food preferences and provide high-protein, high-caloric foods as appropriate       INTERVENTIONS:  - Monitor oral intake, urinary output, labs, and treatment plans  - Assess nutrition and hydration status and recommend course of action  - Evaluate amount of meals eaten  - Assist patient with eating if necessary   - Allow adequate time for meals  - Recommend/ encourage appropriate diets, oral nutritional supplements, and vitamin/mineral supplements  - Order, calculate, and assess calorie counts as needed  - Recommend, monitor, and adjust tube feedings and TPN/PPN based on assessed needs  - Assess need for intravenous fluids  - Provide specific nutrition/hydration education as appropriate  - Include patient/family/caregiver in decisions related to nutrition  Outcome: Progressing

## 2022-10-10 NOTE — CASE MANAGEMENT
Case Management Discharge Planning Note    Patient name Cesario Okeefe    Location [facility] /E4 -* MRN [id number]  : 1965 Date 10/10/2022       Current Admission Date: 10/6/2022  Current Admission Diagnosis:Atrial flutter with rapid ventricular response [facility])   Patient Active Problem List    Diagnosis Date Noted   • DISH (diffuse idiopathic skeletal hyperostosis) 2022   • Fall 2022   • Closed fracture of ninth thoracic vertebra ([address] ) 2022   • Chronic diastolic congestive heart failure ([address] ) 2022   • Paroxysmal atrial fibrillation ([address] ) 2022   • Atrial flutter with rapid ventricular response ([address] ) 2022   • Sepsis ([facility] ) 2022   • Legally blind in right eye, as defined in [place] 2022   • Ventral hernia without obstruction or gangrene 2022   • Primary insomnia 2022   • Bilateral chronic knee pain 10/12/2021   • Chronic midline thoracic back pain 2021   • Atrial fibrillation with RVR ([address] ) 2020   • Numbness and tingling in left hand 2020   • COPD without exacerbation ([address] ) 01/15/2020   • Mild persistent asthma 01/15/2020   • Relationship dysfunction 2020   • Blurred vision, right eye 2020   • Ulnar neuropathy at elbow, left    • Left carpal tunnel syndrome    • Medial epicondylitis of elbow, left 10/31/2019   • Snoring 10/31/2019   • Other hyperlipidemia 10/29/2019   • Nocturia 10/17/2019   • Traumatic amputation of finger 2019   • Tobacco dependence 2019   • Numbness of left hand 2019   • Shoulder pain 2018   • Other male erectile dysfunction 2018   • Essential hypertension 2018   • Type 2 diabetes mellitus with hyperglycemia, with long-term current use of insulin ([facility] ) 2016   • Varicose veins of bilateral lower extremities with other complications       LOS (days): 4  Geometric Mean LOS (GMLOS) (days):   Days to GMLOS:     OBJECTIVE:  Risk of Unplanned Readmission Score: 20 54         Current admission status: Inpatient   Preferred Pharmacy:   [address] [place] [address] [address] [address]  Phone: [phone] Fax: [phone]  Phone: [phone] Fax: [phone]    Primary Care Provider: Cee Lozano MD    Primary Insurance: [address] [place] [address]  Secondary Insurance:     DISCHARGE DETAILS:       Freedom of Choice: Yes        Additional Comments: Pt needs to be transfer to [facility] for A Fib/a flutter ablation that can not be completed at this hospital  [facility] form completed and on chart

## 2022-10-10 NOTE — ASSESSMENT & PLAN NOTE
· Known a T9 oblique fracture after a fall in July  · Following with Neurosurgery outpatient  · Currently weaning TLSO brace  · P r n   Analgesics

## 2022-10-10 NOTE — PROGRESS NOTES
[address] [place] [address]  Progress Note - Anel Varela  1965, 55 y o  male MRN: [id number]  Unit/Bed#: E4 -01 Encounter: [id number]  Primary Care Provider: Dominick Vivas MD   Date and time admitted to hospital: 10/6/2022  2:22 PM    * Atrial flutter with rapid ventricular response [facility])  Assessment & Plan  · Patient was and the hospital after his visiting therapist noticed his heart rate was in the 150s  · Was found to be in AFib with RVR  · Continue p o  Cardizem 240 mg daily and metoprolol 50 mg b i d - increased to TID   · Cardiology following  · Continue anticoagulation with Xarelto  · Underwent LAURENCE cardioversion yesterday but was found to be in A fib later in the evening, now back in A flutter  · Remains on amiodarone drip  · May require transfer to \[facility]"" for cardioversion if he remains in uncontrolled A flutter, await Cardiology recommendations today    COPD without exacerbation ([address] )  Assessment & Plan  · Does not appear in exacerbation at this time  · Continue Breo and p r n  albuterol    Essential hypertension  Assessment & Plan  · BP stable today   · Lisinopril remains on hold   · Continue metoprolol, increase to t i d  Type 2 diabetes mellitus with hyperglycemia, with long-term current use of insulin [facility])  Assessment & Plan  Lab Results   Component Value Date    HGBA1C 9 4 (H) 07/08/2022     · Hold oral hypoglycemic agents  · Continue Lantus 35 units at bedtime  · Add sliding scale insulin  (P) 335 [id number]        VTE Pharmacologic Prophylaxis: VTE Score: 6 High Risk (Score >/= 5) - Pharmacological DVT Prophylaxis Ordered: rivaroxaban (Xarelto)  Sequential Compression Devices Ordered  Patient Centered Rounds: I performed bedside rounds with nursing staff today  Discussions with Specialists or Other Care Team Provider:  None    Education and Discussions with Family / Patient: Patient declined call to   Time Spent for Care: 30 minutes   More than 50% of total time spent on counseling and coordination of care as described above  Current Length of Stay: 4 day(s)  Current Patient Status: Inpatient   Certification Statement: The patient will continue to require additional inpatient hospital stay due to A flutter with RVR  Discharge Plan: Anticipate discharge in 48-72 hrs to home  Code Status: Level 1 - Full Code    Subjective:   Patient reports he is doing okay today  Remains asymptomatic from a cardiac perspective, denies any chest pain, palpitations or shortness of breath  Notes the only pain he is having is in his back in the area of his compression fracture  Objective:     Vitals:   Temp (24hrs), Av 2 °F (36 2 °C), Min:96 6 °F (35 9 °C), Max:97 8 °F (36 6 °C)    Temp:  [96 6 °F (35 9 °C)-97 8 °F (36 6 °C)] 96 6 °F (35 9 °C)  HR:  [110-126] 124  Resp:  [18-20] 20  BP: (114-139)/() 120/93  SpO2:  [92 %-98 %] 92 %  Body mass index is 51 27 kg/m²  Input and Output Summary (last 24 hours): Intake/Output Summary (Last 24 hours) at 10/10/2022 0750  Last data filed at 10/10/2022 0607  Gross per 24 hour   Intake 1482 15 ml   Output --   Net 1482 15 ml       Physical Exam:   Physical Exam  Vitals reviewed  Constitutional:       General: He is not in acute distress  HENT:      Head: Normocephalic and atraumatic  Eyes:      General: No scleral icterus  Conjunctiva/sclera: Conjunctivae normal    Cardiovascular:      Rate and Rhythm: Regular rhythm  Tachycardia present  Heart sounds: No murmur heard  Pulmonary:      Effort: Pulmonary effort is normal  No respiratory distress  Breath sounds: Normal breath sounds  Abdominal:      General: Bowel sounds are normal  There is no distension  Palpations: Abdomen is soft  Tenderness: There is no abdominal tenderness  Musculoskeletal:      Cervical back: Neck supple  Right lower leg: No edema  Left lower leg: No edema     Skin:     General: Skin is warm and dry  Neurological:      Mental Status: He is alert and oriented to person, place, and time  Psychiatric:         Mood and Affect: Mood normal          Behavior: Behavior normal           Additional Data:     Labs:  Results from last 7 days   Lab Units 10/07/22  0643 10/06/22  1436   WBC Thousand/uL 5 89 5 74   HEMOGLOBIN g/dL 14 2 15 4   HEMATOCRIT % 44 9 49 0   PLATELETS Thousands/uL 219 220   NEUTROS PCT %  --  63   LYMPHS PCT %  --  28   MONOS PCT %  --  8   EOS PCT %  --  1     Results from last 7 days   Lab Units 10/08/22  0442 10/07/22  0643 10/06/22  1436   SODIUM mmol/L 136   < > 137   POTASSIUM mmol/L 4 5   < > 4 3   CHLORIDE mmol/L 102   < > 103   CO2 mmol/L 30   < > 29   BUN mg/dL 16   < > 16   CREATININE mg/dL 1 02   < > 1 09   ANION GAP mmol/L 4   < > 5   CALCIUM mg/dL 9 3   < > 10 0   ALBUMIN g/dL  --   --  3 6   TOTAL BILIRUBIN mg/dL  --   --  0 25   ALK PHOS U/L  --   --  67   ALT U/L  --   --  23   AST U/L  --   --  13   GLUCOSE RANDOM mg/dL 134   < > 239*    < > = values in this interval not displayed           Results from last 7 days   Lab Units 10/10/22  0714 10/09/22  2109 10/09/22  1553 10/09/22  1153 10/09/22  0736 10/08/22  2059 10/08/22  1544 10/08/22  1153 10/08/22  0745 10/07/22  2048 10/07/22  1559 10/07/22  1307   POC GLUCOSE mg/dl 142* 163* 146* 176* 100 139 154* 156* 143* 193* 176* 135               Lines/Drains:  Invasive Devices  Report    Peripheral Intravenous Line  Duration           Peripheral IV 10/06/22 Right Antecubital 3 days    Peripheral IV 10/08/22 Left;Ventral (anterior) Forearm 2 days                  Telemetry:  Telemetry Orders (From admission, onward)             48 Hour Telemetry Monitoring  (ED Bridging Orders Panel)  Continuous x 48 hours        References:    Telemetry Guidelines   Question:  Reason for 48 Hour Telemetry  Answer:  Arrhythmias Requiring Medical Therapy (eg  SVT, Vtach/fib, Bradycardia, Uncontrolled A-fib)                 Telemetry Reviewed: Atrial flutter  HR averaging 125  Indication for Continued Telemetry Use: Arrthymias requiring medical therapy             Imaging: No pertinent imaging reviewed  Recent Cultures (last 7 days):         Last 24 Hours Medication List:   Current Facility-Administered Medications   Medication Dose Route Frequency Provider Last Rate   • albuterol  2 puff Inhalation Q4H PRN Anel Quijano MD     • amiodarone  0 5 mg/min Intravenous Continuous Rujul Strasus, DO 0 5 mg/min (10/10/22 0738)   • aspirin  81 mg Oral Daily Anel Quijano MD     • atorvastatin  20 mg Oral Daily With [address] [facility] [address] Darwin Schwarz MD     • diltiazem  240 mg Oral Daily Anel Quijano MD     • DULoxetine  30 mg Oral HS Anel Quijano MD     • fluticasone-vilanterol  1 puff Inhalation Daily Anel Quijano MD     • gabapentin  100 mg Oral TID Anel Quijano MD     • insulin glargine  35 Units Subcutaneous HS Anel Quijano MD     • insulin lispro  1-5 Units Subcutaneous TID Spencer Romberg, MD     • melatonin  3 mg Oral HS Chris Rodriguez DO     • metoprolol tartrate  50 mg Oral TID Rujul Strauss, DO     • nicotine  1 patch Transdermal Daily Anel Quijano MD     • oxyCODONE  10 mg Oral Q4H PRN Getachew Cason PA-C     • oxyCODONE  5 mg Oral Q4H PRN Getachew Cason PA-C     • rivaroxaban  20 mg Oral Daily With Breakfast Anel Quijano MD          Today, Patient Was Seen By: Reji Hollingsworth    **Please Note: This note may have been constructed using a voice recognition system  **

## 2022-10-11 PROBLEM — A41.9 SEPSIS (HCC): Status: RESOLVED | Noted: 2022-05-18 | Resolved: 2022-10-11

## 2022-10-11 NOTE — UTILIZATION REVIEW
Notification of Discharge   This is a Notification of Discharge from our facility [address]  Please be advised that this patient has been discharge from our facility  Below you will find the admission and discharge date and time including the patient’s disposition  UTILIZATION REVIEW CONTACT:  Danilo Garza  Utilization   Network Utilization Review Department  Phone: [phone] x carefully listen to the prompts  All voicemails are confidential   Email: [email or web address]     PHYSICIAN ADVISORY SERVICES:  FOR [id number] REVIEW - MEDICAL NECESSITY DENIAL  Phone: [phone]  Fax: [phone]  Email: [email or web address]     PRESENTATION DATE: 10/6/2022  2:22 PM    INPATIENT ADMISSION DATE: 10/6/22  3:30 PM   DISCHARGE DATE: 10/10/2022  5:37 PM  DISPOSITION: Left against medical advice or discontinued care Left against medical advice or discontinued care      IMPORTANT INFORMATION:  Send all requests for admission clinical reviews, approved or denied determinations and any other requests to dedicated fax number below belonging to the campus where the patient is receiving treatment   List of dedicated fax numbers:  [address] [place] [address] DENIALS (Administrative/Medical Necessity) [phone]   [address] [place] [address] (Maternity/NICU/Pediatrics) [phone]   [facility] [address] [facility] [phone]   [address] [phone]   [address] [facility] [phone]   [address] [place] [address] [phone]   [address] [place] [address] 119 [phone]   [facility]  [phone]   [facility] [address] [facility] [address] [facility] [phone]          Sweetie Castro PA-C   Physician Assistant   Hospitalist   Discharge Summary      Shared Date of Service:  10/10/2022 12:54 PM                 Shared              Show:Clear all  [x]Manual[x]Template[x]Copied    Added by:  Miguel Ángel Zimmerman PA-C      []Viki for details    [address] [facility]  Discharge- Ravi Cheney  1965, 55 y o  male MRN: [id number]  Unit/Bed#: E4 -01 Encounter: [id number]  Primary Care Provider: Allie Kramer MD   Date and time admitted to hospital: 10/6/2022  2:22 PM     * Atrial flutter with rapid ventricular response [facility])  Assessment & Plan  · Patient was and the hospital after his visiting therapist noticed his heart rate was in the 150s  · Was found to be in AFib with RVR  · Continue p o  Cardizem 240 mg daily and metoprolol 50 mg b i d - increased to TID while inpatient  · Cardiology following  · Continue anticoagulation with Xarelto  · Underwent LAURENCE cardioversion Friday but was found to be in A fib later in the evening, now back in A flutter  · Remains in a flutter on amiodarone drip  · Plan for transfer to [facility] for EP evaluation, Dr Nery Ca aware     Closed fracture of ninth thoracic vertebra [facility])  Assessment & Plan  · Known a T9 oblique fracture after a fall in July  · Following with Neurosurgery outpatient  · Currently weaning TLSO brace  · P r n  Analgesics     COPD without exacerbation ([facility] [address] )  Assessment & Plan  · Does not appear in exacerbation at this time  · Continue Breo and p r n  albuterol     Essential hypertension  Assessment & Plan  · BP stable today   · Lisinopril remains on hold   · Continue metoprolol, increase to t i d      Type 2 diabetes mellitus with hyperglycemia, with long-term current use of insulin (HCC)  Assessment & Plan        Lab Results   Component Value Date     HGBA1C 9 4 (H) 07/08/2022      · Hold oral hypoglycemic agents  · Continue Lantus 35 units at bedtime  · Add sliding scale insulin  (P) 155  6               Medical Problems                  Resolved Problems  Date Reviewed: 10/10/2022 None                   Discharging Physician / Practitioner: Andrés Bruno  PCP: Rito Rock MD  Admission Date:       Admission Orders (From admission, onward)              Ordered          10/06/22 1621   INPATIENT ADMISSION  Once              10/06/22 1530   INPATIENT ADMISSION  Once,   Status:  Canceled                          Discharge Date: 10/10/22     Consultations During Hospital Stay:  · Cardiology     Procedures Performed:   XR chest 1 view portable     Result Date: 10/6/2022  Impression: No acute cardiopulmonary findings  Workstation performed: [id number]      LANCE cardioversion     Significant Findings / Test Results:   · See above     Incidental Findings:   · None      Test Results Pending at Discharge (will require follow up): · None     Outpatient Tests Requested:  · Transfer to [facility] for EP     Complications:  None     Reason for Admission:  Tachycardia     Hospital Course:   Alexi Peacock  is a 55 y o  male patient who originally presented to the hospital on 10/6/2022 due to tachycardia  Patient reports he was getting physical therapy when they took routine vital signs and noticed his heart rate was in the 150s  He was brought to the ED and was found to be in atrial flutter with rapid ventricular response  He was seen in consult by Cardiology and taken for lance cardioversion  Initially this was successful but he was found to be back in AFib the evening of his cardioversion  The next morning he had converted back to atrial flutter with rapid ventricular response  He was placed on amiodarone drip  He remained on amiodarone drip through the weekend without improvement in his heart rate or rhythm    Cardiology contacted electrophysiology who recommended transfer for ablation         Please see above list of diagnoses and related plan for additional information       Condition at Discharge: stable     Discharge Day Visit / Exam:   Subjective:    Vitals: Blood Pressure: 114/67 (10/10/22 1100)  Pulse: (!) 122 (10/10/22 1100)  Temperature: 97 5 °F (36 4 °C) (10/10/22 1100)  Temp Source: Temporal (10/10/22 1100)  Respirations: 18 (10/10/22 1100)  Height: 6' (182 9 cm) (10/07/22 1234)  Weight - Scale: (!) 171 kg (378 lb) (10/07/22 1215)  SpO2: 94 % (10/10/22 1100)  Exam:   Physical Exam        Discharge instructions/Information to patient and family:   See after visit summary for information provided to patient and family        Provisions for Follow-Up Care:  See after visit summary for information related to follow-up care and any pertinent home health orders  Disposition:   [address] Transfer to [facility]     Planned Readmission: [facility]B      Discharge Statement:  I spent 35 minutes discharging the patient  This time was spent on the day of discharge  I had direct contact with the patient on the day of discharge  Greater than 50% of the total time was spent examining patient, answering all patient questions, arranging and discussing plan of care with patient as well as directly providing post-discharge instructions    Additional time then spent on discharge activities      Discharge Medications:  See after visit summary for reconciled discharge medications provided to patient and/or family

## 2022-10-19 ENCOUNTER — HOSPITAL ENCOUNTER (OUTPATIENT)
Dept: BONE DENSITY | Facility: CLINIC | Age: 57
Discharge: HOME | End: 2022-10-19
Payer: COMMERCIAL

## 2022-10-19 DIAGNOSIS — M48.10 DISH (DIFFUSE IDIOPATHIC SKELETAL HYPEROSTOSIS): ICD-10-CM

## 2022-10-19 DIAGNOSIS — Z13.820 ENCOUNTER FOR SCREENING FOR OSTEOPOROSIS: ICD-10-CM

## 2022-10-19 PROCEDURE — 77080 DXA BONE DENSITY AXIAL: CPT

## 2022-10-22 DIAGNOSIS — E11.9 TYPE 2 DIABETES MELLITUS WITHOUT COMPLICATION, WITHOUT LONG-TERM CURRENT USE OF INSULIN (HCC): ICD-10-CM

## 2022-10-24 RX ORDER — LISINOPRIL 10 MG/1
10 TABLET ORAL DAILY
Qty: 90 TABLET | Refills: 0 | Status: SHIPPED | OUTPATIENT
Start: 2022-10-24

## 2022-11-04 DIAGNOSIS — J45.30 MILD PERSISTENT ASTHMA: ICD-10-CM

## 2022-11-04 DIAGNOSIS — J44.9 COPD (CHRONIC OBSTRUCTIVE PULMONARY DISEASE) (HCC): ICD-10-CM

## 2022-11-04 RX ORDER — ALBUTEROL SULFATE 90 UG/1
2 AEROSOL, METERED RESPIRATORY (INHALATION) EVERY 4 HOURS PRN
Qty: 18 G | Refills: 2 | Status: SHIPPED | OUTPATIENT
Start: 2022-11-04

## 2022-11-23 DIAGNOSIS — M25.512 BILATERAL SHOULDER PAIN, UNSPECIFIED CHRONICITY: ICD-10-CM

## 2022-11-23 DIAGNOSIS — M25.511 BILATERAL SHOULDER PAIN, UNSPECIFIED CHRONICITY: ICD-10-CM

## 2022-11-28 RX ORDER — DULOXETIN HYDROCHLORIDE 30 MG/1
30 CAPSULE, DELAYED RELEASE ORAL
Qty: 90 CAPSULE | Refills: 0 | Status: SHIPPED | OUTPATIENT
Start: 2022-11-28

## 2022-12-02 ENCOUNTER — OFFICE VISIT (OUTPATIENT)
Dept: FAMILY MEDICINE CLINIC | Facility: CLINIC | Age: 57
End: 2022-12-02

## 2022-12-02 VITALS
SYSTOLIC BLOOD PRESSURE: 120 MMHG | DIASTOLIC BLOOD PRESSURE: 86 MMHG | TEMPERATURE: 97.6 F | RESPIRATION RATE: 18 BRPM | BODY MASS INDEX: 37.7 KG/M2 | OXYGEN SATURATION: 98 % | WEIGHT: 278 LBS | HEART RATE: 73 BPM

## 2022-12-02 DIAGNOSIS — I48.92 ATRIAL FLUTTER WITH RAPID VENTRICULAR RESPONSE (HCC): ICD-10-CM

## 2022-12-02 DIAGNOSIS — J44.9 COPD (CHRONIC OBSTRUCTIVE PULMONARY DISEASE) (HCC): ICD-10-CM

## 2022-12-02 DIAGNOSIS — J45.30 MILD PERSISTENT ASTHMA: ICD-10-CM

## 2022-12-02 DIAGNOSIS — Z23 NEED FOR VACCINATION: ICD-10-CM

## 2022-12-02 DIAGNOSIS — E11.9 TYPE 2 DIABETES MELLITUS WITHOUT COMPLICATION, WITHOUT LONG-TERM CURRENT USE OF INSULIN (HCC): Primary | ICD-10-CM

## 2022-12-02 DIAGNOSIS — N47.6 BALANOPOSTHITIS: ICD-10-CM

## 2022-12-02 LAB — SL AMB POCT HEMOGLOBIN AIC: 11.4 (ref ?–6.5)

## 2022-12-02 RX ORDER — FLUTICASONE FUROATE AND VILANTEROL 100; 25 UG/1; UG/1
1 POWDER RESPIRATORY (INHALATION) DAILY
Qty: 60 EACH | Refills: 5 | Status: SHIPPED | OUTPATIENT
Start: 2022-12-02

## 2022-12-02 RX ORDER — LISINOPRIL 10 MG/1
10 TABLET ORAL DAILY
Qty: 90 TABLET | Refills: 0 | Status: SHIPPED | OUTPATIENT
Start: 2022-12-02

## 2022-12-02 RX ORDER — CLOTRIMAZOLE AND BETAMETHASONE DIPROPIONATE 10; .64 MG/G; MG/G
CREAM TOPICAL 2 TIMES DAILY
Qty: 30 G | Refills: 0 | Status: SHIPPED | OUTPATIENT
Start: 2022-12-02

## 2022-12-02 RX ORDER — INSULIN GLARGINE 100 [IU]/ML
45 INJECTION, SOLUTION SUBCUTANEOUS
Qty: 15 ML | Refills: 5 | Status: SHIPPED | OUTPATIENT
Start: 2022-12-02

## 2022-12-02 RX ORDER — DILTIAZEM HYDROCHLORIDE 240 MG/1
240 CAPSULE, COATED, EXTENDED RELEASE ORAL DAILY
Qty: 90 CAPSULE | Refills: 1 | Status: SHIPPED | OUTPATIENT
Start: 2022-12-02

## 2022-12-02 RX ORDER — METOPROLOL TARTRATE 50 MG/1
50 TABLET, FILM COATED ORAL 3 TIMES DAILY
Qty: 90 TABLET | Refills: 0 | Status: SHIPPED | OUTPATIENT
Start: 2022-12-02

## 2022-12-02 NOTE — PROGRESS NOTES
Name: Lelo Moss  : 1965      MRN: [id number]  Encounter Provider: Cj Sood MD  Encounter Date: 2022   Encounter department: [address] [place] [address]  Type 2 diabetes mellitus without complication, without long-term current use of insulin (HCC)  -     POCT hemoglobin A1c  -     Insulin Glargine Solostar (Lantus SoloStar) 100 UNIT/ML SOPN; Inject 0 45 mL (45 Units total) under the skin daily at bedtime  -     Empagliflozin (Jardiance) 25 MG TABS; Take 1 tablet (25 mg total) by mouth every morning  -     lisinopril (ZESTRIL) 10 mg tablet; Take 1 tablet (10 mg total) by mouth daily    2  Atrial flutter with rapid ventricular response (HCC)  -     diltiazem (CARDIZEM CD) 240 mg 24 hr capsule; Take 1 capsule (240 mg total) by mouth daily  -     metoprolol tartrate (LOPRESSOR) 50 mg tablet; Take 1 tablet (50 mg total) by mouth 3 (three) times a day    3  COPD (chronic obstructive pulmonary disease) (HCC)  -     Fluticasone Furoate-Vilanterol (Breo Ellipta) 100-25 mcg/actuation inhaler; Inhale 1 puff daily Rinse mouth after use  4  Mild persistent asthma  -     Fluticasone Furoate-Vilanterol (Breo Ellipta) 100-25 mcg/actuation inhaler; Inhale 1 puff daily Rinse mouth after use  5  Balanoposthitis  -     clotrimazole-betamethasone (LOTRISONE) 1-0 05 % cream; Apply topically 2 (two) times a day    6  Need for vaccination  -     influenza vaccine, quadrivalent, recombinant, PF, 0 5 mL, for patients 18 yr+ (FLUBLOK)       DM: Uncontrolled  Increase Lantus to 45  Discussed low carb diet  Please make Cardiology appointment  ED precautions reviewed with patient   Subjective      55 y o  male patient who originally presented to the hospital on 10/6/2022 due to tachycardia  Patient reports he was getting physical therapy when they took routine vital signs and noticed his heart rate was in the 150s    He was brought to the ED and was found to be in atrial flutter with rapid ventricular response  He was seen in consult by Cardiology and taken for lance cardioversion  Initially this was successful but he was found to be back in AFib the evening of his cardioversion  The next morning he had converted back to atrial flutter with rapid ventricular response  He was placed on amiodarone drip  He remained on amiodarone drip through the weekend without improvement in his heart rate or rhythm  Cardiology contacted electrophysiology who recommended transfer for ablation  Patient was pending transfer when he chose to leave the hospital against medical advice  He ws supposed to follow up with Cardiology but canceled appointment twice and has not yet rescheduled  Currently states he feels better, denies CP, SOB, LE edema, palpitations, tachycardia  Not checking blood sugar at home  Main complain is back pain     Review of Systems   Constitutional: Positive for fatigue  Musculoskeletal: Positive for arthralgias and back pain  Skin:        Itching of tip of penis    All other systems reviewed and are negative        Current Outpatient Medications on File Prior to Visit   Medication Sig   • acetaminophen (TYLENOL) 325 mg tablet Take 2 tablets (650 mg total) by mouth every 6 (six) hours as needed for mild pain (Patient not taking: Reported on 10/3/2022)   • albuterol (PROVENTIL HFA,VENTOLIN HFA) 90 mcg/act inhaler INHALE 2 PUFFS EVERY 4 (FOUR) HOURS AS NEEDED FOR WHEEZING   • Alcohol Swabs (ALCOHOL PREP) 70 % PADS daily Test (Patient not taking: Reported on 9/3/2021)   • Alcohol Swabs (Alcohol Prep) PADS TEST DAILY (Patient not taking: Reported on 9/3/2021)   • amiodarone 200 mg tablet Take 1 tablet (200 mg total) by mouth daily   • aspirin (ECOTRIN LOW STRENGTH) 81 mg EC tablet Take 81 mg by mouth daily   • atorvastatin (Lipitor) 20 mg tablet Take 1 tablet (20 mg total) by mouth daily   • BD Pen Needle Carolyn U/F 32G X 4 MM MISC USE AS DIRECTED TO INJECT INSULIN • Blood Pressure Monitoring ([place] Choice BP Monitor/Arm) JM USE AS DIRECTED   • budesonide (Pulmicort Flexhaler) 90 MCG/ACT inhaler Inhale 1 puff 2 (two) times a day Rinse mouth after use  • Contour Next Test test strip USE 1 EACH 3 (THREE) TIMES A DAY   • cyclobenzaprine (FLEXERIL) 5 mg tablet Take 1 tablet (5 mg total) by mouth 3 (three) times a day as needed for muscle spasms (Patient not taking: No sig reported)   • DULoxetine (CYMBALTA) 30 mg delayed release capsule TAKE 1 CAPSULE (30 MG TOTAL) BY MOUTH DAILY AT BEDTIME   • gabapentin (NEURONTIN) 100 mg capsule Take 1 capsule (100 mg total) by mouth 3 (three) times a day (Patient not taking: No sig reported)   • Humidifier MISC USE AS DIRECTED   • Lancets MISC Use to test blood glucose once a day  Dx: Type 2 DM  E11 9   • Melatonin 5 MG TABS Take 1 tablet (5 mg total) by mouth daily at bedtime (Patient not taking: Reported on 10/3/2022)   • metFORMIN (GLUCOPHAGE) 1000 MG tablet Take 1 tablet (1,000 mg total) by mouth 2 (two) times a day with meals   • methocarbamol (ROBAXIN) 500 mg tablet Take 1 tablet (500 mg total) by mouth every 6 (six) hours as needed for muscle spasms (Patient not taking: No sig reported)   • Misc  Devices MISC by Does not apply route daily (Patient not taking: Reported on 9/3/2021)   • oxyCODONE (ROXICODONE) 10 MG TABS You may take 5 mg (0 5 tab) for moderate pain or 10 mg (1 tab) for severe pain, every 4 hours, as needed   (Patient not taking: No sig reported)   • oxyCODONE (Roxicodone) 5 immediate release tablet Take 1 tablet (5 mg total) by mouth every 4 (four) hours as needed for moderate pain Ongoing therapy Max Daily Amount: 30 mg (Patient not taking: No sig reported)   • rivaroxaban (XARELTO) 20 mg tablet Take 1 tablet (20 mg total) by mouth daily with breakfast   • senna-docusate sodium (SENOKOT S) 8 6-50 mg per tablet Take 2 tablets by mouth daily (Patient not taking: No sig reported)   • Tadalafil, PAH, 20 MG TABS Take 1 tablet (20 mg total) by mouth as needed (erectile dysfunction)   • triamcinolone (KENALOG) 0 5 % cream APPLY TOPICALLY 3 (THREE) TIMES A DAY   • TRUEplus Lancets 33G MISC USE AS INSTRUCTED       Objective     /86 (BP Location: Left arm, Patient Position: Sitting, Cuff Size: Standard)   Pulse 73   Temp 97 6 °F (36 4 °C) (Temporal)   Resp 18   Wt 126 kg (278 lb)   SpO2 98%   BMI 37 70 kg/m²     Physical Exam  Vitals and nursing note reviewed  Constitutional:       Appearance: He is well-developed  HENT:      Head: Normocephalic  Right Ear: External ear normal       Left Ear: External ear normal       Nose: Nose normal       Mouth/Throat:      Mouth: Oropharynx is clear and moist    Eyes:      Extraocular Movements: EOM normal       Conjunctiva/sclera: Conjunctivae normal       Pupils: Pupils are equal, round, and reactive to light  Neck:      Thyroid: No thyromegaly  Cardiovascular:      Rate and Rhythm: Normal rate and regular rhythm  Heart sounds: Normal heart sounds  Pulmonary:      Effort: Pulmonary effort is normal       Breath sounds: Normal breath sounds  Abdominal:      Palpations: Abdomen is soft  Tenderness: There is no abdominal tenderness  There is no guarding or rebound  Hernia: A hernia is present  Hernia is present in the ventral area  Musculoskeletal:         General: Normal range of motion  Cervical back: Normal range of motion and neck supple  Skin:     General: Skin is dry  Neurological:      Mental Status: He is alert and oriented to person, place, and time  Deep Tendon Reflexes: Reflexes are normal and symmetric     Psychiatric:         Mood and Affect: Mood and affect normal        Maurilio Villalba MD

## 2022-12-22 DIAGNOSIS — S22.078A OTHER CLOSED FRACTURE OF NINTH THORACIC VERTEBRA, INITIAL ENCOUNTER (HCC): ICD-10-CM

## 2022-12-22 RX ORDER — GABAPENTIN 100 MG/1
100 CAPSULE ORAL 3 TIMES DAILY
Qty: 90 CAPSULE | Refills: 2 | Status: SHIPPED | OUTPATIENT
Start: 2022-12-22 | End: 2023-03-22

## 2023-01-06 ENCOUNTER — OFFICE VISIT (OUTPATIENT)
Dept: NEUROSURGERY | Facility: CLINIC | Age: 58
End: 2023-01-06

## 2023-01-06 VITALS — RESPIRATION RATE: 16 BRPM | HEIGHT: 72 IN | WEIGHT: 299 LBS | BODY MASS INDEX: 40.5 KG/M2

## 2023-01-06 DIAGNOSIS — S22.070A CLOSED WEDGE COMPRESSION FRACTURE OF T9 VERTEBRA, INITIAL ENCOUNTER (HCC): Primary | ICD-10-CM

## 2023-01-06 NOTE — ASSESSMENT & PLAN NOTE
· As addressed in HPI  · Nondisplaced oblique fracture through T9 vertebral body  · On imagining evidence of  diffuse idiopathic skeletal hyperostosis (DISH)  · DXA -no osteoporosis  · Reports he completed physical therapy  · Reports continued intermittent back pain  [facility] imagining 7/8/22  thoracic and lumbar spine showed Mild multilevel degenerative changes of thoracic and lumbar spine with evidence of diffuse idiopathic skeletal hyperostosis (DISH)  Transitional lumbosacral anatomy with lumbarization of S1 vertebral body with associated right assimilation joint  Chronic bilateral S1 pars defect with grade 1 anterolisthesis S1-S2   · Is morbidly obese BMI 40 55   · Reports he has transportation issues, is not working , Was finally able to get a bus transportation pass and come to visit  PLAN  · RTO prn   · Red flag symptoms reviewed , if develop go to closest ED  · If additional questions or concerns call the office

## 2023-01-06 NOTE — PROGRESS NOTES
Assessment/Plan:    Closed fracture of ninth thoracic vertebra ([address] )  · As addressed in HPI  · Nondisplaced oblique fracture through T9 vertebral body  · On imagining evidence of  diffuse idiopathic skeletal hyperostosis (DISH)  · DXA -no osteoporosis  · Reports he completed physical therapy  · Reports continued intermittent back pain  [facility] imagining 7/8/22  thoracic and lumbar spine showed Mild multilevel degenerative changes of thoracic and lumbar spine with evidence of diffuse idiopathic skeletal hyperostosis (DISH)  Transitional lumbosacral anatomy with lumbarization of S1 vertebral body with associated right assimilation joint  Chronic bilateral S1 pars defect with grade 1 anterolisthesis S1-S2   · Is morbidly obese BMI 40 55   · Reports he has transportation issues, is not working , Was finally able to get a bus transportation pass and come to visit  PLAN  · RTO prn   · Red flag symptoms reviewed , if develop go to closest ED  · If additional questions or concerns call the office  Subjective: F/u after DXA bone scan 10/19/22; h/o T9 compression fx and DISH    Patient ID: Lyssa Vigil  is a 65 y o  male PMH 2 DM w/ hyperglycemia LT insulin use COPD/asthma , nicotine dependence, chronic systolic HF, atrial fibrillation, HTN, varicose veins b/l lower extremities, HLD, traumatic amputation finger left hand    HPI   Patient presents for evaluation of thoracic spine  He reports pain in mid and low back down the right leg (sharp in character; 10/10 in severity) and stiffness in his back  He reports that symptoms have been present for 6 months the sharp pain started 2 weeks ago  Initial inciting event: 7/8/22 presented as trauma transfer for [facility] [place], reported fall while standing hitting his back on a metal chair in his room that was aganisit a wall then proceeding to fall to the floor on his back  Had immediate back pain, 911 was called and he was taken to [facility]   Symptoms are worst: it all depends on the daily activity  Symptoms are relieved by changing position and be cautious with movements  Symptoms are worsen by any type of acitivity  Previous spinal problems: none  Conservative measures tried: home therapy  Prior surgery: none  Missed several visits after compression  fracture, all visits via telemedicine   He presents for an office visit today to discuss DXA scan result ordered after compression fracture and known history of DISH  Status post a fall when trying to sit in a metal chair hit back against the rim of chair  Sustained an Acute nondisplaced oblique fracture through T9 vertebral body  There is no involvement of the posterior elements      CT thoracic and lumbar showed Mild multilevel degenerative changes of thoracic and lumbar spine with evidence of diffuse idiopathic skeletal hyperostosis (DISH)   Transitional lumbosacral anatomy with lumbarization of S1 vertebral body with associated right assimilation joint  Chronic bilateral S1 pars defect with grade 1 anterolisthesis S1-S2  The study was marked in EPIC for immediate notification  Treated conservatively with a TLSO brace for 12 weeks  Ordered physical therapy after brace weaned  I spent 15 minutes with the patient today, in which greater than 50% of this time was spent in assessment, examination, impressions, reviewing imagining and recommendations for care  All questions were answered to his/her satisfaction, and contact information provided in the event additional questions arise  Patient acknowledged an understanding and agreement with plan          REVIEW OF SYSTEMS  Review of Systems   Constitutional: Positive for activity change (trouble getting up from sitting position and getting out of bed)  HENT: Negative  Eyes: Positive for visual disturbance (R>L eye blurred vision)  Respiratory: Positive for shortness of breath (with exertion)      Cardiovascular:        A-fib   Gastrointestinal: Negative  Endocrine: Negative  Genitourinary: Negative  Musculoskeletal: Positive for back pain (mid back to lower back and down the right leg), gait problem and myalgias (back)  Skin: Negative  Allergic/Immunologic: Negative  Neurological: Positive for dizziness, weakness (left arm, poor grasp), light-headedness and numbness (and tingling in left arm, chronic )  Hematological: Bruises/bleeds easily (bruises)  Psychiatric/Behavioral: Positive for dysphoric mood and sleep disturbance  The patient is nervous/anxious (mild)  All other systems reviewed and are negative  Meds/Allergies     Current Outpatient Medications   Medication Sig Dispense Refill   • acetaminophen (TYLENOL) 325 mg tablet Take 2 tablets (650 mg total) by mouth every 6 (six) hours as needed for mild pain (Patient not taking: Reported on 10/3/2022)  0   • albuterol (PROVENTIL HFA,VENTOLIN HFA) 90 mcg/act inhaler INHALE 2 PUFFS EVERY 4 (FOUR) HOURS AS NEEDED FOR WHEEZING 18 g 2   • Alcohol Swabs (ALCOHOL PREP) 70 % PADS daily Test (Patient not taking: Reported on 9/3/2021)  5   • Alcohol Swabs (Alcohol Prep) PADS TEST DAILY (Patient not taking: Reported on 9/3/2021)     • amiodarone 200 mg tablet Take 1 tablet (200 mg total) by mouth daily 30 tablet 0   • aspirin (ECOTRIN LOW STRENGTH) 81 mg EC tablet Take 81 mg by mouth daily     • atorvastatin (Lipitor) 20 mg tablet Take 1 tablet (20 mg total) by mouth daily 90 tablet 3   • BD Pen Needle Carolyn U/F 32G X 4 MM MISC USE AS DIRECTED TO INJECT INSULIN 100 each 0   • Blood Pressure Monitoring ([place] Choice BP Monitor/Arm) JM USE AS DIRECTED 1 each 0   • budesonide (Pulmicort Flexhaler) 90 MCG/ACT inhaler Inhale 1 puff 2 (two) times a day Rinse mouth after use   1 each 5   • clotrimazole-betamethasone (LOTRISONE) 1-0 05 % cream Apply topically 2 (two) times a day 30 g 0   • Contour Next Test test strip USE 1 EACH 3 (THREE) TIMES A  each 10   • cyclobenzaprine (FLEXERIL) 5 mg tablet Take 1 tablet (5 mg total) by mouth 3 (three) times a day as needed for muscle spasms (Patient not taking: No sig reported) 45 tablet 0   • diltiazem (CARDIZEM CD) 240 mg 24 hr capsule Take 1 capsule (240 mg total) by mouth daily 90 capsule 1   • DULoxetine (CYMBALTA) 30 mg delayed release capsule TAKE 1 CAPSULE (30 MG TOTAL) BY MOUTH DAILY AT BEDTIME 90 capsule 0   • Empagliflozin (Jardiance) 25 MG TABS Take 1 tablet (25 mg total) by mouth every morning 90 tablet 1   • Fluticasone Furoate-Vilanterol (Breo Ellipta) 100-25 mcg/actuation inhaler Inhale 1 puff daily Rinse mouth after use  60 each 5   • gabapentin (NEURONTIN) 100 mg capsule TAKE 1 CAPSULE (100 MG TOTAL) BY MOUTH 3 (THREE) TIMES A DAY 90 capsule 2   • Humidifier MISC USE AS DIRECTED 1 each 0   • Insulin Glargine Solostar (Lantus SoloStar) 100 UNIT/ML SOPN Inject 0 45 mL (45 Units total) under the skin daily at bedtime 15 mL 5   • Lancets MISC Use to test blood glucose once a day  Dx: Type 2 DM  E11 9     • lisinopril (ZESTRIL) 10 mg tablet Take 1 tablet (10 mg total) by mouth daily 90 tablet 0   • Melatonin 5 MG TABS Take 1 tablet (5 mg total) by mouth daily at bedtime (Patient not taking: Reported on 10/3/2022) 30 tablet 5   • metFORMIN (GLUCOPHAGE) 1000 MG tablet Take 1 tablet (1,000 mg total) by mouth 2 (two) times a day with meals 180 tablet 3   • methocarbamol (ROBAXIN) 500 mg tablet Take 1 tablet (500 mg total) by mouth every 6 (six) hours as needed for muscle spasms (Patient not taking: No sig reported) 42 tablet 0   • metoprolol tartrate (LOPRESSOR) 50 mg tablet Take 1 tablet (50 mg total) by mouth 3 (three) times a day 90 tablet 0   • Misc  Devices MISC by Does not apply route daily (Patient not taking: Reported on 9/3/2021) 1 each 0   • oxyCODONE (ROXICODONE) 10 MG TABS You may take 5 mg (0 5 tab) for moderate pain or 10 mg (1 tab) for severe pain, every 4 hours, as needed   (Patient not taking: No sig reported) 30 tablet 0   • oxyCODONE (Roxicodone) 5 immediate release tablet Take 1 tablet (5 mg total) by mouth every 4 (four) hours as needed for moderate pain Ongoing therapy Max Daily Amount: 30 mg (Patient not taking: No sig reported) 20 tablet 0   • rivaroxaban (XARELTO) 20 mg tablet Take 1 tablet (20 mg total) by mouth daily with breakfast 90 tablet 3   • senna-docusate sodium (SENOKOT S) 8 6-50 mg per tablet Take 2 tablets by mouth daily (Patient not taking: No sig reported) 28 tablet 0   • Tadalafil, PAH, 20 MG TABS Take 1 tablet (20 mg total) by mouth as needed (erectile dysfunction) 30 tablet 5   • triamcinolone (KENALOG) 0 5 % cream APPLY TOPICALLY 3 (THREE) TIMES A DAY 30 g 0   • TRUEplus Lancets 33G MISC USE AS INSTRUCTED 100 each 3     No current facility-administered medications for this visit         No Known Allergies    PAST HISTORY    Past Medical History:   Diagnosis Date   • Asthma    • Carpal tunnel syndrome    • Chronic pain     left arm   • Diabetes mellitus (HCC)    • Erectile dysfunction    • Hypertension    • Thoracic vertebral fracture (HCC)    • Umbilical hernia        Past Surgical History:   Procedure Laterality Date   • CHOLECYSTECTOMY     • CHOLECYSTECTOMY LAPAROSCOPIC N/A 6/20/2019    Procedure: CHOLECYSTECTOMY LAPAROSCOPIC;  Surgeon: Hayley Olson MD;  Location: [address] [place] [address];  Service: General   • 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   • HAND SURGERY     • UMBILICAL HERNIA REPAIR         Social History     Tobacco Use   • Smoking status: Some Days     Packs/day: 0 10     Types: Cigarettes   • Smokeless tobacco: Never   Vaping Use   • Vaping Use: Never used   Substance Use Topics   • Alcohol use: Yes     Comment: occassional, social, rare   • Drug use: Never       Family History   Problem Relation Age of Onset   • Breast cancer additional onset Mother    • Diabetes Father    • Hyperlipidemia Father    • Hypertension Father    • Seizures Father    • No Known Problems Son    • No Known Problems Son    • Vitiligo Son        The following portions of the patient's history were reviewed and updated as appropriate: allergies, current medications, past family history, past medical history, past social history, past surgical history and problem list       EXAM    Vitals:Resp  rate 16, height 6' (1 829 m), weight 136 kg (299 lb)  ,Body mass index is 40 55 kg/m²  Physical Exam  Vitals and nursing note reviewed  Constitutional:       General: He is not in acute distress  Appearance: Normal appearance  He is not ill-appearing or diaphoretic  HENT:      Head: Normocephalic and atraumatic  Eyes:      General: No scleral icterus  Right eye: No discharge  Left eye: No discharge  Extraocular Movements: Extraocular movements intact  Pulmonary:      Effort: Pulmonary effort is normal  No respiratory distress  Comments: Smells of cigarettes   Musculoskeletal:         General: Normal range of motion  Right lower leg: No edema  Left lower leg: No edema  Neurological:      General: No focal deficit present  Mental Status: He is alert and oriented to person, place, and time  Sensory: No sensory deficit  Motor: Motor strength is normal  No weakness  Coordination: Coordination normal       Gait: Gait is intact  Gait normal       Deep Tendon Reflexes: Reflexes normal    Psychiatric:         Mood and Affect: Mood normal          Behavior: Behavior normal          Neurologic Exam     Mental Status   Oriented to person, place, and time  Level of consciousness: alert    Motor Exam     Strength   Strength 5/5 throughout  Sensory Exam   Light touch normal      Gait, Coordination, and Reflexes     Gait  Gait: normal      Imaging Studies  No results found    CT spine thoracic & lumbar wo contrast  Status: Final result     PACS Images     Show images for CT spine thoracic & lumbar wo contrast  Study Result    Narrative & Impression   CT THORACIC AND LUMBAR SPINE 7/8/22    INDICATION:   Low back pain, trauma  back pain s/p fall      COMPARISON:  Chest radiograph May 18, 2022  Sacrum and coccyx radiograph December 3, 2018  Lumbar spine radiograph November 6, 2017      TECHNIQUE:  Contiguous axial images were obtained  Sagittal and coronal reconstructions were performed        Radiation dose length product (DLP) for this visit:  2276 mGy-cm   This examination, like all CT scans performed in the [facility], was performed utilizing techniques to minimize radiation dose exposure, including the use of iterative   reconstruction and automated exposure control         IMAGE QUALITY:  Diagnostic      FINDINGS:     ALIGNMENT: Transitional lumbosacral anatomy with lumbarization of S1 vertebral body with associated right assimilation joint  Mild dextroscoliosis of thoracic spine  Chronic bilateral S1 pars defect with grade 1 anterolisthesis S1-S2      VERTEBRAL BODIES: Acute nondisplaced oblique fracture through T9 vertebral body  There is no involvement of the posterior elements      No other fractures are identified in thoracic lumbar spine  No lytic or blastic osseous lesions      DEGENERATIVE CHANGES: Multilevel degenerative changes consists of anterior flowing osteophytes, intravertebral herniations, degenerative endplate changes, mild disc height loss  Diffuse idiopathic skeletal hyperostosis (DISH) of thoracic spine  No   critical central canal stenosis or high-grade neural foraminal narrowing      PARASPINAL SOFT TISSUES:  Unremarkable      OTHER: Calcified granuloma in right upper lobe  Subsegmental atelectasis in right lower lobe      IMPRESSION:     Acute nondisplaced oblique fracture through T9 vertebral body  There is no involvement of the posterior elements        Mild multilevel degenerative changes of thoracic and lumbar spine with evidence of diffuse idiopathic skeletal hyperostosis (DISH)       Transitional lumbosacral anatomy with lumbarization of S1 vertebral body with associated right assimilation joint      Chronic bilateral S1 pars defect with grade 1 anterolisthesis S1-S2      The study was marked in EPIC for immediate notification                    I have personally reviewed pertinent reports     and I have personally reviewed pertinent films in PACS

## 2023-01-08 DIAGNOSIS — J44.9 COPD (CHRONIC OBSTRUCTIVE PULMONARY DISEASE) (HCC): ICD-10-CM

## 2023-01-08 DIAGNOSIS — J45.30 MILD PERSISTENT ASTHMA: ICD-10-CM

## 2023-01-09 RX ORDER — ALBUTEROL SULFATE 90 UG/1
2 AEROSOL, METERED RESPIRATORY (INHALATION) EVERY 4 HOURS PRN
Qty: 18 G | Refills: 2 | Status: SHIPPED | OUTPATIENT
Start: 2023-01-09

## 2023-01-13 DIAGNOSIS — I48.92 ATRIAL FLUTTER WITH RAPID VENTRICULAR RESPONSE (HCC): ICD-10-CM

## 2023-01-16 RX ORDER — METOPROLOL TARTRATE 50 MG/1
50 TABLET, FILM COATED ORAL 3 TIMES DAILY
Qty: 90 TABLET | Refills: 0 | Status: SHIPPED | OUTPATIENT
Start: 2023-01-16

## 2023-01-26 DIAGNOSIS — N47.6 BALANOPOSTHITIS: ICD-10-CM

## 2023-01-27 RX ORDER — CLOTRIMAZOLE AND BETAMETHASONE DIPROPIONATE 10; .64 MG/G; MG/G
CREAM TOPICAL 2 TIMES DAILY
Qty: 30 G | Refills: 0 | Status: SHIPPED | OUTPATIENT
Start: 2023-01-27

## 2023-02-06 DIAGNOSIS — E11.9 TYPE 2 DIABETES MELLITUS WITHOUT COMPLICATION, WITHOUT LONG-TERM CURRENT USE OF INSULIN (HCC): ICD-10-CM

## 2023-02-06 RX ORDER — PEN NEEDLE, DIABETIC 32GX 5/32"
NEEDLE, DISPOSABLE MISCELLANEOUS
Qty: 100 EACH | Refills: 0 | Status: SHIPPED | OUTPATIENT
Start: 2023-02-06

## 2023-02-22 ENCOUNTER — VBI (OUTPATIENT)
Dept: ADMINISTRATIVE | Facility: OTHER | Age: 58
End: 2023-02-22

## 2023-03-14 ENCOUNTER — TELEPHONE (OUTPATIENT)
Dept: FAMILY MEDICINE CLINIC | Facility: CLINIC | Age: 58
End: 2023-03-14

## 2023-03-14 NOTE — TELEPHONE ENCOUNTER
first attempt to contact patient  left message to return my call on answering machine  Appointment on 3/22 needs to be rescheduled

## 2023-03-22 DIAGNOSIS — N47.6 BALANOPOSTHITIS: ICD-10-CM

## 2023-03-23 ENCOUNTER — OFFICE VISIT (OUTPATIENT)
Dept: FAMILY MEDICINE CLINIC | Facility: CLINIC | Age: 58
End: 2023-03-23

## 2023-03-23 VITALS
BODY MASS INDEX: 41.91 KG/M2 | DIASTOLIC BLOOD PRESSURE: 90 MMHG | OXYGEN SATURATION: 97 % | HEIGHT: 72 IN | SYSTOLIC BLOOD PRESSURE: 110 MMHG | RESPIRATION RATE: 20 BRPM | WEIGHT: 309.4 LBS | TEMPERATURE: 97.4 F | HEART RATE: 148 BPM

## 2023-03-23 DIAGNOSIS — E11.9 TYPE 2 DIABETES MELLITUS WITHOUT COMPLICATION, WITHOUT LONG-TERM CURRENT USE OF INSULIN (HCC): ICD-10-CM

## 2023-03-23 DIAGNOSIS — I48.91 ATRIAL FIBRILLATION WITH RVR (HCC): ICD-10-CM

## 2023-03-23 DIAGNOSIS — I48.92 ATRIAL FLUTTER WITH RAPID VENTRICULAR RESPONSE (HCC): ICD-10-CM

## 2023-03-23 DIAGNOSIS — R06.83 SNORING: ICD-10-CM

## 2023-03-23 DIAGNOSIS — Z79.4 TYPE 2 DIABETES MELLITUS WITH HYPERGLYCEMIA, WITH LONG-TERM CURRENT USE OF INSULIN (HCC): Primary | ICD-10-CM

## 2023-03-23 DIAGNOSIS — E11.65 TYPE 2 DIABETES MELLITUS WITH HYPERGLYCEMIA, WITH LONG-TERM CURRENT USE OF INSULIN (HCC): Primary | ICD-10-CM

## 2023-03-23 LAB — SL AMB POCT HEMOGLOBIN AIC: 9.6 (ref ?–6.5)

## 2023-03-23 RX ORDER — CLOTRIMAZOLE AND BETAMETHASONE DIPROPIONATE 10; .64 MG/G; MG/G
CREAM TOPICAL 2 TIMES DAILY
Qty: 30 G | Refills: 0 | Status: SHIPPED | OUTPATIENT
Start: 2023-03-23

## 2023-03-23 RX ORDER — INSULIN GLARGINE 100 [IU]/ML
55 INJECTION, SOLUTION SUBCUTANEOUS
Qty: 150 ML | Refills: 1 | Status: SHIPPED | OUTPATIENT
Start: 2023-03-23 | End: 2023-03-31 | Stop reason: SDUPTHER

## 2023-03-23 NOTE — PROGRESS NOTES
Name: Jo Hernandez  : 1965      MRN: [id number]  Encounter Provider: Caridad Gasca MD  Encounter Date: 3/23/2023   Encounter department: [address] [place] [address]     1  Type 2 diabetes mellitus with hyperglycemia, with long-term current use of insulin (HCC)  Assessment & Plan:  HgbA1C trending down  Switch to Insulin Glargine 55 Units at bedtime  Continue Empagliflozin 25 mg tabs QD  Stop Metformin  Follow up in 6 weeks to recheck HgA1C  Educated patient on correct use of insulin, referral placed for diabetic education  Recommended daily blood sugar checks, eat a low-carb diet with low fast acting sugars  Call the office for any hypo/hyperglycemic symptoms  Lab Results   Component Value Date    HGBA1C 9 6 (A) 2023       Orders:  -     POCT hemoglobin A1c  -     Ambulatory Referral to Diabetic Education; Future    2  Type 2 diabetes mellitus without complication, without long-term current use of insulin (HCC)  -     Insulin Glargine Solostar (Lantus SoloStar) 100 UNIT/ML SOPN; Inject 0 55 mL (55 Units total) under the skin daily at bedtime  -     Empagliflozin (Jardiance) 25 MG TABS; Take 1 tablet (25 mg total) by mouth every morning    3  Snoring  -     Ambulatory Referral to Sleep Medicine; Future    4  Atrial flutter with rapid ventricular response (HCC)  Assessment & Plan:  EKG in office showed Aflutter with RVR  Rate- 143bpm  Patient denies being compliant with Amiodarone  Referral placed for Cardiology to follow up    Orders:  -     Ambulatory Referral to Cardiology; Future    5  Atrial fibrillation with RVR ([address] )  -     Ambulatory Referral to Cardiology; Future           Subjective      AO Kelly Segura is a 64 yo male with PMh of Afib, CHF, COPD, Asthma and DM presenting to the office today for follow up  He reports losing his metformin bottle and therefore reports taking 50 units of insulin BID without any side effects   He denies any chest pain, palpitations, rashes, dizziness, headaches but does report some leg swelling, blurry vision changes, polydipsia, polyuria, and shortness of breath  He also reports mild constant dull back pain  He reports nothing makes it better or worse  He denies being able to go to an ophthalmologist due to financial and transportation issues  He admits not being able to check his blood sugars at home as he doesn't have his machine  He notes eating low fat food but admits to snacking a lot  He smokes 1-2 cigarettes a day for the past 10 years after being smoke free for 25 years  He admits to drinking alcohol once a month  He denies any illicit drug use  Diabetes  Pertinent negatives for hypoglycemia include no dizziness or headaches  Associated symptoms include polydipsia and polyuria  Pertinent negatives for diabetes include no chest pain and no polyphagia  Shortness of Breath  Associated symptoms include leg swelling  Pertinent negatives include no chest pain, dizziness or palpitations  Dizziness  Pertinent negatives include no abdominal pain, chest pain, congestion, fever, headaches, myalgias or numbness  Review of Systems   Constitutional: Negative for fever and unexpected weight change  HENT: Negative for congestion  Eyes: Positive for visual disturbance (blurry vision in left eye)  Respiratory: Positive for shortness of breath  Cardiovascular: Positive for leg swelling  Negative for chest pain and palpitations  Gastrointestinal: Negative for abdominal pain and constipation  Endocrine: Positive for polydipsia and polyuria  Negative for polyphagia  Genitourinary: Negative for difficulty urinating  Musculoskeletal: Positive for back pain  Negative for myalgias  Neurological: Negative for dizziness, syncope, numbness and headaches         Current Outpatient Medications on File Prior to Visit   Medication Sig   • acetaminophen (TYLENOL) 325 mg tablet Take 2 tablets (650 mg total) by mouth every 6 (six) hours as needed for mild pain (Patient not taking: Reported on 10/3/2022)   • albuterol (PROVENTIL HFA,VENTOLIN HFA) 90 mcg/act inhaler INHALE 2 PUFFS EVERY 4 (FOUR) HOURS AS NEEDED FOR WHEEZING   • Alcohol Swabs (ALCOHOL PREP) 70 % PADS daily Test (Patient not taking: Reported on 9/3/2021)   • Alcohol Swabs (Alcohol Prep) PADS TEST DAILY (Patient not taking: Reported on 9/3/2021)   • amiodarone 200 mg tablet Take 1 tablet (200 mg total) by mouth daily   • aspirin (ECOTRIN LOW STRENGTH) 81 mg EC tablet Take 81 mg by mouth daily   • atorvastatin (Lipitor) 20 mg tablet Take 1 tablet (20 mg total) by mouth daily   • BD Pen Needle Carolyn U/F 32G X 4 MM MISC USE AS DIRECTED TO INJECT INSULIN   • Blood Pressure Monitoring ([place] Choice BP Monitor/Arm) JM USE AS DIRECTED   • budesonide (Pulmicort Flexhaler) 90 MCG/ACT inhaler Inhale 1 puff 2 (two) times a day Rinse mouth after use  • clotrimazole-betamethasone (LOTRISONE) 1-0 05 % cream APPLY TOPICALLY 2 (TWO) TIMES A DAY   • Contour Next Test test strip USE 1 EACH 3 (THREE) TIMES A DAY   • cyclobenzaprine (FLEXERIL) 5 mg tablet Take 1 tablet (5 mg total) by mouth 3 (three) times a day as needed for muscle spasms (Patient not taking: No sig reported)   • diltiazem (CARDIZEM CD) 240 mg 24 hr capsule Take 1 capsule (240 mg total) by mouth daily   • DULoxetine (CYMBALTA) 30 mg delayed release capsule TAKE 1 CAPSULE (30 MG TOTAL) BY MOUTH DAILY AT BEDTIME   • Fluticasone Furoate-Vilanterol (Breo Ellipta) 100-25 mcg/actuation inhaler Inhale 1 puff daily Rinse mouth after use  • gabapentin (NEURONTIN) 100 mg capsule TAKE 1 CAPSULE (100 MG TOTAL) BY MOUTH 3 (THREE) TIMES A DAY   • Humidifier MISC USE AS DIRECTED   • Lancets MISC Use to test blood glucose once a day  Dx: Type 2 DM   E11 9   • lisinopril (ZESTRIL) 10 mg tablet Take 1 tablet (10 mg total) by mouth daily   • Melatonin 5 MG TABS Take 1 tablet (5 mg total) by mouth daily at bedtime (Patient not taking: Reported on 10/3/2022)   • metFORMIN (GLUCOPHAGE) 1000 MG tablet Take 1 tablet (1,000 mg total) by mouth 2 (two) times a day with meals   • methocarbamol (ROBAXIN) 500 mg tablet Take 1 tablet (500 mg total) by mouth every 6 (six) hours as needed for muscle spasms (Patient not taking: No sig reported)   • metoprolol tartrate (LOPRESSOR) 50 mg tablet TAKE 1 TABLET (50 MG TOTAL) BY MOUTH 3 (THREE) TIMES A DAY   • Misc  Devices MISC by Does not apply route daily (Patient not taking: Reported on 9/3/2021)   • oxyCODONE (ROXICODONE) 10 MG TABS You may take 5 mg (0 5 tab) for moderate pain or 10 mg (1 tab) for severe pain, every 4 hours, as needed  (Patient not taking: No sig reported)   • oxyCODONE (Roxicodone) 5 immediate release tablet Take 1 tablet (5 mg total) by mouth every 4 (four) hours as needed for moderate pain Ongoing therapy Max Daily Amount: 30 mg (Patient not taking: No sig reported)   • rivaroxaban (XARELTO) 20 mg tablet Take 1 tablet (20 mg total) by mouth daily with breakfast   • senna-docusate sodium (SENOKOT S) 8 6-50 mg per tablet Take 2 tablets by mouth daily (Patient not taking: No sig reported)   • Tadalafil, PAH, 20 MG TABS Take 1 tablet (20 mg total) by mouth as needed (erectile dysfunction)   • triamcinolone (KENALOG) 0 5 % cream APPLY TOPICALLY 3 (THREE) TIMES A DAY   • TRUEplus Lancets 33G MISC USE AS INSTRUCTED       Objective     /90 (BP Location: Right arm, Patient Position: Sitting, Cuff Size: Adult)   Pulse (!) 148   Temp (!) 97 4 °F (36 3 °C) (Temporal)   Resp 20   Ht 6' (1 829 m)   Wt (!) 140 kg (309 lb 6 4 oz)   SpO2 97%   BMI 41 96 kg/m²     Physical Exam  Vitals reviewed  Constitutional:       General: He is not in acute distress  Appearance: Normal appearance  He is obese  He is not toxic-appearing  HENT:      Head: Normocephalic and atraumatic        Right Ear: Tympanic membrane, ear canal and external ear normal       Left Ear: Tympanic membrane, ear canal and external ear normal       Nose: No congestion  Mouth/Throat:      Mouth: Mucous membranes are moist       Pharynx: No oropharyngeal exudate or posterior oropharyngeal erythema  Eyes:      Pupils: Pupils are equal, round, and reactive to light  Cardiovascular:      Rate and Rhythm: Regular rhythm  Tachycardia present  Pulses: Normal pulses  Heart sounds: Normal heart sounds  No murmur heard  No friction rub  No gallop  Pulmonary:      Effort: Pulmonary effort is normal  No respiratory distress  Breath sounds: Normal breath sounds  No wheezing or rales  Musculoskeletal:      Right lower leg: Normal  No swelling  No edema  Left lower leg: Normal  No swelling  No edema  Comments: Calves measurement:  Right: 18 2 cm   Left: 18 0 cm   Skin:     General: Skin is warm  Coloration: Skin is not pale  Findings: No erythema or rash  Neurological:      General: No focal deficit present  Mental Status: He is alert and oriented to person, place, and time         Brayan Richards MD

## 2023-03-23 NOTE — ASSESSMENT & PLAN NOTE
HgbA1C trending down  Switch to Insulin Glargine 55 Units at bedtime  Continue Empagliflozin 25 mg tabs QD  Stop Metformin  Follow up in 6 weeks to recheck HgA1C  Educated patient on correct use of insulin, referral placed for diabetic education  Recommended daily blood sugar checks, eat a low-carb diet with low fast acting sugars     Call the office for any hypo/hyperglycemic symptoms  Lab Results   Component Value Date    HGBA1C 9 6 (A) 03/23/2023

## 2023-03-23 NOTE — ASSESSMENT & PLAN NOTE
EKG in office showed Aflutter with RVR  Rate- 143bpm  Patient denies being compliant with Amiodarone  Referral placed for Cardiology to follow up

## 2023-03-31 DIAGNOSIS — E11.9 TYPE 2 DIABETES MELLITUS WITHOUT COMPLICATION, WITHOUT LONG-TERM CURRENT USE OF INSULIN (HCC): ICD-10-CM

## 2023-03-31 DIAGNOSIS — I48.92 ATRIAL FLUTTER WITH RAPID VENTRICULAR RESPONSE (HCC): ICD-10-CM

## 2023-03-31 RX ORDER — INSULIN GLARGINE 100 [IU]/ML
55 INJECTION, SOLUTION SUBCUTANEOUS
Qty: 15 ML | Refills: 5 | Status: SHIPPED | OUTPATIENT
Start: 2023-03-31

## 2023-04-01 RX ORDER — METOPROLOL TARTRATE 50 MG/1
50 TABLET, FILM COATED ORAL 3 TIMES DAILY
Qty: 90 TABLET | Refills: 0 | Status: SHIPPED | OUTPATIENT
Start: 2023-04-01

## 2023-04-03 DIAGNOSIS — E11.9 TYPE 2 DIABETES MELLITUS WITHOUT COMPLICATION, WITHOUT LONG-TERM CURRENT USE OF INSULIN (HCC): ICD-10-CM

## 2023-04-03 DIAGNOSIS — I48.91 ATRIAL FIBRILLATION WITH RVR (HCC): ICD-10-CM

## 2023-04-03 RX ORDER — LISINOPRIL 10 MG/1
10 TABLET ORAL DAILY
Qty: 90 TABLET | Refills: 0 | Status: SHIPPED | OUTPATIENT
Start: 2023-04-03

## 2023-04-05 ENCOUNTER — TELEPHONE (OUTPATIENT)
Dept: FAMILY MEDICINE CLINIC | Facility: CLINIC | Age: 58
End: 2023-04-05

## 2023-04-05 NOTE — TELEPHONE ENCOUNTER
PCP SIGNATURE NEEDED FOR Prior Authorization FORM RECEIVED VIA FAX AND PLACED IN PCP FOLDER TO BE DELIVERED AT ASSIGNED TIMES

## 2023-04-06 DIAGNOSIS — M25.511 BILATERAL SHOULDER PAIN, UNSPECIFIED CHRONICITY: ICD-10-CM

## 2023-04-06 DIAGNOSIS — M25.512 BILATERAL SHOULDER PAIN, UNSPECIFIED CHRONICITY: ICD-10-CM

## 2023-04-06 RX ORDER — DULOXETIN HYDROCHLORIDE 30 MG/1
30 CAPSULE, DELAYED RELEASE ORAL
Qty: 90 CAPSULE | Refills: 0 | Status: SHIPPED | OUTPATIENT
Start: 2023-04-06

## 2023-04-06 NOTE — TELEPHONE ENCOUNTER
[employer] Disability  form received on 4/6/23  to be completed by PCP  Copy made and placed in PCP folder  Forms to be delivered to PCP mailbox at assigned time        Received by mail

## 2023-04-26 ENCOUNTER — CONSULT (OUTPATIENT)
Dept: CARDIOLOGY CLINIC | Facility: CLINIC | Age: 58
End: 2023-04-26

## 2023-04-26 VITALS
HEART RATE: 60 BPM | WEIGHT: 312 LBS | SYSTOLIC BLOOD PRESSURE: 128 MMHG | BODY MASS INDEX: 42.26 KG/M2 | HEIGHT: 72 IN | DIASTOLIC BLOOD PRESSURE: 78 MMHG

## 2023-04-26 DIAGNOSIS — I48.0 PAROXYSMAL ATRIAL FIBRILLATION (HCC): Chronic | ICD-10-CM

## 2023-04-26 DIAGNOSIS — E11.65 TYPE 2 DIABETES MELLITUS WITH HYPERGLYCEMIA, WITH LONG-TERM CURRENT USE OF INSULIN (HCC): ICD-10-CM

## 2023-04-26 DIAGNOSIS — I48.91 ATRIAL FIBRILLATION WITH RVR (HCC): Primary | ICD-10-CM

## 2023-04-26 DIAGNOSIS — Z79.899 LONG TERM CURRENT USE OF AMIODARONE: ICD-10-CM

## 2023-04-26 DIAGNOSIS — R07.89 ATYPICAL CHEST PAIN: ICD-10-CM

## 2023-04-26 DIAGNOSIS — Z79.4 TYPE 2 DIABETES MELLITUS WITH HYPERGLYCEMIA, WITH LONG-TERM CURRENT USE OF INSULIN (HCC): ICD-10-CM

## 2023-04-26 DIAGNOSIS — I48.92 ATRIAL FLUTTER WITH RAPID VENTRICULAR RESPONSE (HCC): ICD-10-CM

## 2023-04-26 RX ORDER — DIPHENOXYLATE HYDROCHLORIDE AND ATROPINE SULFATE 2.5; .025 MG/1; MG/1
1 TABLET ORAL DAILY
COMMUNITY

## 2023-04-26 RX ORDER — TADALAFIL 20 MG/1
TABLET ORAL
COMMUNITY
Start: 2023-01-29

## 2023-04-26 NOTE — PROGRESS NOTES
Cardiology   MD Moe St MD Dorthea Africa, DO, KENDRICK Melton MD Marshal Minor, DO, Emmanuel Christie DO, [facility]  -------------------------------------------------------------------  [facility]  [address]  [place], [place] [address] [phone]  [phone]  04/26/23  Veronica Blair  YOB: 1965   MRN: [id number]      Referring Physician: Allen Chow MD  [address]  [address]     HPI: Veronica Blair  is a 65 y o  male with:   Rapid atrial flutter, status post successful cardioversion 10/07/2022 with subsequent recurrence later in the day--thereafter started on IV amiodarone  Severe cardiomyopathy, ejection fraction estimated 20% on LAURENCE, likely secondary to rapid atrial flutter at the time of LAURENCE  Primary hypertension  History of paroxysmal atrial fibrillation  Dyslipidemia  Diabetes mellitus with were glycemic control   Hemoglobin A1c is 9 4  Obesity  Tobacco dependence  Suspected obstructive sleep apnea  COPD and asthma  Ambulatory dysfunction - had a fall onto a chair where he broke his back! He presents today for f/u  He still notes intermittent palpitations  Has not followed up with cardiology from hospitalization in October until now  He notes some numbness and sharp pain in L Arm as well  He notes that he can fall asleep very easily, even at times when he does not want to such as when watching TV  He states that he has episodes where he feels like he is startled, stops breathing and wakes up in the middle of the night as well  Review of Systems   Constitutional: Negative for chills and fever  HENT: Negative for facial swelling and sore throat  Eyes: Negative for visual disturbance  Respiratory: Positive for chest tightness  Negative for cough, shortness of breath and wheezing  Cardiovascular: Positive for chest pain and palpitations   Negative for leg swelling  Gastrointestinal: Negative for abdominal pain, blood in stool, constipation, diarrhea, nausea and vomiting  Endocrine: Negative for cold intolerance and heat intolerance  Genitourinary: Negative for decreased urine volume, difficulty urinating, dysuria and hematuria  Musculoskeletal: Negative for arthralgias, back pain and myalgias  Skin: Negative for rash  Neurological: Negative for dizziness, syncope, weakness and numbness  Psychiatric/Behavioral: Negative for agitation, behavioral problems and confusion  The patient is not nervous/anxious  OBJECTIVE  Vitals:    04/26/23 1353   BP: 128/78   Pulse: 60       Physical Exam  Constitutional: awake, alert and oriented, in no acute distress, no obvious deformities, obese male  Head: Normocephalic, without obvious abnormality, atraumatic  Eyes: conjunctivae clear and moist  Sclera anicteric  No xanthelasmas  Pupils equal bilaterally  Extraocular motions are full  Ear nose mouth and throat: ears are symmetrical bilaterally, hearing appears to be equal bilaterally, no nasal discharge or epistaxis, oropharynx is clear with moist mucous membranes  Neck: Trachea is midline, neck is supple, no thyromegaly or significant lymphadenopathy, there is full range of motion  Lungs: clear to auscultation bilaterally, no wheezes, no rales, no rhonchi, no accessory muscle use, breathing is nonlabored  Heart: regular rate and rhythm, S1, S2 normal, no murmur, no click, no rub and no gallop, no lower extremity edema  Abdomen: Obese, soft, non-tender; bowel sounds normal; no masses, no organomegaly  Psychiatric: Patient is oriented to time, place, person, mood/affect is negative for depression, anxiety, agitation, appears to have appropriate insight  Skin: Skin is warm, dry, intact  No obvious rashes or lesions on exposed extremities  Nail beds are pink with no cyanosis or clubbing        EKG:  Results for orders placed or performed in visit on 04/26/23 POCT ECG    Impression    Normal sinus rhythm, normal ECG        IMPRESSION:  Atrial flutter, status post successful cardioversion 10/07/2022 with subsequent recurrence later in the day--thereafter started on amiodarone  Severe cardiomyopathy, ejection fraction estimated 20% on LAURENCE, likely secondary to rapid atrial flutter at the time of LAURENCE  Primary hypertension  History of paroxysmal atrial fibrillation  Dyslipidemia  Diabetes mellitus with were glycemic control   Hemoglobin A1c is 9 4  Obesity  Tobacco dependence  Suspected obstructive sleep apnea  COPD and asthma  Ambulatory dysfunction - had a fall onto a chair where he broke his back! RECOMMENDED ROUTINE MONITORING FOR PATIENTS RECEIVING AMIODARONE   Thyroid function tests---  Baseline and every 6 mo   Electrolytes, serum creatinine --- Baseline and as indicated   Chest radiograph--- Baseline and annually   Pulmonary function tests, with Dlco -- Baseline and for unexplained dyspnea or new chest radiographic findings  Ophthalmologic evaluation--- If visual impairment or for symptoms   ECG--- Baseline and annually    DISCUSSION/RECOMMENDATIONS:  He presents today for follow-up  Still notes intermittent episodes where he feels his heart is having palpitations  Also notes soreness that is happening on a intermittent basis in his left arm and going down his left arm    For left arm pain, soreness, with his diabetes and risk factors for coronary disease with hypertension dyslipidemia obesity diabetes's suspected obstructive sleep apnea, and with his ambulatory dysfunction after having a broken back, will check a pharmacologic nuclear stress  Needs sleep study - high likelihood of MARIAN - contributing to weight and DM and Afib/atrial arrhythmia  Will check new echo given prior reduced EF suspected NICM  Continue with aspirin, Xarelto, amiodarone, Lipitor, diltiazem, Jardiance, metoprolol, lisinopril  If ejection fraction is still low, may need to come off the diltiazem  Will check TSH, CMP, CBC given long-term use of amiodarone  Chest x-ray in October was without acute cardiopulmonary findings  Needs to be seen for eye exam  ECG today shows normal sinus rhythm    Darrell Nelson DO, [facility], [facility]  --------------------------------------------------------------------------------  TREADMILL STRESS  No results found for this or any previous visit      ----------------------------------------------------------------------------------------------  NUCLEAR STRESS TEST: No results found for this or any previous visit  No results found for this or any previous visit       --------------------------------------------------------------------------------  CATH:  No results found for this or any previous visit     --------------------------------------------------------------------------------  ECHO:   Results for orders placed during the hospital encounter of 20    Echo complete with contrast if indicated    [facility]  [employer] [address]    Transthoracic Echocardiogram  2D, M-mode, Doppler, and Color Doppler    Study date:  15-Grzegorz-2020    Patient: Joe Espino  MR number: [id number]  Account number: [de-identified]  : 1965  Age: 49 years  Gender: Male  Status: Outpatient  Location: [facility]  Height: 70 in  Weight: 265 5 lb  BP: 146/ 78 mmHg    Indications: Dyspnea    Interpreting Physician:  Sandee Reyes MD    IMPRESSIONS:  Technical quality: Good  Cardiac rhythm: Normal sinus    1  Normal left ventricular size and systolic and diastolic function, EF around 61%  2  Mild biatrial enlargement  3  Aortic valve sclerosis, no aortic valve stenosis or regurgitation  4  Trace mitral and tricuspid valve regurgitation  5  No obvious pulmonary hypertension  6  No pericardial effusion  No previous echocardiogram is available for comparison      SUMMARY    LEFT VENTRICLE:  Normal left ventricular cavity size, normal wall thickness, normal left ventricle systolic function and wall motion  Ejection fraction is estimated as around 61%  Doppler evaluation suggests normal diastolic function  RIGHT VENTRICLE:  Normal right ventricular size and systolic function  Normal estimated right ventricular systolic pressure, 29 mmHg  LEFT ATRIUM:  Mild left atrial cavity enlargement  RIGHT ATRIUM:  Mild right atrial cavity enlargement  MITRAL VALVE:  Mild mitral valve sclerosis, adequate leaflet mobility  Trace mitral valve regurgitation  AORTIC VALVE:  Tricuspid aortic valve with mild sclerosis  No aortic valve stenosis or regurgitation  TRICUSPID VALVE:  Trace tricuspid valve regurgitation  PULMONIC VALVE:  No significant pulmonic valve regurgitation  AORTA:  Aortic root and proximal ascending aorta are normal in size on 2D imaging  IVC, HEPATIC VEINS:  Inferior vena cava is normal in size and demonstrates appropriate respiratory phasic changes in diameter  PERICARDIUM:  No pericardial effusion  HISTORY: PRIOR HISTORY: Risk factors: hypertension, diabetes, morbid obesity, and a history of current cigarette use (within the last month)  PROCEDURE: The study was performed in the [place] [address]  This was a routine study  The transthoracic approach was used  The study included complete 2D imaging, M-mode, complete spectral Doppler, and color Doppler  The heart rate was 74  bpm, at the start of the study  Image quality was adequate  LEFT VENTRICLE: Normal left ventricular cavity size, normal wall thickness, normal left ventricle systolic function and wall motion  Ejection fraction is estimated as around 61%  Doppler evaluation suggests normal diastolic function  RIGHT VENTRICLE: Normal right ventricular size and systolic function  Normal estimated right ventricular systolic pressure, 29 mmHg  LEFT ATRIUM: Mild left atrial cavity enlargement      RIGHT ATRIUM: Mild right atrial cavity "enlargement  MITRAL VALVE: Mild mitral valve sclerosis, adequate leaflet mobility  Trace mitral valve regurgitation  AORTIC VALVE: Tricuspid aortic valve with mild sclerosis  No aortic valve stenosis or regurgitation  TRICUSPID VALVE: Trace tricuspid valve regurgitation  PULMONIC VALVE: No significant pulmonic valve regurgitation  PERICARDIUM: No pericardial effusion  AORTA: Aortic root and proximal ascending aorta are normal in size on 2D imaging  SYSTEMIC VEINS: IVC: Inferior vena cava is normal in size and demonstrates appropriate respiratory phasic changes in diameter  SYSTEM MEASUREMENT TABLES    2D  %FS: 33 33 %  Ao Diam: 2 58 cm  EDV(Teich): 141 21 ml  EF(Teich): 61 48 %  ESV(Teich): 54 39 ml  IVSd: 0 97 cm  LA Area: 24 07 cm2  LA Diam: 4 35 cm  LVEDV MOD A4C: 151 66 ml  LVEF MOD A4C: 60 89 %  LVESV MOD A4C: 59 32 ml  LVIDd: 5 4 cm  LVIDs: 3 6 cm  LVLd A4C: 9 16 cm  LVLs A4C: 7 22 cm  LVPWd: 1 06 cm  RA Area: 24 95 cm2  RVIDd: 3 33 cm  SV MOD A4C: 92 34 ml  SV(Teich): 86 81 ml    CW  TR Vmax: 2 23 m/s  TR maxP 97 mmHg    MM  TAPSE: 3 04 cm    PW  E': 0 11 m/s  E/E': 8 89  MV A Cristian: 0 92 m/s  MV Dec Camp: 5 1 m/s2  MV DecT: 198 53 ms  MV E Cristian: 1 01 m/s  MV E/A Ratio: 1 1  MV PHT: 57 57 ms  MVA By PHT: 3 82 cm2    [facility]    Prepared and electronically signed by    Aziza Middleton MD  Signed 15-Grzegorz-2020 12:10:23    Results for orders placed during the hospital encounter of 10/06/22    LAURENCE    Narrative  Procedure note:  LAURENCE Preliminary Report    Impression:    LV:  Dilated left ventricle with severely reduced left ventricular systolic function  Left ventricular ejection fraction ~ 15%  LA:  Dilated  JONI:  Mildly dilated  No spontaneous echocontrast (\"smoke\"), or thrombus  Prominent pectinate muscle was seen    RA:  Normal size  Interatrial septum:  Appears grossly normal  RV:  Normal size and systolic function  MV:  Normal " structure  No mitral stenosis  Trace regurgitation  AV:  Normal structure  No aortic stenosis  No regurgitation  TV:  Normal structure  No tricuspid stenosis  Trace regurgitation  PV:  Grossly normal but poorly visualized  No gross pulmonic stenosis or significant regurgitation  AO:  Normal appearing root, descending thoracic aorta, and aortic arch  Informed consent obtained from patient prior to procedure after all indications, risks, and benefits of LAURENCE thoroughly discussed  Propofol was utilized for sedation and administered intravenously by Anesthesia  Blood pressure, EKG, pulse oximetry, respirations, and level of consciousness were monitored throughout the procedure  Pt tolerated procedure well with nurse anesthetist performing sedation and monitoring  LAURENCE probe passed without difficulty with no blood seen on probe upon extubation     --------------------------------------------------------------------------------  HOLTER  No results found for this or any previous visit  No results found for this or any previous visit     --------------------------------------------------------------------------------  CAROTIDS  No results found for this or any previous visit      --------------------------------------------------------------------------------  Diagnoses and all orders for this visit:    Atrial fibrillation with RVR ([facility] )  -     Ambulatory Referral to Cardiology  -     POCT ECG    Atrial flutter with rapid ventricular response ([facility] )  -     Ambulatory Referral to Cardiology  -     POCT ECG  -     Echo complete w/ contrast if indicated; Future  -     Comprehensive metabolic panel  -     TSH, 3rd generation with Free T4 reflex  -     CBC and differential    Paroxysmal atrial fibrillation (HCC)    Type 2 diabetes mellitus with hyperglycemia, with long-term current use of insulin (HCC)    Atypical chest pain  -     Echo complete w/ contrast if indicated;  Future  -     NM myocardial perfusion spect (rx stress and/or rest);  Future    Long term current use of amiodarone  -     Comprehensive metabolic panel  -     TSH, 3rd generation with Free T4 reflex  -     CBC and differential    Other orders  -     tadalafil (CIALIS) 20 MG tablet; TAKE 1 TABLET (20 MG TOTAL) BY MOUTH AS NEEDED (ERECTILE DYSFUNCTION)  -     multivitamin (THERAGRAN) TABS; Take 1 tablet by mouth daily     ======================================================    Past Medical History:   Diagnosis Date   • Asthma    • Carpal tunnel syndrome    • Chronic pain     left arm   • Closed fracture of ninth thoracic vertebra (HCC) 7/8/2022   • Diabetes mellitus ([address] )    • Erectile dysfunction    • Hypertension    • Thoracic vertebral fracture (HCC)    • Umbilical hernia      Past Surgical History:   Procedure Laterality Date   • CHOLECYSTECTOMY     • CHOLECYSTECTOMY LAPAROSCOPIC N/A 6/20/2019    Procedure: CHOLECYSTECTOMY LAPAROSCOPIC;  Surgeon: Mariana Wallace MD;  Location: [address] [place] [address];  Service: General   • 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   • HAND SURGERY     • UMBILICAL HERNIA REPAIR           Medications  Current Outpatient Medications   Medication Sig Dispense Refill   • acetaminophen (TYLENOL) 325 mg tablet Take 2 tablets (650 mg total) by mouth every 6 (six) hours as needed for mild pain  0   • albuterol (PROVENTIL HFA,VENTOLIN HFA) 90 mcg/act inhaler INHALE 2 PUFFS EVERY 4 (FOUR) HOURS AS NEEDED FOR WHEEZING 18 g 2   • Alcohol Swabs (ALCOHOL PREP) 70 % PADS daily Test  5   • amiodarone 200 mg tablet Take 1 tablet (200 mg total) by mouth daily 30 tablet 0   • aspirin (ECOTRIN LOW STRENGTH) 81 mg EC tablet Take 81 mg by mouth daily     • atorvastatin (Lipitor) 20 mg tablet Take 1 tablet (20 mg total) by mouth daily 90 tablet 3   • BD Pen Needle Carolyn U/F 32G X 4 MM MISC USE AS DIRECTED TO INJECT INSULIN 100 each 0   • Blood Pressure Monitoring ([place] BP Monitor/Arm) JM USE AS DIRECTED 1 each 0   • budesonide (Pulmicort Flexhaler) 90 MCG/ACT inhaler Inhale 1 puff 2 (two) times a day Rinse mouth after use  1 each 5   • clotrimazole-betamethasone (LOTRISONE) 1-0 05 % cream APPLY TOPICALLY 2 (TWO) TIMES A DAY 30 g 0   • Contour Next Test test strip USE 1 EACH 3 (THREE) TIMES A  each 10   • diltiazem (CARDIZEM CD) 240 mg 24 hr capsule Take 1 capsule (240 mg total) by mouth daily 90 capsule 1   • DULoxetine (CYMBALTA) 30 mg delayed release capsule TAKE 1 CAPSULE (30 MG TOTAL) BY MOUTH DAILY AT BEDTIME 90 capsule 0   • Empagliflozin (Jardiance) 25 MG TABS Take 1 tablet (25 mg total) by mouth every morning 30 tablet 5   • Fluticasone Furoate-Vilanterol (Breo Ellipta) 100-25 mcg/actuation inhaler Inhale 1 puff daily Rinse mouth after use  60 each 5   • Humidifier MISC USE AS DIRECTED 1 each 0   • Insulin Glargine Solostar (Lantus SoloStar) 100 UNIT/ML SOPN Inject 0 55 mL (55 Units total) under the skin daily at bedtime 15 mL 5   • Lancets MISC Use to test blood glucose once a day  Dx: Type 2 DM   E11 9     • lisinopril (ZESTRIL) 10 mg tablet TAKE 1 TABLET (10 MG TOTAL) BY MOUTH DAILY 90 tablet 0   • Melatonin 5 MG TABS Take 1 tablet (5 mg total) by mouth daily at bedtime 30 tablet 5   • metoprolol tartrate (LOPRESSOR) 50 mg tablet Take 1 tablet (50 mg total) by mouth 3 (three) times a day 90 tablet 0   • multivitamin (THERAGRAN) TABS Take 1 tablet by mouth daily     • rivaroxaban (XARELTO) 20 mg tablet Take 1 tablet (20 mg total) by mouth daily with breakfast 90 tablet 0   • tadalafil (CIALIS) 20 MG tablet TAKE 1 TABLET (20 MG TOTAL) BY MOUTH AS NEEDED (ERECTILE DYSFUNCTION)     • TRUEplus Lancets 33G MISC USE AS INSTRUCTED 100 each 3   • Alcohol Swabs (Alcohol Prep) PADS TEST DAILY (Patient not taking: No sig reported)     • cyclobenzaprine (FLEXERIL) 5 mg tablet Take 1 tablet (5 mg total) by mouth 3 (three) times a day as needed for muscle spasms 45 tablet 0   • gabapentin (NEURONTIN) 100 mg capsule TAKE 1 CAPSULE (100 MG TOTAL) BY MOUTH 3 (THREE) TIMES A DAY 90 capsule 2   • metFORMIN (GLUCOPHAGE) 1000 MG tablet Take 1 tablet (1,000 mg total) by mouth 2 (two) times a day with meals (Patient not taking: Reported on 4/26/2023) 180 tablet 3   • methocarbamol (ROBAXIN) 500 mg tablet Take 1 tablet (500 mg total) by mouth every 6 (six) hours as needed for muscle spasms (Patient not taking: Reported on 9/2/2022) 42 tablet 0   • Misc  Devices MISC by Does not apply route daily (Patient not taking: Reported on 9/3/2021) 1 each 0   • oxyCODONE (ROXICODONE) 10 MG TABS You may take 5 mg (0 5 tab) for moderate pain or 10 mg (1 tab) for severe pain, every 4 hours, as needed  (Patient not taking: Reported on 9/2/2022) 30 tablet 0   • oxyCODONE (Roxicodone) 5 immediate release tablet Take 1 tablet (5 mg total) by mouth every 4 (four) hours as needed for moderate pain Ongoing therapy Max Daily Amount: 30 mg (Patient not taking: Reported on 9/2/2022) 20 tablet 0   • senna-docusate sodium (SENOKOT S) 8 6-50 mg per tablet Take 2 tablets by mouth daily (Patient not taking: Reported on 9/2/2022) 28 tablet 0   • Tadalafil, PAH, 20 MG TABS Take 1 tablet (20 mg total) by mouth as needed (erectile dysfunction) (Patient not taking: Reported on 4/26/2023) 30 tablet 5     No current facility-administered medications for this visit          No Known Allergies    Social History     Socioeconomic History   • Marital status:      Spouse name: Not on file   • Number of children: Not on file   • Years of education: Not on file   • Highest education level: Not on file   Occupational History   • Not on file   Tobacco Use   • Smoking status: Some Days     Packs/day: 0 10     Types: Cigarettes   • Smokeless tobacco: Never   Vaping Use   • Vaping Use: Never used   Substance and Sexual Activity   • Alcohol use: Yes     Comment: occassional, social, rare   • Drug use: Never   • Sexual activity: Yes     Partners: Female   Other Topics Concern   • Not on file   Social History Narrative   • Not on file     Social Determinants of Health     Financial Resource Strain: Not on file   Food Insecurity: Not on file   Transportation Needs: Not on file   Physical Activity: Not on file   Stress: Not on file   Social Connections: Not on file   Intimate Partner Violence: Not on file   Housing Stability: Not on file        Family History   Problem Relation Age of Onset   • Breast cancer additional onset Mother    • Diabetes Father    • Hyperlipidemia Father    • Hypertension Father    • Seizures Father    • No Known Problems Son    • No Known Problems Son    • Vitiligo Son        Lab Results   Component Value Date    WBC 5 89 10/07/2022    HGB 14 2 10/07/2022    HCT 44 9 10/07/2022    MCV 83 10/07/2022     10/07/2022      Lab Results   Component Value Date    SODIUM 136 10/08/2022    K 4 5 10/08/2022     10/08/2022    CO2 30 10/08/2022    BUN 16 10/08/2022    CREATININE 1 02 10/08/2022    GLUC 134 10/08/2022    CALCIUM 9 3 10/08/2022      Lab Results   Component Value Date    HGBA1C 9 6 (A) 03/23/2023      No results found for: CHOL  Lab Results   Component Value Date    HDL 46 10/02/2021    HDL 51 04/01/2021    HDL 56 10/21/2019     Lab Results   Component Value Date    LDLCALC 75 10/02/2021    [address] 74 04/01/2021    [address] 102 10/21/2019     Lab Results   Component Value Date    TRIG 83 10/02/2021    TRIG 107 04/01/2021    TRIG 65 10/21/2019     No results found for: CHOLHDL   Lab Results   Component Value Date    INR 1 06 07/08/2022    INR 1 01 05/18/2022    INR 0 90 (L) 02/05/2020    PROTIME 13 3 07/08/2022    PROTIME 12 9 05/18/2022    PROTIME 10 0 02/05/2020          Patient Active Problem List    Diagnosis Date Noted   • DISH (diffuse idiopathic skeletal hyperostosis) 09/02/2022   • Fall 07/09/2022   • Closed fracture of ninth thoracic vertebra ([facility] ) 07/08/2022   • Chronic diastolic congestive [facility] failure ([facility] ) 05/19/2022   • Paroxysmal atrial fibrillation ([facility] ) 05/19/2022   • "Atrial flutter with rapid ventricular response ([address] ) 05/18/2022   • Legally blind in right eye, as defined in [place] 05/18/2022   • Ventral hernia without obstruction or gangrene 04/27/2022   • Primary insomnia 04/27/2022   • Bilateral chronic knee pain 10/12/2021   • Chronic midline thoracic back pain 05/20/2021   • Atrial fibrillation with RVR ([address] ) 02/05/2020   • Numbness and tingling in left hand 01/16/2020   • COPD without exacerbation ([address] ) 01/15/2020   • Mild persistent asthma 01/15/2020   • Relationship dysfunction 01/13/2020   • Blurred vision, right eye 01/13/2020   • Ulnar neuropathy at elbow, left    • Left carpal tunnel syndrome    • Medial epicondylitis of elbow, left 10/31/2019   • Snoring 10/31/2019   • Other hyperlipidemia 10/29/2019   • Nocturia 10/17/2019   • Traumatic amputation of finger 09/30/2019   • Tobacco dependence 09/30/2019   • Numbness of left hand 05/24/2019   • Shoulder pain 03/14/2018   • Other male erectile dysfunction 03/12/2018   • Essential hypertension 01/30/2018   • Type 2 diabetes mellitus with hyperglycemia, with long-term current use of insulin ([address] ) 07/01/2016   • Varicose veins of bilateral lower extremities with other complications 08/31/7036       Portions of the record may have been created with voice recognition software  Occasional wrong word or \"sound a like\" substitutions may have occurred due to the inherent limitations of voice recognition software  Read the chart carefully and recognize, using context, where substitutions have occurred      Reny Reyes DO, [facility]  4/26/2023 2:40 PM          "

## 2023-05-04 ENCOUNTER — OFFICE VISIT (OUTPATIENT)
Dept: FAMILY MEDICINE CLINIC | Facility: CLINIC | Age: 58
End: 2023-05-04

## 2023-05-04 VITALS
TEMPERATURE: 96.6 F | DIASTOLIC BLOOD PRESSURE: 90 MMHG | WEIGHT: 315 LBS | RESPIRATION RATE: 22 BRPM | HEART RATE: 76 BPM | BODY MASS INDEX: 42.66 KG/M2 | OXYGEN SATURATION: 96 % | SYSTOLIC BLOOD PRESSURE: 138 MMHG | HEIGHT: 72 IN

## 2023-05-04 DIAGNOSIS — R60.0 LEG EDEMA: ICD-10-CM

## 2023-05-04 DIAGNOSIS — E11.9 TYPE 2 DIABETES MELLITUS WITHOUT COMPLICATION, WITHOUT LONG-TERM CURRENT USE OF INSULIN (HCC): ICD-10-CM

## 2023-05-04 DIAGNOSIS — I50.32 CHRONIC DIASTOLIC CONGESTIVE HEART FAILURE (HCC): ICD-10-CM

## 2023-05-04 DIAGNOSIS — G47.33 OSA (OBSTRUCTIVE SLEEP APNEA): Primary | ICD-10-CM

## 2023-05-04 RX ORDER — FUROSEMIDE 20 MG/1
20 TABLET ORAL DAILY
Qty: 5 TABLET | Refills: 0 | Status: SHIPPED | OUTPATIENT
Start: 2023-05-04

## 2023-05-04 RX ORDER — LISINOPRIL 10 MG/1
10 TABLET ORAL DAILY
Qty: 90 TABLET | Refills: 0 | Status: SHIPPED | OUTPATIENT
Start: 2023-05-04

## 2023-05-04 NOTE — PROGRESS NOTES
Name: Saad Tapia  : 1965      MRN: [id number]  Encounter Provider: Taj Caal MD  Encounter Date: 2023   Encounter department: [facility]    Assessment & Plan     1  MARIAN (obstructive sleep apnea)  -     Ambulatory Referral to Sleep Medicine; Future    2  Chronic diastolic congestive heart failure (HCC)    3  Leg edema  -     furosemide (LASIX) 20 mg tablet; Take 1 tablet (20 mg total) by mouth daily    4  Type 2 diabetes mellitus without complication, without long-term current use of insulin (HCC)  -     lisinopril (ZESTRIL) 10 mg tablet; Take 1 tablet (10 mg total) by mouth daily         Subjective      64 yo male with PMh of Afib, CHF, COPD, Asthma and DM presenting to the office today for follow up  He reports losing his metformin bottle and therefore reports taking 50 units of insulin BID without any side effects  He denies any chest pain, palpitations, rashes, dizziness, headaches but does report some leg swelling, blurry vision changes, polydipsia, polyuria, and shortness of breath  He also reports mild constant dull back pain  He reports nothing makes it better or worse  He denies being able to go to an ophthalmologist due to financial and transportation issues  He admits not being able to check his blood sugars at home as he doesn't have his machine  He notes eating low fat food but admits to snacking a lot  He smokes 1-2 cigarettes a day for the past 10 years after being smoke free for 25 years  He admits to drinking alcohol once a month  He denies any illicit drug use  Review of Systems   Musculoskeletal: Positive for arthralgias, back pain and gait problem  All other systems reviewed and are negative        Current Outpatient Medications on File Prior to Visit   Medication Sig    acetaminophen (TYLENOL) 325 mg tablet Take 2 tablets (650 mg total) by mouth every 6 (six) hours as needed for mild pain    albuterol (PROVENTIL HFA,VENTOLIN HFA) 90 mcg/act inhaler INHALE 2 PUFFS EVERY 4 (FOUR) HOURS AS NEEDED FOR WHEEZING    Alcohol Swabs (ALCOHOL PREP) 70 % PADS daily Test    Alcohol Swabs (Alcohol Prep) PADS TEST DAILY (Patient not taking: No sig reported)    amiodarone 200 mg tablet Take 1 tablet (200 mg total) by mouth daily    aspirin (ECOTRIN LOW STRENGTH) 81 mg EC tablet Take 81 mg by mouth daily    atorvastatin (Lipitor) 20 mg tablet Take 1 tablet (20 mg total) by mouth daily    BD Pen Needle Carolyn U/F 32G X 4 MM MISC USE AS DIRECTED TO INJECT INSULIN    Blood Pressure Monitoring ([employer] Choice BP Monitor/Arm) JM USE AS DIRECTED    budesonide (Pulmicort Flexhaler) 90 MCG/ACT inhaler Inhale 1 puff 2 (two) times a day Rinse mouth after use   clotrimazole-betamethasone (LOTRISONE) 1-0 05 % cream APPLY TOPICALLY 2 (TWO) TIMES A DAY    Contour Next Test test strip USE 1 EACH 3 (THREE) TIMES A DAY    cyclobenzaprine (FLEXERIL) 5 mg tablet Take 1 tablet (5 mg total) by mouth 3 (three) times a day as needed for muscle spasms    diltiazem (CARDIZEM CD) 240 mg 24 hr capsule Take 1 capsule (240 mg total) by mouth daily    DULoxetine (CYMBALTA) 30 mg delayed release capsule TAKE 1 CAPSULE (30 MG TOTAL) BY MOUTH DAILY AT BEDTIME    Empagliflozin (Jardiance) 25 MG TABS Take 1 tablet (25 mg total) by mouth every morning    Fluticasone Furoate-Vilanterol (Breo Ellipta) 100-25 mcg/actuation inhaler Inhale 1 puff daily Rinse mouth after use   gabapentin (NEURONTIN) 100 mg capsule TAKE 1 CAPSULE (100 MG TOTAL) BY MOUTH 3 (THREE) TIMES A DAY    Humidifier MISC USE AS DIRECTED    Insulin Glargine Solostar (Lantus SoloStar) 100 UNIT/ML SOPN Inject 0 55 mL (55 Units total) under the skin daily at bedtime    Lancets MISC Use to test blood glucose once a day  Dx: Type 2 DM   E11 9    Melatonin 5 MG TABS Take 1 tablet (5 mg total) by mouth daily at bedtime    metFORMIN (GLUCOPHAGE) 1000 MG tablet Take 1 tablet (1,000 mg total) by mouth 2 (two) times a day with meals (Patient not taking: Reported on 4/26/2023)    methocarbamol (ROBAXIN) 500 mg tablet Take 1 tablet (500 mg total) by mouth every 6 (six) hours as needed for muscle spasms (Patient not taking: Reported on 9/2/2022)    metoprolol tartrate (LOPRESSOR) 50 mg tablet TAKE 1 TABLET (50 MG TOTAL) BY MOUTH 3 (THREE) TIMES A DAY    Misc  Devices MISC by Does not apply route daily (Patient not taking: Reported on 9/3/2021)    multivitamin (THERAGRAN) TABS Take 1 tablet by mouth daily    oxyCODONE (ROXICODONE) 10 MG TABS You may take 5 mg (0 5 tab) for moderate pain or 10 mg (1 tab) for severe pain, every 4 hours, as needed  (Patient not taking: Reported on 9/2/2022)    oxyCODONE (Roxicodone) 5 immediate release tablet Take 1 tablet (5 mg total) by mouth every 4 (four) hours as needed for moderate pain Ongoing therapy Max Daily Amount: 30 mg (Patient not taking: Reported on 9/2/2022)    rivaroxaban (XARELTO) 20 mg tablet Take 1 tablet (20 mg total) by mouth daily with breakfast    senna-docusate sodium (SENOKOT S) 8 6-50 mg per tablet Take 2 tablets by mouth daily (Patient not taking: Reported on 9/2/2022)    tadalafil (CIALIS) 20 MG tablet TAKE 1 TABLET (20 MG TOTAL) BY MOUTH AS NEEDED (ERECTILE DYSFUNCTION)    Tadalafil, PAH, 20 MG TABS Take 1 tablet (20 mg total) by mouth as needed (erectile dysfunction) (Patient not taking: Reported on 4/26/2023)    TRUEplus Lancets 33G MISC USE AS INSTRUCTED    [DISCONTINUED] lisinopril (ZESTRIL) 10 mg tablet TAKE 1 TABLET (10 MG TOTAL) BY MOUTH DAILY       Objective     /90 (BP Location: Left arm, Patient Position: Sitting, Cuff Size: Large)   Pulse 76   Temp (!) 96 6 °F (35 9 °C) (Temporal)   Resp 22   Ht 6' (1 829 m)   Wt (!) 149 kg (327 lb 12 8 oz)   SpO2 96%   BMI 44 46 kg/m²     Physical Exam  Vitals and nursing note reviewed  Constitutional:       Appearance: He is well-developed  HENT:      Head: Normocephalic        Right Ear: External ear normal       Left Ear: External ear normal       Nose: Nose normal    Eyes:      Conjunctiva/sclera: Conjunctivae normal       Pupils: Pupils are equal, round, and reactive to light  Neck:      Thyroid: No thyromegaly  Cardiovascular:      Rate and Rhythm: Normal rate and regular rhythm  Heart sounds: Normal heart sounds  Pulmonary:      Effort: Pulmonary effort is normal       Breath sounds: Normal breath sounds  Abdominal:      Palpations: Abdomen is soft  Tenderness: There is no abdominal tenderness  There is no guarding or rebound  Musculoskeletal:         General: Normal range of motion  Cervical back: Normal range of motion and neck supple  Skin:     General: Skin is dry  Neurological:      Mental Status: He is alert and oriented to person, place, and time  Deep Tendon Reflexes: Reflexes are normal and symmetric         Bishop Pittman MD

## 2023-05-08 DIAGNOSIS — I48.91 ATRIAL FIBRILLATION WITH RVR (HCC): ICD-10-CM

## 2023-05-08 RX ORDER — RIVAROXABAN 20 MG/1
TABLET, FILM COATED ORAL
Qty: 90 TABLET | Refills: 0 | Status: SHIPPED | OUTPATIENT
Start: 2023-05-08

## 2023-05-19 ENCOUNTER — TELEPHONE (OUTPATIENT)
Dept: FAMILY MEDICINE CLINIC | Facility: CLINIC | Age: 58
End: 2023-05-19

## 2023-05-23 DIAGNOSIS — I48.92 ATRIAL FLUTTER WITH RAPID VENTRICULAR RESPONSE (HCC): ICD-10-CM

## 2023-05-24 RX ORDER — METOPROLOL TARTRATE 50 MG/1
50 TABLET, FILM COATED ORAL 3 TIMES DAILY
Qty: 90 TABLET | Refills: 0 | Status: SHIPPED | OUTPATIENT
Start: 2023-05-24

## 2023-05-25 DIAGNOSIS — N47.6 BALANOPOSTHITIS: ICD-10-CM

## 2023-05-26 RX ORDER — CLOTRIMAZOLE AND BETAMETHASONE DIPROPIONATE 10; .64 MG/G; MG/G
CREAM TOPICAL 2 TIMES DAILY
Qty: 30 G | Refills: 0 | Status: SHIPPED | OUTPATIENT
Start: 2023-05-26

## 2023-06-05 ENCOUNTER — OFFICE VISIT (OUTPATIENT)
Dept: FAMILY MEDICINE CLINIC | Facility: CLINIC | Age: 58
End: 2023-06-05

## 2023-06-05 VITALS
OXYGEN SATURATION: 96 % | HEIGHT: 72 IN | HEART RATE: 84 BPM | BODY MASS INDEX: 42.66 KG/M2 | DIASTOLIC BLOOD PRESSURE: 70 MMHG | TEMPERATURE: 96 F | RESPIRATION RATE: 18 BRPM | SYSTOLIC BLOOD PRESSURE: 124 MMHG | WEIGHT: 315 LBS

## 2023-06-05 DIAGNOSIS — I48.0 PAROXYSMAL ATRIAL FIBRILLATION (HCC): Chronic | ICD-10-CM

## 2023-06-05 DIAGNOSIS — E11.42 TYPE 2 DIABETES MELLITUS WITH DIABETIC POLYNEUROPATHY, WITH LONG-TERM CURRENT USE OF INSULIN (HCC): Primary | ICD-10-CM

## 2023-06-05 DIAGNOSIS — Z79.4 TYPE 2 DIABETES MELLITUS WITH DIABETIC POLYNEUROPATHY, WITH LONG-TERM CURRENT USE OF INSULIN (HCC): Primary | ICD-10-CM

## 2023-06-05 DIAGNOSIS — M25.562 ACUTE PAIN OF LEFT KNEE: ICD-10-CM

## 2023-06-05 LAB — SL AMB POCT GLUCOSE BLD: 316

## 2023-06-05 PROCEDURE — 82948 REAGENT STRIP/BLOOD GLUCOSE: CPT | Performed by: FAMILY MEDICINE

## 2023-06-05 PROCEDURE — 3074F SYST BP LT 130 MM HG: CPT | Performed by: FAMILY MEDICINE

## 2023-06-05 PROCEDURE — 99214 OFFICE O/P EST MOD 30 MIN: CPT | Performed by: FAMILY MEDICINE

## 2023-06-05 PROCEDURE — 3078F DIAST BP <80 MM HG: CPT | Performed by: FAMILY MEDICINE

## 2023-06-05 RX ORDER — INSULIN GLARGINE 100 [IU]/ML
55 INJECTION, SOLUTION SUBCUTANEOUS 2 TIMES DAILY
Qty: 33 ML | Refills: 5 | Status: SHIPPED | OUTPATIENT
Start: 2023-06-05

## 2023-06-05 NOTE — PROGRESS NOTES
Name: Michele Disla  : 1965      MRN: [id number]  Encounter Provider: Trisha Gorman MD  Encounter Date: 2023   Encounter department: [facility]     1  Type 2 diabetes mellitus without complication, without long-term current use of insulin ([facility])  -     POCT blood glucose today 316  -     Insulin Glargine Solostar (Lantus SoloStar) 100 UNIT/ML SOPN; Inject 0 55 mL (55 Units total) under the skin 2 (two) times a day  - Continue Jardiance and Metformin  - Monitor home glucose levels    2  Acute pain of left knee  -     XR knee 3 vw left non injury; Future; Expected date: 2023  -  Provided with list of physical therapy locations     Follow up in 6 weeks    Subjective      Michele Disla  Is a 63yo male with a PMH of DM, HTN, a-fib, CHF, and COPD presents today for follow up  He states that he has not taken his insulin for 2 weeks or his Jardiance for 2 months because he was unable to get refills  He also does not check his glucose at home because he cannot find his glucometer  Today he complains of palpitations, blurry vision, polyuria, and polydipsia but denies chest pain, SOB, numbness/tingling, or headaches  He also complains of left knee pain today that has worsened over the past 2 weeks  He states that it often gives out when standing up and using stairs  He denies any swelling or known injury to the joint  Review of Systems   Eyes: Positive for visual disturbance  Respiratory: Negative for chest tightness and shortness of breath  Cardiovascular: Positive for palpitations  Negative for chest pain and leg swelling  Endocrine: Positive for polydipsia and polyuria  Musculoskeletal: Positive for arthralgias  Neurological: Negative for dizziness, weakness, numbness and headaches         Current Outpatient Medications on File Prior to Visit   Medication Sig   • acetaminophen (TYLENOL) 325 mg tablet Take 2 tablets (650 mg total) by mouth every 6 (six) hours as needed for mild pain   • albuterol (PROVENTIL HFA,VENTOLIN HFA) 90 mcg/act inhaler INHALE 2 PUFFS EVERY 4 (FOUR) HOURS AS NEEDED FOR WHEEZING   • Alcohol Swabs (ALCOHOL PREP) 70 % PADS daily Test   • Alcohol Swabs (Alcohol Prep) PADS TEST DAILY (Patient not taking: No sig reported)   • amiodarone 200 mg tablet Take 1 tablet (200 mg total) by mouth daily   • aspirin (ECOTRIN LOW STRENGTH) 81 mg EC tablet Take 81 mg by mouth daily   • atorvastatin (Lipitor) 20 mg tablet Take 1 tablet (20 mg total) by mouth daily   • BD Pen Needle Carolyn U/F 32G X 4 MM MISC USE AS DIRECTED TO INJECT INSULIN   • Blood Pressure Monitoring (Multistat Choice BP Monitor/Arm) JM USE AS DIRECTED   • budesonide (Pulmicort Flexhaler) 90 MCG/ACT inhaler Inhale 1 puff 2 (two) times a day Rinse mouth after use  • clotrimazole-betamethasone (LOTRISONE) 1-0 05 % cream APPLY TOPICALLY 2 (TWO) TIMES A DAY   • Contour Next Test test strip USE 1 EACH 3 (THREE) TIMES A DAY   • cyclobenzaprine (FLEXERIL) 5 mg tablet Take 1 tablet (5 mg total) by mouth 3 (three) times a day as needed for muscle spasms   • diltiazem (CARDIZEM CD) 240 mg 24 hr capsule Take 1 capsule (240 mg total) by mouth daily   • DULoxetine (CYMBALTA) 30 mg delayed release capsule TAKE 1 CAPSULE (30 MG TOTAL) BY MOUTH DAILY AT BEDTIME   • Empagliflozin (Jardiance) 25 MG TABS Take 1 tablet (25 mg total) by mouth every morning   • Fluticasone Furoate-Vilanterol (Breo Ellipta) 100-25 mcg/actuation inhaler Inhale 1 puff daily Rinse mouth after use  • furosemide (LASIX) 20 mg tablet Take 1 tablet (20 mg total) by mouth daily   • gabapentin (NEURONTIN) 100 mg capsule TAKE 1 CAPSULE (100 MG TOTAL) BY MOUTH 3 (THREE) TIMES A DAY   • Humidifier MISC USE AS DIRECTED   • Lancets MISC Use to test blood glucose once a day  Dx: Type 2 DM   E11 9   • lisinopril (ZESTRIL) 10 mg tablet Take 1 tablet (10 mg total) by mouth daily   • Melatonin 5 MG TABS Take 1 tablet (5 mg total) by mouth daily at bedtime   • metFORMIN (GLUCOPHAGE) 1000 MG tablet Take 1 tablet (1,000 mg total) by mouth 2 (two) times a day with meals (Patient not taking: Reported on 4/26/2023)   • methocarbamol (ROBAXIN) 500 mg tablet Take 1 tablet (500 mg total) by mouth every 6 (six) hours as needed for muscle spasms (Patient not taking: Reported on 9/2/2022)   • metoprolol tartrate (LOPRESSOR) 50 mg tablet TAKE 1 TABLET (50 MG TOTAL) BY MOUTH 3 (THREE) TIMES A DAY   • Misc  Devices MISC by Does not apply route daily (Patient not taking: Reported on 9/3/2021)   • multivitamin (THERAGRAN) TABS Take 1 tablet by mouth daily   • oxyCODONE (ROXICODONE) 10 MG TABS You may take 5 mg (0 5 tab) for moderate pain or 10 mg (1 tab) for severe pain, every 4 hours, as needed   (Patient not taking: Reported on 9/2/2022)   • oxyCODONE (Roxicodone) 5 immediate release tablet Take 1 tablet (5 mg total) by mouth every 4 (four) hours as needed for moderate pain Ongoing therapy Max Daily Amount: 30 mg (Patient not taking: Reported on 9/2/2022)   • senna-docusate sodium (SENOKOT S) 8 6-50 mg per tablet Take 2 tablets by mouth daily (Patient not taking: Reported on 9/2/2022)   • tadalafil (CIALIS) 20 MG tablet TAKE 1 TABLET (20 MG TOTAL) BY MOUTH AS NEEDED (ERECTILE DYSFUNCTION)   • Tadalafil, PAH, 20 MG TABS Take 1 tablet (20 mg total) by mouth as needed (erectile dysfunction) (Patient not taking: Reported on 4/26/2023)   • TRUEplus Lancets 33G MISC USE AS INSTRUCTED   • Xarelto 20 MG tablet TAKE 1 TABLET (20 MG TOTAL) BY MOUTH DAILY WITH BREAKFAST   • [DISCONTINUED] Insulin Glargine Solostar (Lantus SoloStar) 100 UNIT/ML SOPN Inject 0 55 mL (55 Units total) under the skin daily at bedtime       Objective     /70 (BP Location: Left arm, Patient Position: Sitting, Cuff Size: Large)   Pulse 84   Temp (!) 96 °F (35 6 °C) (Temporal)   Resp 18   Ht 6' (1 829 m)   Wt (!) 144 kg (318 lb)   SpO2 96%   BMI 43 13 kg/m² Physical Exam  Vitals and nursing note reviewed  Constitutional:       General: He is not in acute distress  Appearance: He is obese  HENT:      Head: Normocephalic and atraumatic  Eyes:      Pupils: Pupils are equal, round, and reactive to light  Cardiovascular:      Rate and Rhythm: Normal rate and regular rhythm  Pulmonary:      Effort: Pulmonary effort is normal       Breath sounds: Normal breath sounds  Musculoskeletal:      Right knee: Normal       Left knee: No swelling or deformity  Tenderness present  Skin:     General: Skin is warm and dry  Neurological:      Mental Status: He is alert       MARISSA Rivera MD

## 2023-06-05 NOTE — ASSESSMENT & PLAN NOTE
Lab Results   Component Value Date    HGBA1C 9 6 (A) 03/23/2023   Encouraged patient to follow low carb diet  Patient states he has his glucometer somewhere in his room but has not taken the time to find it  Encouraged patient to look for it as it is less than 3year old and a replacement will not be covered by his insurance at this time  Lantus refilled at 55 units BID  Patient given letter from [employer] which states [place] [facility] is a preferred medication and if he is having any issues obtaining it his pharmacy should contact pharmacy services from his insurance

## 2023-06-06 DIAGNOSIS — J44.9 COPD (CHRONIC OBSTRUCTIVE PULMONARY DISEASE) (HCC): ICD-10-CM

## 2023-06-06 DIAGNOSIS — J45.30 MILD PERSISTENT ASTHMA: ICD-10-CM

## 2023-06-07 ENCOUNTER — HOSPITAL ENCOUNTER (OUTPATIENT)
Dept: NON INVASIVE DIAGNOSTICS | Facility: HOSPITAL | Age: 58
Discharge: HOME | End: 2023-06-07
Payer: COMMERCIAL

## 2023-06-07 ENCOUNTER — HOSPITAL ENCOUNTER (OUTPATIENT)
Dept: RADIOLOGY | Facility: HOSPITAL | Age: 58
Discharge: HOME | End: 2023-06-07
Payer: COMMERCIAL

## 2023-06-07 VITALS — WEIGHT: 315 LBS | HEIGHT: 72 IN | BODY MASS INDEX: 42.66 KG/M2

## 2023-06-07 VITALS
BODY MASS INDEX: 42.66 KG/M2 | HEIGHT: 72 IN | WEIGHT: 315 LBS | SYSTOLIC BLOOD PRESSURE: 124 MMHG | DIASTOLIC BLOOD PRESSURE: 70 MMHG | HEART RATE: 66 BPM

## 2023-06-07 DIAGNOSIS — R07.89 ATYPICAL CHEST PAIN: ICD-10-CM

## 2023-06-07 DIAGNOSIS — I48.92 ATRIAL FLUTTER WITH RAPID VENTRICULAR RESPONSE (HCC): ICD-10-CM

## 2023-06-07 LAB
AORTIC ROOT: 3.3 CM
APICAL FOUR CHAMBER EJECTION FRACTION: 66 %
E WAVE DECELERATION TIME: 227 MS
FRACTIONAL SHORTENING: 36 (ref 28–44)
INTERVENTRICULAR SEPTUM IN DIASTOLE (PARASTERNAL SHORT AXIS VIEW): 1.3 CM
INTERVENTRICULAR SEPTUM: 1.3 CM (ref 0.6–1.1)
LAAS-AP2: 21.3 CM2
LAAS-AP4: 23.3 CM2
LEFT ATRIUM SIZE: 4.5 CM
LEFT INTERNAL DIMENSION IN SYSTOLE: 3.4 CM (ref 2.1–4)
LEFT VENTRICULAR INTERNAL DIMENSION IN DIASTOLE: 5.3 CM (ref 3.5–6)
LEFT VENTRICULAR POSTERIOR WALL IN END DIASTOLE: 1.3 CM
LEFT VENTRICULAR STROKE VOLUME: 88 ML
LVSV (TEICH): 88 ML
MAX HR PERCENT: 50 %
MAX HR: 64 BPM
MV E'TISSUE VEL-SEP: 11 CM/S
MV PEAK A VEL: 0.72 M/S
MV PEAK E VEL: 72 CM/S
MV STENOSIS PRESSURE HALF TIME: 66 MS
MV VALVE AREA P 1/2 METHOD: 3.33
NUC STRESS EJECTION FRACTION: 52 %
RATE PRESSURE PRODUCT: NORMAL
RIGHT ATRIUM AREA SYSTOLE A4C: 19.8 CM2
RIGHT VENTRICLE ID DIMENSION: 3.6 CM
SL CV LEFT ATRIUM LENGTH A2C: 5.4 CM
SL CV PED ECHO LEFT VENTRICLE DIASTOLIC VOLUME (MOD BIPLANE) 2D: 136 ML
SL CV PED ECHO LEFT VENTRICLE SYSTOLIC VOLUME (MOD BIPLANE) 2D: 48 ML
SL CV REST NUCLEAR ISOTOPE DOSE: 15.6 MCI
SL CV STRESS NUCLEAR ISOTOPE DOSE: 46.3 MCI
SL CV STRESS RECOVERY BP: NORMAL MMHG
SL CV STRESS RECOVERY HR: 72 BPM
SL CV STRESS RECOVERY O2 SAT: 97 %
STRESS ANGINA INDEX: 1
STRESS BASELINE BP: NORMAL MMHG
STRESS BASELINE HR: 64 BPM
STRESS O2 SAT REST: 96 %
STRESS PEAK HR: 80 BPM
STRESS POST ESTIMATED WORKLOAD: 1 METS
STRESS POST O2 SAT PEAK: 99 %
STRESS POST PEAK BP: 148 MMHG
STRESS/REST PERFUSION RATIO: 1
TRICUSPID ANNULAR PLANE SYSTOLIC EXCURSION: 2.6 CM

## 2023-06-07 PROCEDURE — 93306 TTE W/DOPPLER COMPLETE: CPT | Performed by: INTERNAL MEDICINE

## 2023-06-07 PROCEDURE — 78452 HT MUSCLE IMAGE SPECT MULT: CPT

## 2023-06-07 PROCEDURE — 93306 TTE W/DOPPLER COMPLETE: CPT

## 2023-06-07 PROCEDURE — 93017 CV STRESS TEST TRACING ONLY: CPT

## 2023-06-07 PROCEDURE — G1004 CDSM NDSC: HCPCS

## 2023-06-07 PROCEDURE — 93016 CV STRESS TEST SUPVJ ONLY: CPT | Performed by: INTERNAL MEDICINE

## 2023-06-07 PROCEDURE — 93018 CV STRESS TEST I&R ONLY: CPT | Performed by: INTERNAL MEDICINE

## 2023-06-07 PROCEDURE — 78452 HT MUSCLE IMAGE SPECT MULT: CPT | Performed by: INTERNAL MEDICINE

## 2023-06-07 PROCEDURE — A9502 TC99M TETROFOSMIN: HCPCS

## 2023-06-07 RX ORDER — REGADENOSON 0.08 MG/ML
0.4 INJECTION, SOLUTION INTRAVENOUS ONCE
Status: COMPLETED | OUTPATIENT
Start: 2023-06-07 | End: 2023-06-07

## 2023-06-07 RX ORDER — ALBUTEROL SULFATE 90 UG/1
2 AEROSOL, METERED RESPIRATORY (INHALATION) EVERY 4 HOURS PRN
Qty: 18 G | Refills: 2 | Status: SHIPPED | OUTPATIENT
Start: 2023-06-07

## 2023-06-07 RX ADMIN — REGADENOSON 0.4 MG: 0.08 INJECTION, SOLUTION INTRAVENOUS at 13:12

## 2023-06-11 DIAGNOSIS — I48.92 ATRIAL FLUTTER WITH RAPID VENTRICULAR RESPONSE (HCC): ICD-10-CM

## 2023-06-12 DIAGNOSIS — I48.92 ATRIAL FLUTTER WITH RAPID VENTRICULAR RESPONSE (HCC): ICD-10-CM

## 2023-06-12 LAB
CHEST PAIN STATEMENT: NORMAL
MAX DIASTOLIC BP: 96 MMHG
MAX HEART RATE: 83 BPM
MAX PREDICTED HEART RATE: 164 BPM
MAX. SYSTOLIC BP: 163 MMHG
PROTOCOL NAME: NORMAL
REASON FOR TERMINATION: NORMAL
TARGET HR FORMULA: NORMAL
TEST INDICATION: NORMAL
TIME IN EXERCISE PHASE: NORMAL

## 2023-06-13 ENCOUNTER — TELEPHONE (OUTPATIENT)
Dept: CARDIOLOGY CLINIC | Facility: CLINIC | Age: 58
End: 2023-06-13

## 2023-06-13 NOTE — TELEPHONE ENCOUNTER
----- Message from Carolina Goncalves DO sent at 6/12/2023 10:37 PM EDT -----  Please call the patient regarding their normal result

## 2023-06-15 RX ORDER — DILTIAZEM HYDROCHLORIDE 240 MG/1
240 CAPSULE, COATED, EXTENDED RELEASE ORAL DAILY
Qty: 90 CAPSULE | Refills: 1 | Status: SHIPPED | OUTPATIENT
Start: 2023-06-15

## 2023-06-15 RX ORDER — METOPROLOL TARTRATE 50 MG/1
50 TABLET, FILM COATED ORAL 3 TIMES DAILY
Qty: 90 TABLET | Refills: 0 | Status: SHIPPED | OUTPATIENT
Start: 2023-06-15

## 2023-06-20 ENCOUNTER — HOSPITAL ENCOUNTER (EMERGENCY)
Facility: HOSPITAL | Age: 58
Discharge: HOME | End: 2023-06-20
Attending: EMERGENCY MEDICINE
Payer: COMMERCIAL

## 2023-06-20 VITALS
TEMPERATURE: 97.5 F | WEIGHT: 315 LBS | DIASTOLIC BLOOD PRESSURE: 83 MMHG | SYSTOLIC BLOOD PRESSURE: 145 MMHG | OXYGEN SATURATION: 96 % | RESPIRATION RATE: 20 BRPM | BODY MASS INDEX: 44.82 KG/M2 | HEART RATE: 65 BPM

## 2023-06-20 DIAGNOSIS — H54.7 VISION LOSS: Primary | ICD-10-CM

## 2023-06-20 PROCEDURE — 99284 EMERGENCY DEPT VISIT MOD MDM: CPT | Performed by: EMERGENCY MEDICINE

## 2023-06-20 PROCEDURE — 99284 EMERGENCY DEPT VISIT MOD MDM: CPT

## 2023-06-20 NOTE — Clinical Note
Guthrie Edy was seen and treated in our emergency department on 6/20/2023. No restrictions            Diagnosis:     Denny  . He may return on this date: 06/21/2023         If you have any questions or concerns, please don't hesitate to call.       Dana Lopez PA-C    ______________________________           _______________          _______________  Hospital Representative                              Date                                Time

## 2023-06-20 NOTE — ED PROVIDER NOTES
History  Chief Complaint   Patient presents with   • Eye Problem     Blind on the left eye, now feels he is going blind on  the right. Unable to make eye appts due to insurance       Patient is a 67-year-old male coming in for evaluation of floaters on the left eye. 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 Patient denies any pain in his left eye. States he did see Dr. Awais Nguyen approximately year ago, who understand who is a specialist, however due to a work injury, has extremely limited mobility. Patient denies any trauma      Eye Problem  Location:  Left eye  Duration:  3 days  Associated symptoms: blurred vision and decreased vision    Associated symptoms: no discharge, no double vision, no nausea, no photophobia, no redness, no vomiting and no weakness        Prior to Admission Medications   Prescriptions Last Dose Informant Patient Reported? Taking? Alcohol Swabs (ALCOHOL PREP) 70 % PADS  Self Yes No   Sig: daily Test   Alcohol Swabs (Alcohol Prep) PADS  Self Yes No   Sig: TEST DAILY   Patient not taking: No sig reported   BD Pen Needle Carolyn U/F 32G X 4 MM MISC  Self No No   Sig: USE AS DIRECTED TO INJECT INSULIN   Blood Pressure Monitoring ([place] Choice BP Monitor/Arm) JM  Self No No   Sig: USE AS DIRECTED   Contour Next Test test strip  Self No No   Sig: USE 1 EACH 3 (THREE) TIMES A DAY   DULoxetine (CYMBALTA) 30 mg delayed release capsule  Self No No   Sig: TAKE 1 CAPSULE (30 MG TOTAL) BY MOUTH DAILY AT BEDTIME   Empagliflozin (Jardiance) 25 MG TABS  Self No No   Sig: Take 1 tablet (25 mg total) by mouth every morning   Fluticasone Furoate-Vilanterol (Breo Ellipta) 100-25 mcg/actuation inhaler  Self No No   Sig: Inhale 1 puff daily Rinse mouth after use.    Humidifier MISC  Self No No   Sig: USE AS DIRECTED   Insulin Glargine Solostar (Lantus SoloStar) 100 UNIT/ML SOPN   No No   Sig: Inject 0.55 mL (55 Units total) under the skin 2 (two) times a day   Lancets MISC  Self Yes No   Sig: Use to test blood glucose once a day. Dx: Type 2 DM. E11.9   Melatonin 5 MG TABS  Self No No   Sig: Take 1 tablet (5 mg total) by mouth daily at bedtime   Misc. Devices MISC  Self No No   Sig: by Does not apply route daily   Patient not taking: Reported on 9/3/2021   TRUEplus Lancets 33G MISC  Self No No   Sig: USE AS INSTRUCTED   Tadalafil, PAH, 20 MG TABS  Self No No   Sig: Take 1 tablet (20 mg total) by mouth as needed (erectile dysfunction)   Patient not taking: Reported on 4/26/2023   Xarelto 20 MG tablet   No No   Sig: TAKE 1 TABLET (20 MG TOTAL) BY MOUTH DAILY WITH BREAKFAST   acetaminophen (TYLENOL) 325 mg tablet  Self No No   Sig: Take 2 tablets (650 mg total) by mouth every 6 (six) hours as needed for mild pain   albuterol (PROVENTIL HFA,VENTOLIN HFA) 90 mcg/act inhaler   No No   Sig: INHALE 2 PUFFS EVERY 4 (FOUR) HOURS AS NEEDED FOR WHEEZING   amiodarone 200 mg tablet  Self No No   Sig: Take 1 tablet (200 mg total) by mouth daily   aspirin (ECOTRIN LOW STRENGTH) 81 mg EC tablet  Self Yes No   Sig: Take 81 mg by mouth daily   atorvastatin (Lipitor) 20 mg tablet  Self No No   Sig: Take 1 tablet (20 mg total) by mouth daily   budesonide (Pulmicort Flexhaler) 90 MCG/ACT inhaler  Self No No   Sig: Inhale 1 puff 2 (two) times a day Rinse mouth after use.    clotrimazole-betamethasone (LOTRISONE) 1-0.05 % cream   No No   Sig: APPLY TOPICALLY 2 (TWO) TIMES A DAY   cyclobenzaprine (FLEXERIL) 5 mg tablet  Self No No   Sig: Take 1 tablet (5 mg total) by mouth 3 (three) times a day as needed for muscle spasms   diltiazem (CARDIZEM CD) 240 mg 24 hr capsule   No No   Sig: TAKE 1 CAPSULE (240 MG TOTAL) BY MOUTH DAILY   furosemide (LASIX) 20 mg tablet   No No   Sig: Take 1 tablet (20 mg total) by mouth daily   gabapentin (NEURONTIN) 100 mg capsule   No No   Sig: TAKE 1 CAPSULE (100 MG TOTAL) BY MOUTH 3 (THREE) TIMES A DAY lisinopril (ZESTRIL) 10 mg tablet   No No   Sig: Take 1 tablet (10 mg total) by mouth daily   metFORMIN (GLUCOPHAGE) 1000 MG tablet  Self No No   Sig: Take 1 tablet (1,000 mg total) by mouth 2 (two) times a day with meals   Patient not taking: Reported on 4/26/2023   methocarbamol (ROBAXIN) 500 mg tablet  Self No No   Sig: Take 1 tablet (500 mg total) by mouth every 6 (six) hours as needed for muscle spasms   Patient not taking: Reported on 9/2/2022   metoprolol tartrate (LOPRESSOR) 50 mg tablet   No No   Sig: TAKE 1 TABLET (50 MG TOTAL) BY MOUTH 3 (THREE) TIMES A DAY   multivitamin (THERAGRAN) TABS  Self Yes No   Sig: Take 1 tablet by mouth daily   oxyCODONE (ROXICODONE) 10 MG TABS  Self No No   Sig: You may take 5 mg (0.5 tab) for moderate pain or 10 mg (1 tab) for severe pain, every 4 hours, as needed.    Patient not taking: Reported on 9/2/2022   oxyCODONE (Roxicodone) 5 immediate release tablet  Self No No   Sig: Take 1 tablet (5 mg total) by mouth every 4 (four) hours as needed for moderate pain Ongoing therapy Max Daily Amount: 30 mg   Patient not taking: Reported on 9/2/2022   senna-docusate sodium (SENOKOT S) 8.6-50 mg per tablet  Self No No   Sig: Take 2 tablets by mouth daily   Patient not taking: Reported on 9/2/2022   tadalafil (CIALIS) 20 MG tablet  Self Yes No   Sig: TAKE 1 TABLET (20 MG TOTAL) BY MOUTH AS NEEDED (ERECTILE DYSFUNCTION)      Facility-Administered Medications: None       Past Medical History:   Diagnosis Date   • Asthma    • Carpal tunnel syndrome    • Chronic pain     left arm   • Closed fracture of ninth thoracic vertebra ([address]) 7/8/2022   • Diabetes mellitus (HCC)    • Erectile dysfunction    • Hypertension    • Thoracic vertebral fracture ([address])    • Umbilical hernia        Past Surgical History:   Procedure Laterality Date   • CHOLECYSTECTOMY     • CHOLECYSTECTOMY LAPAROSCOPIC N/A 6/20/2019    Procedure: CHOLECYSTECTOMY LAPAROSCOPIC;  Surgeon: Massiel Capellan MD;  Location: [address] [place] [address] OR;  Service: General   • 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   • HAND SURGERY     • UMBILICAL HERNIA REPAIR         Family History   Problem Relation Age of Onset   • Breast cancer additional onset Mother    • Diabetes Father    • Hyperlipidemia Father    • Hypertension Father    • Seizures Father    • No Known Problems Son    • No Known Problems Son    • Vitiligo Son      I have reviewed and agree with the history as documented. E-Cigarette/Vaping   • E-Cigarette Use Never User      E-Cigarette/Vaping Substances     Social History     Tobacco Use   • Smoking status: Some Days     Packs/day: 0.10     Types: Cigarettes   • Smokeless tobacco: Never   Vaping Use   • Vaping Use: Never used   Substance Use Topics   • Alcohol use: Yes     Comment: occassional, social, rare   • Drug use: Never       Review of Systems   Constitutional: Negative for chills, fatigue and fever. Eyes: Positive for blurred vision and visual disturbance. Negative for double vision, photophobia, discharge and redness. Gastrointestinal: Negative for nausea and vomiting. Neurological: Negative for weakness. Physical Exam  Physical Exam  Vitals reviewed. Constitutional:       Appearance: Normal appearance. He is normal weight. HENT:      Head: Normocephalic and atraumatic. Right Ear: External ear normal.      Left Ear: External ear normal.      Nose: Nose normal.   Eyes:      General:         Right eye: No discharge. Left eye: No discharge. Extraocular Movements: Extraocular movements intact. Conjunctiva/sclera: Conjunctivae normal.      Pupils: Pupils are equal, round, and reactive to light. Comments: Patient reports that approximately 6 inches from his face, right hand gets blurry in his right eye. Patient has normal peripheral vision on the left eye, as well as no decreased vision at this time on the left eye   Cardiovascular:      Rate and Rhythm: Normal rate.    Pulmonary:      Effort: Pulmonary effort is normal.   Musculoskeletal:         General: Normal range of motion. Cervical back: Normal range of motion. Skin:     General: Skin is warm and dry. Neurological:      Mental Status: He is alert. Vital Signs  ED Triage Vitals [06/20/23 1425]   Temperature Pulse Respirations Blood Pressure SpO2   97.5 °F (36.4 °C) 65 20 145/83 96 %      Temp Source Heart Rate Source Patient Position - Orthostatic VS BP Location FiO2 (%)   Tympanic Monitor Sitting Left arm --      Pain Score       --           Vitals:    06/20/23 1425   BP: 145/83   Pulse: 65   Patient Position - Orthostatic VS: Sitting         Visual Acuity      ED Medications  Medications - No data to display    Diagnostic Studies  Results Reviewed     None                 No orders to display              Procedures  Procedures         ED Course                               SBIRT 22yo+    Flowsheet Row Most Recent Value   Initial Alcohol Screen: US AUDIT-C     1. How often do you have a drink containing alcohol? 0 Filed at: 06/20/2023 1428   2. How many drinks containing alcohol do you have on a typical day you are drinking? 0 Filed at: 06/20/2023 1428   3a. Male UNDER 65: How often do you have five or more drinks on one occasion? 0 Filed at: 06/20/2023 1428   3b. FEMALE Any Age, or MALE 65+: How often do you have 4 or more drinks on one occassion? 0 Filed at: 06/20/2023 1428   Audit-C Score 0 Filed at: 06/20/2023 1428   NELLY: How many times in the past year have you. .. Used an illegal drug or used a prescription medication for non-medical reasons? Never Filed at: 06/20/2023 1428                    Medical Decision Making  Patient is a 52-year-old male coming in for decreasing vision in the left eye. Patient does have a history of this happening in the right eye, unable to seek care at that time due to agitation issues.   I advised patient to follow-up with his family medicine across the street, and work with a  to allow transportation. Also encouraged to go to Dr. Crystal Lopez, which is nearby at this hospital, for evaluation of this decrease in vision        Disposition  Final diagnoses:   Vision loss     Time reflects when diagnosis was documented in both MDM as applicable and the Disposition within this note     Time User Action Codes Description Comment    6/20/2023  2:42 PM Marianna Addison Add [H54.7] Vision loss       ED Disposition     ED Disposition   Discharge    Condition   Stable    Date/Time   Tue Jun 20, 2023  2:42 PM    Comment   Neeta Thorne. discharge to home/self care. Follow-up Information     Follow up With Specialties Details Why Contact Info Additional Information    Janet Valentin MD [facility] Medicine   [address]  [address]  [address] [facility] Emergency Department Emergency Medicine  As needed, If symptoms worsen [address] [id number] [address] [facility] Emergency Department    [facility] 2nd Floor Family Medicine   [address] Ophthalmology   [address]  [phone]             Discharge Medication List as of 6/20/2023  2:43 PM      CONTINUE these medications which have NOT CHANGED    Details   acetaminophen (TYLENOL) 325 mg tablet Take 2 tablets (650 mg total) by mouth every 6 (six) hours as needed for mild pain, Starting Sun 7/10/2022, No Print      albuterol (PROVENTIL HFA,VENTOLIN HFA) 90 mcg/act inhaler INHALE 2 PUFFS EVERY 4 (FOUR) HOURS AS NEEDED FOR WHEEZING, Starting Wed 6/7/2023, Normal      !! Alcohol Swabs (ALCOHOL PREP) 70 % PADS daily Test, Starting Tue 5/28/2019, Historical Med      !!  Alcohol Swabs (Alcohol Prep) PADS TEST DAILY, Historical Med      amiodarone 200 mg tablet Take 1 tablet (200 mg total) by mouth daily, Starting Mon 10/10/2022, Normal aspirin (ECOTRIN LOW STRENGTH) 81 mg EC tablet Take 81 mg by mouth daily, Historical Med      atorvastatin (Lipitor) 20 mg tablet Take 1 tablet (20 mg total) by mouth daily, Starting Mon 4/25/2022, Until Wed 4/26/2023, Normal      BD Pen Needle Carolyn U/F 32G X 4 MM MISC USE AS DIRECTED TO INJECT INSULIN, Normal      Blood Pressure Monitoring ([place] Choice BP Monitor/Arm) JM USE AS DIRECTED, Normal      budesonide (Pulmicort Flexhaler) 90 MCG/ACT inhaler Inhale 1 puff 2 (two) times a day Rinse mouth after use., Starting Wed 4/27/2022, Normal      clotrimazole-betamethasone (LOTRISONE) 1-0.05 % cream APPLY TOPICALLY 2 (TWO) TIMES A DAY, Starting Fri 5/26/2023, Normal      Contour Next Test test strip USE 1 EACH 3 (THREE) TIMES A DAY, Starting Mon 12/20/2021, Normal      cyclobenzaprine (FLEXERIL) 5 mg tablet Take 1 tablet (5 mg total) by mouth 3 (three) times a day as needed for muscle spasms, Starting Fri 7/22/2022, Normal      diltiazem (CARDIZEM CD) 240 mg 24 hr capsule TAKE 1 CAPSULE (240 MG TOTAL) BY MOUTH DAILY, Starting Thu 6/15/2023, Normal      DULoxetine (CYMBALTA) 30 mg delayed release capsule TAKE 1 CAPSULE (30 MG TOTAL) BY MOUTH DAILY AT BEDTIME, Starting Thu 4/6/2023, Normal      Empagliflozin (Jardiance) 25 MG TABS Take 1 tablet (25 mg total) by mouth every morning, Starting Fri 3/31/2023, Normal      Fluticasone Furoate-Vilanterol (Breo Ellipta) 100-25 mcg/actuation inhaler Inhale 1 puff daily Rinse mouth after use., Starting Fri 12/2/2022, Normal      furosemide (LASIX) 20 mg tablet Take 1 tablet (20 mg total) by mouth daily, Starting Thu 5/4/2023, Normal      gabapentin (NEURONTIN) 100 mg capsule TAKE 1 CAPSULE (100 MG TOTAL) BY MOUTH 3 (THREE) TIMES A DAY, Starting Thu 12/22/2022, Until Wed 3/22/2023, Normal      Humidifier MISC USE AS DIRECTED, Normal      Insulin Glargine Solostar (Lantus SoloStar) 100 UNIT/ML SOPN Inject 0.55 mL (55 Units total) under the skin 2 (two) times a day, Starting Mon 6/5/2023, Normal      !! Lancets MISC Use to test blood glucose once a day. Dx: Type 2 DM. E11.9, Historical Med      lisinopril (ZESTRIL) 10 mg tablet Take 1 tablet (10 mg total) by mouth daily, Starting Thu 5/4/2023, Normal      Melatonin 5 MG TABS Take 1 tablet (5 mg total) by mouth daily at bedtime, Starting Mon 4/25/2022, Normal      metFORMIN (GLUCOPHAGE) 1000 MG tablet Take 1 tablet (1,000 mg total) by mouth 2 (two) times a day with meals, Starting Fri 7/29/2022, Normal      methocarbamol (ROBAXIN) 500 mg tablet Take 1 tablet (500 mg total) by mouth every 6 (six) hours as needed for muscle spasms, Starting Sun 7/10/2022, Normal      metoprolol tartrate (LOPRESSOR) 50 mg tablet TAKE 1 TABLET (50 MG TOTAL) BY MOUTH 3 (THREE) TIMES A DAY, Starting Thu 6/15/2023, Normal      Misc. Devices MISC by Does not apply route daily, Starting Thu 10/31/2019, Normal      multivitamin (THERAGRAN) TABS Take 1 tablet by mouth daily, Historical Med      !! oxyCODONE (ROXICODONE) 10 MG TABS You may take 5 mg (0.5 tab) for moderate pain or 10 mg (1 tab) for severe pain, every 4 hours, as needed., Normal      !! oxyCODONE (Roxicodone) 5 immediate release tablet Take 1 tablet (5 mg total) by mouth every 4 (four) hours as needed for moderate pain Ongoing therapy Max Daily Amount: 30 mg, Starting Fri 7/22/2022, Normal      senna-docusate sodium (SENOKOT S) 8.6-50 mg per tablet Take 2 tablets by mouth daily, Starting Mon 7/11/2022, Normal      tadalafil (CIALIS) 20 MG tablet TAKE 1 TABLET (20 MG TOTAL) BY MOUTH AS NEEDED (ERECTILE DYSFUNCTION), Historical Med      Tadalafil, PAH, 20 MG TABS Take 1 tablet (20 mg total) by mouth as needed (erectile dysfunction), Starting u 3/10/2022, Normal      !! TRUEplus Lancets 33G MISC USE AS INSTRUCTED, Normal      Xarelto 20 MG tablet TAKE 1 TABLET (20 MG TOTAL) BY MOUTH DAILY WITH BREAKFAST, Normal       !! - Potential duplicate medications found. Please discuss with provider.           No discharge procedures on file.     PDMP Review       Value Time User    PDMP Reviewed  Yes 7/22/2022  3:51 PM Getachew Palacios PA-C          ED Provider  Electronically Signed by           Juan Conklin PA-C  06/20/23 8214

## 2023-06-21 DIAGNOSIS — M25.511 BILATERAL SHOULDER PAIN, UNSPECIFIED CHRONICITY: ICD-10-CM

## 2023-06-21 DIAGNOSIS — M25.512 BILATERAL SHOULDER PAIN, UNSPECIFIED CHRONICITY: ICD-10-CM

## 2023-06-22 RX ORDER — DULOXETIN HYDROCHLORIDE 30 MG/1
30 CAPSULE, DELAYED RELEASE ORAL
Qty: 90 CAPSULE | Refills: 0 | Status: SHIPPED | OUTPATIENT
Start: 2023-06-22

## 2023-07-05 DIAGNOSIS — I48.92 ATRIAL FLUTTER WITH RAPID VENTRICULAR RESPONSE (HCC): ICD-10-CM

## 2023-07-05 RX ORDER — METOPROLOL TARTRATE 50 MG/1
50 TABLET, FILM COATED ORAL 3 TIMES DAILY
Qty: 270 TABLET | Refills: 0 | Status: SHIPPED | OUTPATIENT
Start: 2023-07-05

## 2023-07-13 DIAGNOSIS — I48.92 ATRIAL FLUTTER WITH RAPID VENTRICULAR RESPONSE (HCC): ICD-10-CM

## 2023-07-17 RX ORDER — METOPROLOL TARTRATE 50 MG/1
50 TABLET, FILM COATED ORAL 3 TIMES DAILY
Qty: 90 TABLET | OUTPATIENT
Start: 2023-07-17

## 2023-07-23 DIAGNOSIS — I48.91 ATRIAL FIBRILLATION WITH RVR (HCC): ICD-10-CM

## 2023-07-26 RX ORDER — RIVAROXABAN 20 MG/1
TABLET, FILM COATED ORAL
Qty: 90 TABLET | Refills: 0 | Status: SHIPPED | OUTPATIENT
Start: 2023-07-26

## 2023-07-27 DIAGNOSIS — J45.30 MILD PERSISTENT ASTHMA: ICD-10-CM

## 2023-07-27 DIAGNOSIS — J44.9 COPD (CHRONIC OBSTRUCTIVE PULMONARY DISEASE) (HCC): ICD-10-CM

## 2023-07-28 DIAGNOSIS — N47.6 BALANOPOSTHITIS: ICD-10-CM

## 2023-07-28 DIAGNOSIS — S22.078A OTHER CLOSED FRACTURE OF NINTH THORACIC VERTEBRA, INITIAL ENCOUNTER (HCC): ICD-10-CM

## 2023-07-28 RX ORDER — ALBUTEROL SULFATE 90 UG/1
2 AEROSOL, METERED RESPIRATORY (INHALATION) EVERY 4 HOURS PRN
Qty: 18 G | Refills: 2 | Status: SHIPPED | OUTPATIENT
Start: 2023-07-28

## 2023-07-31 RX ORDER — GABAPENTIN 100 MG/1
100 CAPSULE ORAL 3 TIMES DAILY
Qty: 90 CAPSULE | Refills: 2 | Status: SHIPPED | OUTPATIENT
Start: 2023-07-31 | End: 2023-10-29

## 2023-07-31 RX ORDER — CLOTRIMAZOLE AND BETAMETHASONE DIPROPIONATE 10; .64 MG/G; MG/G
CREAM TOPICAL 2 TIMES DAILY
Qty: 30 G | Refills: 0 | Status: SHIPPED | OUTPATIENT
Start: 2023-07-31

## 2023-08-31 ENCOUNTER — OFFICE VISIT (OUTPATIENT)
Dept: FAMILY MEDICINE CLINIC | Facility: CLINIC | Age: 58
End: 2023-08-31

## 2023-08-31 VITALS
TEMPERATURE: 98 F | HEART RATE: 65 BPM | SYSTOLIC BLOOD PRESSURE: 146 MMHG | OXYGEN SATURATION: 96 % | RESPIRATION RATE: 21 BRPM | DIASTOLIC BLOOD PRESSURE: 102 MMHG | BODY MASS INDEX: 42.66 KG/M2 | WEIGHT: 315 LBS | HEIGHT: 72 IN

## 2023-08-31 DIAGNOSIS — Z79.4 TYPE 2 DIABETES MELLITUS WITH DIABETIC POLYNEUROPATHY, WITH LONG-TERM CURRENT USE OF INSULIN (HCC): Primary | ICD-10-CM

## 2023-08-31 DIAGNOSIS — E11.9 TYPE 2 DIABETES MELLITUS WITHOUT COMPLICATION, WITHOUT LONG-TERM CURRENT USE OF INSULIN (HCC): ICD-10-CM

## 2023-08-31 DIAGNOSIS — E11.42 TYPE 2 DIABETES MELLITUS WITH DIABETIC POLYNEUROPATHY, WITH LONG-TERM CURRENT USE OF INSULIN (HCC): Primary | ICD-10-CM

## 2023-08-31 LAB — SL AMB POCT HEMOGLOBIN AIC: 7.8 (ref ?–6.5)

## 2023-08-31 PROCEDURE — 3080F DIAST BP >= 90 MM HG: CPT | Performed by: FAMILY MEDICINE

## 2023-08-31 PROCEDURE — 3077F SYST BP >= 140 MM HG: CPT | Performed by: FAMILY MEDICINE

## 2023-08-31 PROCEDURE — 83036 HEMOGLOBIN GLYCOSYLATED A1C: CPT | Performed by: FAMILY MEDICINE

## 2023-08-31 PROCEDURE — 99214 OFFICE O/P EST MOD 30 MIN: CPT | Performed by: FAMILY MEDICINE

## 2023-08-31 RX ORDER — PERPHENAZINE 16 MG/1
1 TABLET, FILM COATED ORAL 3 TIMES DAILY
Qty: 100 EACH | Refills: 10 | Status: SHIPPED | OUTPATIENT
Start: 2023-08-31

## 2023-08-31 RX ORDER — INSULIN GLARGINE 100 [IU]/ML
55 INJECTION, SOLUTION SUBCUTANEOUS 2 TIMES DAILY
Qty: 39 ML | Refills: 5 | Status: SHIPPED | OUTPATIENT
Start: 2023-08-31

## 2023-08-31 RX ORDER — LANCETS 28 GAUGE
EACH MISCELLANEOUS 3 TIMES DAILY
Qty: 300 EACH | Refills: 11 | Status: SHIPPED | OUTPATIENT
Start: 2023-08-31

## 2023-08-31 NOTE — PROGRESS NOTES
Name: David Rhodes. : 1965      MRN: [id number]  Encounter Provider: Ely Haro MD  Encounter Date: 2023   Encounter department: [address] [facility],[address]     1. Type 2 diabetes mellitus with diabetic polyneuropathy, with long-term current use of insulin ([facility], located within [facility])  -     POCT hemoglobin A1c  -     Insulin Glargine Solostar (Lantus SoloStar) 100 UNIT/ML SOPN; Inject 0.55 mL (55 Units total) under the skin 2 (two) times a day  -     Lancets Ultra Fine MISC; Use 3 (three) times a day Please dispense lancets compatible to Counter Next glucometer    2. Type 2 diabetes mellitus without complication, without long-term current use of insulin ([facility], located within [facility])  -     glucose blood (Contour Next Test) test strip; Use 1 each 3 (three) times a day           Subjective      66 yo male with multiple comorbid conditions inclduing DM  Reports to symptomatic episodes of hypoglycemia over the last month with cold sweats, feeling nausea and hands shaking. BG was 51 and 61  Usually does not check BG  Has decreased vision on the L eye for which he has an appointment with a retina specialist  States he will me moving to [facility]     Review of Systems   Eyes: Positive for visual disturbance. Respiratory: Positive for shortness of breath. Musculoskeletal: Positive for back pain and gait problem. All other systems reviewed and are negative.       Current Outpatient Medications on File Prior to Visit   Medication Sig   • acetaminophen (TYLENOL) 325 mg tablet Take 2 tablets (650 mg total) by mouth every 6 (six) hours as needed for mild pain   • albuterol (PROVENTIL HFA,VENTOLIN HFA) 90 mcg/act inhaler INHALE 2 PUFFS EVERY 4 (FOUR) HOURS AS NEEDED FOR WHEEZING   • Alcohol Swabs (ALCOHOL PREP) 70 % PADS daily Test   • Alcohol Swabs (Alcohol Prep) PADS TEST DAILY (Patient not taking: No sig reported)   • amiodarone 200 mg tablet Take 1 tablet (200 mg total) by mouth daily   • aspirin (ECOTRIN LOW STRENGTH) 81 mg EC tablet Take 81 mg by mouth daily   • atorvastatin (Lipitor) 20 mg tablet Take 1 tablet (20 mg total) by mouth daily   • BD Pen Needle Carolyn U/F 32G X 4 MM MISC USE AS DIRECTED TO INJECT INSULIN   • Blood Pressure Monitoring ([place] Choice BP Monitor/Arm) JM USE AS DIRECTED   • budesonide (Pulmicort Flexhaler) 90 MCG/ACT inhaler Inhale 1 puff 2 (two) times a day Rinse mouth after use. • clotrimazole-betamethasone (LOTRISONE) 1-0.05 % cream APPLY TOPICALLY 2 (TWO) TIMES A DAY   • cyclobenzaprine (FLEXERIL) 5 mg tablet Take 1 tablet (5 mg total) by mouth 3 (three) times a day as needed for muscle spasms   • diltiazem (CARDIZEM CD) 240 mg 24 hr capsule TAKE 1 CAPSULE (240 MG TOTAL) BY MOUTH DAILY   • DULoxetine (CYMBALTA) 30 mg delayed release capsule TAKE 1 CAPSULE (30 MG TOTAL) BY MOUTH DAILY AT BEDTIME   • Empagliflozin (Jardiance) 25 MG TABS Take 1 tablet (25 mg total) by mouth every morning   • Fluticasone Furoate-Vilanterol (Breo Ellipta) 100-25 mcg/actuation inhaler Inhale 1 puff daily Rinse mouth after use. • furosemide (LASIX) 20 mg tablet Take 1 tablet (20 mg total) by mouth daily   • gabapentin (NEURONTIN) 100 mg capsule TAKE 1 CAPSULE (100 MG TOTAL) BY MOUTH 3 (THREE) TIMES A DAY   • Humidifier MISC USE AS DIRECTED   • Lancets MISC Use to test blood glucose once a day. Dx: Type 2 DM. E11.9   • lisinopril (ZESTRIL) 10 mg tablet Take 1 tablet (10 mg total) by mouth daily   • metoprolol tartrate (LOPRESSOR) 50 mg tablet Take 1 tablet (50 mg total) by mouth 3 (three) times a day   • Misc.  Devices MISC by Does not apply route daily (Patient not taking: Reported on 9/3/2021)   • multivitamin (THERAGRAN) TABS Take 1 tablet by mouth daily   • oxyCODONE (Roxicodone) 5 immediate release tablet Take 1 tablet (5 mg total) by mouth every 4 (four) hours as needed for moderate pain Ongoing therapy Max Daily Amount: 30 mg (Patient not taking: Reported on 9/2/2022)   • tadalafil (CIALIS) 20 MG tablet TAKE 1 TABLET (20 MG TOTAL) BY MOUTH AS NEEDED (ERECTILE DYSFUNCTION)   • TRUEplus Lancets 33G MISC USE AS INSTRUCTED   • Xarelto 20 MG tablet TAKE 1 TABLET (20 MG TOTAL) BY MOUTH DAILY WITH BREAKFAST   • [DISCONTINUED] Contour Next Test test strip USE 1 EACH 3 (THREE) TIMES A DAY   • [DISCONTINUED] Insulin Glargine Solostar (Lantus SoloStar) 100 UNIT/ML SOPN Inject 0.55 mL (55 Units total) under the skin 2 (two) times a day   • [DISCONTINUED] Melatonin 5 MG TABS Take 1 tablet (5 mg total) by mouth daily at bedtime   • [DISCONTINUED] metFORMIN (GLUCOPHAGE) 1000 MG tablet Take 1 tablet (1,000 mg total) by mouth 2 (two) times a day with meals (Patient not taking: Reported on 4/26/2023)   • [DISCONTINUED] methocarbamol (ROBAXIN) 500 mg tablet Take 1 tablet (500 mg total) by mouth every 6 (six) hours as needed for muscle spasms (Patient not taking: Reported on 9/2/2022)   • [DISCONTINUED] oxyCODONE (ROXICODONE) 10 MG TABS You may take 5 mg (0.5 tab) for moderate pain or 10 mg (1 tab) for severe pain, every 4 hours, as needed. (Patient not taking: Reported on 9/2/2022)   • [DISCONTINUED] senna-docusate sodium (SENOKOT S) 8.6-50 mg per tablet Take 2 tablets by mouth daily (Patient not taking: Reported on 9/2/2022)   • [DISCONTINUED] Tadalafil, PAH, 20 MG TABS Take 1 tablet (20 mg total) by mouth as needed (erectile dysfunction) (Patient not taking: Reported on 4/26/2023)       Objective     BP (!) 146/102 (BP Location: Left arm, Patient Position: Sitting, Cuff Size: Large)   Pulse 65   Temp 98 °F (36.7 °C) (Temporal)   Resp 21   Ht 6' (1.829 m)   Wt (!) 148 kg (326 lb)   SpO2 96%   BMI 44.21 kg/m²     Physical Exam  Vitals and nursing note reviewed. Constitutional:       Appearance: He is well-developed. HENT:      Head: Normocephalic.       Right Ear: External ear normal.      Left Ear: External ear normal.      Nose: Nose normal.   Eyes:      Conjunctiva/sclera: Conjunctivae normal.      Pupils: Pupils are equal, round, and reactive to light. Comments: Decreased vision    Neck:      Thyroid: No thyromegaly. Cardiovascular:      Rate and Rhythm: Normal rate and regular rhythm. Heart sounds: Normal heart sounds. Pulmonary:      Effort: Pulmonary effort is normal.      Breath sounds: Normal breath sounds. Abdominal:      Palpations: Abdomen is soft. Tenderness: There is no abdominal tenderness. There is no guarding or rebound. Musculoskeletal:         General: Tenderness and deformity present. Normal range of motion. Cervical back: Normal range of motion and neck supple. Skin:     General: Skin is dry. Neurological:      Mental Status: He is alert and oriented to person, place, and time. Gait: Gait abnormal.      Deep Tendon Reflexes: Reflexes are normal and symmetric.        Ely Haro MD

## 2023-09-06 DIAGNOSIS — J45.30 MILD PERSISTENT ASTHMA: ICD-10-CM

## 2023-09-06 DIAGNOSIS — J44.9 COPD (CHRONIC OBSTRUCTIVE PULMONARY DISEASE) (HCC): ICD-10-CM

## 2023-09-07 RX ORDER — ALBUTEROL SULFATE 90 UG/1
2 AEROSOL, METERED RESPIRATORY (INHALATION) EVERY 4 HOURS PRN
Qty: 18 G | Refills: 2 | Status: SHIPPED | OUTPATIENT
Start: 2023-09-07

## 2023-10-04 ENCOUNTER — VBI (OUTPATIENT)
Dept: ADMINISTRATIVE | Facility: OTHER | Age: 58
End: 2023-10-04

## 2023-10-06 ENCOUNTER — VBI (OUTPATIENT)
Dept: ADMINISTRATIVE | Facility: OTHER | Age: 58
End: 2023-10-06

## 2023-10-08 DIAGNOSIS — I48.91 ATRIAL FIBRILLATION WITH RVR (HCC): ICD-10-CM

## 2023-10-09 RX ORDER — RIVAROXABAN 20 MG/1
TABLET, FILM COATED ORAL
Qty: 90 TABLET | Refills: 0 | Status: SHIPPED | OUTPATIENT
Start: 2023-10-09

## 2023-11-05 DIAGNOSIS — J45.30 MILD PERSISTENT ASTHMA: ICD-10-CM

## 2023-11-05 DIAGNOSIS — J44.9 COPD (CHRONIC OBSTRUCTIVE PULMONARY DISEASE) (HCC): ICD-10-CM

## 2023-11-06 RX ORDER — ALBUTEROL SULFATE 90 UG/1
2 AEROSOL, METERED RESPIRATORY (INHALATION) EVERY 4 HOURS PRN
Qty: 18 G | Refills: 2 | Status: SHIPPED | OUTPATIENT
Start: 2023-11-06

## 2023-12-13 DIAGNOSIS — I48.91 ATRIAL FIBRILLATION WITH RVR (HCC): ICD-10-CM

## 2023-12-13 DIAGNOSIS — J45.30 MILD PERSISTENT ASTHMA: ICD-10-CM

## 2023-12-13 DIAGNOSIS — J44.9 COPD (CHRONIC OBSTRUCTIVE PULMONARY DISEASE) (HCC): ICD-10-CM

## 2023-12-13 RX ORDER — ALBUTEROL SULFATE 90 UG/1
2 AEROSOL, METERED RESPIRATORY (INHALATION) EVERY 4 HOURS PRN
Qty: 18 G | Refills: 0 | Status: SHIPPED | OUTPATIENT
Start: 2023-12-13

## 2024-01-11 DIAGNOSIS — M25.512 BILATERAL SHOULDER PAIN, UNSPECIFIED CHRONICITY: ICD-10-CM

## 2024-01-11 DIAGNOSIS — J44.9 COPD (CHRONIC OBSTRUCTIVE PULMONARY DISEASE) (HCC): ICD-10-CM

## 2024-01-11 DIAGNOSIS — I48.92 ATRIAL FLUTTER WITH RAPID VENTRICULAR RESPONSE (HCC): ICD-10-CM

## 2024-01-11 DIAGNOSIS — J45.30 MILD PERSISTENT ASTHMA: ICD-10-CM

## 2024-01-11 DIAGNOSIS — M25.511 BILATERAL SHOULDER PAIN, UNSPECIFIED CHRONICITY: ICD-10-CM

## 2024-01-11 DIAGNOSIS — I48.91 ATRIAL FIBRILLATION WITH RVR (HCC): ICD-10-CM

## 2024-01-11 RX ORDER — DULOXETIN HYDROCHLORIDE 30 MG/1
30 CAPSULE, DELAYED RELEASE ORAL
Qty: 90 CAPSULE | Refills: 0 | Status: SHIPPED | OUTPATIENT
Start: 2024-01-11

## 2024-01-11 RX ORDER — ALBUTEROL SULFATE 90 UG/1
2 AEROSOL, METERED RESPIRATORY (INHALATION) EVERY 4 HOURS PRN
Qty: 18 G | Refills: 0 | Status: SHIPPED | OUTPATIENT
Start: 2024-01-11

## 2024-01-11 RX ORDER — DILTIAZEM HYDROCHLORIDE 240 MG/1
240 CAPSULE, COATED, EXTENDED RELEASE ORAL DAILY
Qty: 90 CAPSULE | Refills: 0 | Status: SHIPPED | OUTPATIENT
Start: 2024-01-11

## 2024-01-12 DIAGNOSIS — E11.9 TYPE 2 DIABETES MELLITUS WITHOUT COMPLICATION, WITHOUT LONG-TERM CURRENT USE OF INSULIN (HCC): ICD-10-CM

## 2024-01-12 NOTE — TELEPHONE ENCOUNTER
To be sent to his new pharmacy [facility] in [facility] already on the chart.  
not assessed

## 2024-03-04 DIAGNOSIS — E11.42 TYPE 2 DIABETES MELLITUS WITH DIABETIC POLYNEUROPATHY, WITH LONG-TERM CURRENT USE OF INSULIN (HCC): ICD-10-CM

## 2024-03-04 DIAGNOSIS — Z79.4 TYPE 2 DIABETES MELLITUS WITH DIABETIC POLYNEUROPATHY, WITH LONG-TERM CURRENT USE OF INSULIN (HCC): ICD-10-CM

## 2024-03-04 RX ORDER — INSULIN GLARGINE 100 [IU]/ML
INJECTION, SOLUTION SUBCUTANEOUS
Qty: 30 ML | Refills: 1 | Status: SHIPPED | OUTPATIENT
Start: 2024-03-04

## 2024-05-06 DIAGNOSIS — M25.511 BILATERAL SHOULDER PAIN, UNSPECIFIED CHRONICITY: ICD-10-CM

## 2024-05-06 DIAGNOSIS — J45.30 MILD PERSISTENT ASTHMA: ICD-10-CM

## 2024-05-06 DIAGNOSIS — J44.9 COPD (CHRONIC OBSTRUCTIVE PULMONARY DISEASE) (HCC): ICD-10-CM

## 2024-05-06 DIAGNOSIS — M25.512 BILATERAL SHOULDER PAIN, UNSPECIFIED CHRONICITY: ICD-10-CM

## 2024-05-06 RX ORDER — ALBUTEROL SULFATE 90 UG/1
2 AEROSOL, METERED RESPIRATORY (INHALATION) EVERY 4 HOURS PRN
Qty: 18 G | Refills: 0 | OUTPATIENT
Start: 2024-05-06

## 2024-05-06 RX ORDER — DULOXETIN HYDROCHLORIDE 30 MG/1
30 CAPSULE, DELAYED RELEASE ORAL
Qty: 90 CAPSULE | Refills: 0 | OUTPATIENT
Start: 2024-05-06

## 2024-05-28 ENCOUNTER — TELEPHONE (OUTPATIENT)
Dept: FAMILY MEDICINE CLINIC | Facility: CLINIC | Age: 59
End: 2024-05-28

## 2025-07-21 NOTE — ASSESSMENT & PLAN NOTE
Lab Results   Component Value Date    HGBA1C 8 2 (A) 01/24/2022    HGBA1C 12 1 (A) 09/03/2021    HGBA1C 12 6 (A) 05/20/2021   · Much improved after restarting Jardiance, also better compliance with insulin and metformin    · Was scheduled with DM educator/nutritionist on 12/17 but missed the appt  · I encouraged continued compliance, referred back to DM nutritionist and optholmologist Expected Date Of Service: 07/21/2025 Performing Laboratory: [id number] Lab Facility: 0 Bill For Surgical Tray: no Billing Type: Third-Party Bill

## (undated) DEVICE — SYRINGE 30ML LL

## (undated) DEVICE — SUT VICRYL 0 UR-6 27 IN J603H

## (undated) DEVICE — TROCAR: Brand: KII FIOS FIRST ENTRY

## (undated) DEVICE — STERILE POLYISOPRENE POWDER-FREE SURGICAL GLOVES: Brand: PROTEXIS

## (undated) DEVICE — SUT MONOCRYL 4-0 PS-2 27 IN Y426H

## (undated) DEVICE — STERILE POLYISOPRENE POWDER-FREE SURGICAL GLOVES WITH EMOLLIENT COATING: Brand: PROTEXIS

## (undated) DEVICE — CHLORAPREP HI-LITE 26ML ORANGE

## (undated) DEVICE — TROCAR: Brand: KII® SLEEVE

## (undated) DEVICE — ENDOPOUCH RETRIEVER SPECIMEN RETRIEVAL BAGS: Brand: ENDOPOUCH RETRIEVER

## (undated) DEVICE — LIGAMAX 5 MM ENDOSCOPIC MULTIPLE CLIP APPLIER: Brand: LIGAMAX

## (undated) DEVICE — INTENDED FOR TISSUE SEPARATION, AND OTHER PROCEDURES THAT REQUIRE A SHARP SURGICAL BLADE TO PUNCTURE OR CUT.: Brand: BARD-PARKER SAFETY BLADES SIZE 11, STERILE

## (undated) DEVICE — INSUFFLATION NEEDLE TO ESTABLISH PNEUMOPERITONEUM.: Brand: INSUFFLATION NEEDLE

## (undated) DEVICE — ALLENTOWN LAP CHOLE APP PACK: Brand: CARDINAL HEALTH

## (undated) DEVICE — SKIN MARKER DUAL TIP WITH RULER CAP, FLEXIBLE RULER AND LABELS: Brand: DEVON

## (undated) DEVICE — HYGIENE-FILTER  FOR SINGLE USE

## (undated) DEVICE — IRRIG ENDO FLO TUBING

## (undated) DEVICE — SCD SEQUENTIAL COMPRESSION COMFORT SLEEVE MEDIUM KNEE LENGTH: Brand: KENDALL SCD

## (undated) DEVICE — 4-PORT MANIFOLD: Brand: NEPTUNE 2

## (undated) DEVICE — 3M™ STERI-STRIP™ REINFORCED ADHESIVE SKIN CLOSURES, R1547, 1/2 IN X 4 IN (12 MM X 100 MM), 6 STRIPS/ENVELOPE: Brand: 3M™ STERI-STRIP™

## (undated) DEVICE — SWABSTCK, BENZOIN TINCTURE, 1/PK, STRL: Brand: APLICARE

## (undated) DEVICE — ENDOPATH 5MM CURVED SCISSORS WITH MONOPOLAR CAUTERY: Brand: ENDOPATH